# Patient Record
Sex: FEMALE | Race: OTHER | NOT HISPANIC OR LATINO | URBAN - METROPOLITAN AREA
[De-identification: names, ages, dates, MRNs, and addresses within clinical notes are randomized per-mention and may not be internally consistent; named-entity substitution may affect disease eponyms.]

---

## 2021-09-03 ENCOUNTER — INPATIENT (INPATIENT)
Facility: HOSPITAL | Age: 76
LOS: 5 days | Discharge: NURSING, REHAB OR LONG-TERM CARE | DRG: 552 | End: 2021-09-09
Attending: INTERNAL MEDICINE | Admitting: INTERNAL MEDICINE
Payer: MEDICARE

## 2021-09-03 VITALS
HEIGHT: 60 IN | OXYGEN SATURATION: 96 % | RESPIRATION RATE: 17 BRPM | DIASTOLIC BLOOD PRESSURE: 95 MMHG | HEART RATE: 94 BPM | TEMPERATURE: 98 F | WEIGHT: 139.99 LBS | SYSTOLIC BLOOD PRESSURE: 173 MMHG

## 2021-09-03 DIAGNOSIS — R33.9 RETENTION OF URINE, UNSPECIFIED: ICD-10-CM

## 2021-09-03 DIAGNOSIS — S32.9XXA FRACTURE OF UNSPECIFIED PARTS OF LUMBOSACRAL SPINE AND PELVIS, INITIAL ENCOUNTER FOR CLOSED FRACTURE: ICD-10-CM

## 2021-09-03 DIAGNOSIS — S32.10XA UNSPECIFIED FRACTURE OF SACRUM, INITIAL ENCOUNTER FOR CLOSED FRACTURE: ICD-10-CM

## 2021-09-03 DIAGNOSIS — I10 ESSENTIAL (PRIMARY) HYPERTENSION: ICD-10-CM

## 2021-09-03 DIAGNOSIS — E87.1 HYPO-OSMOLALITY AND HYPONATREMIA: ICD-10-CM

## 2021-09-03 DIAGNOSIS — Z29.9 ENCOUNTER FOR PROPHYLACTIC MEASURES, UNSPECIFIED: ICD-10-CM

## 2021-09-03 LAB
ANION GAP SERPL CALC-SCNC: 7 MMOL/L — SIGNIFICANT CHANGE UP (ref 5–17)
ANION GAP SERPL CALC-SCNC: 8 MMOL/L — SIGNIFICANT CHANGE UP (ref 5–17)
APPEARANCE UR: CLEAR — SIGNIFICANT CHANGE UP
BASOPHILS # BLD AUTO: 0 K/UL — SIGNIFICANT CHANGE UP (ref 0–0.2)
BASOPHILS NFR BLD AUTO: 0 % — SIGNIFICANT CHANGE UP (ref 0–2)
BILIRUB UR-MCNC: NEGATIVE — SIGNIFICANT CHANGE UP
BUN SERPL-MCNC: 5 MG/DL — LOW (ref 7–18)
BUN SERPL-MCNC: 7 MG/DL — SIGNIFICANT CHANGE UP (ref 7–18)
CALCIUM SERPL-MCNC: 8.1 MG/DL — LOW (ref 8.4–10.5)
CALCIUM SERPL-MCNC: 9.1 MG/DL — SIGNIFICANT CHANGE UP (ref 8.4–10.5)
CHLORIDE SERPL-SCNC: 86 MMOL/L — LOW (ref 96–108)
CHLORIDE SERPL-SCNC: 93 MMOL/L — LOW (ref 96–108)
CO2 SERPL-SCNC: 27 MMOL/L — SIGNIFICANT CHANGE UP (ref 22–31)
CO2 SERPL-SCNC: 28 MMOL/L — SIGNIFICANT CHANGE UP (ref 22–31)
COLOR SPEC: YELLOW — SIGNIFICANT CHANGE UP
CREAT SERPL-MCNC: 0.2 MG/DL — LOW (ref 0.5–1.3)
CREAT SERPL-MCNC: 0.31 MG/DL — LOW (ref 0.5–1.3)
DIFF PNL FLD: NEGATIVE — SIGNIFICANT CHANGE UP
EOSINOPHIL # BLD AUTO: 0 K/UL — SIGNIFICANT CHANGE UP (ref 0–0.5)
EOSINOPHIL NFR BLD AUTO: 0 % — SIGNIFICANT CHANGE UP (ref 0–6)
GLUCOSE SERPL-MCNC: 83 MG/DL — SIGNIFICANT CHANGE UP (ref 70–99)
GLUCOSE SERPL-MCNC: 98 MG/DL — SIGNIFICANT CHANGE UP (ref 70–99)
GLUCOSE UR QL: NEGATIVE — SIGNIFICANT CHANGE UP
HCT VFR BLD CALC: 33.8 % — LOW (ref 34.5–45)
HGB BLD-MCNC: 12.1 G/DL — SIGNIFICANT CHANGE UP (ref 11.5–15.5)
KETONES UR-MCNC: ABNORMAL
LEUKOCYTE ESTERASE UR-ACNC: ABNORMAL
LYMPHOCYTES # BLD AUTO: 0.56 K/UL — LOW (ref 1–3.3)
LYMPHOCYTES # BLD AUTO: 9 % — LOW (ref 13–44)
MCHC RBC-ENTMCNC: 30.6 PG — SIGNIFICANT CHANGE UP (ref 27–34)
MCHC RBC-ENTMCNC: 35.8 GM/DL — SIGNIFICANT CHANGE UP (ref 32–36)
MCV RBC AUTO: 85.4 FL — SIGNIFICANT CHANGE UP (ref 80–100)
MONOCYTES # BLD AUTO: 0.44 K/UL — SIGNIFICANT CHANGE UP (ref 0–0.9)
MONOCYTES NFR BLD AUTO: 7 % — SIGNIFICANT CHANGE UP (ref 2–14)
NEUTROPHILS # BLD AUTO: 3.89 K/UL — SIGNIFICANT CHANGE UP (ref 1.8–7.4)
NEUTROPHILS NFR BLD AUTO: 62 % — SIGNIFICANT CHANGE UP (ref 43–77)
NITRITE UR-MCNC: NEGATIVE — SIGNIFICANT CHANGE UP
PH UR: 8 — SIGNIFICANT CHANGE UP (ref 5–8)
PLATELET # BLD AUTO: 306 K/UL — SIGNIFICANT CHANGE UP (ref 150–400)
POTASSIUM SERPL-MCNC: 3.3 MMOL/L — LOW (ref 3.5–5.3)
POTASSIUM SERPL-MCNC: 3.4 MMOL/L — LOW (ref 3.5–5.3)
POTASSIUM SERPL-SCNC: 3.3 MMOL/L — LOW (ref 3.5–5.3)
POTASSIUM SERPL-SCNC: 3.4 MMOL/L — LOW (ref 3.5–5.3)
PROT UR-MCNC: 15
RBC # BLD: 3.96 M/UL — SIGNIFICANT CHANGE UP (ref 3.8–5.2)
RBC # FLD: 12 % — SIGNIFICANT CHANGE UP (ref 10.3–14.5)
SARS-COV-2 RNA SPEC QL NAA+PROBE: SIGNIFICANT CHANGE UP
SODIUM SERPL-SCNC: 121 MMOL/L — LOW (ref 135–145)
SODIUM SERPL-SCNC: 128 MMOL/L — LOW (ref 135–145)
SP GR SPEC: 1.01 — SIGNIFICANT CHANGE UP (ref 1.01–1.02)
UROBILINOGEN FLD QL: NEGATIVE — SIGNIFICANT CHANGE UP
WBC # BLD: 6.27 K/UL — SIGNIFICANT CHANGE UP (ref 3.8–10.5)
WBC # FLD AUTO: 6.27 K/UL — SIGNIFICANT CHANGE UP (ref 3.8–10.5)

## 2021-09-03 PROCEDURE — 99285 EMERGENCY DEPT VISIT HI MDM: CPT | Mod: CS

## 2021-09-03 PROCEDURE — 71045 X-RAY EXAM CHEST 1 VIEW: CPT | Mod: 26

## 2021-09-03 PROCEDURE — 76376 3D RENDER W/INTRP POSTPROCES: CPT | Mod: 26

## 2021-09-03 PROCEDURE — 72192 CT PELVIS W/O DYE: CPT | Mod: 26,MA

## 2021-09-03 PROCEDURE — 73700 CT LOWER EXTREMITY W/O DYE: CPT | Mod: 26,LT

## 2021-09-03 RX ORDER — ENOXAPARIN SODIUM 100 MG/ML
40 INJECTION SUBCUTANEOUS DAILY
Refills: 0 | Status: DISCONTINUED | OUTPATIENT
Start: 2021-09-03 | End: 2021-09-09

## 2021-09-03 RX ORDER — MORPHINE SULFATE 50 MG/1
1 CAPSULE, EXTENDED RELEASE ORAL EVERY 6 HOURS
Refills: 0 | Status: DISCONTINUED | OUTPATIENT
Start: 2021-09-03 | End: 2021-09-04

## 2021-09-03 RX ORDER — ACETAMINOPHEN 500 MG
650 TABLET ORAL EVERY 4 HOURS
Refills: 0 | Status: DISCONTINUED | OUTPATIENT
Start: 2021-09-03 | End: 2021-09-04

## 2021-09-03 RX ORDER — OXYCODONE AND ACETAMINOPHEN 5; 325 MG/1; MG/1
1 TABLET ORAL EVERY 4 HOURS
Refills: 0 | Status: DISCONTINUED | OUTPATIENT
Start: 2021-09-03 | End: 2021-09-04

## 2021-09-03 RX ORDER — HYDROMORPHONE HYDROCHLORIDE 2 MG/ML
1 INJECTION INTRAMUSCULAR; INTRAVENOUS; SUBCUTANEOUS
Refills: 0 | Status: DISCONTINUED | OUTPATIENT
Start: 2021-09-03 | End: 2021-09-03

## 2021-09-03 RX ORDER — MORPHINE SULFATE 50 MG/1
0.5 CAPSULE, EXTENDED RELEASE ORAL ONCE
Refills: 0 | Status: DISCONTINUED | OUTPATIENT
Start: 2021-09-03 | End: 2021-09-03

## 2021-09-03 RX ORDER — POTASSIUM CHLORIDE 20 MEQ
40 PACKET (EA) ORAL ONCE
Refills: 0 | Status: COMPLETED | OUTPATIENT
Start: 2021-09-03 | End: 2021-09-03

## 2021-09-03 RX ORDER — LIDOCAINE 4 G/100G
1 CREAM TOPICAL DAILY
Refills: 0 | Status: DISCONTINUED | OUTPATIENT
Start: 2021-09-03 | End: 2021-09-09

## 2021-09-03 RX ORDER — IBUPROFEN 200 MG
600 TABLET ORAL ONCE
Refills: 0 | Status: COMPLETED | OUTPATIENT
Start: 2021-09-03 | End: 2021-09-03

## 2021-09-03 RX ORDER — ACETAMINOPHEN 500 MG
1000 TABLET ORAL EVERY 8 HOURS
Refills: 0 | Status: DISCONTINUED | OUTPATIENT
Start: 2021-09-03 | End: 2021-09-03

## 2021-09-03 RX ORDER — SODIUM CHLORIDE 9 MG/ML
1000 INJECTION INTRAMUSCULAR; INTRAVENOUS; SUBCUTANEOUS
Refills: 0 | Status: DISCONTINUED | OUTPATIENT
Start: 2021-09-03 | End: 2021-09-03

## 2021-09-03 RX ORDER — SODIUM CHLORIDE 9 MG/ML
1000 INJECTION, SOLUTION INTRAVENOUS
Refills: 0 | Status: DISCONTINUED | OUTPATIENT
Start: 2021-09-03 | End: 2021-09-04

## 2021-09-03 RX ADMIN — SODIUM CHLORIDE 75 MILLILITER(S): 9 INJECTION INTRAMUSCULAR; INTRAVENOUS; SUBCUTANEOUS at 20:30

## 2021-09-03 RX ADMIN — Medication 600 MILLIGRAM(S): at 12:43

## 2021-09-03 RX ADMIN — MORPHINE SULFATE 1 MILLIGRAM(S): 50 CAPSULE, EXTENDED RELEASE ORAL at 01:45

## 2021-09-03 RX ADMIN — Medication 600 MILLIGRAM(S): at 13:00

## 2021-09-03 NOTE — ED PROVIDER NOTE - PHYSICAL EXAMINATION
Left hip area tenderness.  no bony deformities, no leg length discrepancy, femoral and pedal pulses intact, cap refill  < 2 secs. Left hip and lower back area tenderness.  no bony deformities, no leg length discrepancy, femoral and pedal pulses intact, cap refill  < 2 secs.  No saddle anesthesia, B/L knee, pedal and EHL flex and ext intact.

## 2021-09-03 NOTE — ED ADULT NURSE NOTE - NSIMPLEMENTINTERV_GEN_ALL_ED
Implemented All Fall with Harm Risk Interventions:  [place] to call system. Call bell, personal items and telephone within reach. Instruct patient to call for assistance. Room bathroom lighting operational. Non-slip footwear when patient is off stretcher. Physically safe environment: no spills, clutter or unnecessary equipment. Stretcher in lowest position, wheels locked, appropriate side rails in place. Provide visual cue, wrist band, yellow gown, etc. Monitor gait and stability. Monitor for mental status changes and reorient to person, place, and time. Review medications for side effects contributing to fall risk. Reinforce activity limits and safety measures with patient and family. Provide visual clues: red socks.

## 2021-09-03 NOTE — H&P ADULT - NSHPLABSRESULTS_GEN_ALL_CORE
CBC wnl  Na 121, Cl 86, K 3.4  UA: trace LE, 6-10 WBCs      CT Pelvis, CT 3D reconstruction:    OSSEOUS STRUCTURES: Mildly decreased bone mineralization. There are nondisplaced fractures of the bilateral sacral ala that traverse the midline at the S1 level. Acute bilateral L5 transverse process fractures. No additional acute fracture is seen. Chronic as for fracture Moderate to severe bilateral hip joint space narrowing is noted. There is spurring and productive change at the pubic symphysis. Chronic erosive change at the pubic symphysis. Spurring the sacroiliac joints. Moderate chronic compression deformities of the L4 and L5 vertebra.  SYNOVIUM/ JOINT FLUID: No hip joint effusion  TENDONS: Limited by CT technique. No full-thickness tendon tear or retraction.  MUSCLES: No intramuscular hematoma  NEUROVASCULAR STRUCTURES: Mild vascular calcifications are noted.  INTRAPELVIC SOFT TISSUES: Moderate distention of the urinary bladder. Diastases of the rectus abdominis muscles.  SUBCUTANEOUS SOFT TISSUES: No soft tissue swelling.    3-D reformatted imaging confirms these findings.    IMPRESSION:    Bilateral sacral alar fractures which cross midline at S1. Acute bilateral L5 transverse process fractures.  Chronic fracture deformities of the L4 and L5 vertebra.  Degenerative changes as above.  Moderate distention of the urinary bladder.

## 2021-09-03 NOTE — ED PROVIDER NOTE - CARE PLAN
1 Principal Discharge DX:	Sacral fracture  Secondary Diagnosis:	Lumbar transverse process fracture

## 2021-09-03 NOTE — H&P ADULT - PROBLEM SELECTOR PLAN 4
/95 in ED  on Losartan-HTCZ at home (not in pharmacy records) /95 in ED  on Losartan 50-HTCZ and lisinopril 40mg at home (not in pharmacy records)  If needed can add these anti HTNs meds

## 2021-09-03 NOTE — H&P ADULT - NSHPREVIEWOFSYSTEMS_GEN_ALL_CORE
Denied fever, chills, chest pain, shortness of breath, cough, abdominal pain, urinary incontinence, dysuria, burning urination, nausea, vomiting or diarrhea. Denied fever, chills, chest pain, shortness of breath, cough, abdominal pain, nausea, vomiting or diarrhea, urinary incontinence, dysuria, burning urination,

## 2021-09-03 NOTE — H&P ADULT - ATTENDING COMMENTS
Patient is a 76 year-old f, from home, ambulates with a walker at baseline with a PMH of HTN presents to the ER on 9/3/21 with c/o lower back pain since 7/28/21. As per patient's son, patient fell on her left side while walking on the street on 07/28/21. No head injury or loss of consciousness. Initially, her pain was tolerated and relieved by Tylenol. Her pain was getting worse 2 weeks ago and patient was prescribed percocet by her primary care provider on 8/20/21. Patient also had a X-ray outpatient. However, her pain still persists and she becomes immobilize and bed-bound.     # ACUTE FRACTURE B/L SACRAL ALAR FX W/ CROSS AT MIDLINE OF S1, B/L L5 TRANSVERSE PROCESS FX, CHRONIC FRACTURES OF L4 AND L5  - ORTHOPEDIC SX RECOMMENDATION FOR CONSERVATIVE MGMT  - PRN PAIN CONTROL; PAIN MGMT CONSULT  - F/U PT EVAL    # R/O LEFT FEMOR FRACTURE - F/U CT LEFT HIP/FEMUR    # ACUTE HYPONATREMIA - SUSPECT HYPOVOLEMIC HYPONATREMIA S/T POOR PO INTAKE + ? HCTZ [SO WILL BRING HOME ANTI-HTN TO CONFIRM] VS. R/O SIADH  - F/U URINE LYTES, U NA, U OSM, SERUM OSM  - TRIAL OF IVF  - GOAL NA < 8-10 CORRECTION IN 24 HOURS  - NEPHROLOGY CONSULT DR. FARRELL    # LEFT LOWER LOBE ? MASS VS. EFFUSION VS. CONSOLIDATION - NO ACUTE LEUKOCYTOSIS, COUGH, FEVER - F/U CT CHEST TO FURTHER CHARACTERIZE, F/U BCX    # HYPOKALEMIA - REPLETING WITH SUPPLEMENT    # HTN    # CASE D/W SON AT BEDSIDE    # GI AND DVT PPX

## 2021-09-03 NOTE — H&P ADULT - PROBLEM SELECTOR PLAN 2
-Na 121  -Cl 86  -IVF NS 100c/hr  ->f/u urine lytes  ->f/u BMP Q4  Nephrology Radha berrios -Na 121  -Cl 86  -IVF NS 75c/hr  ->f/u urine lytes  ->f/u BMP Q4  Nephrology Radha berrios -Na 121  -Cl 86  -IVF NS 75c/hr  ->f/u urine lytes  ->f/u BMP Q4 (correctonly to 127 to 129mEQ in 24 hours), adjust fluids as needed  Nephrology Siriki consulted

## 2021-09-03 NOTE — ED PROVIDER NOTE - PROGRESS NOTE DETAILS
Signed out from Dr. Gerber pending labs then Medicine admit  Labs significant for Na 121 but patient is at mental baseline, will proceed with Medicine admission.

## 2021-09-03 NOTE — H&P ADULT - NSHPPHYSICALEXAM_GEN_ALL_CORE
T(C): 36.5 (09-03-21 @ 19:30), Max: 36.8 (09-03-21 @ 09:45)  HR: 92 (09-03-21 @ 19:30) (92 - 94)  BP: 128/69 (09-03-21 @ 19:30) (128/69 - 173/95)  RR: 18 (09-03-21 @ 19:30) (17 - 18)  SpO2: 98% (09-03-21 @ 19:30) (96% - 99%)    GENERAL: Thin, elderly woman, no acute distress, appropriate, pleasant  EYES: sclera clear, no exudates  ENMT: oropharynx clear without erythema, no exudates, moist mucous membranes  NECK: supple, soft, no thyromegaly noted  LUNGS: good air entry bilaterally, clear to auscultation, symmetric breath sounds, no wheezing, rhonchi or rales appreciated  HEART: S1/S2, regular rate and rhythm, no murmurs noted, +1 lower extremity edema to midcalf  GASTROINTESTINAL: abdomen is soft, nontender, nondistended, normoactive bowel sounds, no palpable masses  INTEGUMENT: good skin turgor, no lesions noted  MUSCULOSKELETAL: TTP to left hip, lower back, movement restricted d/t pain  NEUROLOGIC: AAO x3, good muscle tone in 4 extremities, no focal neurological deficits  PSYCHIATRIC: mood is good, affect is congruent, linear and logical thought process  HEME/LYMPH: no palpable supraclavicular nodules, no obvious ecchymosis or petechiae

## 2021-09-03 NOTE — H&P ADULT - ASSESSMENT
76 YOF with PMH of HTN, sp with stable pelvic fracture admitted to medicine for PT/rehab palcement. Surgical intervention not warranted per ortho

## 2021-09-03 NOTE — ED PROVIDER NOTE - CLINICAL SUMMARY MEDICAL DECISION MAKING FREE TEXT BOX
CT reported Bilateral sacral alar fractures which cross midline at S1. Acute bilateral L5 transverse process fractures.  Chronic fracture deformities of the L4 and L5 vertebra. Degenerative changes as above.  Pt states unable to ambulate without walker due to pain.  3pm- Pt's son is not in ED, attempted to call pt. Ortho PA Mike endorsed and will evaluate pt. CT reported Bilateral sacral alar fractures which cross midline at S1. Acute bilateral L5 transverse process fractures.  Chronic fracture deformities of the L4 and L5 vertebra. Degenerative changes as above.  Pt states unable to ambulate without walker due to pain.  3pm- Pt's son is not in ED, attempted to call pt. Ortho RADHA Mckeon endorsed and will evaluate pt.  345p- I spoke to pt's son requesting mother be admitted for progressive apin despite walker use and unable to carry out DALs.  Dr. MELBA Kimbrough endorsed will admit for PT/rehab. Ortho PA also evaluated pt. Surgical intervention not warranted.

## 2021-09-03 NOTE — H&P ADULT - PROBLEM SELECTOR PLAN 1
s/p Fall 7/28  -Bilateral sacral alar fractures which cross midline at S1. Acute bilateral L5 transverse process fractures.  -Chronic fracture deformities of the L4 and L5 vertebra. Degenerative changes as above.  -Pt states unable to ambulate without walker due to pain.  Ortho: rec Conservative management  - Pain control  - Daily PT - WBAT to lower extremities with appropriate device   - Patient is orthopedically stable for discharge  - Patient may follow up with Dr. Beach after discharge     ->f/u CT L femur  ->0.5 morphine for pain s/p Fall 7/28  -Bilateral sacral alar fractures which cross midline at S1. Acute bilateral L5 transverse process fractures.  -Chronic fracture deformities of the L4 and L5 vertebra. Degenerative changes as above.  -Pt states unable to ambulate without walker due to pain.  Ortho: rec Conservative management  - Pain control  - Daily PT - WBAT to lower extremities with appropriate device   - Patient is orthopedically stable for discharge  - Patient may follow up with Dr. Beach after discharge   Pain consult ordered   ->f/u CT L femur  ->0.5 morphine for pain s/p Fall 7/28  -Bilateral sacral alar fractures which cross midline at S1. Acute bilateral L5 transverse process fractures.  -Chronic fracture deformities of the L4 and L5 vertebra. Degenerative changes as above.  -Pt states unable to ambulate without walker due to pain.  Ortho: rec Conservative management  - Pain control  - Daily PT - WBAT to lower extremities with appropriate device   - Patient is orthopedically stable for discharge  - Patient may follow up with Dr. Beach after discharge   Pain consult ordered   ->f/u CT L femur  ->s/p 0.5 morphine for pain in ED  - started on tylenol. percocet and dilaudid for pain control

## 2021-09-03 NOTE — CONSULT NOTE ADULT - ATTENDING COMMENTS
ORTHO ATTENDING  I saw and evaluated pt in ER and reviewed CT of pelvis which shows non-displaced fractures of sacrum and bilateral L5 transverse process fractures.  Chronic L4 and L5 compression fractures are present. Per son per H&P pt ambulates with walker and fell in street 7/28/21 and come to ER due to continued lower back and hip pain. Has tenderness across posterior pelvis and sacrum with little groin pain on hip rotation. Recommendation is for pain control and mobilization with PT ambulation WBAT. Pain should improve as fractures heal further.

## 2021-09-03 NOTE — H&P ADULT - PROBLEM SELECTOR PLAN 3
bates catheter  ->monitor I's/O's Pt was retaining urine  bates catheter inserted  ->monitor I's/O's

## 2021-09-03 NOTE — H&P ADULT - HISTORY OF PRESENT ILLNESS
Patient is a 76 year-old f, from home, ambulates with a walker at baseline with a PMH of HTN presents to the ER on 9/3/21 with c/o lower back pain since 7/28/21. As per patient's son, patient fell on her left side while walking on the street on 07/28/21. No head injury or loss of consciousness. Initially, her pain was tolerated and relieved by Tylenol. Her pain is getting worse 2 weeks ago and patient was prescribed analgesia and muscle relaxant by her primary care provider on 8/20/21. Patient also had a X-ray outpatient. However, her pain still persists and she becomes immobilize and bed-bound.  Patient is a 76 year-old f, from home, ambulates with a walker at baseline with a PMH of HTN presents to the ER on 9/3/21 with c/o lower back pain since 7/28/21. As per patient's son, patient fell on her left side while walking on the street on 07/28/21. No head injury or loss of consciousness. Initially, her pain was tolerated and relieved by Tylenol. Her pain was getting worse 2 weeks ago and patient was prescribed percocet by her primary care provider on 8/20/21. Patient also had a X-ray outpatient. However, her pain still persists and she becomes immobilize and bed-bound.

## 2021-09-03 NOTE — ED PROVIDER NOTE - OBJECTIVE STATEMENT
Son as Bria  requested by pt.  Pt fell when wheel fell off her walker on 7/28/21 and landed on left hip area.  No head trauma or LOC. Pt has persistent left hip are tenderness. Noncompliant with prescribed analgesia and muscle relaxant due to drowsiness.  Meds Lisinopril, Losartan

## 2021-09-04 DIAGNOSIS — M79.605 PAIN IN LEFT LEG: ICD-10-CM

## 2021-09-04 LAB
A1C WITH ESTIMATED AVERAGE GLUCOSE RESULT: 5.6 % — SIGNIFICANT CHANGE UP (ref 4–5.6)
ALBUMIN SERPL ELPH-MCNC: 3.1 G/DL — LOW (ref 3.5–5)
ALP SERPL-CCNC: 175 U/L — HIGH (ref 40–120)
ALT FLD-CCNC: 19 U/L DA — SIGNIFICANT CHANGE UP (ref 10–60)
ANION GAP SERPL CALC-SCNC: 4 MMOL/L — LOW (ref 5–17)
ANION GAP SERPL CALC-SCNC: 5 MMOL/L — SIGNIFICANT CHANGE UP (ref 5–17)
ANION GAP SERPL CALC-SCNC: 7 MMOL/L — SIGNIFICANT CHANGE UP (ref 5–17)
AST SERPL-CCNC: 19 U/L — SIGNIFICANT CHANGE UP (ref 10–40)
BILIRUB SERPL-MCNC: 0.3 MG/DL — SIGNIFICANT CHANGE UP (ref 0.2–1.2)
BUN SERPL-MCNC: 5 MG/DL — LOW (ref 7–18)
BUN SERPL-MCNC: 6 MG/DL — LOW (ref 7–18)
BUN SERPL-MCNC: 9 MG/DL — SIGNIFICANT CHANGE UP (ref 7–18)
CALCIUM SERPL-MCNC: 8 MG/DL — LOW (ref 8.4–10.5)
CALCIUM SERPL-MCNC: 8.3 MG/DL — LOW (ref 8.4–10.5)
CALCIUM SERPL-MCNC: 8.5 MG/DL — SIGNIFICANT CHANGE UP (ref 8.4–10.5)
CHLORIDE SERPL-SCNC: 94 MMOL/L — LOW (ref 96–108)
CHLORIDE SERPL-SCNC: 95 MMOL/L — LOW (ref 96–108)
CHLORIDE SERPL-SCNC: 95 MMOL/L — LOW (ref 96–108)
CHLORIDE UR-SCNC: 62 MMOL/L — SIGNIFICANT CHANGE UP
CHOLEST SERPL-MCNC: 164 MG/DL — SIGNIFICANT CHANGE UP
CO2 SERPL-SCNC: 28 MMOL/L — SIGNIFICANT CHANGE UP (ref 22–31)
CO2 SERPL-SCNC: 29 MMOL/L — SIGNIFICANT CHANGE UP (ref 22–31)
CO2 SERPL-SCNC: 29 MMOL/L — SIGNIFICANT CHANGE UP (ref 22–31)
COVID-19 SPIKE DOMAIN AB INTERP: POSITIVE
COVID-19 SPIKE DOMAIN ANTIBODY RESULT: >250 U/ML — HIGH
CREAT ?TM UR-MCNC: 152 MG/DL — SIGNIFICANT CHANGE UP
CREAT ?TM UR-MCNC: 97 MG/DL — SIGNIFICANT CHANGE UP
CREAT ?TM UR-MCNC: <13 MG/DL — SIGNIFICANT CHANGE UP
CREAT SERPL-MCNC: 0.23 MG/DL — LOW (ref 0.5–1.3)
CREAT SERPL-MCNC: 0.25 MG/DL — LOW (ref 0.5–1.3)
CREAT SERPL-MCNC: 0.3 MG/DL — LOW (ref 0.5–1.3)
ESTIMATED AVERAGE GLUCOSE: 114 MG/DL — SIGNIFICANT CHANGE UP (ref 68–114)
GLUCOSE SERPL-MCNC: 121 MG/DL — HIGH (ref 70–99)
GLUCOSE SERPL-MCNC: 122 MG/DL — HIGH (ref 70–99)
GLUCOSE SERPL-MCNC: 93 MG/DL — SIGNIFICANT CHANGE UP (ref 70–99)
HCT VFR BLD CALC: 31.4 % — LOW (ref 34.5–45)
HDLC SERPL-MCNC: 79 MG/DL — SIGNIFICANT CHANGE UP
HGB BLD-MCNC: 10.9 G/DL — LOW (ref 11.5–15.5)
LIPID PNL WITH DIRECT LDL SERPL: 78 MG/DL — SIGNIFICANT CHANGE UP
MAGNESIUM SERPL-MCNC: 1.7 MG/DL — SIGNIFICANT CHANGE UP (ref 1.6–2.6)
MCHC RBC-ENTMCNC: 30.3 PG — SIGNIFICANT CHANGE UP (ref 27–34)
MCHC RBC-ENTMCNC: 34.7 GM/DL — SIGNIFICANT CHANGE UP (ref 32–36)
MCV RBC AUTO: 87.2 FL — SIGNIFICANT CHANGE UP (ref 80–100)
NON HDL CHOLESTEROL: 85 MG/DL — SIGNIFICANT CHANGE UP
NRBC # BLD: 0 /100 WBCS — SIGNIFICANT CHANGE UP (ref 0–0)
OSMOLALITY SERPL: 262 MOSMOL/KG — LOW (ref 280–301)
OSMOLALITY UR: 183 MOS/KG — SIGNIFICANT CHANGE UP (ref 50–1200)
OSMOLALITY UR: 407 MOS/KG — SIGNIFICANT CHANGE UP (ref 50–1200)
OSMOLALITY UR: 540 MOS/KG — SIGNIFICANT CHANGE UP (ref 50–1200)
PHOSPHATE SERPL-MCNC: 3.2 MG/DL — SIGNIFICANT CHANGE UP (ref 2.5–4.5)
PLATELET # BLD AUTO: 283 K/UL — SIGNIFICANT CHANGE UP (ref 150–400)
POTASSIUM SERPL-MCNC: 3.6 MMOL/L — SIGNIFICANT CHANGE UP (ref 3.5–5.3)
POTASSIUM SERPL-MCNC: 3.9 MMOL/L — SIGNIFICANT CHANGE UP (ref 3.5–5.3)
POTASSIUM SERPL-MCNC: 4.3 MMOL/L — SIGNIFICANT CHANGE UP (ref 3.5–5.3)
POTASSIUM SERPL-SCNC: 3.6 MMOL/L — SIGNIFICANT CHANGE UP (ref 3.5–5.3)
POTASSIUM SERPL-SCNC: 3.9 MMOL/L — SIGNIFICANT CHANGE UP (ref 3.5–5.3)
POTASSIUM SERPL-SCNC: 4.3 MMOL/L — SIGNIFICANT CHANGE UP (ref 3.5–5.3)
POTASSIUM UR-SCNC: 52 MMOL/L — SIGNIFICANT CHANGE UP
POTASSIUM UR-SCNC: 81 MMOL/L — SIGNIFICANT CHANGE UP
PROT SERPL-MCNC: 6.4 G/DL — SIGNIFICANT CHANGE UP (ref 6–8.3)
RBC # BLD: 3.6 M/UL — LOW (ref 3.8–5.2)
RBC # FLD: 12.3 % — SIGNIFICANT CHANGE UP (ref 10.3–14.5)
SARS-COV-2 IGG+IGM SERPL QL IA: >250 U/ML — HIGH
SARS-COV-2 IGG+IGM SERPL QL IA: POSITIVE
SODIUM SERPL-SCNC: 128 MMOL/L — LOW (ref 135–145)
SODIUM SERPL-SCNC: 129 MMOL/L — LOW (ref 135–145)
SODIUM SERPL-SCNC: 129 MMOL/L — LOW (ref 135–145)
SODIUM UR-SCNC: 24 MMOL/L — SIGNIFICANT CHANGE UP
SODIUM UR-SCNC: 32 MMOL/L — SIGNIFICANT CHANGE UP
SODIUM UR-SCNC: 58 MMOL/L — SIGNIFICANT CHANGE UP
TRIGL SERPL-MCNC: 35 MG/DL — SIGNIFICANT CHANGE UP
TSH SERPL-MCNC: 1.41 UU/ML — SIGNIFICANT CHANGE UP (ref 0.34–4.82)
URATE SERPL-MCNC: 1.7 MG/DL — LOW (ref 2.5–7)
WBC # BLD: 5.38 K/UL — SIGNIFICANT CHANGE UP (ref 3.8–10.5)
WBC # FLD AUTO: 5.38 K/UL — SIGNIFICANT CHANGE UP (ref 3.8–10.5)

## 2021-09-04 PROCEDURE — 71250 CT THORAX DX C-: CPT | Mod: 26

## 2021-09-04 PROCEDURE — 99222 1ST HOSP IP/OBS MODERATE 55: CPT

## 2021-09-04 RX ORDER — LOSARTAN POTASSIUM 100 MG/1
50 TABLET, FILM COATED ORAL DAILY
Refills: 0 | Status: DISCONTINUED | OUTPATIENT
Start: 2021-09-04 | End: 2021-09-09

## 2021-09-04 RX ORDER — GABAPENTIN 400 MG/1
100 CAPSULE ORAL EVERY 8 HOURS
Refills: 0 | Status: DISCONTINUED | OUTPATIENT
Start: 2021-09-04 | End: 2021-09-04

## 2021-09-04 RX ORDER — SENNA PLUS 8.6 MG/1
2 TABLET ORAL AT BEDTIME
Refills: 0 | Status: DISCONTINUED | OUTPATIENT
Start: 2021-09-04 | End: 2021-09-09

## 2021-09-04 RX ORDER — OXYCODONE HYDROCHLORIDE 5 MG/1
5 TABLET ORAL EVERY 4 HOURS
Refills: 0 | Status: DISCONTINUED | OUTPATIENT
Start: 2021-09-04 | End: 2021-09-06

## 2021-09-04 RX ORDER — ACETAMINOPHEN 500 MG
1000 TABLET ORAL EVERY 8 HOURS
Refills: 0 | Status: COMPLETED | OUTPATIENT
Start: 2021-09-04 | End: 2021-09-07

## 2021-09-04 RX ORDER — LISINOPRIL 2.5 MG/1
40 TABLET ORAL DAILY
Refills: 0 | Status: DISCONTINUED | OUTPATIENT
Start: 2021-09-04 | End: 2021-09-04

## 2021-09-04 RX ORDER — SODIUM CHLORIDE 9 MG/ML
1000 INJECTION, SOLUTION INTRAVENOUS
Refills: 0 | Status: DISCONTINUED | OUTPATIENT
Start: 2021-09-04 | End: 2021-09-04

## 2021-09-04 RX ORDER — INFLUENZA VIRUS VACCINE 15; 15; 15; 15 UG/.5ML; UG/.5ML; UG/.5ML; UG/.5ML
0.5 SUSPENSION INTRAMUSCULAR ONCE
Refills: 0 | Status: DISCONTINUED | OUTPATIENT
Start: 2021-09-04 | End: 2021-09-09

## 2021-09-04 RX ORDER — SODIUM CHLORIDE 9 MG/ML
2 INJECTION INTRAMUSCULAR; INTRAVENOUS; SUBCUTANEOUS THREE TIMES A DAY
Refills: 0 | Status: DISCONTINUED | OUTPATIENT
Start: 2021-09-04 | End: 2021-09-08

## 2021-09-04 RX ORDER — GABAPENTIN 400 MG/1
100 CAPSULE ORAL EVERY 12 HOURS
Refills: 0 | Status: DISCONTINUED | OUTPATIENT
Start: 2021-09-04 | End: 2021-09-07

## 2021-09-04 RX ADMIN — OXYCODONE AND ACETAMINOPHEN 1 TABLET(S): 5; 325 TABLET ORAL at 00:29

## 2021-09-04 RX ADMIN — OXYCODONE AND ACETAMINOPHEN 1 TABLET(S): 5; 325 TABLET ORAL at 01:25

## 2021-09-04 RX ADMIN — OXYCODONE AND ACETAMINOPHEN 1 TABLET(S): 5; 325 TABLET ORAL at 09:00

## 2021-09-04 RX ADMIN — LIDOCAINE 1 PATCH: 4 CREAM TOPICAL at 23:40

## 2021-09-04 RX ADMIN — OXYCODONE AND ACETAMINOPHEN 1 TABLET(S): 5; 325 TABLET ORAL at 08:12

## 2021-09-04 RX ADMIN — ENOXAPARIN SODIUM 40 MILLIGRAM(S): 100 INJECTION SUBCUTANEOUS at 11:11

## 2021-09-04 RX ADMIN — MORPHINE SULFATE 1 MILLIGRAM(S): 50 CAPSULE, EXTENDED RELEASE ORAL at 01:26

## 2021-09-04 RX ADMIN — OXYCODONE HYDROCHLORIDE 5 MILLIGRAM(S): 5 TABLET ORAL at 13:40

## 2021-09-04 RX ADMIN — SODIUM CHLORIDE 50 MILLILITER(S): 9 INJECTION, SOLUTION INTRAVENOUS at 00:13

## 2021-09-04 RX ADMIN — Medication 40 MILLIEQUIVALENT(S): at 00:11

## 2021-09-04 RX ADMIN — SODIUM CHLORIDE 2 GRAM(S): 9 INJECTION INTRAMUSCULAR; INTRAVENOUS; SUBCUTANEOUS at 21:37

## 2021-09-04 RX ADMIN — Medication 1000 MILLIGRAM(S): at 21:35

## 2021-09-04 RX ADMIN — LIDOCAINE 1 PATCH: 4 CREAM TOPICAL at 11:11

## 2021-09-04 RX ADMIN — SENNA PLUS 2 TABLET(S): 8.6 TABLET ORAL at 21:35

## 2021-09-04 RX ADMIN — Medication 1000 MILLIGRAM(S): at 22:18

## 2021-09-04 RX ADMIN — GABAPENTIN 100 MILLIGRAM(S): 400 CAPSULE ORAL at 17:12

## 2021-09-04 RX ADMIN — LOSARTAN POTASSIUM 50 MILLIGRAM(S): 100 TABLET, FILM COATED ORAL at 17:12

## 2021-09-04 RX ADMIN — SODIUM CHLORIDE 60 MILLILITER(S): 9 INJECTION, SOLUTION INTRAVENOUS at 06:15

## 2021-09-04 RX ADMIN — OXYCODONE HYDROCHLORIDE 5 MILLIGRAM(S): 5 TABLET ORAL at 14:30

## 2021-09-04 RX ADMIN — LIDOCAINE 1 PATCH: 4 CREAM TOPICAL at 19:16

## 2021-09-04 NOTE — CONSULT NOTE ADULT - SUBJECTIVE AND OBJECTIVE BOX
Source of information: , Chart review, patient  Patient language: Bria  : [id number]    CC: Patient is a 76y old  Female who presents with a chief complaint of Bilateral L5 transverse process and sacral alar fracture (04 Sep 2021 10:35)      HPI:  Patient is a 76 year-old f, from home, ambulates with a walker at baseline with a PMH of HTN presents to the ER on 9/3/21 with c/o lower back pain since 21. As per patient's son, patient fell on her left side while walking on the street on 21. No head injury or loss of consciousness. Initially, her pain was tolerated and relieved by Tylenol. Her pain was getting worse 2 weeks ago and patient was prescribed percocet by her primary care provider on 21. Patient also had a X-ray outpatient. However, her pain still persists and she becomes immobilize and bed-bound.  (03 Sep 2021 19:24)    Pt s/p fall over 1 month ago.  + left hip pain.  Pt now admitted with worsening pain - pt states right leg pian worse than left.  Pain worsens on exertion.  Pt unable to ambulate due to pain.  No nausea or vomiting.  No chest pain or sob.  Imaging shows       PAIN SCORE: 3/10      SCALE USED: (1-10 VNRS)    PAST MEDICAL & SURGICAL HISTORY:  HTN (hypertension)        FAMILY HISTORY:        SOCIAL HISTORY:  [x ] Denies Smoking, Alcohol, or Drug Use    Allergies    penicillin (Rash)    Intolerances        MEDICATIONS:    MEDICATIONS  (STANDING):  enoxaparin Injectable 40 milliGRAM(s) SubCutaneous daily  influenza   Vaccine 0.5 milliLiter(s) IntraMuscular once  lidocaine   4% Patch 1 Patch Transdermal daily    MEDICATIONS  (PRN):      Vital Signs Last 24 Hrs  T(C): 36.4 (04 Sep 2021 05:40), Max: 36.7 (03 Sep 2021 23:14)  T(F): 97.5 (04 Sep 2021 05:40), Max: 98.1 (03 Sep 2021 23:14)  HR: 94 (04 Sep 2021 05:40) (82 - 100)  BP: 169/82 (04 Sep 2021 05:40) (128/69 - 189/94)  BP(mean): --  RR: 18 (04 Sep 2021 05:40) (18 - 18)  SpO2: 98% (04 Sep 2021 05:40) (97% - 100%)    LABS:                          10.9   5.38  )-----------( 283      ( 04 Sep 2021 04:21 )             31.4     -    129<L>  |  95<L>  |  6<L>  ----------------------------<  122<H>  3.9   |  29  |  0.30<L>    Ca    8.5      04 Sep 2021 10:29  Phos  3.2       Mg     1.7         TPro  6.4  /  Alb  3.1<L>  /  TBili  0.3  /  DBili  x   /  AST  19  /  ALT  19  /  AlkPhos  175<H>        LIVER FUNCTIONS - ( 04 Sep 2021 04:21 )  Alb: 3.1 g/dL / Pro: 6.4 g/dL / ALK PHOS: 175 U/L / ALT: 19 U/L DA / AST: 19 U/L / GGT: x           Urinalysis Basic - ( 03 Sep 2021 15:43 )    Color: Yellow / Appearance: Clear / S.010 / pH: x  Gluc: x / Ketone: Small  / Bili: Negative / Urobili: Negative   Blood: x / Protein: 15 / Nitrite: Negative   Leuk Esterase: Trace / RBC: 0-2 /HPF / WBC 6-10 /HPF   Sq Epi: x / Non Sq Epi: Few /HPF / Bacteria: Few /HPF      CAPILLARY BLOOD GLUCOSE          REVIEW OF SYSTEMS:  CONSTITUTIONAL: No fever or fatigue  RESPIRATORY: No cough, wheezing, chills or hemoptysis; No shortness of breath  CARDIOVASCULAR: No chest pain, palpitations, dizziness, or leg swelling  GASTROINTESTINAL: No abdominal or epigastric pain. No nausea, vomiting; No diarrhea or constipation.   GENITOURINARY: No dysuria, frequency, hematuria, retention or incontinence  MUSCULOSKELETAL: No joint pain or swelling; No muscle, back, or extremity pain, no upper or lower motor strength weakness, no saddle anesthesia, bowel/bladder incontinence, no falls   NEURO: No weakness, no numbness   PSYCHIATRIC: No depression, anxiety, mood swings, or difficulty sleeping    PHYSICAL EXAM:  GENERAL:  Alert & Oriented X3, NAD, Good concentration  CHEST/LUNG: Clear to auscultation bilaterally; No rales, rhonchi, wheezing, or rubs  HEART: Regular rate and rhythm; No murmurs, rubs, or gallops  ABDOMEN: Soft, Nontender, Nondistended; Bowel sounds present  : no incontinence, no flank pain  EXTREMITIES:  2+ Peripheral Pulses, No cyanosis, or edema  MUSCULOSKELETAL: Motor Strength 5/5 B/L upper and lower extremities; moves all extremities equally against gravity; ROM intact; negative SRL  NEUROLOGICAL: awake and alert and oriented   SKIN: No rashes or lesions    Radiology:      Drug Screen:  enoxaparin Injectable 40 milliGRAM(s) SubCutaneous daily      < from: CT Pelvis Bony Only No Cont (21 @ 13:58) >    EXAM:  CT 3D RECONSTRUCT [facility]                          EXAM:  CT PELVIS BONY ONLY                            PROCEDURE DATE:  2021          INTERPRETATION:  CT PELVIS BONY, CT 3D RECONSTRUCTION WO WORKSTATION dated 9/3/2021 1:58 PM    INDICATION: Left hip pain    COMPARISON: None available.    TECHNIQUE: CT imaging of the pelvis was performed. The data was reformatted in the axial, coronal, and sagittal planes. Additionally, 3-D reformatted imaging was created.    FINDINGS:    OSSEOUS STRUCTURES: Mildly decreased bone mineralization. There are nondisplaced fractures of the bilateral sacral ala that traverse the midline at the S1 level. Acute bilateral L5 transverse process fractures. No additional acute fracture is seen. Chronic as for fracture Moderate to severe bilateral hip joint space narrowing is noted. There is spurring and productive change at the pubic symphysis. Chronic erosive change at the pubic symphysis. Spurring the sacroiliac joints. Moderate chronic compression deformities of the L4 and L5 vertebra.  SYNOVIUM/ JOINT FLUID: No hip joint effusion  TENDONS: Limited by CT technique. No full-thickness tendon tear or retraction.  MUSCLES: No intramuscular hematoma  NEUROVASCULAR STRUCTURES: Mild vascular calcifications are noted.  INTRAPELVIC SOFT TISSUES: Moderate distention of the urinary bladder. Diastases of the rectus abdominis muscles.  SUBCUTANEOUS SOFT TISSUES: No soft tissue swelling.    3-D reformatted imaging confirms these findings.    IMPRESSION:    Bilateral sacral alar fractures which cross midline at S1. Acute bilateral L5 transverse process fractures.  Chronic fracture deformities of the L4 and L5 vertebra.  Degenerative changes as above.  Moderate distention of the urinary bladder. Correlate clinically.    < end of copied text >      ORT Score   Family Hx of substance abuse	Female	Male  Alcohol 	                                              1              3  Illegal drugs	                                      2              3  Rx drugs                                               4	      4    Personal Hx of substance abuse		  Alcohol 	                                               3	      3  Illegal drugs                                  	       4	      4  Rx drugs                                                5	      5  Age between 16- 45 years	               1             1  hx preadolescent sexual abuse	       3	      0  Psychological disease		  ADD, OCD, bipolar, schizophrenia	2	      2  Depression                                    	1	      1  Score totals 		0  		  a score of 3 or lower indicates low risk for opioid abuse		  a score of 4-7 indicates moderate risk for opioid abuse		  a score of 8 or higher indicates high risk for opioid abuse	    Risk factors associated with adverse outcomes related to opioid treatment  [ ]  Concurrent benzodiazepine use  [ ]  History/ Active substance use or alcohol use disorder  [ ] Psychiatric co-morbidity  [ ] Sleep apnea  [ ] COPD  [ ] BMI> 35  [ ] Liver dysfunction  [ ] Renal dysfunction  [ ] CHF  [ ] Smoker  [x ]  Age > 60 years      [x ]  [place]  Reviewed and Copied to Chart. See below.          
CLINTON LIU  [id number]    Orthopedic Consult:    Orthopedic Diagnosis: 1) Bilateral L5 transverse process fracture                                     2) Bilateral sacral alar fracture      CLINTON [id number]  HPI:  77 y/o F, from home, ambulates with a walker at baseline with a PMHx of HTN, who presents to the ER today c/o low back pain x 1-2 weeks. Patient is Bria speaking,  used ID#[id number]. Patient states that she had a fall recently however does not recall the date or the mechanism, and since the fall she has been having pain in the lower back. Patient states she was seen by her PMD who advised her to go to the ER for pain control and imaging. Patient states the pain is localized to lower back, moderate, non-radiating, partially alleviated with rest. Pt denies Chest pain, B/B changes, SOB, dyspnea, paresthesias, N/V/D, abdominal pain, syncope, or pain anywhere else.       Ambulation: [Walker]    PAST MEDICAL & SURGICAL HISTORY:  HTN (hypertension)        FAMILY HISTORY:      Social History:      Allergies    penicillin (Rash)    Intolerances        Home Medications:      Vital Signs Last 24 Hrs  T(C): 36.3 (03 Sep 2021 16:01), Max: 36.8 (03 Sep 2021 09:45)  T(F): 97.4 (03 Sep 2021 16:01), Max: 98.2 (03 Sep 2021 09:45)  HR: 92 (03 Sep 2021 16:01) (92 - 94)  BP: 167/85 (03 Sep 2021 16:01) (160/81 - 173/95)  BP(mean): --  RR: 18 (03 Sep 2021 16:01) (17 - 18)  SpO2: 97% (03 Sep 2021 16:01) (96% - 99%)  I&O's Summary      Physical Exam:  General: Alert and oriented, NAD, resting comfortably  Musculoskeletal:  Low back: Skin warm and pink. No erythema. No lesions. No step-offs. No midline TTP. Mild TTP noted over left paraspinal region. SILT throughout LE. Able to SLR b/l. Hip flexion 0-80 degrees with 4+/5 strength b/l   Lower extremities: Calves soft and NTTP b/l. SILT. NVI. (+)EHL/FHL/ADF/APF intact bilaterally    Labs:                        12.1   x     )-----------( 306      ( 03 Sep 2021 15:43 )             33.8     09-03    121<L>  |  86<L>  |  5<L>  ----------------------------<  98  3.4<L>   |  28  |  0.31<L>    Ca    9.1      03 Sep 2021 15:43          Radiology:  < from: CT Pelvis Bony Only No Cont (09.03.21 @ 13:58) >  EXAM:  CT 3D RECONSTRUCT [facility]                          EXAM:  CT PELVIS BONY ONLY                            PROCEDURE DATE:  09/03/2021          INTERPRETATION:  CT PELVIS BONY, CT 3D RECONSTRUCTION WO WORKSTATION dated 9/3/2021 1:58 PM    INDICATION: Left hip pain    COMPARISON: None available.    TECHNIQUE: CT imaging of the pelvis was performed. The data was reformatted in the axial, coronal, and sagittal planes. Additionally, 3-D reformatted imaging was created.    FINDINGS:    OSSEOUS STRUCTURES: Mildly decreased bone mineralization. There are nondisplaced fractures of the bilateral sacral ala that traverse the midline at the S1 level. Acute bilateral L5 transverse process fractures. No additional acute fracture is seen. Chronic as for fracture Moderate to severe bilateral hip joint space narrowing is noted. There is spurring and productive change at the pubic symphysis. Chronic erosive change at the pubic symphysis. Spurring the sacroiliac joints. Moderate chronic compression deformities of the L4 and L5 vertebra.  SYNOVIUM/ JOINT FLUID: No hip joint effusion  TENDONS: Limited by CT technique. No full-thickness tendon tear or retraction.  MUSCLES: No intramuscular hematoma  NEUROVASCULAR STRUCTURES: Mild vascular calcifications are noted.  INTRAPELVIC SOFT TISSUES: Moderate distention of the urinary bladder. Diastases of the rectus abdominis muscles.  SUBCUTANEOUS SOFT TISSUES: No soft tissue swelling.    3-D reformatted imaging confirms these findings.    IMPRESSION:    Bilateral sacral alar fractures which cross midline at S1. Acute bilateral L5 transverse process fractures.  Chronic fracture deformities of the L4 and L5 vertebra.  Degenerative changes as above.  Moderate distention of the urinary bladder. Correlate clinically.        Impression: Patient is a 76yFemale with 1) Bilateral L5 transverse process fracture                                                           2) Bilateral sacral alar fracture  Plan:  - Recommendation: [ ] Conservative management [  ] Surgical intervention  - Pain control  - Daily PT - WBAT to lower extremities with appropriate device   - Patient is orthopedically stable for discharge  - Patient may follow up with Dr. Beach after discharge   - Case d/w Dr. Beach   
NEPHROLOGY MEDICAL CARE, [facility] - Dr. Kirby Garcia/ Dr. Jesica Wyatt/ Dr. Dom Vasquez/ Dr. Hemalatha Robins    Patient was seen and examined at bedside.     Consultation requested by:  Mine Duff    Reason for Consult: Hyponatremia.    HPI:  Patient is a 76 year-old f, from home, ambulates with a walker at baseline with a PMH of HTN presents to the ER on 9/3/21 with c/o lower back pain since 21. As per patient's son, patient fell on her left side while walking on the street on 21. No head injury or loss of consciousness. Initially, her pain was tolerated and relieved by Tylenol. Renal consulted for hyponatremia. Patient was admitted with Na around 121 and given NS at 100cc/hr and Na improved to 129meq/l overnight but concern for overcorrection, the patient was placed on d5w at 60cc/hr and this morning Na around 129. Son at beside and spoke with son's relative who is a nephrologist pt was taking losartan and hctz since may. pt has not been eating much and drinking alot of water for past few days.     PMH:   HTN (hypertension)    PSH:   none.    FAMILY HISTORY:  n/c    Social History:  non-smoker/ non-alcoholic     Home Meds:  Home Medications:  lisinopril 40 mg oral tablet: 1 tab(s) orally once a day (03 Sep 2021 19:50)  losartan-hydrochlorothiazide 50mg-12.5mg oral tablet: 1 tab(s) orally once a day (03 Sep 2021 19:50)  Percocet 2.5/325 oral tablet: 1 tab(s) orally once a day, As Needed (03 Sep 2021 19:50)      Allergies:  Allergies    penicillin (Rash)    Intolerances        REVIEW OF SYSTEMS:  CONSTITUTIONAL: No fever; No weight loss; No fatigue  EYES: No eye pain; No visual disturbances; No discharge  ENMT:  No difficulty hearing; No tinnitus;  No vertigo; No sinus; No throat pain  NECK: No pain; No stiffness  BREASTS: No pain; No masses; No nipple discharge  RESPIRATORY: No cough; No wheezing; No chills; No hemoptysis; No shortness of breath  CARDIOVASCULAR: No chest pain; No palpitations; No dizziness; No leg swelling  GASTROINTESTINAL: No abdominal pain; No epigastric pain; No nausea; No vomiting; No hematemesis; No diarrhea; No constipation. No melena   GENITOURINARY: No dysuria No frequency; No hematuria; No incontinence  NEUROLOGICAL: No headaches; No memory loss; No loss of strength; No numbness; No tremors  SKIN: No itching; No burning; No rashes  ENDOCRINE: No heat or cold intolerance; No hair loss  MUSCULOSKELETAL: No joint pain or swelling; No muscle, back, or extremity pain  PSYCHIATRIC: No depression; No anxiety; No mood swings; No difficulty sleeping  HEME/LYMPH: No easy bruising; No bleeding gums  ALLERY AND IMMUNOLOGIC: No hives or eczema    Vital Signs Last 24 Hrs  T(C): 36.4 (04 Sep 2021 05:40), Max: 36.7 (03 Sep 2021 23:14)  T(F): 97.5 (04 Sep 2021 05:40), Max: 98.1 (03 Sep 2021 23:14)  HR: 94 (04 Sep 2021 05:40) (82 - 100)  BP: 169/82 (04 Sep 2021 05:40) (128/69 - 189/94)  BP(mean): --  RR: 18 (04 Sep 2021 05:40) (18 - 18)  SpO2: 98% (04 Sep 2021 05:40) (97% - 100%)     @ 07:01  -   @ 07:00  --------------------------------------------------------  IN: 300 mL / OUT: 1200 mL / NET: -900 mL          PHYSICAL EXAM:  General: No acute respiratory distress.  Eyes: conjunctiva and sclera clear  ENMT: Atraumatic, Normocephalic, supple, No JVD present. Moist mucous membranes  Respiratory: Bilateral clear to percussion; No rales, rhonchi, wheezing  Cardiovassular: S1S2+; no m/r/g  Gastrointestinal: Soft, Non-tender, Nondistended; Bowel sounds present, no hepatosplenomegaly.   Neuro:  Awake, Alert & Oriented X3, No focal deficits present.   Ext:  2+ Peripheral Pulses and pedal edema present, No Cyanosis  Skin: No visible rashes        LABS:                        10.9   5.38  )-----------( 283      ( 04 Sep 2021 04:21 )             31.4         129<L>  |  95<L>  |  6<L>  ----------------------------<  122<H>  3.9   |  29  |  0.30<L>    Ca    8.5      04 Sep 2021 10:29  Phos  3.2       Mg     1.7         TPro  6.4  /  Alb  3.1<L>  /  TBili  0.3  /  DBili  x   /  AST  19  /  ALT  19  /  AlkPhos  175<H>        Urinalysis Basic - ( 03 Sep 2021 15:43 )    Color: Yellow / Appearance: Clear / S.010 / pH: x  Gluc: x / Ketone: Small  / Bili: Negative / Urobili: Negative   Blood: x / Protein: 15 / Nitrite: Negative   Leuk Esterase: Trace / RBC: 0-2 /HPF / WBC 6-10 /HPF   Sq Epi: x / Non Sq Epi: Few /HPF / Bacteria: Few /HPF      Magnesium, Serum: 1.7 mg/dL ( @ 04:21)  Phosphorus Level, Serum: 3.2 mg/dL ( @ 04:21)    Urine studies  Osmolality, Random Urine: 407 mos/kg ( @ 11:32)  Potassium, Random Urine: 52 mmol/L ( @ 11:31)  Sodium, Random Urine: 24 mmol/L ( @ 11:31)  Creatinine, Random Urine: 152 mg/dL ( @ 11:31)  Sodium, Random Urine: 58 mmol/L ( @ 04:21)  Osmolality, Random Urine: 183 mos/kg ( @ 04:21)  Creatinine, Random Urine: <13 mg/dL ( @ 04:21)  Chloride, Random Urine: 62 mmol/L ( @ 04:21)      Medications:  MEDICATIONS  (STANDING):  acetaminophen   Tablet .. 1000 milliGRAM(s) Oral every 8 hours  enoxaparin Injectable 40 milliGRAM(s) SubCutaneous daily  gabapentin 100 milliGRAM(s) Oral every 12 hours  influenza   Vaccine 0.5 milliLiter(s) IntraMuscular once  lidocaine   4% Patch 1 Patch Transdermal daily  lisinopril 40 milliGRAM(s) Oral daily  senna 2 Tablet(s) Oral at bedtime    MEDICATIONS  (PRN):  oxyCODONE    IR 5 milliGRAM(s) Oral every 4 hours PRN Severe Pain (7 - 10)

## 2021-09-04 NOTE — CONSULT NOTE ADULT - ASSESSMENT
1. Hyponatremia with unknown duration most likely chronic. Pt is clinically euvolemic. Multifactorial due to HCTZ and excessive water intake and possible high ADH state.  -Na improved with fluids. d/c D5W for now since 129 is appropriate for 24hrs and rpt Na in the evening. Urine lytes noted.  Serum Uric acid is low;  TSH is okay; Cortisol in AM.   -Start Fluid restriction 800ml to 1L/day.   -Check Seurm Na qhrs. Monitor I/O's daily. Avoid overcorrection of NA (8-10meq/day)  2. HTN: -bp is elevated. okay to restart lisinopril 40mg daily.   -titrate bp meds to keep sbp >110 and < 130    Discussed with patient's son at bedside in detail regarding the renal plan and care  Discussed the assessment and plan with Primary Team/Nurse   1. Hyponatremia with unknown duration most likely chronic. Pt is clinically euvolemic. Multifactorial due to HCTZ and excessive water intake and possible high ADH state.  -Na improved with fluids. d/c D5W for now since 129 is appropriate for 24hrs and rpt Na in the evening. Urine lytes noted.  Serum Uric acid is low;  TSH is okay; Cortisol in AM.   -Start Fluid restriction 800ml to 1L/day.   -Check Seurm Na qhrs. Monitor I/O's daily. Avoid overcorrection of NA (8-10meq/day)  2. HTN: -bp is elevated. okay to restart lisinopril or losartan. hold hctz.  -titrate bp meds to keep sbp >110 and < 130    Discussed with patient's son at bedside in detail regarding the renal plan and care  Discussed the assessment and plan with Primary Team/Nurse

## 2021-09-04 NOTE — CONSULT NOTE ADULT - PROBLEM SELECTOR RECOMMENDATION 9
Pt with Bilateral sacral alar fractures which cross midline at S1. Acute bilateral L5 transverse process fractures.  Chronic fracture deformities of the L4 and L5 vertebra.   nonopioid recc  - acetaminophen 1 gram po q 8 hours for 3 days  - gabapentin 100mg po q 12 hours  - lidocaine patch daily  opioid recc  - no iv opioids  - oxycodone 5mg po q 4 hours prn severe pain  bowel regimen  - senna  - miralax daily  OOB/PT Pt with Bilateral sacral alar fractures which cross midline at S1. Acute bilateral L5 transverse process fractures.  Chronic fracture deformities of the L4 and L5 vertebra.   nonopioid recc  - acetaminophen 1 gram po q 8 hours for 3 days  - gabapentin 100mg po q 12 hours  - lidocaine patch daily  - no nsaids - pt with hyponatremia   opioid recc  - no iv opioids  - oxycodone 5mg po q 4 hours prn severe pain  bowel regimen  - senna  - miralax daily  OOB/PT

## 2021-09-04 NOTE — PATIENT PROFILE ADULT - NSTRANSFERBELONGINGSRESP_GEN_A_NUR
yes Topical Sulfur Applications Counseling: Topical Sulfur Counseling: Patient counseled that this medication may cause skin irritation or allergic reactions.  In the event of skin irritation, the patient was advised to reduce the amount of the drug applied or use it less frequently.   The patient verbalized understanding of the proper use and possible adverse effects of topical sulfur application.  All of the patient's questions and concerns were addressed.

## 2021-09-04 NOTE — CONSULT NOTE ADULT - ASSESSMENT
Calculator page.   This informational tool is a resource and is not meant for direct clinical application.   Patient Search   Multi-Patient Search   MME Calculator   Reports   Drug List   Designation   My MARTHA #  Data Detail Level: Printer-Friendly View Extended View  Confidential Drug Utilization Report  Search Terms: giovanni blancas, 1945Search Date: 09/04/2021 12:16:01 PM  The Drug Utilization Report below displays all of the controlled substance prescriptions, if any, that your patient has filled in the last twelve months. The information displayed on this report is compiled from pharmacy submissions to the Department, and accurately reflects the information as submitted by the pharmacies.    This report was requested by: Mirtha Song | Reference #: [id number]    There are no results for the search terms that you entered.

## 2021-09-04 NOTE — CHART NOTE - NSCHARTNOTEFT_GEN_A_CORE
EVENT:Pt with uncontrolled HTN . off her anti-HTN home regimen : Lisinopril 40mg , and Losartan-HCTZ .    HPI    76 year-old female, from home, ambulates with a walker at baseline with a PMH of HTN presents to the ER on 9/3/21 with c/o lower back pain since 7/28/21. As per patient's son, patient fell on her left side while walking on the street on 07/28/21. Pt found with  Bilateral L5 transverse process fracture and  Bilateral sacral alar fracture per CT pelvis. No surgical intervention . Continue Pain control. PT.  Seen by Ortho and Pain Management. Pt noted to be hyponatremic, with Na+ 121, overcorrected to 128 , now stable at 129 . Nephro following.   Now with uncontrolled HTN: 169-189/69-94.         SUBJECTIVE:Pt seen and examined. Pt awake, wanting to get out of bed, endorses pain to left hip. Pt reports no SOB, no chest discomfort. Son Milton at bedside who endorses pt takes Lisinopril 40mg daily and losartan -HCTZ . Son reports pt has poor intake at home, drinks only water, and sits in chair all day at home.     OBJECTIVE:  Vital Signs Last 24 Hrs  T(C): 36.4 (04 Sep 2021 05:40), Max: 36.7 (03 Sep 2021 23:14)  T(F): 97.5 (04 Sep 2021 05:40), Max: 98.1 (03 Sep 2021 23:14)  HR: 94 (04 Sep 2021 05:40) (82 - 100)  BP: 169/82 (04 Sep 2021 05:40) (128/69 - 189/94)  BP(mean): --  RR: 18 (04 Sep 2021 05:40) (18 - 18)  SpO2: 98% (04 Sep 2021 05:40) (97% - 100%)    MEDICATIONS  (STANDING):  acetaminophen   Tablet .. 1000 milliGRAM(s) Oral every 8 hours  enoxaparin Injectable 40 milliGRAM(s) SubCutaneous daily  gabapentin 100 milliGRAM(s) Oral every 12 hours  influenza   Vaccine 0.5 milliLiter(s) IntraMuscular once  lidocaine   4% Patch 1 Patch Transdermal daily  lisinopril 40 milliGRAM(s) Oral daily  senna 2 Tablet(s) Oral at bedtime    MEDICATIONS  (PRN):  oxyCODONE    IR 5 milliGRAM(s) Oral every 4 hours PRN Severe Pain (7 - 10)      PE: Lungs: CTA        CV: S1S2, RRR        Extremities : no edema BLE, PPP       Neuro: nonfocal     LABS:                        10.9   5.38  )-----------( 283      ( 04 Sep 2021 04:21 )             31.4     09-04    129<L>  |  95<L>  |  6<L>  ----------------------------<  122<H>  3.9   |  29  |  0.30<L>    Ca    8.5      04 Sep 2021 10:29  Phos  3.2     09-04  Mg     1.7     09-04    TPro  6.4  /  Alb  3.1<L>  /  TBili  0.3  /  DBili  x   /  AST  19  /  ALT  19  /  AlkPhos  175<H>  09-04        ASSESSMENT/Plan   1. Uncontrolled HTN       Resume Lisinopril 40mg       Hold HCTZ for now in setting of hyponatremia      Monitor BP and consider resuming losartan if BP persistently > 150/85      Advised son to bring pt's prescription bottles.     2. Hyponatremia       Na+ was overcorrected with NS.      Now stable at 129 (after D5W )      F/U repeat BMP at 5pm per Nephro       Hold HCTZ      Nephro Dr. Carrera following    3. Left hip pain       CT pelvis shows Bilateral sacral alar fractures which cross midline at S1. Acute bilateral L5 transverse process fractures.  Chronic fracture deformities of the L4 and L5 vertebra.  Degenerative changes . CT left femur shows no fracture.     Continue pain control    PT  No surgical intervention.  Seen by Ortho     4. Anorexia/Severe protein calorie malnutrition     Add Ensure 1 can 3 times daily     Nutrition consult      Case discussed with attending who agrees with plan of care.    Nerissa Soriano NP Medicine   [phone]

## 2021-09-04 NOTE — CHART NOTE - NSCHARTNOTEFT_GEN_A_CORE
EVENT:  76 year-old female, from home, ambulates with a walker at baseline with a PMH of HTN presents to the ER on 9/3/21 with c/o lower back pain since 7/28/21. As per patient's son, patient fell on her left side while walking on the street on 07/28/21.     Patient with Hyponatremia, Na is being monitored  Yesterday was 1211, last night 10pm-128, , 4am-120    SUBJECTIVE:  NAD    OBJECTIVE:  Vital Signs Last 24 Hrs  T(C): 36.4 (04 Sep 2021 05:40), Max: 36.8 (03 Sep 2021 09:45)  T(F): 97.5 (04 Sep 2021 05:40), Max: 98.2 (03 Sep 2021 09:45)  HR: 94 (04 Sep 2021 05:40) (82 - 100)  BP: 169/82 (04 Sep 2021 05:40) (128/69 - 189/94)  BP(mean): --  RR: 18 (04 Sep 2021 05:40) (17 - 18)  SpO2: 98% (04 Sep 2021 05:40) (96% - 100%)    LABS:                        10.9   5.38  )-----------( 283      ( 04 Sep 2021 04:21 )             31.4     09-04    129<L>  |  94<L>  |  5<L>  ----------------------------<  93  3.6   |  28  |  0.25<L>    Ca    8.0<L>      04 Sep 2021 04:21  Phos  3.2     09-04  Mg     1.7     09-04    T Pro  6.4  /  Alb  3.1<L>  /  T Bili  0.3  /  D Bili  x   /  AST  19  /  ALT  19  /  Alk Phos  175<H>  09-04      A/P:   Problem: Hyponatremia.     Plan:   -Na 129  -IVF changed to D5W for 4 hours   -f/u BMP Q4 (correction to 127 to 129mEQ in 24 hours), adjust fluids as needed  Nephrology Siriki consulted EVENT:  76 year-old female, from home, ambulates with a walker at baseline with a PMH of HTN presents to the ER on 9/3/21 with c/o lower back pain since 7/28/21. As per patient's son, patient fell on her left side while walking on the street on 07/28/21.     Patient with Hyponatremia, Na is being monitored  Yesterday was 121, last night 10pm-128, , 4am-120    SUBJECTIVE:  NAD    OBJECTIVE:  Vital Signs Last 24 Hrs  T(C): 36.4 (04 Sep 2021 05:40), Max: 36.8 (03 Sep 2021 09:45)  T(F): 97.5 (04 Sep 2021 05:40), Max: 98.2 (03 Sep 2021 09:45)  HR: 94 (04 Sep 2021 05:40) (82 - 100)  BP: 169/82 (04 Sep 2021 05:40) (128/69 - 189/94)  BP(mean): --  RR: 18 (04 Sep 2021 05:40) (17 - 18)  SpO2: 98% (04 Sep 2021 05:40) (96% - 100%)    LABS:                        10.9   5.38  )-----------( 283      ( 04 Sep 2021 04:21 )             31.4     09-04    129<L>  |  94<L>  |  5<L>  ----------------------------<  93  3.6   |  28  |  0.25<L>    Ca    8.0<L>      04 Sep 2021 04:21  Phos  3.2     09-04  Mg     1.7     09-04    T Pro  6.4  /  Alb  3.1<L>  /  T Bili  0.3  /  D Bili  x   /  AST  19  /  ALT  19  /  Alk Phos  175<H>  09-04      A/P:  Problem: Hyponatremia.     Plan:  -Na 129  -IVF changed to D5W for 4 hours   -f/u BMP Q4 (correction to 127 to 129mEQ in 24 hours), adjust fluids as needed  Nephrology Siriki consulted EVENT:  76 year-old female, from home, ambulates with a walker at baseline with a PMH of HTN presents to the ER on 9/3/21 with c/o lower back pain since 7/28/21. As per patient's son, patient fell on her left side while walking on the street on 07/28/21.     Patient with Hyponatremia, Na is being monitored  Yesterday was 121, last night 10pm-128, 4am-120    SUBJECTIVE:  NAD    OBJECTIVE:  Vital Signs Last 24 Hrs  T(C): 36.4 (04 Sep 2021 05:40), Max: 36.8 (03 Sep 2021 09:45)  T(F): 97.5 (04 Sep 2021 05:40), Max: 98.2 (03 Sep 2021 09:45)  HR: 94 (04 Sep 2021 05:40) (82 - 100)  BP: 169/82 (04 Sep 2021 05:40) (128/69 - 189/94)  BP(mean): --  RR: 18 (04 Sep 2021 05:40) (17 - 18)  SpO2: 98% (04 Sep 2021 05:40) (96% - 100%)    LABS:                        10.9   5.38  )-----------( 283      ( 04 Sep 2021 04:21 )             31.4     09-04    129<L>  |  94<L>  |  5<L>  ----------------------------<  93  3.6   |  28  |  0.25<L>    Ca    8.0<L>      04 Sep 2021 04:21  Phos  3.2     09-04  Mg     1.7     09-04    T Pro  6.4  /  Alb  3.1<L>  /  T Bili  0.3  /  D Bili  x   /  AST  19  /  ALT  19  /  Alk Phos  175<H>  09-04      A/P:  Problem: Hyponatremia.     Plan:  -Na 129  -IVF changed to D5W for 4 hours   -f/u BMP Q4 (correction to 127 to 129mEQ in 24 hours), adjust fluids as needed  Nephrology Siriki consulted

## 2021-09-04 NOTE — CONSULT NOTE ADULT - REASON FOR ADMISSION
Bilateral L5 transverse process and sacral alar fracture
Bilateral L5 transverse process and sacral alar fracture

## 2021-09-04 NOTE — PROGRESS NOTE ADULT - SUBJECTIVE AND OBJECTIVE BOX
Patient is a 76y old  Female who presents with a chief complaint of Bilateral L5 transverse process and sacral alar fracture (03 Sep 2021 19:24)    PATIENT IS SEEN AND EXAMINED IN MEDICAL FLOOR.  NGT [    ]    PASCUAL [   ]      GT [   ]    ALLERGIES:  penicillin (Rash)      Daily     Daily Weight in k.8 (03 Sep 2021 23:14)    VITALS:    Vital Signs Last 24 Hrs  T(C): 36.4 (04 Sep 2021 05:40), Max: 36.7 (03 Sep 2021 23:14)  T(F): 97.5 (04 Sep 2021 05:40), Max: 98.1 (03 Sep 2021 23:14)  HR: 94 (04 Sep 2021 05:40) (82 - 100)  BP: 169/82 (04 Sep 2021 05:40) (128/69 - 189/94)  BP(mean): --  RR: 18 (04 Sep 2021 05:40) (17 - 18)  SpO2: 98% (04 Sep 2021 05:40) (97% - 100%)    LABS:    CBC Full  -  ( 04 Sep 2021 04:21 )  WBC Count : 5.38 K/uL  RBC Count : 3.60 M/uL  Hemoglobin : 10.9 g/dL  Hematocrit : 31.4 %  Platelet Count - Automated : 283 K/uL  Mean Cell Volume : 87.2 fl  Mean Cell Hemoglobin : 30.3 pg  Mean Cell Hemoglobin Concentration : 34.7 gm/dL  Auto Neutrophil # : x  Auto Lymphocyte # : x  Auto Monocyte # : x  Auto Eosinophil # : x  Auto Basophil # : x  Auto Neutrophil % : x  Auto Lymphocyte % : x  Auto Monocyte % : x  Auto Eosinophil % : x  Auto Basophil % : x          129<L>  |  94<L>  |  5<L>  ----------------------------<  93  3.6   |  28  |  0.25<L>    Ca    8.0<L>      04 Sep 2021 04:21  Phos  3.2       Mg     1.7         TPro  6.4  /  Alb  3.1<L>  /  TBili  0.3  /  DBili  x   /  AST  19  /  ALT  19  /  AlkPhos  175<H>      CAPILLARY BLOOD GLUCOSE            LIVER FUNCTIONS - ( 04 Sep 2021 04:21 )  Alb: 3.1 g/dL / Pro: 6.4 g/dL / ALK PHOS: 175 U/L / ALT: 19 U/L DA / AST: 19 U/L / GGT: x           Creatinine Trend: 0.25<--, 0.20<--, 0.31<--  I&O's Summary    03 Sep 2021 07:01  -  04 Sep 2021 07:00  --------------------------------------------------------  IN: 300 mL / OUT: 1200 mL / NET: -900 mL                MEDICATIONS:    MEDICATIONS  (STANDING):  dextrose 5%. 1000 milliLiter(s) (60 mL/Hr) IV Continuous <Continuous>  enoxaparin Injectable 40 milliGRAM(s) SubCutaneous daily  influenza   Vaccine 0.5 milliLiter(s) IntraMuscular once  lidocaine   4% Patch 1 Patch Transdermal daily      MEDICATIONS  (PRN):  acetaminophen   Tablet .. 650 milliGRAM(s) Oral every 4 hours PRN Mild Pain (1 - 3)  morphine  - Injectable 1 milliGRAM(s) IV Push every 6 hours PRN Severe Pain (7 - 10)  oxycodone    5 mG/acetaminophen 325 mG 1 Tablet(s) Oral every 4 hours PRN Moderate Pain (4 - 6)      REVIEW OF SYSTEMS:                           ALL ROS DONE [ X   ]    CONSTITUTIONAL:  LETHARGIC [   ], FEVER [   ], UNRESPONSIVE [   ]  CVS:  CP  [   ], SOB, [   ], PALPITATIONS [   ], DIZZYNESS [   ]  RS: COUGH [   ], SPUTUM [   ]  GI: ABDOMINAL PAIN [   ], NAUSEA [   ], VOMITINGS [   ], DIARRHEA [   ], CONSTIPATION [   ]  :  DYSURIA [   ], NOCTURIA [   ], INCREASED FREQUENCY [   ], DRIBLING [   ],  SKELETAL: PAINFUL JOINTS [   ], SWOLLEN JOINTS [   ], NECK ACHE [   ], LOW BACK ACHE [   ],  SKIN : ULCERS [   ], RASH [   ], ITCHING [   ]  CNS: HEAD ACHE [   ], DOUBLE VISION [   ], BLURRED VISION [   ], AMS / CONFUSION [   ], SEIZURES [   ], WEAKNESS [   ],TINGLING / NUMBNESS [   ]    PHYSICAL EXAMINATION:  GENERAL APPEARANCE: NO DISTRESS  HEENT:  NO PALLOR, NO  JVD,  NO   NODES, NECK SUPPLE  CVS: S1 +, S2 +,   RS: AEEB,  OCCASIONAL  RALES +,   NO RONCHI  ABD: SOFT, NT, NO, BS +  EXT: NO PE  SKIN: WARM,   SKELETAL:  ROM ACCEPTABLE  CNS:  AAO X    ,   DEFICITS    RADIOLOGY :      ASSESSMENT :     Unspecified fracture of sacrum, initial encounter for closed fracture    No pertinent past medical history    HTN (hypertension)        PLAN:  HPI:  Patient is a 76 year-old f, from home, ambulates with a walker at baseline with a PMH of HTN presents to the ER on 9/3/21 with c/o lower back pain since 21. As per patient's son, patient fell on her left side while walking on the street on 21. No head injury or loss of consciousness. Initially, her pain was tolerated and relieved by Tylenol. Her pain was getting worse 2 weeks ago and patient was prescribed percocet by her primary care provider on 21. Patient also had a X-ray outpatient. However, her pain still persists and she becomes immobilize and bed-bound.  (03 Sep 2021 19:24)    # ACUTE FRACTURE B/L SACRAL ALAR FX W/ CROSS AT MIDLINE OF S1, B/L L5 TRANSVERSE PROCESS FX, CHRONIC FRACTURES OF L4 AND L5  - ORTHOPEDIC SX RECOMMENDATION FOR CONSERVATIVE MGMT  - PRN PAIN CONTROL; PAIN MGMT CONSULT  - F/U PT EVAL    # R/O LEFT FEMOR FRACTURE - F/U CT LEFT HIP/FEMUR    # ACUTE HYPONATREMIA - SUSPECT HYPOVOLEMIC HYPONATREMIA S/T POOR PO INTAKE + ? HCTZ [SO WILL BRING HOME ANTI-HTN TO CONFIRM] VS. R/O SIADH  - F/U URINE LYTES, U NA, U OSM, SERUM OSM  - TRIAL OF IVF  - GOAL NA < 6-8 CORRECTION IN 24 HOURS  - NEPHROLOGY CONSULT DR. FARRELL    # LEFT LOWER LOBE ? MASS VS. EFFUSION VS. CONSOLIDATION - NO ACUTE LEUKOCYTOSIS, COUGH, FEVER - F/U CT CHEST TO FURTHER CHARACTERIZE, F/U BCX    # HYPOKALEMIA - REPLETING WITH SUPPLEMENT    # HTN    # CASE D/W SON AT BEDSIDE    # GI AND DVT PPX .       Patient is a 76y old  Female who presents with a chief complaint of Bilateral L5 transverse process and sacral alar fracture (03 Sep 2021 19:24)    PATIENT IS SEEN AND EXAMINED IN MEDICAL FLOOR.    ALLERGIES:  penicillin (Rash)      Daily     Daily Weight in k.8 (03 Sep 2021 23:14)    VITALS:    Vital Signs Last 24 Hrs  T(C): 36.4 (04 Sep 2021 05:40), Max: 36.7 (03 Sep 2021 23:14)  T(F): 97.5 (04 Sep 2021 05:40), Max: 98.1 (03 Sep 2021 23:14)  HR: 94 (04 Sep 2021 05:40) (82 - 100)  BP: 169/82 (04 Sep 2021 05:40) (128/69 - 189/94)  BP(mean): --  RR: 18 (04 Sep 2021 05:40) (17 - 18)  SpO2: 98% (04 Sep 2021 05:40) (97% - 100%)    LABS:    CBC Full  -  ( 04 Sep 2021 04:21 )  WBC Count : 5.38 K/uL  RBC Count : 3.60 M/uL  Hemoglobin : 10.9 g/dL  Hematocrit : 31.4 %  Platelet Count - Automated : 283 K/uL  Mean Cell Volume : 87.2 fl  Mean Cell Hemoglobin : 30.3 pg  Mean Cell Hemoglobin Concentration : 34.7 gm/dL  Auto Neutrophil # : x  Auto Lymphocyte # : x  Auto Monocyte # : x  Auto Eosinophil # : x  Auto Basophil # : x  Auto Neutrophil % : x  Auto Lymphocyte % : x  Auto Monocyte % : x  Auto Eosinophil % : x  Auto Basophil % : x          129<L>  |  94<L>  |  5<L>  ----------------------------<  93  3.6   |  28  |  0.25<L>    Ca    8.0<L>      04 Sep 2021 04:21  Phos  3.2       Mg     1.7         TPro  6.4  /  Alb  3.1<L>  /  TBili  0.3  /  DBili  x   /  AST  19  /  ALT  19  /  AlkPhos  175<H>      CAPILLARY BLOOD GLUCOSE            LIVER FUNCTIONS - ( 04 Sep 2021 04:21 )  Alb: 3.1 g/dL / Pro: 6.4 g/dL / ALK PHOS: 175 U/L / ALT: 19 U/L DA / AST: 19 U/L / GGT: x           Creatinine Trend: 0.25<--, 0.20<--, 0.31<--  I&O's Summary    03 Sep 2021 07:01  -  04 Sep 2021 07:00  --------------------------------------------------------  IN: 300 mL / OUT: 1200 mL / NET: -900 mL                MEDICATIONS:    MEDICATIONS  (STANDING):  dextrose 5%. 1000 milliLiter(s) (60 mL/Hr) IV Continuous <Continuous>  enoxaparin Injectable 40 milliGRAM(s) SubCutaneous daily  influenza   Vaccine 0.5 milliLiter(s) IntraMuscular once  lidocaine   4% Patch 1 Patch Transdermal daily      MEDICATIONS  (PRN):  acetaminophen   Tablet .. 650 milliGRAM(s) Oral every 4 hours PRN Mild Pain (1 - 3)  morphine  - Injectable 1 milliGRAM(s) IV Push every 6 hours PRN Severe Pain (7 - 10)  oxycodone    5 mG/acetaminophen 325 mG 1 Tablet(s) Oral every 4 hours PRN Moderate Pain (4 - 6)      REVIEW OF SYSTEMS:                           ALL ROS DONE [ X   ]    CONSTITUTIONAL:  LETHARGIC [   ], FEVER [   ], UNRESPONSIVE [   ]  CVS:  CP  [   ], SOB, [   ], PALPITATIONS [   ], DIZZYNESS [   ]  RS: COUGH [   ], SPUTUM [   ]  GI: ABDOMINAL PAIN [   ], NAUSEA [   ], VOMITINGS [   ], DIARRHEA [   ], CONSTIPATION [   ]  :  DYSURIA [   ], NOCTURIA [   ], INCREASED FREQUENCY [   ], DRIBLING [   ],  SKELETAL: PAINFUL JOINTS [   ], SWOLLEN JOINTS [   ], NECK ACHE [   ], LOW BACK ACHE [   ],  SKIN : ULCERS [   ], RASH [   ], ITCHING [   ]  CNS: HEAD ACHE [   ], DOUBLE VISION [   ], BLURRED VISION [   ], AMS / CONFUSION [   ], SEIZURES [   ], WEAKNESS [   ],TINGLING / NUMBNESS [   ]    PHYSICAL EXAMINATION:  GENERAL APPEARANCE: NO DISTRESS  HEENT:  NO PALLOR, NO  JVD,  NO   NODES, NECK SUPPLE  CVS: S1 +, S2 +,   RS: AEEB,  OCCASIONAL  RALES +,   NO RONCHI  ABD: SOFT, NT, NO, BS +  EXT: PE +  SKIN: WARM,   SKELETAL:  ROM ACCEPTABLE  CNS:  AAO X 2-3, NO  DEFICITS    RADIOLOGY :    EXAM:  CT CHEST                            PROCEDURE DATE:  2021          INTERPRETATION:  CLINICAL INFORMATION: Evaluate for left lower lobe mass versus consolidation    COMPARISON: None.    CONTRAST/COMPLICATIONS:  IV Contrast: NONE  Oral Contrast: NONE  Complications: None reported at time of study completion    PROCEDURE:  CT of the Chest was performed.  Sagittal and coronal reformats were performed.    FINDINGS:  Motion limited exam.  LUNGS AND AIRWAYS: Moderate elevation of the lefthemidiaphragm. Patent central airways.  Small amount of scarring at the left lung apex.  PLEURA: No pleural effusion.  MEDIASTINUM AND LESLIE: No lymphadenopathy.  VESSELS: Atherosclerotic changes of the aorta.  HEART: Heart size is normal. No pericardial effusion. Coronary artery calcifications.  CHEST WALL AND LOWER NECK: Thyroid gland is enlarged and heterogeneous.  VISUALIZED UPPER ABDOMEN: Within normal limits.  BONES: Multilevel severe compression deformities of the thoracic and upper lumbar spine. Status post L1 vertebroplasty. Severe degenerative changes of the left shoulder.    IMPRESSION:  Moderate elevation of the left hemidiaphragm. No focal consolidation.    Enlarged, heterogeneous thyroid gland. Correlate with nonemergent thyroid ultrasound if clinically warranted.    Multilevel severe compression deformities of the thoracic and upper lumbar spine of indeterminate age.    ==========================================================================    EXAM:  CT FEMUR ONLY LT                            PROCEDURE DATE:  2021          INTERPRETATION:  CLINICAL INFORMATION: Status post fall.    TECHNIQUE: CT of the left femur was performed in bone and soft tissue windows with coronal and sagittal reformats. No intravenous contrast was administered. 3-D reformats were performed on a separate workstation.    COMPARISON: No similar prior studies are available for comparison.    FINDINGS:    Bone: No fracture or dislocation is demonstrated. Moderate osteoarthritis is seen in the left hip. Moderate to severe osteoarthritis of the left knee is noted.    Soft tissues: The visualized muscles and tendons are grossly intact.    IMPRESSION:    No fracture or dislocation of the left femur.    ===================================================================    EXAM:  CT 3D RECONSTRUCT [facility]                          EXAM:  CT PELVIS BONY ONLY                            PROCEDURE DATE:  2021          INTERPRETATION:  CT PELVIS BONY, CT 3D RECONSTRUCTION WO WORKSTATION dated 9/3/2021 1:58 PM    INDICATION: Left hip pain    COMPARISON: None available.    TECHNIQUE: CT imaging of the pelvis was performed. The data was reformatted in the axial, coronal, and sagittal planes. Additionally, 3-D reformatted imaging was created.    FINDINGS:    OSSEOUS STRUCTURES: Mildly decreased bone mineralization. There are nondisplaced fractures of the bilateral sacral ala that traverse the midline at the S1 level. Acute bilateral L5 transverse process fractures. No additional acute fracture is seen. Chronic as for fracture Moderate to severe bilateral hip joint space narrowing is noted. There is spurring and productive change at the pubic symphysis. Chronic erosive change at the pubic symphysis. Spurring the sacroiliac joints. Moderate chronic compression deformities of the L4 and L5 vertebra.  SYNOVIUM/ JOINT FLUID: No hip joint effusion  TENDONS: Limited by CT technique. No full-thickness tendon tear or retraction.  MUSCLES: No intramuscular hematoma  NEUROVASCULAR STRUCTURES: Mild vascular calcifications are noted.  INTRAPELVIC SOFT TISSUES: Moderate distention of the urinary bladder. Diastases of the rectus abdominis muscles.  SUBCUTANEOUS SOFT TISSUES: No soft tissue swelling.    3-D reformatted imaging confirms these findings.    IMPRESSION:    Bilateral sacral alar fractures which cross midline at S1. Acute bilateral L5 transverse process fractures.  Chronic fracture deformities of the L4 and L5 vertebra.  Degenerative changes as above.  Moderate distention of the urinary bladder. Correlate clinically.    ===============================================================      ASSESSMENT :     Unspecified fracture of sacrum, initial encounter for closed fracture    No pertinent past medical history    HTN (hypertension)        PLAN:  HPI:  Patient is a 76 year-old f, from home, ambulates with a walker at baseline with a PMH of HTN presents to the ER on 9/3/21 with c/o lower back pain since 21. As per patient's son, patient fell on her left side while walking on the street on 21. No head injury or loss of consciousness. Initially, her pain was tolerated and relieved by Tylenol. Her pain was getting worse 2 weeks ago and patient was prescribed percocet by her primary care provider on 21. Patient also had a X-ray outpatient. However, her pain still persists and she becomes immobilize and bed-bound.  (03 Sep 2021 19:24)    # ACUTE FRACTURE B/L SACRAL ALAR FX W/ CROSS AT MIDLINE OF S1, B/L L5 TRANSVERSE PROCESS FX, CHRONIC FRACTURES OF L4 AND L5, CHRONIC COMPRESSION DEFORMITIES OF THORACIC SPINE  - ORTHOPEDIC SX RECOMMENDATION FOR CONSERVATIVE MGMT  - PRN PAIN CONTROL; PAIN MGMT CONSULT  - F/U PT EVAL    # NO LEFT FEMOR FRACTURE NOTED ON CT LEFT HIP/FEMUR    # ACUTE HYPONATREMIA - SUSPECT HYPOVOLEMIC HYPONATREMIA S/T POOR PO INTAKE + ? HCTZ [SO WILL BRING HOME ANTI-HTN TO CONFIRM] VS. LESS LIKELY R/O SIADH  - NOTED URINE LYTES, U NA, U OSM, SERUM OSM  - TRIAL OF IVF - HOLD FOR NOW  - GOAL NA < 6-8 CORRECTION IN 24 HOURS  - NEPHROLOGY CONSULT DR. FARRELL    # RULED OUT LEFT LOWER LOBE ? MASS VS. EFFUSION VS. CONSOLIDATION  ON CT CHEST     # HYPOKALEMIA - REPLETING WITH SUPPLEMENT    # HTN    # CASE D/W SON AT BEDSIDE    # GI AND DVT PPX .

## 2021-09-04 NOTE — CHART NOTE - NSCHARTNOTEFT_GEN_A_CORE
Patient sodium on admission was 121, she was started NS 75cc/hr, repeat BMP showed serum Na 128( over corrected), patients NS was discontinued, he was started on D 5 @ 50cc, repeat BMP 4 hours later serum Na  noted to be 129, D5 increased to 60cc, repeat BMP at 10 am ordered. Goal BMP correction 6-8 meq in 24 hours. Goal Na 129 by 4 pm this afternoon..    F/u with nephro

## 2021-09-05 LAB
ALBUMIN SERPL ELPH-MCNC: 3 G/DL — LOW (ref 3.5–5)
ALP SERPL-CCNC: 192 U/L — HIGH (ref 40–120)
ALT FLD-CCNC: 17 U/L DA — SIGNIFICANT CHANGE UP (ref 10–60)
ANION GAP SERPL CALC-SCNC: 7 MMOL/L — SIGNIFICANT CHANGE UP (ref 5–17)
AST SERPL-CCNC: 14 U/L — SIGNIFICANT CHANGE UP (ref 10–40)
BILIRUB SERPL-MCNC: 0.2 MG/DL — SIGNIFICANT CHANGE UP (ref 0.2–1.2)
BUN SERPL-MCNC: 12 MG/DL — SIGNIFICANT CHANGE UP (ref 7–18)
CALCIUM SERPL-MCNC: 8.5 MG/DL — SIGNIFICANT CHANGE UP (ref 8.4–10.5)
CHLORIDE SERPL-SCNC: 96 MMOL/L — SIGNIFICANT CHANGE UP (ref 96–108)
CO2 SERPL-SCNC: 28 MMOL/L — SIGNIFICANT CHANGE UP (ref 22–31)
CREAT SERPL-MCNC: 0.25 MG/DL — LOW (ref 0.5–1.3)
GLUCOSE SERPL-MCNC: 93 MG/DL — SIGNIFICANT CHANGE UP (ref 70–99)
HCT VFR BLD CALC: 33.2 % — LOW (ref 34.5–45)
HGB BLD-MCNC: 11.3 G/DL — LOW (ref 11.5–15.5)
MAGNESIUM SERPL-MCNC: 1.9 MG/DL — SIGNIFICANT CHANGE UP (ref 1.6–2.6)
MCHC RBC-ENTMCNC: 29.9 PG — SIGNIFICANT CHANGE UP (ref 27–34)
MCHC RBC-ENTMCNC: 34 GM/DL — SIGNIFICANT CHANGE UP (ref 32–36)
MCV RBC AUTO: 87.8 FL — SIGNIFICANT CHANGE UP (ref 80–100)
NRBC # BLD: 0 /100 WBCS — SIGNIFICANT CHANGE UP (ref 0–0)
OSMOLALITY UR: 618 MOS/KG — SIGNIFICANT CHANGE UP (ref 50–1200)
PHOSPHATE SERPL-MCNC: 2.9 MG/DL — SIGNIFICANT CHANGE UP (ref 2.5–4.5)
PLATELET # BLD AUTO: 317 K/UL — SIGNIFICANT CHANGE UP (ref 150–400)
POTASSIUM SERPL-MCNC: 4.2 MMOL/L — SIGNIFICANT CHANGE UP (ref 3.5–5.3)
POTASSIUM SERPL-SCNC: 4.2 MMOL/L — SIGNIFICANT CHANGE UP (ref 3.5–5.3)
POTASSIUM UR-SCNC: 69 MMOL/L — SIGNIFICANT CHANGE UP
PROT SERPL-MCNC: 6.8 G/DL — SIGNIFICANT CHANGE UP (ref 6–8.3)
RBC # BLD: 3.78 M/UL — LOW (ref 3.8–5.2)
RBC # FLD: 12.7 % — SIGNIFICANT CHANGE UP (ref 10.3–14.5)
SODIUM SERPL-SCNC: 131 MMOL/L — LOW (ref 135–145)
SODIUM UR-SCNC: 53 MMOL/L — SIGNIFICANT CHANGE UP
URATE SERPL-MCNC: 1.4 MG/DL — LOW (ref 2.5–7)
URATE UR-MCNC: 60 MG/DL — SIGNIFICANT CHANGE UP
WBC # BLD: 4.93 K/UL — SIGNIFICANT CHANGE UP (ref 3.8–10.5)
WBC # FLD AUTO: 4.93 K/UL — SIGNIFICANT CHANGE UP (ref 3.8–10.5)

## 2021-09-05 RX ORDER — MAGNESIUM HYDROXIDE 400 MG/1
30 TABLET, CHEWABLE ORAL ONCE
Refills: 0 | Status: DISCONTINUED | OUTPATIENT
Start: 2021-09-05 | End: 2021-09-09

## 2021-09-05 RX ORDER — POLYETHYLENE GLYCOL 3350 17 G/17G
17 POWDER, FOR SOLUTION ORAL DAILY
Refills: 0 | Status: DISCONTINUED | OUTPATIENT
Start: 2021-09-05 | End: 2021-09-09

## 2021-09-05 RX ADMIN — Medication 1000 MILLIGRAM(S): at 13:18

## 2021-09-05 RX ADMIN — Medication 1 ENEMA: at 17:58

## 2021-09-05 RX ADMIN — LOSARTAN POTASSIUM 50 MILLIGRAM(S): 100 TABLET, FILM COATED ORAL at 06:19

## 2021-09-05 RX ADMIN — POLYETHYLENE GLYCOL 3350 17 GRAM(S): 17 POWDER, FOR SOLUTION ORAL at 11:10

## 2021-09-05 RX ADMIN — SENNA PLUS 2 TABLET(S): 8.6 TABLET ORAL at 21:41

## 2021-09-05 RX ADMIN — Medication 1000 MILLIGRAM(S): at 06:19

## 2021-09-05 RX ADMIN — Medication 1000 MILLIGRAM(S): at 21:41

## 2021-09-05 RX ADMIN — SODIUM CHLORIDE 2 GRAM(S): 9 INJECTION INTRAMUSCULAR; INTRAVENOUS; SUBCUTANEOUS at 13:18

## 2021-09-05 RX ADMIN — GABAPENTIN 100 MILLIGRAM(S): 400 CAPSULE ORAL at 06:19

## 2021-09-05 RX ADMIN — Medication 1000 MILLIGRAM(S): at 22:15

## 2021-09-05 RX ADMIN — Medication 1000 MILLIGRAM(S): at 13:50

## 2021-09-05 RX ADMIN — LIDOCAINE 1 PATCH: 4 CREAM TOPICAL at 19:26

## 2021-09-05 RX ADMIN — LIDOCAINE 1 PATCH: 4 CREAM TOPICAL at 11:11

## 2021-09-05 RX ADMIN — Medication 1000 MILLIGRAM(S): at 22:42

## 2021-09-05 RX ADMIN — SODIUM CHLORIDE 2 GRAM(S): 9 INJECTION INTRAMUSCULAR; INTRAVENOUS; SUBCUTANEOUS at 21:41

## 2021-09-05 RX ADMIN — SODIUM CHLORIDE 2 GRAM(S): 9 INJECTION INTRAMUSCULAR; INTRAVENOUS; SUBCUTANEOUS at 06:19

## 2021-09-05 RX ADMIN — Medication 1000 MILLIGRAM(S): at 06:58

## 2021-09-05 RX ADMIN — GABAPENTIN 100 MILLIGRAM(S): 400 CAPSULE ORAL at 17:56

## 2021-09-05 RX ADMIN — LIDOCAINE 1 PATCH: 4 CREAM TOPICAL at 23:00

## 2021-09-05 RX ADMIN — ENOXAPARIN SODIUM 40 MILLIGRAM(S): 100 INJECTION SUBCUTANEOUS at 11:10

## 2021-09-05 NOTE — PROGRESS NOTE ADULT - SUBJECTIVE AND OBJECTIVE BOX
Patient is a 76y old  Female who presents with a chief complaint of Bilateral L5 transverse process and sacral alar fracture (05 Sep 2021 10:53)    PATIENT IS SEEN AND EXAMINED IN MEDICAL FLOOR.  NGT [    ]    PASCUAL [   ]      GT [   ]    ALLERGIES:  penicillin (Rash)      Daily     Daily     VITALS:    Vital Signs Last 24 Hrs  T(C): 36.7 (05 Sep 2021 05:41), Max: 36.7 (05 Sep 2021 05:41)  T(F): 98 (05 Sep 2021 05:41), Max: 98 (05 Sep 2021 05:41)  HR: 107 (05 Sep 2021 05:41) (77 - 107)  BP: 157/74 (05 Sep 2021 05:41) (132/74 - 157/74)  BP(mean): --  RR: 16 (05 Sep 2021 05:41) (16 - 18)  SpO2: 97% (05 Sep 2021 05:41) (97% - 98%)    LABS:    CBC Full  -  ( 04 Sep 2021 04:21 )  WBC Count : 5.38 K/uL  RBC Count : 3.60 M/uL  Hemoglobin : 10.9 g/dL  Hematocrit : 31.4 %  Platelet Count - Automated : 283 K/uL  Mean Cell Volume : 87.2 fl  Mean Cell Hemoglobin : 30.3 pg  Mean Cell Hemoglobin Concentration : 34.7 gm/dL  Auto Neutrophil # : x  Auto Lymphocyte # : x  Auto Monocyte # : x  Auto Eosinophil # : x  Auto Basophil # : x  Auto Neutrophil % : x  Auto Lymphocyte % : x  Auto Monocyte % : x  Auto Eosinophil % : x  Auto Basophil % : x      09-04    128<L>  |  95<L>  |  9   ----------------------------<  121<H>  4.3   |  29  |  0.23<L>    Ca    8.3<L>      04 Sep 2021 16:35  Phos  3.2     09-04  Mg     1.7     09-04    TPro  6.4  /  Alb  3.1<L>  /  TBili  0.3  /  DBili  x   /  AST  19  /  ALT  19  /  AlkPhos  175<H>  09-04    CAPILLARY BLOOD GLUCOSE            LIVER FUNCTIONS - ( 04 Sep 2021 04:21 )  Alb: 3.1 g/dL / Pro: 6.4 g/dL / ALK PHOS: 175 U/L / ALT: 19 U/L DA / AST: 19 U/L / GGT: x           Creatinine Trend: 0.23<--, 0.30<--, 0.25<--, 0.20<--, 0.31<--  I&O's Summary    04 Sep 2021 07:01  -  05 Sep 2021 07:00  --------------------------------------------------------  IN: 0 mL / OUT: 1125 mL / NET: -1125 mL                MEDICATIONS:    MEDICATIONS  (STANDING):  acetaminophen   Tablet .. 1000 milliGRAM(s) Oral every 8 hours  enoxaparin Injectable 40 milliGRAM(s) SubCutaneous daily  gabapentin 100 milliGRAM(s) Oral every 12 hours  influenza   Vaccine 0.5 milliLiter(s) IntraMuscular once  lidocaine   4% Patch 1 Patch Transdermal daily  losartan 50 milliGRAM(s) Oral daily  polyethylene glycol 3350 17 Gram(s) Oral daily  senna 2 Tablet(s) Oral at bedtime  sodium chloride 2 Gram(s) Oral three times a day      MEDICATIONS  (PRN):  magnesium hydroxide Suspension 30 milliLiter(s) Oral once PRN Constipation  oxyCODONE    IR 5 milliGRAM(s) Oral every 4 hours PRN Severe Pain (7 - 10)      REVIEW OF SYSTEMS:                           ALL ROS DONE [ X   ]    CONSTITUTIONAL:  LETHARGIC [   ], FEVER [   ], UNRESPONSIVE [   ]  CVS:  CP  [   ], SOB, [   ], PALPITATIONS [   ], DIZZYNESS [   ]  RS: COUGH [   ], SPUTUM [   ]  GI: ABDOMINAL PAIN [   ], NAUSEA [   ], VOMITINGS [   ], DIARRHEA [   ], CONSTIPATION [   ]  :  DYSURIA [   ], NOCTURIA [   ], INCREASED FREQUENCY [   ], DRIBLING [   ],  SKELETAL: PAINFUL JOINTS [   ], SWOLLEN JOINTS [   ], NECK ACHE [   ], LOW BACK ACHE [   ],  SKIN : ULCERS [   ], RASH [   ], ITCHING [   ]  CNS: HEAD ACHE [   ], DOUBLE VISION [   ], BLURRED VISION [   ], AMS / CONFUSION [   ], SEIZURES [   ], WEAKNESS [   ],TINGLING / NUMBNESS [   ]    PHYSICAL EXAMINATION:  GENERAL APPEARANCE: NO DISTRESS  HEENT:  NO PALLOR, NO  JVD,  NO   NODES, NECK SUPPLE  CVS: S1 +, S2 +,   RS: AEEB,  OCCASIONAL  RALES +,   NO RONCHI  ABD: SOFT, NT, NO, BS +  EXT: PE +  SKIN: WARM,   SKELETAL:  ROM ACCEPTABLE  CNS:  AAO X 2-3, NO  DEFICITS    RADIOLOGY :    EXAM:  CT CHEST                            PROCEDURE DATE:  09/04/2021          INTERPRETATION:  CLINICAL INFORMATION: Evaluate for left lower lobe mass versus consolidation    COMPARISON: None.    CONTRAST/COMPLICATIONS:  IV Contrast: NONE  Oral Contrast: NONE  Complications: None reported at time of study completion    PROCEDURE:  CT of the Chest was performed.  Sagittal and coronal reformats were performed.    FINDINGS:  Motion limited exam.  LUNGS AND AIRWAYS: Moderate elevation of the lefthemidiaphragm. Patent central airways.  Small amount of scarring at the left lung apex.  PLEURA: No pleural effusion.  MEDIASTINUM AND LESLIE: No lymphadenopathy.  VESSELS: Atherosclerotic changes of the aorta.  HEART: Heart size is normal. No pericardial effusion. Coronary artery calcifications.  CHEST WALL AND LOWER NECK: Thyroid gland is enlarged and heterogeneous.  VISUALIZED UPPER ABDOMEN: Within normal limits.  BONES: Multilevel severe compression deformities of the thoracic and upper lumbar spine. Status post L1 vertebroplasty. Severe degenerative changes of the left shoulder.    IMPRESSION:  Moderate elevation of the left hemidiaphragm. No focal consolidation.    Enlarged, heterogeneous thyroid gland. Correlate with nonemergent thyroid ultrasound if clinically warranted.    Multilevel severe compression deformities of the thoracic and upper lumbar spine of indeterminate age.    ==========================================================================    EXAM:  CT FEMUR ONLY LT                            PROCEDURE DATE:  09/03/2021          INTERPRETATION:  CLINICAL INFORMATION: Status post fall.    TECHNIQUE: CT of the left femur was performed in bone and soft tissue windows with coronal and sagittal reformats. No intravenous contrast was administered. 3-D reformats were performed on a separate workstation.    COMPARISON: No similar prior studies are available for comparison.    FINDINGS:    Bone: No fracture or dislocation is demonstrated. Moderate osteoarthritis is seen in the left hip. Moderate to severe osteoarthritis of the left knee is noted.    Soft tissues: The visualized muscles and tendons are grossly intact.    IMPRESSION:    No fracture or dislocation of the left femur.    ===================================================================    EXAM:  CT 3D RECONSTRUCT [facility]                          EXAM:  CT PELVIS BONY ONLY                            PROCEDURE DATE:  09/03/2021          INTERPRETATION:  CT PELVIS BONY, CT 3D RECONSTRUCTION WO WORKSTATION dated 9/3/2021 1:58 PM    INDICATION: Left hip pain    COMPARISON: None available.    TECHNIQUE: CT imaging of the pelvis was performed. The data was reformatted in the axial, coronal, and sagittal planes. Additionally, 3-D reformatted imaging was created.    FINDINGS:    OSSEOUS STRUCTURES: Mildly decreased bone mineralization. There are nondisplaced fractures of the bilateral sacral ala that traverse the midline at the S1 level. Acute bilateral L5 transverse process fractures. No additional acute fracture is seen. Chronic as for fracture Moderate to severe bilateral hip joint space narrowing is noted. There is spurring and productive change at the pubic symphysis. Chronic erosive change at the pubic symphysis. Spurring the sacroiliac joints. Moderate chronic compression deformities of the L4 and L5 vertebra.  SYNOVIUM/ JOINT FLUID: No hip joint effusion  TENDONS: Limited by CT technique. No full-thickness tendon tear or retraction.  MUSCLES: No intramuscular hematoma  NEUROVASCULAR STRUCTURES: Mild vascular calcifications are noted.  INTRAPELVIC SOFT TISSUES: Moderate distention of the urinary bladder. Diastases of the rectus abdominis muscles.  SUBCUTANEOUS SOFT TISSUES: No soft tissue swelling.    3-D reformatted imaging confirms these findings.    IMPRESSION:    Bilateral sacral alar fractures which cross midline at S1. Acute bilateral L5 transverse process fractures.  Chronic fracture deformities of the L4 and L5 vertebra.  Degenerative changes as above.  Moderate distention of the urinary bladder. Correlate clinically.    ===============================================================      ASSESSMENT :     Unspecified fracture of sacrum, initial encounter for closed fracture    No pertinent past medical history    HTN (hypertension)        PLAN:  HPI:  Patient is a 76 year-old f, from home, ambulates with a walker at baseline with a PMH of HTN presents to the ER on 9/3/21 with c/o lower back pain since 7/28/21. As per patient's son, patient fell on her left side while walking on the street on 07/28/21. No head injury or loss of consciousness. Initially, her pain was tolerated and relieved by Tylenol. Her pain was getting worse 2 weeks ago and patient was prescribed percocet by her primary care provider on 8/20/21. Patient also had a X-ray outpatient. However, her pain still persists and she becomes immobilize and bed-bound.  (03 Sep 2021 19:24)    # ACUTE FRACTURE B/L SACRAL ALAR FX W/ CROSS AT MIDLINE OF S1, B/L L5 TRANSVERSE PROCESS FX, CHRONIC FRACTURES OF L4 AND L5, CHRONIC COMPRESSION DEFORMITIES OF THORACIC SPINE  - ORTHOPEDIC SX RECOMMENDATION FOR CONSERVATIVE MGMT  - PRN PAIN CONTROL; PAIN MGMT CONSULT  - F/U PT EVAL    # NO LEFT FEMOR FRACTURE NOTED ON CT LEFT HIP/FEMUR    # ACUTE HYPONATREMIA - SUSPECT HYPOVOLEMIC HYPONATREMIA S/T POOR PO INTAKE + ? HCTZ [SO WILL BRING HOME ANTI-HTN TO CONFIRM] VS. LESS LIKELY R/O SIADH  - NOTED URINE LYTES, U NA, U OSM, SERUM OSM  - TRIAL OF IVF - HOLD FOR NOW  - GOAL NA < 6-8 CORRECTION IN 24 HOURS  - NEPHROLOGY CONSULT DR. FARRELL    # RULED OUT LEFT LOWER LOBE ? MASS VS. EFFUSION VS. CONSOLIDATION  ON CT CHEST     # HYPOKALEMIA - REPLETING WITH SUPPLEMENT    # HTN    # CASE D/W SON AT BEDSIDE    # GI AND DVT PPX .   Patient is a 76y old  Female who presents with a chief complaint of Bilateral L5 transverse process and sacral alar fracture (05 Sep 2021 10:53)    PATIENT IS SEEN AND EXAMINED IN MEDICAL FLOOR.      ALLERGIES:  penicillin (Rash)      Daily     Daily     VITALS:    Vital Signs Last 24 Hrs  T(C): 36.7 (05 Sep 2021 05:41), Max: 36.7 (05 Sep 2021 05:41)  T(F): 98 (05 Sep 2021 05:41), Max: 98 (05 Sep 2021 05:41)  HR: 107 (05 Sep 2021 05:41) (77 - 107)  BP: 157/74 (05 Sep 2021 05:41) (132/74 - 157/74)  BP(mean): --  RR: 16 (05 Sep 2021 05:41) (16 - 18)  SpO2: 97% (05 Sep 2021 05:41) (97% - 98%)    LABS:    CBC Full  -  ( 04 Sep 2021 04:21 )  WBC Count : 5.38 K/uL  RBC Count : 3.60 M/uL  Hemoglobin : 10.9 g/dL  Hematocrit : 31.4 %  Platelet Count - Automated : 283 K/uL  Mean Cell Volume : 87.2 fl  Mean Cell Hemoglobin : 30.3 pg  Mean Cell Hemoglobin Concentration : 34.7 gm/dL  Auto Neutrophil # : x  Auto Lymphocyte # : x  Auto Monocyte # : x  Auto Eosinophil # : x  Auto Basophil # : x  Auto Neutrophil % : x  Auto Lymphocyte % : x  Auto Monocyte % : x  Auto Eosinophil % : x  Auto Basophil % : x      09-04    128<L>  |  95<L>  |  9   ----------------------------<  121<H>  4.3   |  29  |  0.23<L>    Ca    8.3<L>      04 Sep 2021 16:35  Phos  3.2     09-04  Mg     1.7     09-04    TPro  6.4  /  Alb  3.1<L>  /  TBili  0.3  /  DBili  x   /  AST  19  /  ALT  19  /  AlkPhos  175<H>  09-04    CAPILLARY BLOOD GLUCOSE            LIVER FUNCTIONS - ( 04 Sep 2021 04:21 )  Alb: 3.1 g/dL / Pro: 6.4 g/dL / ALK PHOS: 175 U/L / ALT: 19 U/L DA / AST: 19 U/L / GGT: x           Creatinine Trend: 0.23<--, 0.30<--, 0.25<--, 0.20<--, 0.31<--  I&O's Summary    04 Sep 2021 07:01  -  05 Sep 2021 07:00  --------------------------------------------------------  IN: 0 mL / OUT: 1125 mL / NET: -1125 mL                MEDICATIONS:    MEDICATIONS  (STANDING):  acetaminophen   Tablet .. 1000 milliGRAM(s) Oral every 8 hours  enoxaparin Injectable 40 milliGRAM(s) SubCutaneous daily  gabapentin 100 milliGRAM(s) Oral every 12 hours  influenza   Vaccine 0.5 milliLiter(s) IntraMuscular once  lidocaine   4% Patch 1 Patch Transdermal daily  losartan 50 milliGRAM(s) Oral daily  polyethylene glycol 3350 17 Gram(s) Oral daily  senna 2 Tablet(s) Oral at bedtime  sodium chloride 2 Gram(s) Oral three times a day      MEDICATIONS  (PRN):  magnesium hydroxide Suspension 30 milliLiter(s) Oral once PRN Constipation  oxyCODONE    IR 5 milliGRAM(s) Oral every 4 hours PRN Severe Pain (7 - 10)      REVIEW OF SYSTEMS:                           ALL ROS DONE [ X   ]    CONSTITUTIONAL:  LETHARGIC [   ], FEVER [   ], UNRESPONSIVE [   ]  CVS:  CP  [   ], SOB, [   ], PALPITATIONS [   ], DIZZYNESS [   ]  RS: COUGH [   ], SPUTUM [   ]  GI: ABDOMINAL PAIN [   ], NAUSEA [   ], VOMITINGS [   ], DIARRHEA [   ], CONSTIPATION [   ]  :  DYSURIA [   ], NOCTURIA [   ], INCREASED FREQUENCY [   ], DRIBLING [   ],  SKELETAL: PAINFUL JOINTS [   ], SWOLLEN JOINTS [   ], NECK ACHE [   ], LOW BACK ACHE [   ],  SKIN : ULCERS [   ], RASH [   ], ITCHING [   ]  CNS: HEAD ACHE [   ], DOUBLE VISION [   ], BLURRED VISION [   ], AMS / CONFUSION [   ], SEIZURES [   ], WEAKNESS [   ],TINGLING / NUMBNESS [   ]    PHYSICAL EXAMINATION:  GENERAL APPEARANCE: NO DISTRESS  HEENT:  NO PALLOR, NO  JVD,  NO   NODES, NECK SUPPLE  CVS: S1 +, S2 +,   RS: AEEB,  OCCASIONAL  RALES +,   NO RONCHI  ABD: SOFT, NT, NO, BS +  EXT: PE +  SKIN: WARM,   SKELETAL:  ROM ACCEPTABLE  CNS:  AAO X 2-3, NO  DEFICITS    RADIOLOGY :    EXAM:  CT CHEST                            PROCEDURE DATE:  09/04/2021          INTERPRETATION:  CLINICAL INFORMATION: Evaluate for left lower lobe mass versus consolidation    COMPARISON: None.    CONTRAST/COMPLICATIONS:  IV Contrast: NONE  Oral Contrast: NONE  Complications: None reported at time of study completion    PROCEDURE:  CT of the Chest was performed.  Sagittal and coronal reformats were performed.    FINDINGS:  Motion limited exam.  LUNGS AND AIRWAYS: Moderate elevation of the lefthemidiaphragm. Patent central airways.  Small amount of scarring at the left lung apex.  PLEURA: No pleural effusion.  MEDIASTINUM AND LESLIE: No lymphadenopathy.  VESSELS: Atherosclerotic changes of the aorta.  HEART: Heart size is normal. No pericardial effusion. Coronary artery calcifications.  CHEST WALL AND LOWER NECK: Thyroid gland is enlarged and heterogeneous.  VISUALIZED UPPER ABDOMEN: Within normal limits.  BONES: Multilevel severe compression deformities of the thoracic and upper lumbar spine. Status post L1 vertebroplasty. Severe degenerative changes of the left shoulder.    IMPRESSION:  Moderate elevation of the left hemidiaphragm. No focal consolidation.    Enlarged, heterogeneous thyroid gland. Correlate with nonemergent thyroid ultrasound if clinically warranted.    Multilevel severe compression deformities of the thoracic and upper lumbar spine of indeterminate age.    ==========================================================================    EXAM:  CT FEMUR ONLY LT                            PROCEDURE DATE:  09/03/2021          INTERPRETATION:  CLINICAL INFORMATION: Status post fall.    TECHNIQUE: CT of the left femur was performed in bone and soft tissue windows with coronal and sagittal reformats. No intravenous contrast was administered. 3-D reformats were performed on a separate workstation.    COMPARISON: No similar prior studies are available for comparison.    FINDINGS:    Bone: No fracture or dislocation is demonstrated. Moderate osteoarthritis is seen in the left hip. Moderate to severe osteoarthritis of the left knee is noted.    Soft tissues: The visualized muscles and tendons are grossly intact.    IMPRESSION:    No fracture or dislocation of the left femur.    ===================================================================    EXAM:  CT 3D RECONSTRUCT [facility]                          EXAM:  CT PELVIS BONY ONLY                            PROCEDURE DATE:  09/03/2021          INTERPRETATION:  CT PELVIS BONY, CT 3D RECONSTRUCTION WO WORKSTATION dated 9/3/2021 1:58 PM    INDICATION: Left hip pain    COMPARISON: None available.    TECHNIQUE: CT imaging of the pelvis was performed. The data was reformatted in the axial, coronal, and sagittal planes. Additionally, 3-D reformatted imaging was created.    FINDINGS:    OSSEOUS STRUCTURES: Mildly decreased bone mineralization. There are nondisplaced fractures of the bilateral sacral ala that traverse the midline at the S1 level. Acute bilateral L5 transverse process fractures. No additional acute fracture is seen. Chronic as for fracture Moderate to severe bilateral hip joint space narrowing is noted. There is spurring and productive change at the pubic symphysis. Chronic erosive change at the pubic symphysis. Spurring the sacroiliac joints. Moderate chronic compression deformities of the L4 and L5 vertebra.  SYNOVIUM/ JOINT FLUID: No hip joint effusion  TENDONS: Limited by CT technique. No full-thickness tendon tear or retraction.  MUSCLES: No intramuscular hematoma  NEUROVASCULAR STRUCTURES: Mild vascular calcifications are noted.  INTRAPELVIC SOFT TISSUES: Moderate distention of the urinary bladder. Diastases of the rectus abdominis muscles.  SUBCUTANEOUS SOFT TISSUES: No soft tissue swelling.    3-D reformatted imaging confirms these findings.    IMPRESSION:    Bilateral sacral alar fractures which cross midline at S1. Acute bilateral L5 transverse process fractures.  Chronic fracture deformities of the L4 and L5 vertebra.  Degenerative changes as above.  Moderate distention of the urinary bladder. Correlate clinically.    ===============================================================      ASSESSMENT :     Unspecified fracture of sacrum, initial encounter for closed fracture    No pertinent past medical history    HTN (hypertension)        PLAN:  HPI:  Patient is a 76 year-old f, from home, ambulates with a walker at baseline with a PMH of HTN presents to the ER on 9/3/21 with c/o lower back pain since 7/28/21. As per patient's son, patient fell on her left side while walking on the street on 07/28/21. No head injury or loss of consciousness. Initially, her pain was tolerated and relieved by Tylenol. Her pain was getting worse 2 weeks ago and patient was prescribed percocet by her primary care provider on 8/20/21. Patient also had a X-ray outpatient. However, her pain still persists and she becomes immobilize and bed-bound.  (03 Sep 2021 19:24)    # ACUTE FRACTURE B/L SACRAL ALAR FX W/ CROSS AT MIDLINE OF S1, B/L L5 TRANSVERSE PROCESS FX, CHRONIC FRACTURES OF L4 AND L5, CHRONIC COMPRESSION DEFORMITIES OF THORACIC SPINE  - ORTHOPEDIC SX RECOMMENDATION FOR CONSERVATIVE MGMT  - PRN PAIN CONTROL; PAIN MGMT CONSULT  - F/U PT EVAL     # NO LEFT FEMOR FRACTURE NOTED ON CT LEFT HIP/FEMUR    # ACUTE HYPONATREMIA - SUSPECT HYPOVOLEMIC HYPONATREMIA S/T POOR PO INTAKE + ? HCTZ [SO WILL BRING HOME ANTI-HTN TO CONFIRM] VS. LESS LIKELY R/O SIADH  - NOTED URINE LYTES, U NA, U OSM, SERUM OSM  - S/P IVF; NOW ON FLUID RESTRICTION  - GOAL NA < 6-8 CORRECTION IN 24 HOURS  - NEPHROLOGY CONSULT DR. FARRELL    # CONSTIPATION - MIRALAX    # RULED OUT LEFT LOWER LOBE ? MASS VS. EFFUSION VS. CONSOLIDATION  ON CT CHEST     # HYPOKALEMIA - REPLETING WITH SUPPLEMENT    # HTN    # CASE D/W SON EMILIANO LIU @ [phone] [9/5]    # GI AND DVT PPX .

## 2021-09-05 NOTE — DIETITIAN INITIAL EVALUATION ADULT. - PERTINENT LABORATORY DATA
09-05 Na131 mmol/L<L> Glu 93 mg/dL K+ 4.2 mmol/L Cr  0.25 mg/dL<L> BUN 12 mg/dL   09-05 Phos 2.9 mg/dL   09-05 Alb 3.0 g/dL<L>     09-04 Chol 164 mg/dL LDL --    HDL 79 mg/dL Trig 35 mg/dL    09-04-21 @ 10:17 HgbA1C 5.6

## 2021-09-05 NOTE — CHART NOTE - NSCHARTNOTEFT_GEN_A_CORE
Spoke to pt and Son at bedside, reviewed hospital course, current clinical status, treatment plan/options, all question answered.

## 2021-09-05 NOTE — DIETITIAN INITIAL EVALUATION ADULT. - PROBLEM SELECTOR PLAN 4
/95 in ED  on Losartan 50-HTCZ and lisinopril 40mg at home (not in pharmacy records)  If needed can add these anti HTNs meds

## 2021-09-05 NOTE — DIETITIAN INITIAL EVALUATION ADULT. - PROBLEM SELECTOR PLAN 2
-Na 121  -Cl 86  -IVF NS 75c/hr  ->f/u urine lytes  ->f/u BMP Q4 (correctonly to 127 to 129mEQ in 24 hours), adjust fluids as needed  Nephrology Siriki consulted

## 2021-09-05 NOTE — DIETITIAN INITIAL EVALUATION ADULT. - PERTINENT MEDS FT
MEDICATIONS  (STANDING):  acetaminophen   Tablet .. 1000 milliGRAM(s) Oral every 8 hours  enoxaparin Injectable 40 milliGRAM(s) SubCutaneous daily  gabapentin 100 milliGRAM(s) Oral every 12 hours  influenza   Vaccine 0.5 milliLiter(s) IntraMuscular once  lidocaine   4% Patch 1 Patch Transdermal daily  losartan 50 milliGRAM(s) Oral daily  polyethylene glycol 3350 17 Gram(s) Oral daily  senna 2 Tablet(s) Oral at bedtime  sodium chloride 2 Gram(s) Oral three times a day    MEDICATIONS  (PRN):  magnesium hydroxide Suspension 30 milliLiter(s) Oral once PRN Constipation  oxyCODONE    IR 5 milliGRAM(s) Oral every 4 hours PRN Severe Pain (7 - 10)

## 2021-09-05 NOTE — DIETITIAN NUTRITION RISK NOTIFICATION - TREATMENT: THE FOLLOWING DIET HAS BEEN RECOMMENDED
Diet, DASH/TLC:   Sodium & Cholesterol Restricted  Supplement Feeding Modality:  Oral  Ensure Enlive Cans or Servings Per Day:  1       Frequency:  Three Times a day (09-04-21 @ 13:20) [Active]

## 2021-09-05 NOTE — DIETITIAN INITIAL EVALUATION ADULT. - FACTORS AFF FOOD INTAKE
difficulty with food procurement/preparation/[employer]ehov[employer] witness/ethnic/cultural/personal food preferences/other (specify) hyponatremia, fracture of sacrum, weakness, HTN, left leg pain,/difficulty with food procurement/preparation/pain/persistent constipation/[place]/ethnic/cultural/personal food preferences/other (specify)

## 2021-09-05 NOTE — PHYSICAL THERAPY INITIAL EVALUATION ADULT - PERTINENT HX OF CURRENT PROBLEM, REHAB EVAL
Patient was brought to ED s/p fall at home. Imaging shows sacral fracture and b/l L5 transverse fracture. Patient cleared by ortho for PT

## 2021-09-05 NOTE — DIETITIAN INITIAL EVALUATION ADULT. - PROBLEM SELECTOR PLAN 1
s/p Fall 7/28  -Bilateral sacral alar fractures which cross midline at S1. Acute bilateral L5 transverse process fractures.  -Chronic fracture deformities of the L4 and L5 vertebra. Degenerative changes as above.  -Pt states unable to ambulate without walker due to pain.  Ortho: rec Conservative management  - Pain control  - Daily PT - WBAT to lower extremities with appropriate device   - Patient is orthopedically stable for discharge  - Patient may follow up with Dr. Beach after discharge   Pain consult ordered   ->f/u CT L femur  ->s/p 0.5 morphine for pain in ED  - started on tylenol. percocet and dilaudid for pain control

## 2021-09-05 NOTE — DIETITIAN INITIAL EVALUATION ADULT. - OTHER INFO
Patient from home lives alone. Visited pt. alert & "mildly agitated", son - Flex at bedside, reports pt. with poor po intake >1wk with severe "pain" & mostly drinking water" & unable to prepare her daily meals or "look after herself", denies nausea/vomiting or diarrhea however complaints of constipation PTA. Son stated pt. loss weight but unable to quantify amount? dosing wt. 117.1 Lbs on 09/05/21 noted, presently pt. consuming 10-30% of meals, receiving laxative, obtained food preferences from son, encourage po intake w/meal set up, Ortho team following, skin intact, no edema, awaiting placement.

## 2021-09-05 NOTE — PROGRESS NOTE ADULT - SUBJECTIVE AND OBJECTIVE BOX
NEPHROLOGY MEDICAL CARE, [facility] - Dr. Kirby Garcia/ Dr. Jesica Wyatt/ Dr. Dom Vasquez/ Dr. Hemalatha Robins    Patient was seen and examined at bedside.    CC: patient has constipation but doing okay.    Vital Signs Last 24 Hrs  T(C): 36.7 (05 Sep 2021 05:41), Max: 36.7 (05 Sep 2021 05:41)  T(F): 98 (05 Sep 2021 05:41), Max: 98 (05 Sep 2021 05:41)  HR: 107 (05 Sep 2021 05:41) (77 - 107)  BP: 157/74 (05 Sep 2021 05:41) (132/74 - 157/74)  BP(mean): --  RR: 16 (05 Sep 2021 05:41) (16 - 18)  SpO2: 97% (05 Sep 2021 05:41) (97% - 98%)    09-04 @ 07:01  -  09-05 @ 07:00  --------------------------------------------------------  IN: 0 mL / OUT: 1125 mL / NET: -1125 mL        PHYSICAL EXAM:  General: No acute respiratory distress.  Eyes: conjunctiva and sclera clear  ENMT: Atraumatic, Normocephalic, supple, No JVD present. Moist mucous membranes  Respiratory: Bilateral clear to percussion; No rales, rhonchi, wheezing  Cardiovascular S1S2+; no m/r/g  Gastrointestinal: Soft, Non-tender, Nondistended; Bowel sounds present, no hepatosplenomegaly.   Neuro:  Awake, Alert & Oriented X3, No focal deficits present.   Ext:  pedal edema present, No Cyanosis  Skin: No visible rashes    MEDICATIONS:  MEDICATIONS  (STANDING):  acetaminophen   Tablet .. 1000 milliGRAM(s) Oral every 8 hours  enoxaparin Injectable 40 milliGRAM(s) SubCutaneous daily  gabapentin 100 milliGRAM(s) Oral every 12 hours  influenza   Vaccine 0.5 milliLiter(s) IntraMuscular once  lidocaine   4% Patch 1 Patch Transdermal daily  losartan 50 milliGRAM(s) Oral daily  polyethylene glycol 3350 17 Gram(s) Oral daily  senna 2 Tablet(s) Oral at bedtime  sodium chloride 2 Gram(s) Oral three times a day    MEDICATIONS  (PRN):  magnesium hydroxide Suspension 30 milliLiter(s) Oral once PRN Constipation  oxyCODONE    IR 5 milliGRAM(s) Oral every 4 hours PRN Severe Pain (7 - 10)          LABS:                        10.9   5.38  )-----------( 283      ( 04 Sep 2021 04:21 )             31.4         128<L>  |  95<L>  |  9   ----------------------------<  121<H>  4.3   |  29  |  0.23<L>    Ca    8.3<L>      04 Sep 2021 16:35  Phos  3.2       Mg     1.7         TPro  6.4  /  Alb  3.1<L>  /  TBili  0.3  /  DBili  x   /  AST  19  /  ALT  19  /  AlkPhos  175<H>        Urinalysis Basic - ( 03 Sep 2021 15:43 )    Color: Yellow / Appearance: Clear / S.010 / pH: x  Gluc: x / Ketone: Small  / Bili: Negative / Urobili: Negative   Blood: x / Protein: 15 / Nitrite: Negative   Leuk Esterase: Trace / RBC: 0-2 /HPF / WBC 6-10 /HPF   Sq Epi: x / Non Sq Epi: Few /HPF / Bacteria: Few /HPF        Urine studies  Osmolality, Random Urine: 540 mos/kg ( @ 18:10)  Sodium, Random Urine: 32 mmol/L ( @ 18:10)  Potassium, Random Urine: 81 mmol/L ( @ 18:10)  Creatinine, Random Urine: 97 mg/dL ( @ 18:10)  Osmolality, Random Urine: 407 mos/kg ( @ 11:32)  Potassium, Random Urine: 52 mmol/L ( @ 11:31)  Sodium, Random Urine: 24 mmol/L ( @ 11:31)  Creatinine, Random Urine: 152 mg/dL ( @ 11:31)  Sodium, Random Urine: 58 mmol/L ( @ 04:21)  Osmolality, Random Urine: 183 mos/kg ( @ 04:21)  Creatinine, Random Urine: <13 mg/dL ( @ 04:21)  Chloride, Random Urine: 62 mmol/L ( @ :21)    PTH and Vit D:

## 2021-09-05 NOTE — PROGRESS NOTE ADULT - ASSESSMENT
1. Hyponatremia with unknown duration most likely chronic. Pt is clinically euvolemic. Multifactorial due to HCTZ and excessive water intake and possible high ADH state.  -Na improved with fluids. Na was 128 last night and add sodium tabs 2gm tid. rpt Urine lytes this morning; awaiting labs. urine lytes noted from admit. Serum Uric acid is low;  TSH is okay; Cortisol in AM.   -continue Fluid restriction 800ml to 1L/day.   -Check Seurm Na daily. Monitor I/O's daily. Avoid overcorrection of NA (8-10meq/day)  2. HTN: -bp is okay. on losartan 50mg daily. hold hctz.  -titrate bp meds to keep sbp >110 and < 130  3. Constipated: give laxatives.     Discussed the assessment and plan with Primary Team/Nurse

## 2021-09-05 NOTE — DIETITIAN INITIAL EVALUATION ADULT. - ORAL INTAKE PTA/DIET HISTORY
Per son, pt. follows lacto - vegetarian meal plan at home. Per son, pt. follows lacto/ovo - vegetarian meal plan at home.

## 2021-09-06 LAB
ALBUMIN SERPL ELPH-MCNC: 2.9 G/DL — LOW (ref 3.5–5)
ALP SERPL-CCNC: 193 U/L — HIGH (ref 40–120)
ALT FLD-CCNC: 17 U/L DA — SIGNIFICANT CHANGE UP (ref 10–60)
ANION GAP SERPL CALC-SCNC: 6 MMOL/L — SIGNIFICANT CHANGE UP (ref 5–17)
AST SERPL-CCNC: 11 U/L — SIGNIFICANT CHANGE UP (ref 10–40)
BILIRUB SERPL-MCNC: 0.3 MG/DL — SIGNIFICANT CHANGE UP (ref 0.2–1.2)
BUN SERPL-MCNC: 12 MG/DL — SIGNIFICANT CHANGE UP (ref 7–18)
CALCIUM SERPL-MCNC: 8.8 MG/DL — SIGNIFICANT CHANGE UP (ref 8.4–10.5)
CHLORIDE SERPL-SCNC: 97 MMOL/L — SIGNIFICANT CHANGE UP (ref 96–108)
CO2 SERPL-SCNC: 29 MMOL/L — SIGNIFICANT CHANGE UP (ref 22–31)
CREAT SERPL-MCNC: 0.37 MG/DL — LOW (ref 0.5–1.3)
GLUCOSE SERPL-MCNC: 131 MG/DL — HIGH (ref 70–99)
HCT VFR BLD CALC: 33 % — LOW (ref 34.5–45)
HGB BLD-MCNC: 11.3 G/DL — LOW (ref 11.5–15.5)
MAGNESIUM SERPL-MCNC: 1.8 MG/DL — SIGNIFICANT CHANGE UP (ref 1.6–2.6)
MCHC RBC-ENTMCNC: 30.4 PG — SIGNIFICANT CHANGE UP (ref 27–34)
MCHC RBC-ENTMCNC: 34.2 GM/DL — SIGNIFICANT CHANGE UP (ref 32–36)
MCV RBC AUTO: 88.7 FL — SIGNIFICANT CHANGE UP (ref 80–100)
NRBC # BLD: 0 /100 WBCS — SIGNIFICANT CHANGE UP (ref 0–0)
PHOSPHATE SERPL-MCNC: 2.6 MG/DL — SIGNIFICANT CHANGE UP (ref 2.5–4.5)
PLATELET # BLD AUTO: 336 K/UL — SIGNIFICANT CHANGE UP (ref 150–400)
POTASSIUM SERPL-MCNC: 4.3 MMOL/L — SIGNIFICANT CHANGE UP (ref 3.5–5.3)
POTASSIUM SERPL-SCNC: 4.3 MMOL/L — SIGNIFICANT CHANGE UP (ref 3.5–5.3)
PROT SERPL-MCNC: 6.5 G/DL — SIGNIFICANT CHANGE UP (ref 6–8.3)
RBC # BLD: 3.72 M/UL — LOW (ref 3.8–5.2)
RBC # FLD: 12.9 % — SIGNIFICANT CHANGE UP (ref 10.3–14.5)
SODIUM SERPL-SCNC: 132 MMOL/L — LOW (ref 135–145)
WBC # BLD: 5.6 K/UL — SIGNIFICANT CHANGE UP (ref 3.8–10.5)
WBC # FLD AUTO: 5.6 K/UL — SIGNIFICANT CHANGE UP (ref 3.8–10.5)

## 2021-09-06 RX ORDER — FUROSEMIDE 40 MG
20 TABLET ORAL DAILY
Refills: 0 | Status: DISCONTINUED | OUTPATIENT
Start: 2021-09-06 | End: 2021-09-07

## 2021-09-06 RX ORDER — HYDRALAZINE HCL 50 MG
5 TABLET ORAL ONCE
Refills: 0 | Status: COMPLETED | OUTPATIENT
Start: 2021-09-06 | End: 2021-09-06

## 2021-09-06 RX ORDER — OXYCODONE HYDROCHLORIDE 5 MG/1
5 TABLET ORAL EVERY 6 HOURS
Refills: 0 | Status: DISCONTINUED | OUTPATIENT
Start: 2021-09-06 | End: 2021-09-07

## 2021-09-06 RX ADMIN — Medication 1000 MILLIGRAM(S): at 06:07

## 2021-09-06 RX ADMIN — LOSARTAN POTASSIUM 50 MILLIGRAM(S): 100 TABLET, FILM COATED ORAL at 06:07

## 2021-09-06 RX ADMIN — POLYETHYLENE GLYCOL 3350 17 GRAM(S): 17 POWDER, FOR SOLUTION ORAL at 11:20

## 2021-09-06 RX ADMIN — GABAPENTIN 100 MILLIGRAM(S): 400 CAPSULE ORAL at 06:07

## 2021-09-06 RX ADMIN — OXYCODONE HYDROCHLORIDE 5 MILLIGRAM(S): 5 TABLET ORAL at 14:25

## 2021-09-06 RX ADMIN — Medication 1000 MILLIGRAM(S): at 14:31

## 2021-09-06 RX ADMIN — Medication 1000 MILLIGRAM(S): at 21:24

## 2021-09-06 RX ADMIN — SODIUM CHLORIDE 2 GRAM(S): 9 INJECTION INTRAMUSCULAR; INTRAVENOUS; SUBCUTANEOUS at 06:07

## 2021-09-06 RX ADMIN — Medication 1000 MILLIGRAM(S): at 06:47

## 2021-09-06 RX ADMIN — SODIUM CHLORIDE 2 GRAM(S): 9 INJECTION INTRAMUSCULAR; INTRAVENOUS; SUBCUTANEOUS at 21:25

## 2021-09-06 RX ADMIN — ENOXAPARIN SODIUM 40 MILLIGRAM(S): 100 INJECTION SUBCUTANEOUS at 11:20

## 2021-09-06 RX ADMIN — SENNA PLUS 2 TABLET(S): 8.6 TABLET ORAL at 21:24

## 2021-09-06 RX ADMIN — OXYCODONE HYDROCHLORIDE 5 MILLIGRAM(S): 5 TABLET ORAL at 17:50

## 2021-09-06 RX ADMIN — Medication 20 MILLIGRAM(S): at 12:21

## 2021-09-06 RX ADMIN — SODIUM CHLORIDE 2 GRAM(S): 9 INJECTION INTRAMUSCULAR; INTRAVENOUS; SUBCUTANEOUS at 14:31

## 2021-09-06 RX ADMIN — GABAPENTIN 100 MILLIGRAM(S): 400 CAPSULE ORAL at 17:50

## 2021-09-06 RX ADMIN — OXYCODONE HYDROCHLORIDE 5 MILLIGRAM(S): 5 TABLET ORAL at 12:20

## 2021-09-06 RX ADMIN — Medication 5 MILLIGRAM(S): at 16:17

## 2021-09-06 NOTE — PROGRESS NOTE ADULT - SUBJECTIVE AND OBJECTIVE BOX
NEPHROLOGY MEDICAL CARE, [facility] - Dr. Kirby Garcia/ Dr. Jesica Wyatt/ Dr. Dom Vasquez/ Dr. Hemalatha Robins    Patient was seen and examined at bedside.    CC: patient is okay and constipation resolved.    Vital Signs Last 24 Hrs  T(C): 36.4 (06 Sep 2021 05:30), Max: 36.4 (05 Sep 2021 14:47)  T(F): 97.6 (06 Sep 2021 05:30), Max: 97.6 (05 Sep 2021 14:47)  HR: 100 (06 Sep 2021 05:30) (89 - 108)  BP: 174/83 (06 Sep 2021 05:30) (145/71 - 174/83)  BP(mean): --  RR: 16 (06 Sep 2021 05:30) (16 - 17)  SpO2: 95% (06 Sep 2021 05:30) (95% - 99%)    09-05 @ 07:01 - 09-06 @ 07:00  --------------------------------------------------------  IN: 0 mL / OUT: 701 mL / NET: -701 mL    09-06 @ 07:01  -  09-06 @ 10:50  --------------------------------------------------------  IN: 0 mL / OUT: 400 mL / NET: -400 mL        PHYSICAL EXAM:  General: No acute respiratory distress.  Eyes: conjunctiva and sclera clear  ENMT: Atraumatic, Normocephalic, supple, No JVD present. Moist mucous membranes  Respiratory: Bilateral clear to percussion; No rales, rhonchi, wheezing  Cardiovascular S1S2+; no m/r/g  Gastrointestinal: Soft, Non-tender, Nondistended; Bowel sounds present  Neuro:  Awake, Alert & Oriented X3  Ext:  pedal edema present, No Cyanosis  Skin: No visible rashes      MEDICATIONS:  MEDICATIONS  (STANDING):  acetaminophen   Tablet .. 1000 milliGRAM(s) Oral every 8 hours  enoxaparin Injectable 40 milliGRAM(s) SubCutaneous daily  gabapentin 100 milliGRAM(s) Oral every 12 hours  influenza   Vaccine 0.5 milliLiter(s) IntraMuscular once  lidocaine   4% Patch 1 Patch Transdermal daily  losartan 50 milliGRAM(s) Oral daily  polyethylene glycol 3350 17 Gram(s) Oral daily  senna 2 Tablet(s) Oral at bedtime  sodium chloride 2 Gram(s) Oral three times a day    MEDICATIONS  (PRN):  magnesium hydroxide Suspension 30 milliLiter(s) Oral once PRN Constipation  oxyCODONE    IR 5 milliGRAM(s) Oral every 4 hours PRN Severe Pain (7 - 10)          LABS:                        11.3   5.60  )-----------( 336      ( 06 Sep 2021 08:44 )             33.0     09-06    132<L>  |  97  |  12  ----------------------------<  131<H>  4.3   |  29  |  0.37<L>    Ca    8.8      06 Sep 2021 08:44  Phos  2.6     09-06  Mg     1.8     09-06    TPro  6.5  /  Alb  2.9<L>  /  TBili  0.3  /  DBili  x   /  AST  11  /  ALT  17  /  AlkPhos  193<H>  09-06        Magnesium, Serum: 1.8 mg/dL (09-06 @ 08:44)  Phosphorus Level, Serum: 2.6 mg/dL (09-06 @ 08:44)  Magnesium, Serum: 1.9 mg/dL (09-05 @ 11:37)  Phosphorus Level, Serum: 2.9 mg/dL (09-05 @ 11:37)    Urine studies  Potassium, Random Urine: 69 mmol/L (09-05 @ 11:42)  Sodium, Random Urine: 53 mmol/L (09-05 @ 11:42)  Osmolality, Random Urine: 618 mos/kg (09-05 @ 11:42)  Osmolality, Random Urine: 540 mos/kg (09-04 @ 18:10)  Sodium, Random Urine: 32 mmol/L (09-04 @ 18:10)  Potassium, Random Urine: 81 mmol/L (09-04 @ 18:10)  Creatinine, Random Urine: 97 mg/dL (09-04 @ 18:10)  Osmolality, Random Urine: 407 mos/kg (09-04 @ 11:32)  Potassium, Random Urine: 52 mmol/L (09-04 @ 11:31)  Sodium, Random Urine: 24 mmol/L (09-04 @ 11:31)  Creatinine, Random Urine: 152 mg/dL (09-04 @ 11:31)  Sodium, Random Urine: 58 mmol/L (09-04 @ 04:21)  Osmolality, Random Urine: 183 mos/kg (09-04 @ 04:21)  Creatinine, Random Urine: <13 mg/dL (09-04 @ 04:21)  Chloride, Random Urine: 62 mmol/L (09-04 @ 04:21)    PTH and Vit D:

## 2021-09-06 NOTE — PROGRESS NOTE ADULT - ASSESSMENT
1. Hyponatremia with unknown duration most likely chronic. Pt is clinically euvolemic. Multifactorial due to HCTZ and excessive water intake and possible high ADH state.  -Na improved with fluids. Na is better 132 and sodium tabs 2gm tid. urine lytes noted. Serum Uric acid is low;  TSH is okay; Cortisol in AM.   -continue Fluid restriction 800ml to 1L/day.   -Check Seurm Na daily. Monitor I/O's daily. Avoid overcorrection of NA (8-10meq/day)  2. HTN: -bp is okay. on losartan 50mg daily. hold hctz.  -titrate bp meds to keep sbp >110 and < 130  3. Constipated: better and  laxatives prn.  4. Edema of legs: add lasix 20mg oral daily.     Discussed the assessment and plan with Primary Team/Nurse

## 2021-09-06 NOTE — PROGRESS NOTE ADULT - SUBJECTIVE AND OBJECTIVE BOX
Patient is a 76y old  Female who presents with a chief complaint of Bilateral L5 transverse process and sacral alar fracture (06 Sep 2021 10:50)    PATIENT IS SEEN AND EXAMINED IN MEDICAL FLOOR.  NGT [    ]    PASCUAL [   ]      GT [   ]    ALLERGIES:  penicillin (Rash)      Daily     Daily     VITALS:    Vital Signs Last 24 Hrs  T(C): 36.4 (06 Sep 2021 05:30), Max: 36.4 (05 Sep 2021 14:47)  T(F): 97.6 (06 Sep 2021 05:30), Max: 97.6 (05 Sep 2021 14:47)  HR: 99 (06 Sep 2021 11:31) (89 - 108)  BP: 187/84 (06 Sep 2021 11:31) (145/71 - 187/84)  BP(mean): --  RR: 16 (06 Sep 2021 05:30) (16 - 17)  SpO2: 97% (06 Sep 2021 11:31) (95% - 99%)    LABS:    CBC Full  -  ( 06 Sep 2021 08:44 )  WBC Count : 5.60 K/uL  RBC Count : 3.72 M/uL  Hemoglobin : 11.3 g/dL  Hematocrit : 33.0 %  Platelet Count - Automated : 336 K/uL  Mean Cell Volume : 88.7 fl  Mean Cell Hemoglobin : 30.4 pg  Mean Cell Hemoglobin Concentration : 34.2 gm/dL  Auto Neutrophil # : x  Auto Lymphocyte # : x  Auto Monocyte # : x  Auto Eosinophil # : x  Auto Basophil # : x  Auto Neutrophil % : x  Auto Lymphocyte % : x  Auto Monocyte % : x  Auto Eosinophil % : x  Auto Basophil % : x      09-06    132<L>  |  97  |  12  ----------------------------<  131<H>  4.3   |  29  |  0.37<L>    Ca    8.8      06 Sep 2021 08:44  Phos  2.6     09-06  Mg     1.8     09-06    TPro  6.5  /  Alb  2.9<L>  /  TBili  0.3  /  DBili  x   /  AST  11  /  ALT  17  /  AlkPhos  193<H>  09-06    CAPILLARY BLOOD GLUCOSE            LIVER FUNCTIONS - ( 06 Sep 2021 08:44 )  Alb: 2.9 g/dL / Pro: 6.5 g/dL / ALK PHOS: 193 U/L / ALT: 17 U/L DA / AST: 11 U/L / GGT: x           Creatinine Trend: 0.37<--, 0.25<--, 0.23<--, 0.30<--, 0.25<--, 0.20<--  I&O's Summary    05 Sep 2021 07:01  -  06 Sep 2021 07:00  --------------------------------------------------------  IN: 0 mL / OUT: 701 mL / NET: -701 mL    06 Sep 2021 07:01  -  06 Sep 2021 11:33  --------------------------------------------------------  IN: 0 mL / OUT: 400 mL / NET: -400 mL                MEDICATIONS:    MEDICATIONS  (STANDING):  acetaminophen   Tablet .. 1000 milliGRAM(s) Oral every 8 hours  enoxaparin Injectable 40 milliGRAM(s) SubCutaneous daily  furosemide    Tablet 20 milliGRAM(s) Oral daily  gabapentin 100 milliGRAM(s) Oral every 12 hours  influenza   Vaccine 0.5 milliLiter(s) IntraMuscular once  lidocaine   4% Patch 1 Patch Transdermal daily  losartan 50 milliGRAM(s) Oral daily  polyethylene glycol 3350 17 Gram(s) Oral daily  senna 2 Tablet(s) Oral at bedtime  sodium chloride 2 Gram(s) Oral three times a day      MEDICATIONS  (PRN):  magnesium hydroxide Suspension 30 milliLiter(s) Oral once PRN Constipation  oxyCODONE    IR 5 milliGRAM(s) Oral every 4 hours PRN Severe Pain (7 - 10)      REVIEW OF SYSTEMS:                           ALL ROS DONE [ X   ]    CONSTITUTIONAL:  LETHARGIC [   ], FEVER [   ], UNRESPONSIVE [   ]  CVS:  CP  [   ], SOB, [   ], PALPITATIONS [   ], DIZZYNESS [   ]  RS: COUGH [   ], SPUTUM [   ]  GI: ABDOMINAL PAIN [   ], NAUSEA [   ], VOMITINGS [   ], DIARRHEA [   ], CONSTIPATION [   ]  :  DYSURIA [   ], NOCTURIA [   ], INCREASED FREQUENCY [   ], DRIBLING [   ],  SKELETAL: PAINFUL JOINTS [   ], SWOLLEN JOINTS [   ], NECK ACHE [   ], LOW BACK ACHE [   ],  SKIN : ULCERS [   ], RASH [   ], ITCHING [   ]  CNS: HEAD ACHE [   ], DOUBLE VISION [   ], BLURRED VISION [   ], AMS / CONFUSION [   ], SEIZURES [   ], WEAKNESS [   ],TINGLING / NUMBNESS [   ]    PHYSICAL EXAMINATION:  GENERAL APPEARANCE: NO DISTRESS  HEENT:  NO PALLOR, NO  JVD,  NO   NODES, NECK SUPPLE  CVS: S1 +, S2 +,   RS: AEEB,  OCCASIONAL  RALES +,   NO RONCHI  ABD: SOFT, NT, NO, BS +  EXT: PE +  SKIN: WARM,   SKELETAL:  ROM ACCEPTABLE  CNS:  AAO X 2-3, NO  DEFICITS    RADIOLOGY :    EXAM:  CT CHEST                            PROCEDURE DATE:  09/04/2021          INTERPRETATION:  CLINICAL INFORMATION: Evaluate for left lower lobe mass versus consolidation    COMPARISON: None.    CONTRAST/COMPLICATIONS:  IV Contrast: NONE  Oral Contrast: NONE  Complications: None reported at time of study completion    PROCEDURE:  CT of the Chest was performed.  Sagittal and coronal reformats were performed.    FINDINGS:  Motion limited exam.  LUNGS AND AIRWAYS: Moderate elevation of the lefthemidiaphragm. Patent central airways.  Small amount of scarring at the left lung apex.  PLEURA: No pleural effusion.  MEDIASTINUM AND LESLIE: No lymphadenopathy.  VESSELS: Atherosclerotic changes of the aorta.  HEART: Heart size is normal. No pericardial effusion. Coronary artery calcifications.  CHEST WALL AND LOWER NECK: Thyroid gland is enlarged and heterogeneous.  VISUALIZED UPPER ABDOMEN: Within normal limits.  BONES: Multilevel severe compression deformities of the thoracic and upper lumbar spine. Status post L1 vertebroplasty. Severe degenerative changes of the left shoulder.    IMPRESSION:  Moderate elevation of the left hemidiaphragm. No focal consolidation.    Enlarged, heterogeneous thyroid gland. Correlate with nonemergent thyroid ultrasound if clinically warranted.    Multilevel severe compression deformities of the thoracic and upper lumbar spine of indeterminate age.    ==========================================================================    EXAM:  CT FEMUR ONLY LT                            PROCEDURE DATE:  09/03/2021          INTERPRETATION:  CLINICAL INFORMATION: Status post fall.    TECHNIQUE: CT of the left femur was performed in bone and soft tissue windows with coronal and sagittal reformats. No intravenous contrast was administered. 3-D reformats were performed on a separate workstation.    COMPARISON: No similar prior studies are available for comparison.    FINDINGS:    Bone: No fracture or dislocation is demonstrated. Moderate osteoarthritis is seen in the left hip. Moderate to severe osteoarthritis of the left knee is noted.    Soft tissues: The visualized muscles and tendons are grossly intact.    IMPRESSION:    No fracture or dislocation of the left femur.    ===================================================================    EXAM:  CT 3D RECONSTRUCT [facility]                          EXAM:  CT PELVIS BONY ONLY                            PROCEDURE DATE:  09/03/2021          INTERPRETATION:  CT PELVIS BONY, CT 3D RECONSTRUCTION WO WORKSTATION dated 9/3/2021 1:58 PM    INDICATION: Left hip pain    COMPARISON: None available.    TECHNIQUE: CT imaging of the pelvis was performed. The data was reformatted in the axial, coronal, and sagittal planes. Additionally, 3-D reformatted imaging was created.    FINDINGS:    OSSEOUS STRUCTURES: Mildly decreased bone mineralization. There are nondisplaced fractures of the bilateral sacral ala that traverse the midline at the S1 level. Acute bilateral L5 transverse process fractures. No additional acute fracture is seen. Chronic as for fracture Moderate to severe bilateral hip joint space narrowing is noted. There is spurring and productive change at the pubic symphysis. Chronic erosive change at the pubic symphysis. Spurring the sacroiliac joints. Moderate chronic compression deformities of the L4 and L5 vertebra.  SYNOVIUM/ JOINT FLUID: No hip joint effusion  TENDONS: Limited by CT technique. No full-thickness tendon tear or retraction.  MUSCLES: No intramuscular hematoma  NEUROVASCULAR STRUCTURES: Mild vascular calcifications are noted.  INTRAPELVIC SOFT TISSUES: Moderate distention of the urinary bladder. Diastases of the rectus abdominis muscles.  SUBCUTANEOUS SOFT TISSUES: No soft tissue swelling.    3-D reformatted imaging confirms these findings.    IMPRESSION:    Bilateral sacral alar fractures which cross midline at S1. Acute bilateral L5 transverse process fractures.  Chronic fracture deformities of the L4 and L5 vertebra.  Degenerative changes as above.  Moderate distention of the urinary bladder. Correlate clinically.    ===============================================================      ASSESSMENT :     Unspecified fracture of sacrum, initial encounter for closed fracture    No pertinent past medical history    HTN (hypertension)        PLAN:  HPI:  Patient is a 76 year-old f, from home, ambulates with a walker at baseline with a PMH of HTN presents to the ER on 9/3/21 with c/o lower back pain since 7/28/21. As per patient's son, patient fell on her left side while walking on the street on 07/28/21. No head injury or loss of consciousness. Initially, her pain was tolerated and relieved by Tylenol. Her pain was getting worse 2 weeks ago and patient was prescribed percocet by her primary care provider on 8/20/21. Patient also had a X-ray outpatient. However, her pain still persists and she becomes immobilize and bed-bound.  (03 Sep 2021 19:24)    # ACUTE FRACTURE B/L SACRAL ALAR FX W/ CROSS AT MIDLINE OF S1, B/L L5 TRANSVERSE PROCESS FX, CHRONIC FRACTURES OF L4 AND L5, CHRONIC COMPRESSION DEFORMITIES OF THORACIC SPINE  - ORTHOPEDIC SX RECOMMENDATION FOR CONSERVATIVE MGMT  - PRN PAIN CONTROL; PAIN MGMT CONSULT  - F/U PT EVAL     # NO LEFT FEMOR FRACTURE NOTED ON CT LEFT HIP/FEMUR    # ACUTE HYPONATREMIA - SUSPECT HYPOVOLEMIC HYPONATREMIA S/T POOR PO INTAKE + ? HCTZ [SO WILL BRING HOME ANTI-HTN TO CONFIRM] VS. LESS LIKELY R/O SIADH  - NOTED URINE LYTES, U NA, U OSM, SERUM OSM  - S/P IVF; NOW ON FLUID RESTRICTION  - GOAL NA < 6-8 CORRECTION IN 24 HOURS  - NEPHROLOGY CONSULT DR. FARRELL    # CONSTIPATION - MIRALAX    # RULED OUT LEFT LOWER LOBE ? MASS VS. EFFUSION VS. CONSOLIDATION  ON CT CHEST     # HYPOKALEMIA - REPLETING WITH SUPPLEMENT    # HTN    # CASE D/W SON EMILIANO LIU @ [phone] [9/5]    # GI AND DVT PPX .   Patient is a 76y old  Female who presents with a chief complaint of Bilateral L5 transverse process and sacral alar fracture (06 Sep 2021 10:50)    PATIENT IS SEEN AND EXAMINED IN MEDICAL FLOOR.  NGT [    ]    PASCUAL [   ]      GT [   ]    ALLERGIES:  penicillin (Rash)      Daily     Daily     VITALS:    Vital Signs Last 24 Hrs  T(C): 36.4 (06 Sep 2021 05:30), Max: 36.4 (05 Sep 2021 14:47)  T(F): 97.6 (06 Sep 2021 05:30), Max: 97.6 (05 Sep 2021 14:47)  HR: 99 (06 Sep 2021 11:31) (89 - 108)  BP: 187/84 (06 Sep 2021 11:31) (145/71 - 187/84)  BP(mean): --  RR: 16 (06 Sep 2021 05:30) (16 - 17)  SpO2: 97% (06 Sep 2021 11:31) (95% - 99%)    LABS:    CBC Full  -  ( 06 Sep 2021 08:44 )  WBC Count : 5.60 K/uL  RBC Count : 3.72 M/uL  Hemoglobin : 11.3 g/dL  Hematocrit : 33.0 %  Platelet Count - Automated : 336 K/uL  Mean Cell Volume : 88.7 fl  Mean Cell Hemoglobin : 30.4 pg  Mean Cell Hemoglobin Concentration : 34.2 gm/dL  Auto Neutrophil # : x  Auto Lymphocyte # : x  Auto Monocyte # : x  Auto Eosinophil # : x  Auto Basophil # : x  Auto Neutrophil % : x  Auto Lymphocyte % : x  Auto Monocyte % : x  Auto Eosinophil % : x  Auto Basophil % : x      09-06    132<L>  |  97  |  12  ----------------------------<  131<H>  4.3   |  29  |  0.37<L>    Ca    8.8      06 Sep 2021 08:44  Phos  2.6     09-06  Mg     1.8     09-06    TPro  6.5  /  Alb  2.9<L>  /  TBili  0.3  /  DBili  x   /  AST  11  /  ALT  17  /  AlkPhos  193<H>  09-06    CAPILLARY BLOOD GLUCOSE            LIVER FUNCTIONS - ( 06 Sep 2021 08:44 )  Alb: 2.9 g/dL / Pro: 6.5 g/dL / ALK PHOS: 193 U/L / ALT: 17 U/L DA / AST: 11 U/L / GGT: x           Creatinine Trend: 0.37<--, 0.25<--, 0.23<--, 0.30<--, 0.25<--, 0.20<--  I&O's Summary    05 Sep 2021 07:01  -  06 Sep 2021 07:00  --------------------------------------------------------  IN: 0 mL / OUT: 701 mL / NET: -701 mL    06 Sep 2021 07:01  -  06 Sep 2021 11:33  --------------------------------------------------------  IN: 0 mL / OUT: 400 mL / NET: -400 mL                MEDICATIONS:    MEDICATIONS  (STANDING):  acetaminophen   Tablet .. 1000 milliGRAM(s) Oral every 8 hours  enoxaparin Injectable 40 milliGRAM(s) SubCutaneous daily  furosemide    Tablet 20 milliGRAM(s) Oral daily  gabapentin 100 milliGRAM(s) Oral every 12 hours  influenza   Vaccine 0.5 milliLiter(s) IntraMuscular once  lidocaine   4% Patch 1 Patch Transdermal daily  losartan 50 milliGRAM(s) Oral daily  polyethylene glycol 3350 17 Gram(s) Oral daily  senna 2 Tablet(s) Oral at bedtime  sodium chloride 2 Gram(s) Oral three times a day      MEDICATIONS  (PRN):  magnesium hydroxide Suspension 30 milliLiter(s) Oral once PRN Constipation  oxyCODONE    IR 5 milliGRAM(s) Oral every 4 hours PRN Severe Pain (7 - 10)      REVIEW OF SYSTEMS:                           ALL ROS DONE [ X   ]    CONSTITUTIONAL:  LETHARGIC [   ], FEVER [   ], UNRESPONSIVE [   ]  CVS:  CP  [   ], SOB, [   ], PALPITATIONS [   ], DIZZYNESS [   ]  RS: COUGH [   ], SPUTUM [   ]  GI: ABDOMINAL PAIN [   ], NAUSEA [   ], VOMITINGS [   ], DIARRHEA [   ], CONSTIPATION [   ]  :  DYSURIA [   ], NOCTURIA [   ], INCREASED FREQUENCY [   ], DRIBLING [   ],  SKELETAL: PAINFUL JOINTS [   ], SWOLLEN JOINTS [   ], NECK ACHE [   ], LOW BACK ACHE [   ],  SKIN : ULCERS [   ], RASH [   ], ITCHING [   ]  CNS: HEAD ACHE [   ], DOUBLE VISION [   ], BLURRED VISION [   ], AMS / CONFUSION [   ], SEIZURES [   ], WEAKNESS [   ],TINGLING / NUMBNESS [   ]    PHYSICAL EXAMINATION:  GENERAL APPEARANCE: NO DISTRESS  HEENT:  NO PALLOR, NO  JVD,  NO   NODES, NECK SUPPLE  CVS: S1 +, S2 +,   RS: AEEB,  OCCASIONAL  RALES +,   NO RONCHI  ABD: SOFT, NT, NO, BS +  EXT: PE +  SKIN: WARM,   SKELETAL:  ROM ACCEPTABLE  CNS:  AAO X 2-3, NO  DEFICITS    RADIOLOGY :    EXAM:  CT CHEST                            PROCEDURE DATE:  09/04/2021          INTERPRETATION:  CLINICAL INFORMATION: Evaluate for left lower lobe mass versus consolidation    COMPARISON: None.    CONTRAST/COMPLICATIONS:  IV Contrast: NONE  Oral Contrast: NONE  Complications: None reported at time of study completion    PROCEDURE:  CT of the Chest was performed.  Sagittal and coronal reformats were performed.    FINDINGS:  Motion limited exam.  LUNGS AND AIRWAYS: Moderate elevation of the lefthemidiaphragm. Patent central airways.  Small amount of scarring at the left lung apex.  PLEURA: No pleural effusion.  MEDIASTINUM AND LESLIE: No lymphadenopathy.  VESSELS: Atherosclerotic changes of the aorta.  HEART: Heart size is normal. No pericardial effusion. Coronary artery calcifications.  CHEST WALL AND LOWER NECK: Thyroid gland is enlarged and heterogeneous.  VISUALIZED UPPER ABDOMEN: Within normal limits.  BONES: Multilevel severe compression deformities of the thoracic and upper lumbar spine. Status post L1 vertebroplasty. Severe degenerative changes of the left shoulder.    IMPRESSION:  Moderate elevation of the left hemidiaphragm. No focal consolidation.    Enlarged, heterogeneous thyroid gland. Correlate with nonemergent thyroid ultrasound if clinically warranted.    Multilevel severe compression deformities of the thoracic and upper lumbar spine of indeterminate age.    ==========================================================================    EXAM:  CT FEMUR ONLY LT                            PROCEDURE DATE:  09/03/2021          INTERPRETATION:  CLINICAL INFORMATION: Status post fall.    TECHNIQUE: CT of the left femur was performed in bone and soft tissue windows with coronal and sagittal reformats. No intravenous contrast was administered. 3-D reformats were performed on a separate workstation.    COMPARISON: No similar prior studies are available for comparison.    FINDINGS:    Bone: No fracture or dislocation is demonstrated. Moderate osteoarthritis is seen in the left hip. Moderate to severe osteoarthritis of the left knee is noted.    Soft tissues: The visualized muscles and tendons are grossly intact.    IMPRESSION:    No fracture or dislocation of the left femur.    ===================================================================    EXAM:  CT 3D RECONSTRUCT [facility]                          EXAM:  CT PELVIS BONY ONLY                            PROCEDURE DATE:  09/03/2021          INTERPRETATION:  CT PELVIS BONY, CT 3D RECONSTRUCTION WO WORKSTATION dated 9/3/2021 1:58 PM    INDICATION: Left hip pain    COMPARISON: None available.    TECHNIQUE: CT imaging of the pelvis was performed. The data was reformatted in the axial, coronal, and sagittal planes. Additionally, 3-D reformatted imaging was created.    FINDINGS:    OSSEOUS STRUCTURES: Mildly decreased bone mineralization. There are nondisplaced fractures of the bilateral sacral ala that traverse the midline at the S1 level. Acute bilateral L5 transverse process fractures. No additional acute fracture is seen. Chronic as for fracture Moderate to severe bilateral hip joint space narrowing is noted. There is spurring and productive change at the pubic symphysis. Chronic erosive change at the pubic symphysis. Spurring the sacroiliac joints. Moderate chronic compression deformities of the L4 and L5 vertebra.  SYNOVIUM/ JOINT FLUID: No hip joint effusion  TENDONS: Limited by CT technique. No full-thickness tendon tear or retraction.  MUSCLES: No intramuscular hematoma  NEUROVASCULAR STRUCTURES: Mild vascular calcifications are noted.  INTRAPELVIC SOFT TISSUES: Moderate distention of the urinary bladder. Diastases of the rectus abdominis muscles.  SUBCUTANEOUS SOFT TISSUES: No soft tissue swelling.    3-D reformatted imaging confirms these findings.    IMPRESSION:    Bilateral sacral alar fractures which cross midline at S1. Acute bilateral L5 transverse process fractures.  Chronic fracture deformities of the L4 and L5 vertebra.  Degenerative changes as above.  Moderate distention of the urinary bladder. Correlate clinically.    ===============================================================      ASSESSMENT :     Unspecified fracture of sacrum, initial encounter for closed fracture    No pertinent past medical history    HTN (hypertension)        PLAN:  HPI:  Patient is a 76 year-old f, from home, ambulates with a walker at baseline with a PMH of HTN presents to the ER on 9/3/21 with c/o lower back pain since 7/28/21. As per patient's son, patient fell on her left side while walking on the street on 07/28/21. No head injury or loss of consciousness. Initially, her pain was tolerated and relieved by Tylenol. Her pain was getting worse 2 weeks ago and patient was prescribed percocet by her primary care provider on 8/20/21. Patient also had a X-ray outpatient. However, her pain still persists and she becomes immobilize and bed-bound.  (03 Sep 2021 19:24)    # D/W SON EMILIANO [9/6] - HE [facility] AGREEABLE W/ [facility]    # ACUTE FRACTURE B/L SACRAL ALAR FX W/ CROSS AT MIDLINE OF S1, B/L L5 TRANSVERSE PROCESS FX, CHRONIC FRACTURES OF L4 AND L5, CHRONIC COMPRESSION DEFORMITIES OF THORACIC SPINE  - ORTHOPEDIC SX RECOMMENDATION FOR CONSERVATIVE MGMT  - PRN PAIN CONTROL; PAIN MGMT CONSULT  - F/U PT EVAL     # NO LEFT FEMOR FRACTURE NOTED ON CT LEFT HIP/FEMUR    # ACUTE HYPONATREMIA - SUSPECT HYPOVOLEMIC HYPONATREMIA S/T POOR PO INTAKE + ? HCTZ [SO WILL BRING HOME ANTI-HTN TO CONFIRM] VS. LESS LIKELY R/O SIADH  - NOTED URINE LYTES, U NA, U OSM, SERUM OSM  - S/P IVF; NOW ON FLUID RESTRICTION  - GOAL NA < 6-8 CORRECTION IN 24 HOURS  - NEPHROLOGY CONSULT DR. FARRELL    # CONSTIPATION - MIRALAX  - IMPROVED S/P TWE [9/5]    # RULED OUT LEFT LOWER LOBE ? MASS VS. EFFUSION VS. CONSOLIDATION  ON CT CHEST     # HYPOKALEMIA - REPLETING WITH SUPPLEMENT    # HTN  - LOSARTAN, ADDED LASIX PER NEPHROLOGY    # CASE D/W SON EMILIANO LIU @ [phone] [9/6]    # GI AND DVT PPX .

## 2021-09-06 NOTE — PROGRESS NOTE ADULT - SUBJECTIVE AND OBJECTIVE BOX
ORTHO F/U  I saw and evaluated pt in bed with head of bed elevated. Still with pain across posterior pelvis.  Started PT ambulation with walker 5 feet.    PE: Still with tenderness most across posterior pelvis and sacrum.        No groin pain on hip rotation.        Moving legs with gross sensation.    A/P: L5 bilateral transverse process fractures         Sacral alar fractures         Chronic L4 and L5 compression         Continue pain control and mobilization PT ambulation.         Will injury reportedly 7/28 fractures should be healing well at this point with pain improving

## 2021-09-07 DIAGNOSIS — Z02.9 ENCOUNTER FOR ADMINISTRATIVE EXAMINATIONS, UNSPECIFIED: ICD-10-CM

## 2021-09-07 LAB
ANION GAP SERPL CALC-SCNC: 5 MMOL/L — SIGNIFICANT CHANGE UP (ref 5–17)
BUN SERPL-MCNC: 16 MG/DL — SIGNIFICANT CHANGE UP (ref 7–18)
CALCIUM SERPL-MCNC: 8.6 MG/DL — SIGNIFICANT CHANGE UP (ref 8.4–10.5)
CHLORIDE SERPL-SCNC: 93 MMOL/L — LOW (ref 96–108)
CO2 SERPL-SCNC: 31 MMOL/L — SIGNIFICANT CHANGE UP (ref 22–31)
CREAT SERPL-MCNC: 0.36 MG/DL — LOW (ref 0.5–1.3)
D DIMER BLD IA.RAPID-MCNC: 417 NG/ML DDU — HIGH
GLUCOSE SERPL-MCNC: 96 MG/DL — SIGNIFICANT CHANGE UP (ref 70–99)
HCT VFR BLD CALC: 33.9 % — LOW (ref 34.5–45)
HGB BLD-MCNC: 11.6 G/DL — SIGNIFICANT CHANGE UP (ref 11.5–15.5)
MCHC RBC-ENTMCNC: 30.7 PG — SIGNIFICANT CHANGE UP (ref 27–34)
MCHC RBC-ENTMCNC: 34.2 GM/DL — SIGNIFICANT CHANGE UP (ref 32–36)
MCV RBC AUTO: 89.7 FL — SIGNIFICANT CHANGE UP (ref 80–100)
NRBC # BLD: 0 /100 WBCS — SIGNIFICANT CHANGE UP (ref 0–0)
OSMOLALITY UR: 485 MOS/KG — SIGNIFICANT CHANGE UP (ref 50–1200)
PLATELET # BLD AUTO: 323 K/UL — SIGNIFICANT CHANGE UP (ref 150–400)
POTASSIUM SERPL-MCNC: 4.2 MMOL/L — SIGNIFICANT CHANGE UP (ref 3.5–5.3)
POTASSIUM SERPL-SCNC: 4.2 MMOL/L — SIGNIFICANT CHANGE UP (ref 3.5–5.3)
POTASSIUM UR-SCNC: 67 MMOL/L — SIGNIFICANT CHANGE UP
RBC # BLD: 3.78 M/UL — LOW (ref 3.8–5.2)
RBC # FLD: 13 % — SIGNIFICANT CHANGE UP (ref 10.3–14.5)
SARS-COV-2 RNA SPEC QL NAA+PROBE: SIGNIFICANT CHANGE UP
SODIUM SERPL-SCNC: 129 MMOL/L — LOW (ref 135–145)
SODIUM UR-SCNC: 88 MMOL/L — SIGNIFICANT CHANGE UP
WBC # BLD: 4.58 K/UL — SIGNIFICANT CHANGE UP (ref 3.8–10.5)
WBC # FLD AUTO: 4.58 K/UL — SIGNIFICANT CHANGE UP (ref 3.8–10.5)

## 2021-09-07 PROCEDURE — 99232 SBSQ HOSP IP/OBS MODERATE 35: CPT

## 2021-09-07 RX ORDER — GABAPENTIN 400 MG/1
200 CAPSULE ORAL EVERY 12 HOURS
Refills: 0 | Status: DISCONTINUED | OUTPATIENT
Start: 2021-09-07 | End: 2021-09-09

## 2021-09-07 RX ORDER — LISINOPRIL 2.5 MG/1
40 TABLET ORAL DAILY
Refills: 0 | Status: DISCONTINUED | OUTPATIENT
Start: 2021-09-07 | End: 2021-09-07

## 2021-09-07 RX ORDER — TAMSULOSIN HYDROCHLORIDE 0.4 MG/1
0.4 CAPSULE ORAL AT BEDTIME
Refills: 0 | Status: DISCONTINUED | OUTPATIENT
Start: 2021-09-07 | End: 2021-09-09

## 2021-09-07 RX ORDER — OXYCODONE HYDROCHLORIDE 5 MG/1
5 TABLET ORAL EVERY 8 HOURS
Refills: 0 | Status: DISCONTINUED | OUTPATIENT
Start: 2021-09-07 | End: 2021-09-09

## 2021-09-07 RX ORDER — ACETAMINOPHEN 500 MG
1000 TABLET ORAL EVERY 8 HOURS
Refills: 0 | Status: DISCONTINUED | OUTPATIENT
Start: 2021-09-07 | End: 2021-09-09

## 2021-09-07 RX ORDER — METOPROLOL TARTRATE 50 MG
12.5 TABLET ORAL DAILY
Refills: 0 | Status: DISCONTINUED | OUTPATIENT
Start: 2021-09-07 | End: 2021-09-08

## 2021-09-07 RX ADMIN — OXYCODONE HYDROCHLORIDE 5 MILLIGRAM(S): 5 TABLET ORAL at 00:53

## 2021-09-07 RX ADMIN — Medication 1000 MILLIGRAM(S): at 06:20

## 2021-09-07 RX ADMIN — LIDOCAINE 1 PATCH: 4 CREAM TOPICAL at 20:30

## 2021-09-07 RX ADMIN — SENNA PLUS 2 TABLET(S): 8.6 TABLET ORAL at 21:58

## 2021-09-07 RX ADMIN — SODIUM CHLORIDE 2 GRAM(S): 9 INJECTION INTRAMUSCULAR; INTRAVENOUS; SUBCUTANEOUS at 05:31

## 2021-09-07 RX ADMIN — OXYCODONE HYDROCHLORIDE 5 MILLIGRAM(S): 5 TABLET ORAL at 14:00

## 2021-09-07 RX ADMIN — ENOXAPARIN SODIUM 40 MILLIGRAM(S): 100 INJECTION SUBCUTANEOUS at 12:21

## 2021-09-07 RX ADMIN — GABAPENTIN 100 MILLIGRAM(S): 400 CAPSULE ORAL at 05:31

## 2021-09-07 RX ADMIN — Medication 12.5 MILLIGRAM(S): at 17:23

## 2021-09-07 RX ADMIN — GABAPENTIN 200 MILLIGRAM(S): 400 CAPSULE ORAL at 17:23

## 2021-09-07 RX ADMIN — OXYCODONE HYDROCHLORIDE 5 MILLIGRAM(S): 5 TABLET ORAL at 06:20

## 2021-09-07 RX ADMIN — POLYETHYLENE GLYCOL 3350 17 GRAM(S): 17 POWDER, FOR SOLUTION ORAL at 12:21

## 2021-09-07 RX ADMIN — Medication 20 MILLIGRAM(S): at 05:31

## 2021-09-07 RX ADMIN — SODIUM CHLORIDE 2 GRAM(S): 9 INJECTION INTRAMUSCULAR; INTRAVENOUS; SUBCUTANEOUS at 21:58

## 2021-09-07 RX ADMIN — Medication 1000 MILLIGRAM(S): at 13:56

## 2021-09-07 RX ADMIN — LIDOCAINE 1 PATCH: 4 CREAM TOPICAL at 12:21

## 2021-09-07 RX ADMIN — Medication 1000 MILLIGRAM(S): at 14:00

## 2021-09-07 RX ADMIN — TAMSULOSIN HYDROCHLORIDE 0.4 MILLIGRAM(S): 0.4 CAPSULE ORAL at 21:58

## 2021-09-07 RX ADMIN — OXYCODONE HYDROCHLORIDE 5 MILLIGRAM(S): 5 TABLET ORAL at 01:40

## 2021-09-07 RX ADMIN — Medication 1000 MILLIGRAM(S): at 05:31

## 2021-09-07 RX ADMIN — OXYCODONE HYDROCHLORIDE 5 MILLIGRAM(S): 5 TABLET ORAL at 05:31

## 2021-09-07 RX ADMIN — SODIUM CHLORIDE 2 GRAM(S): 9 INJECTION INTRAMUSCULAR; INTRAVENOUS; SUBCUTANEOUS at 13:56

## 2021-09-07 RX ADMIN — LOSARTAN POTASSIUM 50 MILLIGRAM(S): 100 TABLET, FILM COATED ORAL at 05:31

## 2021-09-07 RX ADMIN — OXYCODONE HYDROCHLORIDE 5 MILLIGRAM(S): 5 TABLET ORAL at 13:56

## 2021-09-07 NOTE — PROGRESS NOTE ADULT - ASSESSMENT
76 YOF with PMH of HTN, sp with stable pelvic fracture admitted to medicine for PT/rehab placement. Surgical intervention not warranted per ortho

## 2021-09-07 NOTE — PROGRESS NOTE ADULT - SUBJECTIVE AND OBJECTIVE BOX
`Patient is a 76y old  Female who presents with a chief complaint of Bilateral L5 transverse process and sacral alar fracture (06 Sep 2021 21:47)    PATIENT IS SEEN AND EXAMINED IN MEDICAL FLOOR.  NGT [    ]    PASCUAL [   ]      GT [   ]    ALLERGIES:  penicillin (Rash)      Daily     Daily     VITALS:    Vital Signs Last 24 Hrs  T(C): 37 (07 Sep 2021 05:38), Max: 37 (07 Sep 2021 05:38)  T(F): 98.6 (07 Sep 2021 05:38), Max: 98.6 (07 Sep 2021 05:38)  HR: 99 (07 Sep 2021 05:38) (94 - 99)  BP: 152/84 (07 Sep 2021 05:38) (137/73 - 187/84)  BP(mean): --  RR: 19 (07 Sep 2021 05:38) (17 - 19)  SpO2: 96% (07 Sep 2021 05:38) (96% - 97%)    LABS:    CBC Full  -  ( 07 Sep 2021 08:21 )  WBC Count : 4.58 K/uL  RBC Count : 3.78 M/uL  Hemoglobin : 11.6 g/dL  Hematocrit : 33.9 %  Platelet Count - Automated : 323 K/uL  Mean Cell Volume : 89.7 fl  Mean Cell Hemoglobin : 30.7 pg  Mean Cell Hemoglobin Concentration : 34.2 gm/dL  Auto Neutrophil # : x  Auto Lymphocyte # : x  Auto Monocyte # : x  Auto Eosinophil # : x  Auto Basophil # : x  Auto Neutrophil % : x  Auto Lymphocyte % : x  Auto Monocyte % : x  Auto Eosinophil % : x  Auto Basophil % : x      09-07    129<L>  |  93<L>  |  16  ----------------------------<  96  4.2   |  31  |  0.36<L>    Ca    8.6      07 Sep 2021 08:21  Phos  2.6     09-06  Mg     1.8     09-06    TPro  6.5  /  Alb  2.9<L>  /  TBili  0.3  /  DBili  x   /  AST  11  /  ALT  17  /  AlkPhos  193<H>  09-06    CAPILLARY BLOOD GLUCOSE            LIVER FUNCTIONS - ( 06 Sep 2021 08:44 )  Alb: 2.9 g/dL / Pro: 6.5 g/dL / ALK PHOS: 193 U/L / ALT: 17 U/L DA / AST: 11 U/L / GGT: x           Creatinine Trend: 0.36<--, 0.37<--, 0.25<--, 0.23<--, 0.30<--, 0.25<--  I&O's Summary    06 Sep 2021 07:01  -  07 Sep 2021 07:00  --------------------------------------------------------  IN: 0 mL / OUT: 1400 mL / NET: -1400 mL                MEDICATIONS:    MEDICATIONS  (STANDING):  enoxaparin Injectable 40 milliGRAM(s) SubCutaneous daily  gabapentin 100 milliGRAM(s) Oral every 12 hours  influenza   Vaccine 0.5 milliLiter(s) IntraMuscular once  lidocaine   4% Patch 1 Patch Transdermal daily  losartan 50 milliGRAM(s) Oral daily  polyethylene glycol 3350 17 Gram(s) Oral daily  senna 2 Tablet(s) Oral at bedtime  sodium chloride 2 Gram(s) Oral three times a day  tamsulosin 0.4 milliGRAM(s) Oral at bedtime      MEDICATIONS  (PRN):  magnesium hydroxide Suspension 30 milliLiter(s) Oral once PRN Constipation      REVIEW OF SYSTEMS:                           ALL ROS DONE [ X   ]    CONSTITUTIONAL:  LETHARGIC [   ], FEVER [   ], UNRESPONSIVE [   ]  CVS:  CP  [   ], SOB, [   ], PALPITATIONS [   ], DIZZYNESS [   ]  RS: COUGH [   ], SPUTUM [   ]  GI: ABDOMINAL PAIN [   ], NAUSEA [   ], VOMITINGS [   ], DIARRHEA [   ], CONSTIPATION [   ]  :  DYSURIA [   ], NOCTURIA [   ], INCREASED FREQUENCY [   ], DRIBLING [   ],  SKELETAL: PAINFUL JOINTS [   ], SWOLLEN JOINTS [   ], NECK ACHE [   ], LOW BACK ACHE [   ],  SKIN : ULCERS [   ], RASH [   ], ITCHING [   ]  CNS: HEAD ACHE [   ], DOUBLE VISION [   ], BLURRED VISION [   ], AMS / CONFUSION [   ], SEIZURES [   ], WEAKNESS [   ],TINGLING / NUMBNESS [   ]    PHYSICAL EXAMINATION:  GENERAL APPEARANCE: NO DISTRESS  HEENT:  NO PALLOR, NO  JVD,  NO   NODES, NECK SUPPLE  CVS: S1 +, S2 +,   RS: AEEB,  OCCASIONAL  RALES +,   NO RONCHI  ABD: SOFT, NT, NO, BS +  EXT: PE +  SKIN: WARM,   SKELETAL:  ROM ACCEPTABLE  CNS:  AAO X 2-3, NO  DEFICITS    RADIOLOGY :    EXAM:  CT CHEST                            PROCEDURE DATE:  09/04/2021          INTERPRETATION:  CLINICAL INFORMATION: Evaluate for left lower lobe mass versus consolidation    COMPARISON: None.    CONTRAST/COMPLICATIONS:  IV Contrast: NONE  Oral Contrast: NONE  Complications: None reported at time of study completion    PROCEDURE:  CT of the Chest was performed.  Sagittal and coronal reformats were performed.    FINDINGS:  Motion limited exam.  LUNGS AND AIRWAYS: Moderate elevation of the lefthemidiaphragm. Patent central airways.  Small amount of scarring at the left lung apex.  PLEURA: No pleural effusion.  MEDIASTINUM AND LESLIE: No lymphadenopathy.  VESSELS: Atherosclerotic changes of the aorta.  HEART: Heart size is normal. No pericardial effusion. Coronary artery calcifications.  CHEST WALL AND LOWER NECK: Thyroid gland is enlarged and heterogeneous.  VISUALIZED UPPER ABDOMEN: Within normal limits.  BONES: Multilevel severe compression deformities of the thoracic and upper lumbar spine. Status post L1 vertebroplasty. Severe degenerative changes of the left shoulder.    IMPRESSION:  Moderate elevation of the left hemidiaphragm. No focal consolidation.    Enlarged, heterogeneous thyroid gland. Correlate with nonemergent thyroid ultrasound if clinically warranted.    Multilevel severe compression deformities of the thoracic and upper lumbar spine of indeterminate age.    ==========================================================================    EXAM:  CT FEMUR ONLY LT                            PROCEDURE DATE:  09/03/2021          INTERPRETATION:  CLINICAL INFORMATION: Status post fall.    TECHNIQUE: CT of the left femur was performed in bone and soft tissue windows with coronal and sagittal reformats. No intravenous contrast was administered. 3-D reformats were performed on a separate workstation.    COMPARISON: No similar prior studies are available for comparison.    FINDINGS:    Bone: No fracture or dislocation is demonstrated. Moderate osteoarthritis is seen in the left hip. Moderate to severe osteoarthritis of the left knee is noted.    Soft tissues: The visualized muscles and tendons are grossly intact.    IMPRESSION:    No fracture or dislocation of the left femur.    ===================================================================    EXAM:  CT 3D RECONSTRUCT [facility]                          EXAM:  CT PELVIS BONY ONLY                            PROCEDURE DATE:  09/03/2021          INTERPRETATION:  CT PELVIS BONY, CT 3D RECONSTRUCTION WO WORKSTATION dated 9/3/2021 1:58 PM    INDICATION: Left hip pain    COMPARISON: None available.    TECHNIQUE: CT imaging of the pelvis was performed. The data was reformatted in the axial, coronal, and sagittal planes. Additionally, 3-D reformatted imaging was created.    FINDINGS:    OSSEOUS STRUCTURES: Mildly decreased bone mineralization. There are nondisplaced fractures of the bilateral sacral ala that traverse the midline at the S1 level. Acute bilateral L5 transverse process fractures. No additional acute fracture is seen. Chronic as for fracture Moderate to severe bilateral hip joint space narrowing is noted. There is spurring and productive change at the pubic symphysis. Chronic erosive change at the pubic symphysis. Spurring the sacroiliac joints. Moderate chronic compression deformities of the L4 and L5 vertebra.  SYNOVIUM/ JOINT FLUID: No hip joint effusion  TENDONS: Limited by CT technique. No full-thickness tendon tear or retraction.  MUSCLES: No intramuscular hematoma  NEUROVASCULAR STRUCTURES: Mild vascular calcifications are noted.  INTRAPELVIC SOFT TISSUES: Moderate distention of the urinary bladder. Diastases of the rectus abdominis muscles.  SUBCUTANEOUS SOFT TISSUES: No soft tissue swelling.    3-D reformatted imaging confirms these findings.    IMPRESSION:    Bilateral sacral alar fractures which cross midline at S1. Acute bilateral L5 transverse process fractures.  Chronic fracture deformities of the L4 and L5 vertebra.  Degenerative changes as above.  Moderate distention of the urinary bladder. Correlate clinically.    ===============================================================      ASSESSMENT :     Unspecified fracture of sacrum, initial encounter for closed fracture    No pertinent past medical history    HTN (hypertension)        PLAN:  HPI:  Patient is a 76 year-old f, from home, ambulates with a walker at baseline with a PMH of HTN presents to the ER on 9/3/21 with c/o lower back pain since 7/28/21. As per patient's son, patient fell on her left side while walking on the street on 07/28/21. No head injury or loss of consciousness. Initially, her pain was tolerated and relieved by Tylenol. Her pain was getting worse 2 weeks ago and patient was prescribed percocet by her primary care provider on 8/20/21. Patient also had a X-ray outpatient. However, her pain still persists and she becomes immobilize and bed-bound.  (03 Sep 2021 19:24)    # D/W SON EMILIANO [9/6] - HE IS AGREEABLE W/ PT EVAL FOR CHET TO [facility] OR [facility]    # ACUTE FRACTURE B/L SACRAL ALAR FX W/ CROSS AT MIDLINE OF S1, B/L L5 TRANSVERSE PROCESS FX, CHRONIC FRACTURES OF L4 AND L5, CHRONIC COMPRESSION DEFORMITIES OF THORACIC SPINE  - ORTHOPEDIC SX RECOMMENDATION FOR CONSERVATIVE MGMT  - PRN PAIN CONTROL; PAIN MGMT CONSULT  - F/U PT EVAL     # NO LEFT FEMOR FRACTURE NOTED ON CT LEFT HIP/FEMUR    # ACUTE HYPONATREMIA - SUSPECT HYPOVOLEMIC HYPONATREMIA S/T POOR PO INTAKE + ? HCTZ [SO WILL BRING HOME ANTI-HTN TO CONFIRM] VS. LESS LIKELY R/O SIADH  - NOTED URINE LYTES, U NA, U OSM, SERUM OSM  - S/P IVF; NOW ON FLUID RESTRICTION  - GOAL NA < 6-8 CORRECTION IN 24 HOURS  - NEPHROLOGY CONSULT DR. FARRELL    # CONSTIPATION - MIRALAX  - IMPROVED S/P TWE [9/5]    # RULED OUT LEFT LOWER LOBE ? MASS VS. EFFUSION VS. CONSOLIDATION  ON CT CHEST     # HYPOKALEMIA - REPLETING WITH SUPPLEMENT    # HTN  - LOSARTAN, ADDED LASIX PER NEPHROLOGY    # CASE D/W SON EMILIANO BEKACARMEN @ [phone] [9/6]    # GI AND DVT PPX .   `Patient is a 76y old  Female who presents with a chief complaint of Bilateral L5 transverse process and sacral alar fracture (06 Sep 2021 21:47)    PATIENT IS SEEN AND EXAMINED IN MEDICAL FLOOR.      ALLERGIES:  penicillin (Rash)      VITALS:    Vital Signs Last 24 Hrs  T(C): 37 (07 Sep 2021 05:38), Max: 37 (07 Sep 2021 05:38)  T(F): 98.6 (07 Sep 2021 05:38), Max: 98.6 (07 Sep 2021 05:38)  HR: 99 (07 Sep 2021 05:38) (94 - 99)  BP: 152/84 (07 Sep 2021 05:38) (137/73 - 187/84)  BP(mean): --  RR: 19 (07 Sep 2021 05:38) (17 - 19)  SpO2: 96% (07 Sep 2021 05:38) (96% - 97%)    LABS:    CBC Full  -  ( 07 Sep 2021 08:21 )  WBC Count : 4.58 K/uL  RBC Count : 3.78 M/uL  Hemoglobin : 11.6 g/dL  Hematocrit : 33.9 %  Platelet Count - Automated : 323 K/uL  Mean Cell Volume : 89.7 fl  Mean Cell Hemoglobin : 30.7 pg  Mean Cell Hemoglobin Concentration : 34.2 gm/dL  Auto Neutrophil # : x  Auto Lymphocyte # : x  Auto Monocyte # : x  Auto Eosinophil # : x  Auto Basophil # : x  Auto Neutrophil % : x  Auto Lymphocyte % : x  Auto Monocyte % : x  Auto Eosinophil % : x  Auto Basophil % : x      09-07    129<L>  |  93<L>  |  16  ----------------------------<  96  4.2   |  31  |  0.36<L>    Ca    8.6      07 Sep 2021 08:21  Phos  2.6     09-06  Mg     1.8     09-06    TPro  6.5  /  Alb  2.9<L>  /  TBili  0.3  /  DBili  x   /  AST  11  /  ALT  17  /  AlkPhos  193<H>  09-06    CAPILLARY BLOOD GLUCOSE            LIVER FUNCTIONS - ( 06 Sep 2021 08:44 )  Alb: 2.9 g/dL / Pro: 6.5 g/dL / ALK PHOS: 193 U/L / ALT: 17 U/L DA / AST: 11 U/L / GGT: x           Creatinine Trend: 0.36<--, 0.37<--, 0.25<--, 0.23<--, 0.30<--, 0.25<--  I&O's Summary    06 Sep 2021 07:01  -  07 Sep 2021 07:00  --------------------------------------------------------  IN: 0 mL / OUT: 1400 mL / NET: -1400 mL                MEDICATIONS:    MEDICATIONS  (STANDING):  enoxaparin Injectable 40 milliGRAM(s) SubCutaneous daily  gabapentin 100 milliGRAM(s) Oral every 12 hours  influenza   Vaccine 0.5 milliLiter(s) IntraMuscular once  lidocaine   4% Patch 1 Patch Transdermal daily  losartan 50 milliGRAM(s) Oral daily  polyethylene glycol 3350 17 Gram(s) Oral daily  senna 2 Tablet(s) Oral at bedtime  sodium chloride 2 Gram(s) Oral three times a day  tamsulosin 0.4 milliGRAM(s) Oral at bedtime      MEDICATIONS  (PRN):  magnesium hydroxide Suspension 30 milliLiter(s) Oral once PRN Constipation      REVIEW OF SYSTEMS:                           ALL ROS DONE [ X   ]    CONSTITUTIONAL:  LETHARGIC [   ], FEVER [   ], UNRESPONSIVE [   ]  CVS:  CP  [   ], SOB, [   ], PALPITATIONS [   ], DIZZYNESS [   ]  RS: COUGH [   ], SPUTUM [   ]  GI: ABDOMINAL PAIN [   ], NAUSEA [   ], VOMITINGS [   ], DIARRHEA [   ], CONSTIPATION [   ]  :  DYSURIA [   ], NOCTURIA [   ], INCREASED FREQUENCY [   ], DRIBLING [   ],  SKELETAL: PAINFUL JOINTS [   ], SWOLLEN JOINTS [   ], NECK ACHE [   ], LOW BACK ACHE [   ],  SKIN : ULCERS [   ], RASH [   ], ITCHING [   ]  CNS: HEAD ACHE [   ], DOUBLE VISION [   ], BLURRED VISION [   ], AMS / CONFUSION [   ], SEIZURES [   ], WEAKNESS [   ],TINGLING / NUMBNESS [   ]    PHYSICAL EXAMINATION:  GENERAL APPEARANCE: NO DISTRESS  HEENT:  NO PALLOR, NO  JVD,  NO   NODES, NECK SUPPLE  CVS: S1 +, S2 +,   RS: AEEB,  OCCASIONAL  RALES +,   NO RONCHI  ABD: SOFT, NT, NO, BS +  EXT: PE +  SKIN: WARM,   SKELETAL:  ROM ACCEPTABLE  CNS:  AAO X 2-3, NO  DEFICITS    RADIOLOGY :    EXAM:  CT CHEST                            PROCEDURE DATE:  09/04/2021          INTERPRETATION:  CLINICAL INFORMATION: Evaluate for left lower lobe mass versus consolidation    COMPARISON: None.    CONTRAST/COMPLICATIONS:  IV Contrast: NONE  Oral Contrast: NONE  Complications: None reported at time of study completion    PROCEDURE:  CT of the Chest was performed.  Sagittal and coronal reformats were performed.    FINDINGS:  Motion limited exam.  LUNGS AND AIRWAYS: Moderate elevation of the lefthemidiaphragm. Patent central airways.  Small amount of scarring at the left lung apex.  PLEURA: No pleural effusion.  MEDIASTINUM AND LESLIE: No lymphadenopathy.  VESSELS: Atherosclerotic changes of the aorta.  HEART: Heart size is normal. No pericardial effusion. Coronary artery calcifications.  CHEST WALL AND LOWER NECK: Thyroid gland is enlarged and heterogeneous.  VISUALIZED UPPER ABDOMEN: Within normal limits.  BONES: Multilevel severe compression deformities of the thoracic and upper lumbar spine. Status post L1 vertebroplasty. Severe degenerative changes of the left shoulder.    IMPRESSION:  Moderate elevation of the left hemidiaphragm. No focal consolidation.    Enlarged, heterogeneous thyroid gland. Correlate with nonemergent thyroid ultrasound if clinically warranted.    Multilevel severe compression deformities of the thoracic and upper lumbar spine of indeterminate age.    ==========================================================================    EXAM:  CT FEMUR ONLY LT                            PROCEDURE DATE:  09/03/2021          INTERPRETATION:  CLINICAL INFORMATION: Status post fall.    TECHNIQUE: CT of the left femur was performed in bone and soft tissue windows with coronal and sagittal reformats. No intravenous contrast was administered. 3-D reformats were performed on a separate workstation.    COMPARISON: No similar prior studies are available for comparison.    FINDINGS:    Bone: No fracture or dislocation is demonstrated. Moderate osteoarthritis is seen in the left hip. Moderate to severe osteoarthritis of the left knee is noted.    Soft tissues: The visualized muscles and tendons are grossly intact.    IMPRESSION:    No fracture or dislocation of the left femur.    ===================================================================    EXAM:  CT 3D RECONSTRUCT [facility]                          EXAM:  CT PELVIS BONY ONLY                            PROCEDURE DATE:  09/03/2021          INTERPRETATION:  CT PELVIS BONY, CT 3D RECONSTRUCTION WO WORKSTATION dated 9/3/2021 1:58 PM    INDICATION: Left hip pain    COMPARISON: None available.    TECHNIQUE: CT imaging of the pelvis was performed. The data was reformatted in the axial, coronal, and sagittal planes. Additionally, 3-D reformatted imaging was created.    FINDINGS:    OSSEOUS STRUCTURES: Mildly decreased bone mineralization. There are nondisplaced fractures of the bilateral sacral ala that traverse the midline at the S1 level. Acute bilateral L5 transverse process fractures. No additional acute fracture is seen. Chronic as for fracture Moderate to severe bilateral hip joint space narrowing is noted. There is spurring and productive change at the pubic symphysis. Chronic erosive change at the pubic symphysis. Spurring the sacroiliac joints. Moderate chronic compression deformities of the L4 and L5 vertebra.  SYNOVIUM/ JOINT FLUID: No hip joint effusion  TENDONS: Limited by CT technique. No full-thickness tendon tear or retraction.  MUSCLES: No intramuscular hematoma  NEUROVASCULAR STRUCTURES: Mild vascular calcifications are noted.  INTRAPELVIC SOFT TISSUES: Moderate distention of the urinary bladder. Diastases of the rectus abdominis muscles.  SUBCUTANEOUS SOFT TISSUES: No soft tissue swelling.    3-D reformatted imaging confirms these findings.    IMPRESSION:    Bilateral sacral alar fractures which cross midline at S1. Acute bilateral L5 transverse process fractures.  Chronic fracture deformities of the L4 and L5 vertebra.  Degenerative changes as above.  Moderate distention of the urinary bladder. Correlate clinically.    ===============================================================      ASSESSMENT :     Unspecified fracture of sacrum, initial encounter for closed fracture    No pertinent past medical history    HTN (hypertension)        PLAN:  HPI:  Patient is a 76 year-old f, from home, ambulates with a walker at baseline with a PMH of HTN presents to the ER on 9/3/21 with c/o lower back pain since 7/28/21. As per patient's son, patient fell on her left side while walking on the street on 07/28/21. No head injury or loss of consciousness. Initially, her pain was tolerated and relieved by Tylenol. Her pain was getting worse 2 weeks ago and patient was prescribed percocet by her primary care provider on 8/20/21. Patient also had a X-ray outpatient. However, her pain still persists and she becomes immobilize and bed-bound.  (03 Sep 2021 19:24)    # D/W SON EMILIANO [9/7] - [facility]  TEAM COORDINATING    # ACUTE FRACTURE B/L SACRAL ALAR FX W/ CROSS AT MIDLINE OF S1, B/L L5 TRANSVERSE PROCESS FX, CHRONIC FRACTURES OF L4 AND L5, CHRONIC COMPRESSION DEFORMITIES OF THORACIC SPINE  - ORTHOPEDIC SX RECOMMENDATION FOR CONSERVATIVE MGMT  - PRN PAIN CONTROL; PAIN MGMT CONSULT  - S/P PT EVAL     # NO LEFT FEMOR FRACTURE NOTED ON CT LEFT HIP/FEMUR    # ACUTE HYPONATREMIA - SUSPECT HYPOVOLEMIC HYPONATREMIA S/T POOR PO INTAKE + ? HCTZ [SO WILL BRING HOME ANTI-HTN TO CONFIRM] VS. LESS LIKELY R/O SIADH  - NOTED URINE LYTES, U NA, U OSM, SERUM OSM  - S/P IVF; NOW ON FLUID RESTRICTION, SALT TABS  - GOAL NA < 6-8 CORRECTION IN 24 HOURS  - NEPHROLOGY CONSULT DR. FARRELL    - STARTED FLOMAX, PASCUAL TO BE D/C-ED IN [facility] IN 2-3 DAYS W/ TOV    # CONSTIPATION - MIRALAX  - IMPROVED S/P TWE [9/5]    # RULED OUT LEFT LOWER LOBE ? MASS VS. EFFUSION VS. CONSOLIDATION  ON CT CHEST     # HYPOKALEMIA - REPLETING WITH SUPPLEMENT    # HTN  - LOSARTAN, STARTED LOW DOSE BB    # CASE D/W SON EMILIANO LIU @ [phone] [9/6] AND AT BEDSIDE ON 9/7    # GI AND DVT PPX .

## 2021-09-07 NOTE — PROGRESS NOTE ADULT - ASSESSMENT
1. Hyponatremia with unknown duration most likely chronic. Pt is clinically euvolemic. Multifactorial due to HCTZ and excessive water intake and possible high ADH state.  -Na dropped to 129 and d/c lasix and continue sodium tabs 2gm tid. urine lytes noted. Serum Uric acid is low;  TSH is okay; Cortisol in AM.   -continue Fluid restriction 800ml to 1L/day.   -Check Seurm Na daily. Monitor I/O's daily. Avoid overcorrection of NA (8-10meq/day)  2. HTN: -bp is okay. on losartan 50mg daily. hold hctz.  -titrate bp meds to keep sbp >110 and < 130  3. Constipated: better and  laxatives prn.  4. Edema of legs:  better; hold diuretic due to worsening hypona.     Discussed the assessment and plan with Primary Team/Nurse

## 2021-09-07 NOTE — PROGRESS NOTE ADULT - ASSESSMENT
Calculator page.   This informational tool is a resource and is not meant for direct clinical application.   Patient Search   Multi-Patient Search   MME Calculator   Reports   Drug List   Designation   My MARTHA #  Data Detail Level: Printer-Friendly View Extended View  Confidential Drug Utilization Report  Search Terms: giovanni blancas, 1945Search Date: 09/04/2021 12:16:01 PM  The Drug Utilization Report below displays all of the controlled substance prescriptions, if any, that your patient has filled in the last twelve months. The information displayed on this report is compiled from pharmacy submissions to the Department, and accurately reflects the information as submitted by the pharmacies.    This report was requested by: Mirtha Song | Reference #: [id number]    There are no results for the search terms that you entered.

## 2021-09-07 NOTE — PROGRESS NOTE ADULT - SUBJECTIVE AND OBJECTIVE BOX
HPI:  76 YOF admitted with L5 & S1 fractures s/p fall.  Found to be hyponatremic.  Pain, ortho and nephrology consulted.    OVERNIGHT EVENTS:  No new overnight events.  Seen and examined at bedside.     REVIEW OF SYSTEMS:      CONSTITUTIONAL: No fever  EYES: no acute visual disturbances  NECK: No pain or stiffness  RESPIRATORY: No cough; No shortness of breath  CARDIOVASCULAR: No chest pain, no palpitations  GASTROINTESTINAL: No pain. No nausea, vomiting or diarrhea   NEUROLOGICAL: No headache or numbness, no tremors  MUSCULOSKELETAL: + pain  GENITOURINARY: no dysuria, no frequency, no hesitancy  PSYCHIATRY: no depression, no anxiety  ALL OTHER  ROS negative        Vital Signs Last 24 Hrs  T(C): 36.9 (07 Sep 2021 12:57), Max: 37 (07 Sep 2021 05:38)  T(F): 98.4 (07 Sep 2021 12:57), Max: 98.6 (07 Sep 2021 05:38)  HR: 96 (07 Sep 2021 12:57) (94 - 99)  BP: 161/81 (07 Sep 2021 12:57) (137/73 - 179/72)  BP(mean): --  RR: 20 (07 Sep 2021 12:57) (18 - 20)  SpO2: 97% (07 Sep 2021 12:57) (96% - 97%)    ________________________________________________  PHYSICAL EXAM:    GENERAL: NAD  HEENT: Normocephalic; conjunctivae and sclerae clear;  NECK : supple, no JVD  CHEST/LUNG: Clear to auscultation; Nonlabored  HEART: S1 S2  regular  ABDOMEN: Soft, Nontender, Nondistended; Bowel sounds present  EXTREMITIES: no cyanosis; no LE edema;   SKIN: warm and dry; No rashes or lesions  NERVOUS SYSTEM:  Alert; no new deficits    _________________________________________________  CURRENT MEDICATIONS:    MEDICATIONS  (STANDING):  acetaminophen   Tablet .. 1000 milliGRAM(s) Oral every 8 hours  enoxaparin Injectable 40 milliGRAM(s) SubCutaneous daily  gabapentin 200 milliGRAM(s) Oral every 12 hours  influenza   Vaccine 0.5 milliLiter(s) IntraMuscular once  lidocaine   4% Patch 1 Patch Transdermal daily  losartan 50 milliGRAM(s) Oral daily  oxyCODONE    IR 5 milliGRAM(s) Oral every 8 hours  polyethylene glycol 3350 17 Gram(s) Oral daily  senna 2 Tablet(s) Oral at bedtime  sodium chloride 2 Gram(s) Oral three times a day  tamsulosin 0.4 milliGRAM(s) Oral at bedtime    MEDICATIONS  (PRN):  magnesium hydroxide Suspension 30 milliLiter(s) Oral once PRN Constipation      __________________________________________________  LABS:                          11.6   4.58  )-----------( 323      ( 07 Sep 2021 08:21 )             33.9     09-07    129<L>  |  93<L>  |  16  ----------------------------<  96  4.2   |  31  |  0.36<L>    Ca    8.6      07 Sep 2021 08:21  Phos  2.6     09-06  Mg     1.8     09-06    TPro  6.5  /  Alb  2.9<L>  /  TBili  0.3  /  DBili  x   /  AST  11  /  ALT  17  /  AlkPhos  193<H>  09-06        CAPILLARY BLOOD GLUCOSE          __________________________________________________  RADIOLOGY & ADDITIONAL TESTS:    Imaging Personally Reviewed:  YES    < from: CT 3D Reconstruct w/o Workstation (09.03.21 @ 13:58) >  IMPRESSION:    Bilateral sacral alar fractures which cross midline at S1. Acute bilateral L5 transverse process fractures.  Chronic fracture deformities of the L4 and L5 vertebra.  Degenerative changes as above.  Moderate distention of the urinary bladder. Correlate clinically.    < end of copied text >    Consultant(s) Notes Reviewed:   YES     Plan of care was discussed with patient and /or primary care giver; all questions and concerns were addressed and care was aligned with patient's wishes.    Plan discussed with attending and consulting physicians.

## 2021-09-07 NOTE — PROGRESS NOTE ADULT - SUBJECTIVE AND OBJECTIVE BOX
NEPHROLOGY MEDICAL CARE, [facility] - Dr. Kirby Garcia/ Dr. Jesica Wyatt/ Dr. Dom Vasquez/ Dr. Hemalatha Robins    Patient was seen and examined at bedside.    CC: patient has difficult walking    Vital Signs Last 24 Hrs  T(C): 36.9 (07 Sep 2021 12:57), Max: 37 (07 Sep 2021 05:38)  T(F): 98.4 (07 Sep 2021 12:57), Max: 98.6 (07 Sep 2021 05:38)  HR: 96 (07 Sep 2021 12:57) (94 - 99)  BP: 161/81 (07 Sep 2021 12:57) (137/73 - 179/72)  BP(mean): --  RR: 20 (07 Sep 2021 12:57) (18 - 20)  SpO2: 97% (07 Sep 2021 12:57) (96% - 97%)    09-06 @ 07:01  -  09-07 @ 07:00  --------------------------------------------------------  IN: 0 mL / OUT: 1400 mL / NET: -1400 mL        PHYSICAL EXAM:  General: No acute respiratory distress.  Eyes: conjunctiva and sclera clear  ENMT: Atraumatic, Normocephalic, supple, No JVD present. Moist mucous membranes  Respiratory: Bilateral clear to percussion; No rales, rhonchi, wheezing  Cardiovascular S1S2+; no m/r/g  Gastrointestinal: Soft, Non-tender, Nondistended; Bowel sounds present  Neuro:  Awake, Alert & Oriented X3  Ext:  no pedal edema present, No Cyanosis  Skin: No visible rashes    MEDICATIONS:  MEDICATIONS  (STANDING):  acetaminophen   Tablet .. 1000 milliGRAM(s) Oral every 8 hours  enoxaparin Injectable 40 milliGRAM(s) SubCutaneous daily  gabapentin 200 milliGRAM(s) Oral every 12 hours  influenza   Vaccine 0.5 milliLiter(s) IntraMuscular once  lidocaine   4% Patch 1 Patch Transdermal daily  losartan 50 milliGRAM(s) Oral daily  oxyCODONE    IR 5 milliGRAM(s) Oral every 8 hours  polyethylene glycol 3350 17 Gram(s) Oral daily  senna 2 Tablet(s) Oral at bedtime  sodium chloride 2 Gram(s) Oral three times a day  tamsulosin 0.4 milliGRAM(s) Oral at bedtime    MEDICATIONS  (PRN):  magnesium hydroxide Suspension 30 milliLiter(s) Oral once PRN Constipation          LABS:                        11.6   4.58  )-----------( 323      ( 07 Sep 2021 08:21 )             33.9     09-07    129<L>  |  93<L>  |  16  ----------------------------<  96  4.2   |  31  |  0.36<L>    Ca    8.6      07 Sep 2021 08:21  Phos  2.6     09-06  Mg     1.8     09-06    TPro  6.5  /  Alb  2.9<L>  /  TBili  0.3  /  DBili  x   /  AST  11  /  ALT  17  /  AlkPhos  193<H>  09-06          Urine studies  Potassium, Random Urine: 67 mmol/L (09-07 @ 10:15)  Sodium, Random Urine: 88 mmol/L (09-07 @ 10:15)  Osmolality, Random Urine: 485 mos/kg (09-07 @ 10:15)  Potassium, Random Urine: 69 mmol/L (09-05 @ 11:42)  Sodium, Random Urine: 53 mmol/L (09-05 @ 11:42)  Osmolality, Random Urine: 618 mos/kg (09-05 @ 11:42)  Osmolality, Random Urine: 540 mos/kg (09-04 @ 18:10)  Sodium, Random Urine: 32 mmol/L (09-04 @ 18:10)  Potassium, Random Urine: 81 mmol/L (09-04 @ 18:10)  Creatinine, Random Urine: 97 mg/dL (09-04 @ 18:10)    PTH and Vit D:

## 2021-09-07 NOTE — PROGRESS NOTE ADULT - SUBJECTIVE AND OBJECTIVE BOX
Source of information: , Chart review  Patient language: English  : n/a    CC:  right leg pain  This is a 76yFemale patient who presents to the hospital for Patient is a 76y old  Female who presents with a chief complaint of Bilateral L5 transverse process and sacral alar fracture (07 Sep 2021 10:14)    Pt with Bilateral L5 transverse process and sacral alar fracture.  + chronic L4 and l5 compression fractures.  Pt states no pain at rest.  no nausea or vomiting.  + right leg pain on movement. Pt was not receiving btp meds so po opioids were given atc.      PAIN SCORE:   5/10      SCALE USED: (1-10 NRS)      PAST MEDICAL & SURGICAL HISTORY:  HTN (hypertension)        FAMILY HISTORY:        SOCIAL HISTORY:  [ x] Denies Smoking, Alcohol, or Drug Use    Allergies    penicillin (Rash)    Intolerances        MEDICATIONS:    MEDICATIONS  (STANDING):  acetaminophen   Tablet .. 1000 milliGRAM(s) Oral every 8 hours  enoxaparin Injectable 40 milliGRAM(s) SubCutaneous daily  gabapentin 200 milliGRAM(s) Oral every 12 hours  influenza   Vaccine 0.5 milliLiter(s) IntraMuscular once  lidocaine   4% Patch 1 Patch Transdermal daily  losartan 50 milliGRAM(s) Oral daily  oxyCODONE    IR 5 milliGRAM(s) Oral every 8 hours  polyethylene glycol 3350 17 Gram(s) Oral daily  senna 2 Tablet(s) Oral at bedtime  sodium chloride 2 Gram(s) Oral three times a day  tamsulosin 0.4 milliGRAM(s) Oral at bedtime    MEDICATIONS  (PRN):  magnesium hydroxide Suspension 30 milliLiter(s) Oral once PRN Constipation      Vital Signs Last 24 Hrs  T(C): 37 (07 Sep 2021 05:38), Max: 37 (07 Sep 2021 05:38)  T(F): 98.6 (07 Sep 2021 05:38), Max: 98.6 (07 Sep 2021 05:38)  HR: 99 (07 Sep 2021 05:38) (94 - 99)  BP: 152/84 (07 Sep 2021 05:38) (137/73 - 187/84)  BP(mean): --  RR: 19 (07 Sep 2021 05:38) (17 - 19)  SpO2: 96% (07 Sep 2021 05:38) (96% - 97%)    LABS:                          11.6   4.58  )-----------( 323      ( 07 Sep 2021 08:21 )             33.9     09-07    129<L>  |  93<L>  |  16  ----------------------------<  96  4.2   |  31  |  0.36<L>    Ca    8.6      07 Sep 2021 08:21  Phos  2.6     09-06  Mg     1.8     09-06    TPro  6.5  /  Alb  2.9<L>  /  TBili  0.3  /  DBili  x   /  AST  11  /  ALT  17  /  AlkPhos  193<H>  09-06      LIVER FUNCTIONS - ( 06 Sep 2021 08:44 )  Alb: 2.9 g/dL / Pro: 6.5 g/dL / ALK PHOS: 193 U/L / ALT: 17 U/L DA / AST: 11 U/L / GGT: x             CAPILLARY BLOOD GLUCOSE          Radiology:    Drug Screen:        REVIEW OF SYSTEMS:  CONSTITUTIONAL: No fever or fatigue  RESPIRATORY: No cough, wheezing, chills or hemoptysis; No shortness of breath  CARDIOVASCULAR: No chest pain, palpitations, dizziness, or leg swelling  GASTROINTESTINAL: No abdominal or epigastric pain. No nausea, vomiting; No diarrhea or constipation.   GENITOURINARY: No dysuria, frequency, hematuria, retention or incontinence  MUSCULOSKELETAL: + bilateral hip pain + right leg pain  NEURO: No headaches, No numbness/tingling b/l LE, No weakness  PSYCHIATRIC: No depression, anxiety, mood swings, or difficulty sleeping    PHYSICAL EXAM:  GENERAL:  Alert & Oriented X3, NAD, Good concentration  CHEST/LUNG: Clear to auscultation bilaterally; No rales, rhonchi, wheezing, or rubs  HEART: Regular rate and rhythm; No murmurs, rubs, or gallops  ABDOMEN: Soft, Nontender, Nondistended; Bowel sounds present  EXTREMITIES:  2+ Peripheral Pulses, No cyanosis, or edema  MUSCULOSKELETAL: + decreased rom + right hip tenderness on movement   SKIN: No rashes or lesions    Risk factors associated with adverse outcomes related to opioid treatment  [ ]  Concurrent benzodiazepine use  [ ]  History/ Active substance use or alcohol use disorder  [ ] Psychiatric co-morbidity  [ ] Sleep apnea  [ ] COPD  [ ] BMI> 35  [ ] Liver dysfunction  [ ] Renal dysfunction  [ ] CHF  [ ] Smoker  [ z]  Age > 60 years    [ x]  [place]  Reviewed and Copied to Chart. See below.    Plan of care and goal oriented pain management treatment options were discussed with patient and /or primary care giver; all questions and concerns were addressed and care was aligned with patient's wishes.    Educated patient on goal oriented pain management treatment options     09-07-21 @ 10:43

## 2021-09-08 LAB
ANION GAP SERPL CALC-SCNC: 5 MMOL/L — SIGNIFICANT CHANGE UP (ref 5–17)
BUN SERPL-MCNC: 18 MG/DL — SIGNIFICANT CHANGE UP (ref 7–18)
CALCIUM SERPL-MCNC: 8.4 MG/DL — SIGNIFICANT CHANGE UP (ref 8.4–10.5)
CHLORIDE SERPL-SCNC: 91 MMOL/L — LOW (ref 96–108)
CO2 SERPL-SCNC: 31 MMOL/L — SIGNIFICANT CHANGE UP (ref 22–31)
CREAT SERPL-MCNC: 0.25 MG/DL — LOW (ref 0.5–1.3)
GLUCOSE SERPL-MCNC: 102 MG/DL — HIGH (ref 70–99)
HCT VFR BLD CALC: 33.5 % — LOW (ref 34.5–45)
HGB BLD-MCNC: 11.5 G/DL — SIGNIFICANT CHANGE UP (ref 11.5–15.5)
MAGNESIUM SERPL-MCNC: 1.8 MG/DL — SIGNIFICANT CHANGE UP (ref 1.6–2.6)
MCHC RBC-ENTMCNC: 30.7 PG — SIGNIFICANT CHANGE UP (ref 27–34)
MCHC RBC-ENTMCNC: 34.3 GM/DL — SIGNIFICANT CHANGE UP (ref 32–36)
MCV RBC AUTO: 89.6 FL — SIGNIFICANT CHANGE UP (ref 80–100)
NRBC # BLD: 0 /100 WBCS — SIGNIFICANT CHANGE UP (ref 0–0)
OSMOLALITY UR: 728 MOS/KG — SIGNIFICANT CHANGE UP (ref 50–1200)
PHOSPHATE SERPL-MCNC: 3.8 MG/DL — SIGNIFICANT CHANGE UP (ref 2.5–4.5)
PLATELET # BLD AUTO: 330 K/UL — SIGNIFICANT CHANGE UP (ref 150–400)
POTASSIUM SERPL-MCNC: 4.2 MMOL/L — SIGNIFICANT CHANGE UP (ref 3.5–5.3)
POTASSIUM SERPL-SCNC: 4.2 MMOL/L — SIGNIFICANT CHANGE UP (ref 3.5–5.3)
POTASSIUM UR-SCNC: 105 MMOL/L — SIGNIFICANT CHANGE UP
RBC # BLD: 3.74 M/UL — LOW (ref 3.8–5.2)
RBC # FLD: 13.1 % — SIGNIFICANT CHANGE UP (ref 10.3–14.5)
SODIUM SERPL-SCNC: 127 MMOL/L — LOW (ref 135–145)
SODIUM UR-SCNC: 21 MMOL/L — SIGNIFICANT CHANGE UP
WBC # BLD: 6.69 K/UL — SIGNIFICANT CHANGE UP (ref 3.8–10.5)
WBC # FLD AUTO: 6.69 K/UL — SIGNIFICANT CHANGE UP (ref 3.8–10.5)

## 2021-09-08 PROCEDURE — 93970 EXTREMITY STUDY: CPT | Mod: 26

## 2021-09-08 PROCEDURE — 99231 SBSQ HOSP IP/OBS SF/LOW 25: CPT

## 2021-09-08 RX ORDER — SODIUM CHLORIDE 5 G/100ML
500 INJECTION, SOLUTION INTRAVENOUS
Refills: 0 | Status: DISCONTINUED | OUTPATIENT
Start: 2021-09-08 | End: 2021-09-08

## 2021-09-08 RX ORDER — METOPROLOL TARTRATE 50 MG
25 TABLET ORAL DAILY
Refills: 0 | Status: DISCONTINUED | OUTPATIENT
Start: 2021-09-08 | End: 2021-09-09

## 2021-09-08 RX ORDER — SODIUM CHLORIDE 9 MG/ML
1000 INJECTION INTRAMUSCULAR; INTRAVENOUS; SUBCUTANEOUS
Refills: 0 | Status: DISCONTINUED | OUTPATIENT
Start: 2021-09-08 | End: 2021-09-09

## 2021-09-08 RX ADMIN — OXYCODONE HYDROCHLORIDE 5 MILLIGRAM(S): 5 TABLET ORAL at 21:41

## 2021-09-08 RX ADMIN — OXYCODONE HYDROCHLORIDE 5 MILLIGRAM(S): 5 TABLET ORAL at 22:30

## 2021-09-08 RX ADMIN — GABAPENTIN 200 MILLIGRAM(S): 400 CAPSULE ORAL at 05:51

## 2021-09-08 RX ADMIN — Medication 25 MILLIGRAM(S): at 22:52

## 2021-09-08 RX ADMIN — LIDOCAINE 1 PATCH: 4 CREAM TOPICAL at 19:30

## 2021-09-08 RX ADMIN — SODIUM CHLORIDE 2 GRAM(S): 9 INJECTION INTRAMUSCULAR; INTRAVENOUS; SUBCUTANEOUS at 05:50

## 2021-09-08 RX ADMIN — LIDOCAINE 1 PATCH: 4 CREAM TOPICAL at 12:49

## 2021-09-08 RX ADMIN — ENOXAPARIN SODIUM 40 MILLIGRAM(S): 100 INJECTION SUBCUTANEOUS at 12:49

## 2021-09-08 RX ADMIN — LIDOCAINE 1 PATCH: 4 CREAM TOPICAL at 00:00

## 2021-09-08 RX ADMIN — Medication 1000 MILLIGRAM(S): at 21:41

## 2021-09-08 RX ADMIN — SODIUM CHLORIDE 50 MILLILITER(S): 5 INJECTION, SOLUTION INTRAVENOUS at 10:13

## 2021-09-08 RX ADMIN — SODIUM CHLORIDE 75 MILLILITER(S): 9 INJECTION INTRAMUSCULAR; INTRAVENOUS; SUBCUTANEOUS at 21:43

## 2021-09-08 RX ADMIN — TAMSULOSIN HYDROCHLORIDE 0.4 MILLIGRAM(S): 0.4 CAPSULE ORAL at 22:52

## 2021-09-08 RX ADMIN — SODIUM CHLORIDE 75 MILLILITER(S): 9 INJECTION INTRAMUSCULAR; INTRAVENOUS; SUBCUTANEOUS at 11:11

## 2021-09-08 RX ADMIN — LOSARTAN POTASSIUM 50 MILLIGRAM(S): 100 TABLET, FILM COATED ORAL at 05:50

## 2021-09-08 RX ADMIN — Medication 1000 MILLIGRAM(S): at 07:00

## 2021-09-08 RX ADMIN — Medication 12.5 MILLIGRAM(S): at 05:51

## 2021-09-08 RX ADMIN — Medication 1000 MILLIGRAM(S): at 05:55

## 2021-09-08 RX ADMIN — Medication 1000 MILLIGRAM(S): at 22:10

## 2021-09-08 NOTE — PROGRESS NOTE ADULT - NUTRITIONAL ASSESSMENT
This patient has been assessed with a concern for Malnutrition and has been determined to have a diagnosis/diagnoses of Moderate protein-calorie malnutrition.    This patient is being managed with:   Diet DASH/TLC-  Sodium & Cholesterol Restricted  Vegan {Accepts Vegetable Products Only}  Supplement Feeding Modality:  Oral  Ensure Enlive Cans or Servings Per Day:  1       Frequency:  Three Times a day  Entered: Sep  6 2021 11:32AM    
This patient has been assessed with a concern for Malnutrition and has been determined to have a diagnosis/diagnoses of Moderate protein-calorie malnutrition.    This patient is being managed with:   Diet DASH/TLC-  Sodium & Cholesterol Restricted  Vegan {Accepts Vegetable Products Only}  Supplement Feeding Modality:  Oral  Ensure Enlive Cans or Servings Per Day:  1       Frequency:  Three Times a day  Entered: Sep  6 2021 11:32AM    
This patient has been assessed with a concern for Malnutrition and has been determined to have a diagnosis/diagnoses of Moderate protein-calorie malnutrition.      
This patient has been assessed with a concern for Malnutrition and has been determined to have a diagnosis/diagnoses of Moderate protein-calorie malnutrition.    This patient is being managed with:   Diet DASH/TLC-  Sodium & Cholesterol Restricted  Vegan {Accepts Vegetable Products Only}  Supplement Feeding Modality:  Oral  Ensure Enlive Cans or Servings Per Day:  1       Frequency:  Three Times a day  Entered: Sep  6 2021 11:32AM    
This patient has been assessed with a concern for Malnutrition and has been determined to have a diagnosis/diagnoses of Moderate protein-calorie malnutrition.    This patient is being managed with:   Diet DASH/TLC-  Sodium & Cholesterol Restricted  Vegan {Accepts Vegetable Products Only}  Supplement Feeding Modality:  Oral  Ensure Enlive Cans or Servings Per Day:  1       Frequency:  Three Times a day  Entered: Sep  6 2021 11:32AM

## 2021-09-08 NOTE — PROGRESS NOTE ADULT - ASSESSMENT
Calculator page.   This informational tool is a resource and is not meant for direct clinical application.   Patient Search   Multi-Patient Search   MME Calculator   Reports   Drug List   Designation   My MARTHA #  Data Detail Level: Printer-Friendly View Extended View  Confidential Drug Utilization Report  Search Terms: giovanni [email or web address], 1945Search Date: 09/04/2021 12:16:01 PM  The Drug Utilization Report below displays all of the controlled substance prescriptions, if any, that your patient has filled in the last twelve months. The information displayed on this report is compiled from pharmacy submissions to the Department, and accurately reflects the information as submitted by the pharmacies.    This report was requested by: Mirtha Song | Reference #: [id number]    There are no results for the search terms that you entered.

## 2021-09-08 NOTE — PROGRESS NOTE ADULT - SUBJECTIVE AND OBJECTIVE BOX
`Patient is a 76y old  Female who presents with a chief complaint of Bilateral L5 transverse process and sacral alar fracture (07 Sep 2021 16:16)    PATIENT IS SEEN AND EXAMINED IN MEDICAL FLOOR.  BHUPENDRAT [    ]    SAMARA [   ]      GT [   ]    ALLERGIES:  penicillin (Rash)      Daily     Daily     VITALS:    Vital Signs Last 24 Hrs  T(C): 36.8 (08 Sep 2021 05:16), Max: 36.9 (07 Sep 2021 12:57)  T(F): 98.3 (08 Sep 2021 05:16), Max: 98.4 (07 Sep 2021 12:57)  HR: 110 (08 Sep 2021 05:16) (94 - 110)  BP: 155/80 (08 Sep 2021 05:16) (147/80 - 161/81)  BP(mean): --  RR: 15 (08 Sep 2021 05:16) (15 - 20)  SpO2: 96% (08 Sep 2021 05:16) (96% - 97%)    LABS:    CBC Full  -  ( 08 Sep 2021 07:45 )  WBC Count : 6.69 K/uL  RBC Count : 3.74 M/uL  Hemoglobin : 11.5 g/dL  Hematocrit : 33.5 %  Platelet Count - Automated : 330 K/uL  Mean Cell Volume : 89.6 fl  Mean Cell Hemoglobin : 30.7 pg  Mean Cell Hemoglobin Concentration : 34.3 gm/dL  Auto Neutrophil # : x  Auto Lymphocyte # : x  Auto Monocyte # : x  Auto Eosinophil # : x  Auto Basophil # : x  Auto Neutrophil % : x  Auto Lymphocyte % : x  Auto Monocyte % : x  Auto Eosinophil % : x  Auto Basophil % : x      09-08    127<L>  |  91<L>  |  18  ----------------------------<  102<H>  4.2   |  31  |  0.25<L>    Ca    8.4      08 Sep 2021 07:45  Phos  3.8     09-08  Mg     1.8     09-08      CAPILLARY BLOOD GLUCOSE              Creatinine Trend: 0.25<--, 0.36<--, 0.37<--, 0.25<--, 0.23<--, 0.30<--  I&O's Summary    07 Sep 2021 07:01  -  08 Sep 2021 07:00  --------------------------------------------------------  IN: 0 mL / OUT: 1050 mL / NET: -1050 mL                MEDICATIONS:    MEDICATIONS  (STANDING):  acetaminophen   Tablet .. 1000 milliGRAM(s) Oral every 8 hours  enoxaparin Injectable 40 milliGRAM(s) SubCutaneous daily  gabapentin 200 milliGRAM(s) Oral every 12 hours  influenza   Vaccine 0.5 milliLiter(s) IntraMuscular once  lidocaine   4% Patch 1 Patch Transdermal daily  losartan 50 milliGRAM(s) Oral daily  metoprolol succinate ER 12.5 milliGRAM(s) Oral daily  oxyCODONE    IR 5 milliGRAM(s) Oral every 8 hours  polyethylene glycol 3350 17 Gram(s) Oral daily  senna 2 Tablet(s) Oral at bedtime  sodium chloride 1.5%. 500 milliLiter(s) (50 mL/Hr) IV Continuous <Continuous>  tamsulosin 0.4 milliGRAM(s) Oral at bedtime      MEDICATIONS  (PRN):  magnesium hydroxide Suspension 30 milliLiter(s) Oral once PRN Constipation      REVIEW OF SYSTEMS:                           ALL ROS DONE [ X   ]    CONSTITUTIONAL:  LETHARGIC [   ], FEVER [   ], UNRESPONSIVE [   ]  CVS:  CP  [   ], SOB, [   ], PALPITATIONS [   ], DIZZYNESS [   ]  RS: COUGH [   ], SPUTUM [   ]  GI: ABDOMINAL PAIN [   ], NAUSEA [   ], VOMITINGS [   ], DIARRHEA [   ], CONSTIPATION [   ]  :  DYSURIA [   ], NOCTURIA [   ], INCREASED FREQUENCY [   ], DRIBLING [   ],  SKELETAL: PAINFUL JOINTS [   ], SWOLLEN JOINTS [   ], NECK ACHE [   ], LOW BACK ACHE [   ],  SKIN : ULCERS [   ], RASH [   ], ITCHING [   ]  CNS: HEAD ACHE [   ], DOUBLE VISION [   ], BLURRED VISION [   ], AMS / CONFUSION [   ], SEIZURES [   ], WEAKNESS [   ],TINGLING / NUMBNESS [   ]      PHYSICAL EXAMINATION:  GENERAL APPEARANCE: NO DISTRESS  HEENT:  NO PALLOR, NO  JVD,  NO   NODES, NECK SUPPLE  CVS: S1 +, S2 +,   RS: AEEB,  OCCASIONAL  RALES +,   NO RONCHI  ABD: SOFT, NT, NO, BS +  EXT: PE +  SKIN: WARM,   SKELETAL:  ROM ACCEPTABLE  CNS:  AAO X 2-3, NO  DEFICITS    RADIOLOGY :    EXAM:  CT CHEST                            PROCEDURE DATE:  09/04/2021          INTERPRETATION:  CLINICAL INFORMATION: Evaluate for left lower lobe mass versus consolidation    COMPARISON: None.    CONTRAST/COMPLICATIONS:  IV Contrast: NONE  Oral Contrast: NONE  Complications: None reported at time of study completion    PROCEDURE:  CT of the Chest was performed.  Sagittal and coronal reformats were performed.    FINDINGS:  Motion limited exam.  LUNGS AND AIRWAYS: Moderate elevation of the lefthemidiaphragm. Patent central airways.  Small amount of scarring at the left lung apex.  PLEURA: No pleural effusion.  MEDIASTINUM AND LESLIE: No lymphadenopathy.  VESSELS: Atherosclerotic changes of the aorta.  HEART: Heart size is normal. No pericardial effusion. Coronary artery calcifications.  CHEST WALL AND LOWER NECK: Thyroid gland is enlarged and heterogeneous.  VISUALIZED UPPER ABDOMEN: Within normal limits.  BONES: Multilevel severe compression deformities of the thoracic and upper lumbar spine. Status post L1 vertebroplasty. Severe degenerative changes of the left shoulder.    IMPRESSION:  Moderate elevation of the left hemidiaphragm. No focal consolidation.    Enlarged, heterogeneous thyroid gland. Correlate with nonemergent thyroid ultrasound if clinically warranted.    Multilevel severe compression deformities of the thoracic and upper lumbar spine of indeterminate age.    ==========================================================================    EXAM:  CT FEMUR ONLY LT                            PROCEDURE DATE:  09/03/2021          INTERPRETATION:  CLINICAL INFORMATION: Status post fall.    TECHNIQUE: CT of the left femur was performed in bone and soft tissue windows with coronal and sagittal reformats. No intravenous contrast was administered. 3-D reformats were performed on a separate workstation.    COMPARISON: No similar prior studies are available for comparison.    FINDINGS:    Bone: No fracture or dislocation is demonstrated. Moderate osteoarthritis is seen in the left hip. Moderate to severe osteoarthritis of the left knee is noted.    Soft tissues: The visualized muscles and tendons are grossly intact.    IMPRESSION:    No fracture or dislocation of the left femur.    ===================================================================    EXAM:  CT 3D RECONSTRUCT  [facility]                          EXAM:  CT PELVIS BONY ONLY                            PROCEDURE DATE:  09/03/2021          INTERPRETATION:  CT PELVIS BONY, CT 3D RECONSTRUCTION WO WORKSTATION dated 9/3/2021 1:58 PM    INDICATION: Left hip pain    COMPARISON: None available.    TECHNIQUE: CT imaging of the pelvis was performed. The data was reformatted in the axial, coronal, and sagittal planes. Additionally, 3-D reformatted imaging was created.    FINDINGS:    OSSEOUS STRUCTURES: Mildly decreased bone mineralization. There are nondisplaced fractures of the bilateral sacral ala that traverse the midline at the S1 level. Acute bilateral L5 transverse process fractures. No additional acute fracture is seen. Chronic as for fracture Moderate to severe bilateral hip joint space narrowing is noted. There is spurring and productive change at the pubic symphysis. Chronic erosive change at the pubic symphysis. Spurring the sacroiliac joints. Moderate chronic compression deformities of the L4 and L5 vertebra.  SYNOVIUM/ JOINT FLUID: No hip joint effusion  TENDONS: Limited by CT technique. No full-thickness tendon tear or retraction.  MUSCLES: No intramuscular hematoma  NEUROVASCULAR STRUCTURES: Mild vascular calcifications are noted.  INTRAPELVIC SOFT TISSUES: Moderate distention of the urinary bladder. Diastases of the rectus abdominis muscles.  SUBCUTANEOUS SOFT TISSUES: No soft tissue swelling.    3-D reformatted imaging confirms these findings.    IMPRESSION:    Bilateral sacral alar fractures which cross midline at S1. Acute bilateral L5 transverse process fractures.  Chronic fracture deformities of the L4 and L5 vertebra.  Degenerative changes as above.  Moderate distention of the urinary bladder. Correlate clinically.    ===============================================================      ASSESSMENT :     Unspecified fracture of sacrum, initial encounter for closed fracture    No pertinent past medical history    HTN (hypertension)        PLAN:  HPI:  Patient is a 76 year-old f, from home, ambulates with a walker at baseline with a PMH of HTN presents to the ER on 9/3/21 with c/o lower back pain since 7/28/21. As per patient's son, patient fell on her left side while walking on the street on 07/28/21. No head injury or loss of consciousness. Initially, her pain was tolerated and relieved by Tylenol. Her pain was getting worse 2 weeks ago and patient was prescribed percocet by her primary care provider on 8/20/21. Patient also had a X-ray outpatient. However, her pain still persists and she becomes immobilize and bed-bound.  (03 Sep 2021 19:24)    # D/W SON EMILIANO [9/7] - [facility]  [facility]AM COORDINATING    # ACUTE FRACTURE B/L SACRAL ALAR FX W/ CROSS AT MIDLINE OF S1, B/L L5 TRANSVERSE PROCESS FX, CHRONIC FRACTURES OF L4 AND L5, CHRONIC COMPRESSION DEFORMITIES OF THORACIC SPINE  - ORTHOPEDIC SX RECOMMENDATION FOR CONSERVATIVE MGMT  - PRN PAIN CONTROL; PAIN MGMT CONSULT  - S/P PT EVAL     # NO LEFT FEMOR FRACTURE NOTED ON CT LEFT HIP/FEMUR    # ACUTE HYPONATREMIA - SUSPECT HYPOVOLEMIC HYPONATREMIA S/T POOR PO INTAKE + ? HCTZ [SO WILL BRING HOME ANTI-HTN TO CONFIRM] VS. LESS LIKELY R/O SIADH  - NOTED URINE LYTES, U NA, U OSM, SERUM OSM  - S/P IVF; NOW ON FLUID RESTRICTION, SALT TABS  - GOAL NA < 6-8 CORRECTION IN 24 HOURS  - NEPHROLOGY CONSULT DR. FARRELL    - STARTED FLOMAX, PASCUAL TO BE D/C-ED IN NH IN 2-3 DAYS W/ TOV    # CONSTIPATION - MIRALAX  - IMPROVED S/P TWE [9/5]    # RULED OUT LEFT LOWER LOBE ? MASS VS. EFFUSION VS. CONSOLIDATION  ON CT CHEST     # HYPOKALEMIA - REPLETING WITH SUPPLEMENT    # HTN  - LOSARTAN, STARTED LOW DOSE BB    # CASE D/W SON EMILIANO LIU @ [phone] [9/6] AND AT BEDSIDE ON 9/7    # GI AND DVT PPX .   `Patient is a 76y old  Female who presents with a chief complaint of Bilateral L5 transverse process and sacral alar fracture (07 Sep 2021 16:16)    PATIENT IS SEEN AND EXAMINED IN MEDICAL FLOOR.    ALLERGIES:  penicillin (Rash)      Daily     Daily     VITALS:    Vital Signs Last 24 Hrs  T(C): 36.8 (08 Sep 2021 05:16), Max: 36.9 (07 Sep 2021 12:57)  T(F): 98.3 (08 Sep 2021 05:16), Max: 98.4 (07 Sep 2021 12:57)  HR: 110 (08 Sep 2021 05:16) (94 - 110)  BP: 155/80 (08 Sep 2021 05:16) (147/80 - 161/81)  BP(mean): --  RR: 15 (08 Sep 2021 05:16) (15 - 20)  SpO2: 96% (08 Sep 2021 05:16) (96% - 97%)    LABS:    CBC Full  -  ( 08 Sep 2021 07:45 )  WBC Count : 6.69 K/uL  RBC Count : 3.74 M/uL  Hemoglobin : 11.5 g/dL  Hematocrit : 33.5 %  Platelet Count - Automated : 330 K/uL  Mean Cell Volume : 89.6 fl  Mean Cell Hemoglobin : 30.7 pg  Mean Cell Hemoglobin Concentration : 34.3 gm/dL  Auto Neutrophil # : x  Auto Lymphocyte # : x  Auto Monocyte # : x  Auto Eosinophil # : x  Auto Basophil # : x  Auto Neutrophil % : x  Auto Lymphocyte % : x  Auto Monocyte % : x  Auto Eosinophil % : x  Auto Basophil % : x      09-08    127<L>  |  91<L>  |  18  ----------------------------<  102<H>  4.2   |  31  |  0.25<L>    Ca    8.4      08 Sep 2021 07:45  Phos  3.8     09-08  Mg     1.8     09-08      CAPILLARY BLOOD GLUCOSE              Creatinine Trend: 0.25<--, 0.36<--, 0.37<--, 0.25<--, 0.23<--, 0.30<--  I&O's Summary    07 Sep 2021 07:01  -  08 Sep 2021 07:00  --------------------------------------------------------  IN: 0 mL / OUT: 1050 mL / NET: -1050 mL                MEDICATIONS:    MEDICATIONS  (STANDING):  acetaminophen   Tablet .. 1000 milliGRAM(s) Oral every 8 hours  enoxaparin Injectable 40 milliGRAM(s) SubCutaneous daily  gabapentin 200 milliGRAM(s) Oral every 12 hours  influenza   Vaccine 0.5 milliLiter(s) IntraMuscular once  lidocaine   4% Patch 1 Patch Transdermal daily  losartan 50 milliGRAM(s) Oral daily  metoprolol succinate ER 12.5 milliGRAM(s) Oral daily  oxyCODONE    IR 5 milliGRAM(s) Oral every 8 hours  polyethylene glycol 3350 17 Gram(s) Oral daily  senna 2 Tablet(s) Oral at bedtime  sodium chloride 1.5%. 500 milliLiter(s) (50 mL/Hr) IV Continuous <Continuous>  tamsulosin 0.4 milliGRAM(s) Oral at bedtime      MEDICATIONS  (PRN):  magnesium hydroxide Suspension 30 milliLiter(s) Oral once PRN Constipation      REVIEW OF SYSTEMS:                           ALL ROS DONE [ X   ]    CONSTITUTIONAL:  LETHARGIC [   ], FEVER [   ], UNRESPONSIVE [   ]  CVS:  CP  [   ], SOB, [   ], PALPITATIONS [   ], DIZZYNESS [   ]  RS: COUGH [   ], SPUTUM [   ]  GI: ABDOMINAL PAIN [   ], NAUSEA [   ], VOMITINGS [   ], DIARRHEA [   ], CONSTIPATION [   ]  :  DYSURIA [   ], NOCTURIA [   ], INCREASED FREQUENCY [   ], DRIBLING [   ],  SKELETAL: PAINFUL JOINTS [   ], SWOLLEN JOINTS [   ], NECK ACHE [   ], LOW BACK ACHE [   ],  SKIN : ULCERS [   ], RASH [   ], ITCHING [   ]  CNS: HEAD ACHE [   ], DOUBLE VISION [   ], BLURRED VISION [   ], AMS / CONFUSION [   ], SEIZURES [   ], WEAKNESS [   ],TINGLING / NUMBNESS [   ]      PHYSICAL EXAMINATION:  GENERAL APPEARANCE: NO DISTRESS  HEENT:  NO PALLOR, NO  JVD,  NO   NODES, NECK SUPPLE  CVS: S1 +, S2 +,   RS: AEEB,  OCCASIONAL  RALES +,   NO RONCHI  ABD: SOFT, NT, NO, BS +  EXT: PE +  SKIN: WARM,   SKELETAL:  ROM ACCEPTABLE  CNS:  AAO X 2-3, NO  DEFICITS    RADIOLOGY :    EXAM:  CT CHEST                            PROCEDURE DATE:  09/04/2021          INTERPRETATION:  CLINICAL INFORMATION: Evaluate for left lower lobe mass versus consolidation    COMPARISON: None.    CONTRAST/COMPLICATIONS:  IV Contrast: NONE  Oral Contrast: NONE  Complications: None reported at time of study completion    PROCEDURE:  CT of the Chest was performed.  Sagittal and coronal reformats were performed.    FINDINGS:  Motion limited exam.  LUNGS AND AIRWAYS: Moderate elevation of the lefthemidiaphragm. Patent central airways.  Small amount of scarring at the left lung apex.  PLEURA: No pleural effusion.  MEDIASTINUM AND LESLIE: No lymphadenopathy.  VESSELS: Atherosclerotic changes of the aorta.  HEART: Heart size is normal. No pericardial effusion. Coronary artery calcifications.  CHEST WALL AND LOWER NECK: Thyroid gland is enlarged and heterogeneous.  VISUALIZED UPPER ABDOMEN: Within normal limits.  BONES: Multilevel severe compression deformities of the thoracic and upper lumbar spine. Status post L1 vertebroplasty. Severe degenerative changes of the left shoulder.    IMPRESSION:  Moderate elevation of the left hemidiaphragm. No focal consolidation.    Enlarged, heterogeneous thyroid gland. Correlate with nonemergent thyroid ultrasound if clinically warranted.    Multilevel severe compression deformities of the thoracic and upper lumbar spine of indeterminate age.    ==========================================================================    EXAM:  CT FEMUR ONLY LT                            PROCEDURE DATE:  09/03/2021          INTERPRETATION:  CLINICAL INFORMATION: Status post fall.    TECHNIQUE: CT of the left femur was performed in bone and soft tissue windows with coronal and sagittal reformats. No intravenous contrast was administered. 3-D reformats were performed on a separate workstation.    COMPARISON: No similar prior studies are available for comparison.    FINDINGS:    Bone: No fracture or dislocation is demonstrated. Moderate osteoarthritis is seen in the left hip. Moderate to severe osteoarthritis of the left knee is noted.    Soft tissues: The visualized muscles and tendons are grossly intact.    IMPRESSION:    No fracture or dislocation of the left femur.    ===================================================================    EXAM:  CT 3D RECONSTRUCT [facility]                          EXAM:  CT PELVIS BONY ONLY                            PROCEDURE DATE:  09/03/2021          INTERPRETATION:  CT PELVIS BONY, CT 3D RECONSTRUCTION WO WORKSTATION dated 9/3/2021 1:58 PM    INDICATION: Left hip pain    COMPARISON: None available.    TECHNIQUE: CT imaging of the pelvis was performed. The data was reformatted in the axial, coronal, and sagittal planes. Additionally, 3-D reformatted imaging was created.    FINDINGS:    OSSEOUS STRUCTURES: Mildly decreased bone mineralization. There are nondisplaced fractures of the bilateral sacral ala that traverse the midline at the S1 level. Acute bilateral L5 transverse process fractures. No additional acute fracture is seen. Chronic as for fracture Moderate to severe bilateral hip joint space narrowing is noted. There is spurring and productive change at the pubic symphysis. Chronic erosive change at the pubic symphysis. Spurring the sacroiliac joints. Moderate chronic compression deformities of the L4 and L5 vertebra.  SYNOVIUM/ JOINT FLUID: No hip joint effusion  TENDONS: Limited by CT technique. No full-thickness tendon tear or retraction.  MUSCLES: No intramuscular hematoma  NEUROVASCULAR STRUCTURES: Mild vascular calcifications are noted.  INTRAPELVIC SOFT TISSUES: Moderate distention of the urinary bladder. Diastases of the rectus abdominis muscles.  SUBCUTANEOUS SOFT TISSUES: No soft tissue swelling.    3-D reformatted imaging confirms these findings.    IMPRESSION:    Bilateral sacral alar fractures which cross midline at S1. Acute bilateral L5 transverse process fractures.  Chronic fracture deformities of the L4 and L5 vertebra.  Degenerative changes as above.  Moderate distention of the urinary bladder. Correlate clinically.    ===============================================================      ASSESSMENT :     Unspecified fracture of sacrum, initial encounter for closed fracture    No pertinent past medical history    HTN (hypertension)        PLAN:  HPI:  Patient is a 76 year-old f, from home, ambulates with a walker at baseline with a PMH of HTN presents to the ER on 9/3/21 with c/o lower back pain since 7/28/21. As per patient's son, patient fell on her left side while walking on the street on 07/28/21. No head injury or loss of consciousness. Initially, her pain was tolerated and relieved by Tylenol. Her pain was getting worse 2 weeks ago and patient was prescribed percocet by her primary care provider on 8/20/21. Patient also had a X-ray outpatient. However, her pain still persists and she becomes immobilize and bed-bound.  (03 Sep 2021 19:24)    # D/W SON EMILIANO [9/7] - [facility]  [facility]AM COORDINATING    # ACUTE FRACTURE B/L SACRAL ALAR FX W/ CROSS AT MIDLINE OF S1, B/L L5 TRANSVERSE PROCESS FX, CHRONIC FRACTURES OF L4 AND L5, CHRONIC COMPRESSION DEFORMITIES OF THORACIC SPINE  - ORTHOPEDIC SX RECOMMENDATION FOR CONSERVATIVE MGMT  - PRN PAIN CONTROL; PAIN MGMT CONSULT  - S/P PT EVAL     # NO LEFT FEMOR FRACTURE NOTED ON CT LEFT HIP/FEMUR    # ACUTE HYPONATREMIA - SUSPECT HYPOVOLEMIC HYPONATREMIA S/T POOR PO INTAKE + ? HCTZ [SO WILL BRING HOME ANTI-HTN TO CONFIRM] VS. LESS LIKELY R/O SIADH  - NOTED URINE LYTES, U NA, U OSM, SERUM OSM  - S/P IVF; NOW ON FLUID RESTRICTION, SALT TABS  - GOAL NA < 6-8 CORRECTION IN 24 HOURS  - NEPHROLOGY CONSULT DR. FARRELL    - HYPONATREMIA [9/8] - RECOMMENDED STARTING NS PER NEPHROLOGY, ON SALT TABS    - STARTED FLOMAX, PASCUAL TO BE D/C-ED IN NH IN 2-3 DAYS W/ TOV    # CONSTIPATION - MIRALAX  - IMPROVED S/P TWE [9/5]    # RULED OUT LEFT LOWER LOBE ? MASS VS. EFFUSION VS. CONSOLIDATION  ON CT CHEST     # HYPOKALEMIA - REPLETING WITH SUPPLEMENT    # HTN  - LOSARTAN, STARTED LOW DOSE BB    # CASE D/W SON EMILIANO LIU @ [phone] [9/6] AND AT BEDSIDE ON 9/8    # GI AND DVT PPX .

## 2021-09-08 NOTE — PROGRESS NOTE ADULT - SUBJECTIVE AND OBJECTIVE BOX
HPI:  76 YOF admitted with L5 & S1 fractures s/p fall.  Found to be hyponatremic.  Pain, ortho and nephrology consulted.    OVERNIGHT EVENTS:  No new overnight events.  Seen and examined at bedside.     REVIEW OF SYSTEMS:      CONSTITUTIONAL: No fever  EYES: no acute visual disturbances  NECK: No pain or stiffness  RESPIRATORY: No cough; No shortness of breath  CARDIOVASCULAR: No chest pain, no palpitations  GASTROINTESTINAL: No pain. No nausea, vomiting or diarrhea   NEUROLOGICAL: No headache or numbness, no tremors  MUSCULOSKELETAL: + pain  GENITOURINARY: no dysuria, no frequency, no hesitancy  PSYCHIATRY: no depression, no anxiety  ALL OTHER  ROS negative        Vital Signs Last 24 Hrs  T(C): 36.8 (08 Sep 2021 14:09), Max: 36.8 (07 Sep 2021 19:33)  T(F): 98.2 (08 Sep 2021 14:09), Max: 98.3 (08 Sep 2021 05:16)  HR: 105 (08 Sep 2021 14:09) (94 - 110)  BP: 169/89 (08 Sep 2021 14:09) (147/80 - 169/89)  BP(mean): --  RR: 18 (08 Sep 2021 14:09) (15 - 18)  SpO2: 97% (08 Sep 2021 14:09) (96% - 97%)    ________________________________________________  PHYSICAL EXAM:    GENERAL: NAD  HEENT: Normocephalic; conjunctivae and sclerae clear;  NECK : supple, no JVD  CHEST/LUNG: Clear to auscultation; Nonlabored  HEART: S1 S2  regular  ABDOMEN: Soft, Nontender, Nondistended; Bowel sounds present  EXTREMITIES: no cyanosis; no LE edema; no calf tenderness  SKIN: warm and dry; No rashes or lesions  NERVOUS SYSTEM:  Alert; no new deficits    _________________________________________________  CURRENT MEDICATIONS:    MEDICATIONS  (STANDING):  acetaminophen   Tablet .. 1000 milliGRAM(s) Oral every 8 hours  enoxaparin Injectable 40 milliGRAM(s) SubCutaneous daily  gabapentin 200 milliGRAM(s) Oral every 12 hours  influenza   Vaccine 0.5 milliLiter(s) IntraMuscular once  lidocaine   4% Patch 1 Patch Transdermal daily  losartan 50 milliGRAM(s) Oral daily  metoprolol succinate ER 12.5 milliGRAM(s) Oral daily  oxyCODONE    IR 5 milliGRAM(s) Oral every 8 hours  polyethylene glycol 3350 17 Gram(s) Oral daily  senna 2 Tablet(s) Oral at bedtime  sodium chloride 0.9%. 1000 milliLiter(s) (75 mL/Hr) IV Continuous <Continuous>  tamsulosin 0.4 milliGRAM(s) Oral at bedtime    MEDICATIONS  (PRN):  magnesium hydroxide Suspension 30 milliLiter(s) Oral once PRN Constipation      __________________________________________________  LABS:                          11.5   6.69  )-----------( 330      ( 08 Sep 2021 07:45 )             33.5     09-08    127<L>  |  91<L>  |  18  ----------------------------<  102<H>  4.2   |  31  |  0.25<L>    Ca    8.4      08 Sep 2021 07:45  Phos  3.8     09-08  Mg     1.8     09-08          CAPILLARY BLOOD GLUCOSE          __________________________________________________  RADIOLOGY & ADDITIONAL TESTS:    Imaging Personally Reviewed:  YES    < from: CT 3D Reconstruct w/o Workstation (09.03.21 @ 13:58) >  IMPRESSION:    Bilateral sacral alar fractures which cross midline at S1. Acute bilateral L5 transverse process fractures.  Chronic fracture deformities of the L4 and L5 vertebra.  Degenerative changes as above.  Moderate distention of the urinary bladder. Correlate clinically.    < end of copied text >    Consultant(s) Notes Reviewed:   YES     Plan of care was discussed with patient and /or primary care giver; all questions and concerns were addressed and care was aligned with patient's wishes.    Plan discussed with attending and consulting physicians.

## 2021-09-08 NOTE — PROGRESS NOTE ADULT - PROBLEM SELECTOR PLAN 2
Na 127  Urine os 728  Start NS  Continue 2g Na tabs TID  Follow up BMP in PM  Dr. Garcia, nephrology, following
Na 129  D/c lasix  Continue 2g Na tabs TID  Dr. Garcia, nephrology, following

## 2021-09-08 NOTE — PROGRESS NOTE ADULT - PROBLEM SELECTOR PLAN 4
Borderline controlled  Pt takes Losartan, HTCZ and Lisinopril at home  Continue home regimen Losartan with parameters  Diuretic held in the setting of hyponatremia  Consider increasing Losartan if SBP > 170  Monitor BP

## 2021-09-08 NOTE — PROGRESS NOTE ADULT - PROBLEM SELECTOR PLAN 1
Left leg pain.   ·  Recommendation: Pt with Bilateral sacral alar fractures which cross midline at S1. Acute bilateral L5 transverse process fractures.  Chronic fracture deformities of the L4 and L5 vertebra.   nonopioid recc   - acetaminophen 1 gram po q 8 hours for 3 days  - gabapentin 200mg po q 12 hours  - lidocaine patch daily  - no nsaids - pt with hyponatremia   opioid recc  - no iv opioids  - oxycodone 5mg po q 8 atc for 2 days as pt has not been taking any btp meds   bowel regimen - bm yesterday  - senna  - miralax daily  OOB/PT.
Left leg pain.   ·  Recommendation: Pt with Bilateral sacral alar fractures which cross midline at S1. Acute bilateral L5 transverse process fractures.  Chronic fracture deformities of the L4 and L5 vertebra.   nonopioid recc  - acetaminophen 1 gram po q 8 hours for 3 days  - gabapentin 200mg po q 12 hours  - lidocaine patch daily  - no nsaids - pt with hyponatremia   opioid recc  - no iv opioids  - oxycodone 5mg po q 8 atc for 2 days as pt has not been taking any  btp meds   bowel regimen - bm yesterday  - senna  - miralax daily  OOB/PT.
CT noted above  CT femur negative for fx  Ortho recommends conservative management  Continue pain control  Continue WBAT with appropriate device   PT recommends CHET  Ortho following  Pain NP following
CT noted above  CT femur negative for fx  Ortho recommends conservative management  Continue pain control  Continue WBAT with appropriate device   PT recommends CHET  Ortho following  Pain NP following

## 2021-09-08 NOTE — PROGRESS NOTE ADULT - SUBJECTIVE AND OBJECTIVE BOX
NEPHROLOGY MEDICAL CARE, [facility] - Dr. Kirby Garcia/ Dr. Jesica Wyatt/ Dr. Dom Vasquez/ Dr. Hemalatha Robins    Patient was seen and examined at bedside.    CC: patient is okay.     Vital Signs Last 24 Hrs  T(C): 36.8 (08 Sep 2021 05:16), Max: 36.8 (07 Sep 2021 19:33)  T(F): 98.3 (08 Sep 2021 05:16), Max: 98.3 (08 Sep 2021 05:16)  HR: 110 (08 Sep 2021 05:16) (94 - 110)  BP: 155/80 (08 Sep 2021 05:16) (147/80 - 155/80)  BP(mean): --  RR: 15 (08 Sep 2021 05:16) (15 - 17)  SpO2: 96% (08 Sep 2021 05:16) (96% - 97%)    09-07 @ 07:01  -  09-08 @ 07:00  --------------------------------------------------------  IN: 0 mL / OUT: 1050 mL / NET: -1050 mL        PHYSICAL EXAM:  General: No acute respiratory distress.  Eyes: conjunctiva and sclera clear  ENMT: Atraumatic, Normocephalic, supple, No JVD present. Moist mucous membranes  Respiratory: Bilateral clear to percussion; No rales, rhonchi, wheezing  Cardiovascular S1S2+; no m/r/g  Gastrointestinal: Soft, Non-tender, Nondistended; Bowel sounds present  Neuro:  Awake, Alert & Oriented X3  Ext:  no pedal edema present, No Cyanosis  Skin: No visible rashes    MEDICATIONS:  MEDICATIONS  (STANDING):  acetaminophen   Tablet .. 1000 milliGRAM(s) Oral every 8 hours  enoxaparin Injectable 40 milliGRAM(s) SubCutaneous daily  gabapentin 200 milliGRAM(s) Oral every 12 hours  influenza   Vaccine 0.5 milliLiter(s) IntraMuscular once  lidocaine   4% Patch 1 Patch Transdermal daily  losartan 50 milliGRAM(s) Oral daily  metoprolol succinate ER 12.5 milliGRAM(s) Oral daily  oxyCODONE    IR 5 milliGRAM(s) Oral every 8 hours  polyethylene glycol 3350 17 Gram(s) Oral daily  senna 2 Tablet(s) Oral at bedtime  sodium chloride 0.9%. 1000 milliLiter(s) (75 mL/Hr) IV Continuous <Continuous>  tamsulosin 0.4 milliGRAM(s) Oral at bedtime    MEDICATIONS  (PRN):  magnesium hydroxide Suspension 30 milliLiter(s) Oral once PRN Constipation          LABS:                        11.5   6.69  )-----------( 330      ( 08 Sep 2021 07:45 )             33.5     09-08    127<L>  |  91<L>  |  18  ----------------------------<  102<H>  4.2   |  31  |  0.25<L>    Ca    8.4      08 Sep 2021 07:45  Phos  3.8     09-08  Mg     1.8     09-08          Magnesium, Serum: 1.8 mg/dL (09-08 @ 07:45)  Phosphorus Level, Serum: 3.8 mg/dL (09-08 @ 07:45)    Urine studies  Osmolality, Random Urine: 728 mos/kg (09-08 @ 09:34)  Sodium, Random Urine: 21 mmol/L (09-08 @ 09:34)  Potassium, Random Urine: 105 mmol/L (09-08 @ 09:34)  Potassium, Random Urine: 67 mmol/L (09-07 @ 10:15)  Sodium, Random Urine: 88 mmol/L (09-07 @ 10:15)  Osmolality, Random Urine: 485 mos/kg (09-07 @ 10:15)    PTH and Vit D:

## 2021-09-08 NOTE — PROGRESS NOTE ADULT - PROBLEM SELECTOR PLAN 6
Pt medically stable for discharge  PT recommends CHET  CM following for discharge planning  Pending auth
Pt medically stable for discharge  PT recommends CHET  CM following for discharge planning  Pending auth

## 2021-09-08 NOTE — PROGRESS NOTE ADULT - ASSESSMENT
1. Hyponatremia with unknown duration most likely chronic. Pt is clinically euvolemic. Multifactorial due to HCTZ and excessive water intake and possible high ADH state.  -Na dropped to 127; rpt urine lytes show high uosm and low Romina, will try NS at 75cc/hr and rpt in 6hrs; if still low then lazarus consider hypertonic saline (1.5%).  -urine lytes noted. Serum Uric acid is low;  TSH is okay; Cortisol in AM.   -continue Fluid restriction 800ml to 1L/day.   -Check Seurm Na daily. Monitor I/O's daily. Avoid overcorrection of NA (8-10meq/day)  2. HTN: -bp is okay. on losartan 50mg daily. hold hctz.  -titrate bp meds to keep sbp >110 and < 130  3. Constipated: better and  laxatives prn.  4. Edema of legs:  better; off diuretic due to worsening hypona.     Discussed the assessment and plan with Primary Team/Nurse 1. Hyponatremia with unknown duration most likely chronic. Pt is clinically euvolemic. Multifactorial due to HCTZ and excessive water intake and possible high ADH state.  -Na dropped to 127; rpt urine lytes show high uosm and low Romina possible hypovelmia but also high ADH state, will try NS at 75cc/hr and rpt in 6hrs; if still low then lazarus consider hypertonic saline (1.5%).  -urine lytes noted. Serum Uric acid is low;  TSH is okay; Cortisol in AM.   -continue Fluid restriction 800ml to 1L/day.   -Check Seurm Na daily. Monitor I/O's daily. Avoid overcorrection of NA (8-10meq/day)  2. HTN: -bp is okay. on losartan 50mg daily. hold hctz.  -titrate bp meds to keep sbp >110 and < 130  3. Constipated: better and  laxatives prn.  4. Edema of legs:  better; off diuretic due to worsening hypona.     Discussed the assessment and plan with Primary Team/Nurse 1. Hyponatremia with unknown duration most likely chronic. Pt is clinically euvolemic. Multifactorial due to HCTZ and excessive water intake and possible high ADH state.  -Na dropped to 127; rpt urine lytes show high uosm and low Romina possible hypovelmia but also high ADH state, will try NS at 75cc/hr and rpt in 6hrs; if still low then lazarus consider hypertonic saline (1.5%).  -urine lytes noted. Serum Uric acid is low;  TSH is okay; Cortisol in AM.   -continue Fluid restriction 800ml to 1L/day.   -Check Seurm Na daily. Monitor I/O's daily. Avoid overcorrection of NA (8-10meq/day)  2. HTN: -bp is okay. on losartan 50mg daily. hold hctz.  -titrate bp meds to keep sbp >110 and < 130  3. Constipated: better and  laxatives prn.  4. Edema of legs:  better; off diuretic due to worsening hypona.     Discussed the assessment and plan with Primary Team/Nurse    addendum: pt refused rpt Na. continue fluids now and check bmp in am.

## 2021-09-09 ENCOUNTER — TRANSCRIPTION ENCOUNTER (OUTPATIENT)
Age: 76
End: 2021-09-09

## 2021-09-09 VITALS
HEART RATE: 101 BPM | DIASTOLIC BLOOD PRESSURE: 79 MMHG | TEMPERATURE: 98 F | RESPIRATION RATE: 16 BRPM | OXYGEN SATURATION: 97 % | SYSTOLIC BLOOD PRESSURE: 131 MMHG

## 2021-09-09 LAB
ANION GAP SERPL CALC-SCNC: 6 MMOL/L — SIGNIFICANT CHANGE UP (ref 5–17)
BUN SERPL-MCNC: 17 MG/DL — SIGNIFICANT CHANGE UP (ref 7–18)
CALCIUM SERPL-MCNC: 8.2 MG/DL — LOW (ref 8.4–10.5)
CHLORIDE SERPL-SCNC: 95 MMOL/L — LOW (ref 96–108)
CO2 SERPL-SCNC: 29 MMOL/L — SIGNIFICANT CHANGE UP (ref 22–31)
CREAT SERPL-MCNC: 0.25 MG/DL — LOW (ref 0.5–1.3)
GLUCOSE SERPL-MCNC: 149 MG/DL — HIGH (ref 70–99)
HCT VFR BLD CALC: 31 % — LOW (ref 34.5–45)
HGB BLD-MCNC: 10.4 G/DL — LOW (ref 11.5–15.5)
MAGNESIUM SERPL-MCNC: 1.9 MG/DL — SIGNIFICANT CHANGE UP (ref 1.6–2.6)
MCHC RBC-ENTMCNC: 30.2 PG — SIGNIFICANT CHANGE UP (ref 27–34)
MCHC RBC-ENTMCNC: 33.5 GM/DL — SIGNIFICANT CHANGE UP (ref 32–36)
MCV RBC AUTO: 90.1 FL — SIGNIFICANT CHANGE UP (ref 80–100)
NRBC # BLD: 0 /100 WBCS — SIGNIFICANT CHANGE UP (ref 0–0)
PHOSPHATE SERPL-MCNC: 3.1 MG/DL — SIGNIFICANT CHANGE UP (ref 2.5–4.5)
PLATELET # BLD AUTO: 294 K/UL — SIGNIFICANT CHANGE UP (ref 150–400)
POTASSIUM SERPL-MCNC: 4.2 MMOL/L — SIGNIFICANT CHANGE UP (ref 3.5–5.3)
POTASSIUM SERPL-SCNC: 4.2 MMOL/L — SIGNIFICANT CHANGE UP (ref 3.5–5.3)
RBC # BLD: 3.44 M/UL — LOW (ref 3.8–5.2)
RBC # FLD: 13 % — SIGNIFICANT CHANGE UP (ref 10.3–14.5)
SODIUM SERPL-SCNC: 130 MMOL/L — LOW (ref 135–145)
WBC # BLD: 5.13 K/UL — SIGNIFICANT CHANGE UP (ref 3.8–10.5)
WBC # FLD AUTO: 5.13 K/UL — SIGNIFICANT CHANGE UP (ref 3.8–10.5)

## 2021-09-09 PROCEDURE — 84560 ASSAY OF URINE/URIC ACID: CPT

## 2021-09-09 PROCEDURE — 84100 ASSAY OF PHOSPHORUS: CPT

## 2021-09-09 PROCEDURE — 93970 EXTREMITY STUDY: CPT

## 2021-09-09 PROCEDURE — 86769 SARS-COV-2 COVID-19 ANTIBODY: CPT

## 2021-09-09 PROCEDURE — 81001 URINALYSIS AUTO W/SCOPE: CPT

## 2021-09-09 PROCEDURE — 82436 ASSAY OF URINE CHLORIDE: CPT

## 2021-09-09 PROCEDURE — 87635 SARS-COV-2 COVID-19 AMP PRB: CPT

## 2021-09-09 PROCEDURE — 83935 ASSAY OF URINE OSMOLALITY: CPT

## 2021-09-09 PROCEDURE — 97110 THERAPEUTIC EXERCISES: CPT

## 2021-09-09 PROCEDURE — 85379 FIBRIN DEGRADATION QUANT: CPT

## 2021-09-09 PROCEDURE — 84133 ASSAY OF URINE POTASSIUM: CPT

## 2021-09-09 PROCEDURE — 80053 COMPREHEN METABOLIC PANEL: CPT

## 2021-09-09 PROCEDURE — 84443 ASSAY THYROID STIM HORMONE: CPT

## 2021-09-09 PROCEDURE — 97530 THERAPEUTIC ACTIVITIES: CPT

## 2021-09-09 PROCEDURE — 80061 LIPID PANEL: CPT

## 2021-09-09 PROCEDURE — 83036 HEMOGLOBIN GLYCOSYLATED A1C: CPT

## 2021-09-09 PROCEDURE — 36415 COLL VENOUS BLD VENIPUNCTURE: CPT

## 2021-09-09 PROCEDURE — 76376 3D RENDER W/INTRP POSTPROCES: CPT

## 2021-09-09 PROCEDURE — 84300 ASSAY OF URINE SODIUM: CPT

## 2021-09-09 PROCEDURE — 71250 CT THORAX DX C-: CPT

## 2021-09-09 PROCEDURE — 83930 ASSAY OF BLOOD OSMOLALITY: CPT

## 2021-09-09 PROCEDURE — 85027 COMPLETE CBC AUTOMATED: CPT

## 2021-09-09 PROCEDURE — 99284 EMERGENCY DEPT VISIT MOD MDM: CPT | Mod: 25

## 2021-09-09 PROCEDURE — 71045 X-RAY EXAM CHEST 1 VIEW: CPT

## 2021-09-09 PROCEDURE — 83735 ASSAY OF MAGNESIUM: CPT

## 2021-09-09 PROCEDURE — 85025 COMPLETE CBC W/AUTO DIFF WBC: CPT

## 2021-09-09 PROCEDURE — 73700 CT LOWER EXTREMITY W/O DYE: CPT

## 2021-09-09 PROCEDURE — 84550 ASSAY OF BLOOD/URIC ACID: CPT

## 2021-09-09 PROCEDURE — 93005 ELECTROCARDIOGRAM TRACING: CPT

## 2021-09-09 PROCEDURE — 82570 ASSAY OF URINE CREATININE: CPT

## 2021-09-09 PROCEDURE — 80048 BASIC METABOLIC PNL TOTAL CA: CPT

## 2021-09-09 PROCEDURE — 72192 CT PELVIS W/O DYE: CPT | Mod: MA

## 2021-09-09 RX ORDER — SODIUM CHLORIDE 9 MG/ML
2 INJECTION INTRAMUSCULAR; INTRAVENOUS; SUBCUTANEOUS THREE TIMES A DAY
Refills: 0 | Status: DISCONTINUED | OUTPATIENT
Start: 2021-09-09 | End: 2021-09-09

## 2021-09-09 RX ORDER — LOSARTAN POTASSIUM 100 MG/1
1 TABLET, FILM COATED ORAL
Qty: 0 | Refills: 0 | DISCHARGE
Start: 2021-09-09

## 2021-09-09 RX ORDER — TAMSULOSIN HYDROCHLORIDE 0.4 MG/1
1 CAPSULE ORAL
Qty: 0 | Refills: 0 | DISCHARGE
Start: 2021-09-09

## 2021-09-09 RX ORDER — LISINOPRIL 2.5 MG/1
1 TABLET ORAL
Qty: 0 | Refills: 0 | DISCHARGE

## 2021-09-09 RX ORDER — POLYETHYLENE GLYCOL 3350 17 G/17G
17 POWDER, FOR SOLUTION ORAL
Qty: 0 | Refills: 0 | DISCHARGE
Start: 2021-09-09

## 2021-09-09 RX ORDER — GABAPENTIN 400 MG/1
2 CAPSULE ORAL
Qty: 0 | Refills: 0 | DISCHARGE
Start: 2021-09-09

## 2021-09-09 RX ORDER — SENNA PLUS 8.6 MG/1
2 TABLET ORAL
Qty: 0 | Refills: 0 | DISCHARGE
Start: 2021-09-09

## 2021-09-09 RX ORDER — MAGNESIUM HYDROXIDE 400 MG/1
30 TABLET, CHEWABLE ORAL
Qty: 0 | Refills: 0 | DISCHARGE
Start: 2021-09-09

## 2021-09-09 RX ORDER — LIDOCAINE 4 G/100G
1 CREAM TOPICAL
Qty: 0 | Refills: 0 | DISCHARGE
Start: 2021-09-09

## 2021-09-09 RX ORDER — SODIUM CHLORIDE 9 MG/ML
2 INJECTION INTRAMUSCULAR; INTRAVENOUS; SUBCUTANEOUS
Qty: 0 | Refills: 0 | DISCHARGE
Start: 2021-09-09

## 2021-09-09 RX ORDER — LOSARTAN/HYDROCHLOROTHIAZIDE 100MG-25MG
1 TABLET ORAL
Qty: 0 | Refills: 0 | DISCHARGE

## 2021-09-09 RX ORDER — SODIUM CHLORIDE 5 G/100ML
500 INJECTION, SOLUTION INTRAVENOUS
Refills: 0 | Status: DISCONTINUED | OUTPATIENT
Start: 2021-09-09 | End: 2021-09-09

## 2021-09-09 RX ORDER — METOPROLOL TARTRATE 50 MG
1 TABLET ORAL
Qty: 0 | Refills: 0 | DISCHARGE
Start: 2021-09-09

## 2021-09-09 RX ORDER — ACETAMINOPHEN 500 MG
2 TABLET ORAL
Qty: 0 | Refills: 0 | DISCHARGE
Start: 2021-09-09

## 2021-09-09 RX ADMIN — ENOXAPARIN SODIUM 40 MILLIGRAM(S): 100 INJECTION SUBCUTANEOUS at 11:52

## 2021-09-09 RX ADMIN — Medication 1000 MILLIGRAM(S): at 13:39

## 2021-09-09 RX ADMIN — LOSARTAN POTASSIUM 50 MILLIGRAM(S): 100 TABLET, FILM COATED ORAL at 06:03

## 2021-09-09 RX ADMIN — Medication 1000 MILLIGRAM(S): at 07:00

## 2021-09-09 RX ADMIN — Medication 1000 MILLIGRAM(S): at 14:11

## 2021-09-09 RX ADMIN — Medication 1000 MILLIGRAM(S): at 06:03

## 2021-09-09 RX ADMIN — OXYCODONE HYDROCHLORIDE 5 MILLIGRAM(S): 5 TABLET ORAL at 07:00

## 2021-09-09 RX ADMIN — SODIUM CHLORIDE 40 MILLILITER(S): 5 INJECTION, SOLUTION INTRAVENOUS at 01:59

## 2021-09-09 RX ADMIN — OXYCODONE HYDROCHLORIDE 5 MILLIGRAM(S): 5 TABLET ORAL at 06:03

## 2021-09-09 RX ADMIN — GABAPENTIN 200 MILLIGRAM(S): 400 CAPSULE ORAL at 17:24

## 2021-09-09 RX ADMIN — LIDOCAINE 1 PATCH: 4 CREAM TOPICAL at 00:12

## 2021-09-09 RX ADMIN — Medication 25 MILLIGRAM(S): at 06:03

## 2021-09-09 RX ADMIN — SODIUM CHLORIDE 2 GRAM(S): 9 INJECTION INTRAMUSCULAR; INTRAVENOUS; SUBCUTANEOUS at 13:39

## 2021-09-09 RX ADMIN — LIDOCAINE 1 PATCH: 4 CREAM TOPICAL at 11:52

## 2021-09-09 RX ADMIN — GABAPENTIN 200 MILLIGRAM(S): 400 CAPSULE ORAL at 06:03

## 2021-09-09 NOTE — DISCHARGE NOTE PROVIDER - NSDCMRMEDTOKEN_GEN_ALL_CORE_FT
acetaminophen 500 mg oral tablet: 2 tab(s) orally every 8 hours  gabapentin 100 mg oral capsule: 2 cap(s) orally every 12 hours  lidocaine 4% topical film: Apply topically to affected area once a day  losartan 50 mg oral tablet: 1 tab(s) orally once a day  magnesium hydroxide 8% oral suspension: 30 milliliter(s) orally once, As needed, Constipation  metoprolol succinate 25 mg oral tablet, extended release: 1 tab(s) orally once a day  polyethylene glycol 3350 oral powder for reconstitution: 17 gram(s) orally once a day  senna oral tablet: 2 tab(s) orally once a day (at bedtime)  sodium chloride 1 g oral tablet: 2 tab(s) orally 3 times a day  tamsulosin 0.4 mg oral capsule: 1 cap(s) orally once a day (at bedtime)

## 2021-09-09 NOTE — DISCHARGE NOTE PROVIDER - DETAILS OF MALNUTRITION DIAGNOSIS/DIAGNOSES
This patient has been assessed with a concern for Malnutrition and was treated during this hospitalization for the following Nutrition diagnosis/diagnoses:     -  09/05/2021: Moderate protein-calorie malnutrition

## 2021-09-09 NOTE — DISCHARGE NOTE PROVIDER - HOSPITAL COURSE
76 YOF admitted with L5 & S1 fractures s/p fall.  Found to be hyponatremic.  Pain, ortho and nephrology consulted.  Patient reports reduced pain with pain NP regimen.  Ortho recommends conservative management.  Pt Na corrected with PO sodium and IVF.  Na 130 today, nephrology recommends d/c on PO sodium and patient follow up as outpatient.  Patient was found to be retaining urine, a bates was inserted and Flomax started.  TOV can be tried in the facility as early as 9/10.    Discussed with attending.    Patient medically stable for discharge.    For full hospital course, please see medical record.

## 2021-09-09 NOTE — PROGRESS NOTE ADULT - REASON FOR ADMISSION
Bilateral L5 transverse process and sacral alar fracture

## 2021-09-09 NOTE — CHART NOTE - NSCHARTNOTEFT_GEN_A_CORE
EVENT: Labs reviewed. Serum Na+ dropped from 129 to 127    HPI:  76 YOF admitted with L5 & S1 fractures s/p fall.  Found to be hyponatremic.  Pain, ortho and nephrology consulted.    Subjective: n/a    OBJECTIVE:  Vital Signs Last 24 Hrs  T(C): 36.5 (08 Sep 2021 21:33), Max: 36.8 (08 Sep 2021 05:16)  T(F): 97.7 (08 Sep 2021 21:33), Max: 98.3 (08 Sep 2021 05:16)  HR: 93 (08 Sep 2021 21:33) (93 - 110)  BP: 150/81 (08 Sep 2021 21:33) (150/81 - 169/89)  BP(mean): --  RR: 18 (08 Sep 2021 21:33) (15 - 18)  SpO2: 94% (08 Sep 2021 21:33) (94% - 97%)    FOCUSED PHYSICAL EXAM:  Neuro: awake, alert, oriented x 3. No neuro deficit  Cardiovascular: Pulses +2 B/L in lower and upper extremities, HR regular, BP stable, No edema.  Respiratory: Respirations regular, unlabored, breath sounds clear B/L.   GI: Abdomen soft, non-tender, positive bowel sounds.  : no bladder distention noted. No complaints at this time.  Skin: Dry, intact, no bruising, no diaphoresis.    LABS:                        11.5   6.69  )-----------( 330      ( 08 Sep 2021 07:45 )             33.5     09-08    127<L>  |  91<L>  |  18  ----------------------------<  102<H>  4.2   |  31  |  0.25<L>    Ca    8.4      08 Sep 2021 07:45  Phos  3.8     09-08  Mg     1.8     09-08        EKG:   IMAGING:    ASSESSMENT/PROBLEM: Hyponatremia of 127 most likely 2/2 to hypovolemia vs high ADH state      PLAN:   1. D/c NS at 75cc/hr  2. Order NS 1.5% as recommended by nephrologist, Dr Radha Orr  3. Follow up with serum sodium & Cortisol level in AM  4. Continue sodium tabs TID as ordered  5. Ordered fluid restriction to 1000cc/day  6. Strict I/O  7. Educate pt about fluid restriction  8. Cont present care/treatment  9. Reassess EVENT: Labs reviewed. Serum Na+ dropped from 129 to 127    HPI:  76 YOF admitted with L5 & S1 fractures s/p fall.  Found to be hyponatremic.  Pain, ortho and nephrology consulted.    Subjective: n/a    OBJECTIVE:  Vital Signs Last 24 Hrs  T(C): 36.5 (08 Sep 2021 21:33), Max: 36.8 (08 Sep 2021 05:16)  T(F): 97.7 (08 Sep 2021 21:33), Max: 98.3 (08 Sep 2021 05:16)  HR: 93 (08 Sep 2021 21:33) (93 - 110)  BP: 150/81 (08 Sep 2021 21:33) (150/81 - 169/89)  BP(mean): --  RR: 18 (08 Sep 2021 21:33) (15 - 18)  SpO2: 94% (08 Sep 2021 21:33) (94% - 97%)    FOCUSED PHYSICAL EXAM:  Neuro: awake, alert, oriented x 3. No neuro deficit  Cardiovascular: Pulses +2 B/L in lower and upper extremities, HR regular, BP stable, No edema.  Respiratory: Respirations regular, unlabored, breath sounds clear B/L.   GI: Abdomen soft, non-tender, positive bowel sounds.  : no bladder distention noted. No complaints at this time.  Skin: Dry, intact, no bruising, no diaphoresis.    LABS:                        11.5   6.69  )-----------( 330      ( 08 Sep 2021 07:45 )             33.5     09-08    127<L>  |  91<L>  |  18  ----------------------------<  102<H>  4.2   |  31  |  0.25<L>    Ca    8.4      08 Sep 2021 07:45  Phos  3.8     09-08  Mg     1.8     09-08        EKG:   IMAGING:    ASSESSMENT/PROBLEM: Hyponatremia of 127 most likely 2/2 to hypovolemia vs high ADH state      PLAN:   1. D/c NS at 75cc/hr  2. Order NS 1.5% as recommended by nephrologist, Dr Radha Orr  3. Follow up with serum sodium & Cortisol level in AM  4. Ordered fluid restriction to 1000cc/day  5. Strict I/O  6. Educate pt about fluid restriction  7. Cont present care/treatment  8. Reassess

## 2021-09-09 NOTE — DISCHARGE NOTE PROVIDER - NSDCCPCAREPLAN_GEN_ALL_CORE_FT
PRINCIPAL DISCHARGE DIAGNOSIS  Diagnosis: Sacral fracture  Assessment and Plan of Treatment: You came to the hospital after a fall.  Your work up revealed a sacral fracture.  Ortho recommends physical therapy and pain management to treat your fracture.      SECONDARY DISCHARGE DIAGNOSES  Diagnosis: Lumbar transverse process fracture  Assessment and Plan of Treatment: You were also found to have a lumbar fracture.  Ortho recommends the same treatment for this fracture.    Diagnosis: Hyponatremia  Assessment and Plan of Treatment: Your sodium was low.  You were given IV fluids and started on salt tablets.  Please continue to take the salt tablets and follow up with your PCP for lab work to ensure your sodium remains within an acceptable range.    Diagnosis: Urinary retention  Assessment and Plan of Treatment: A bates catheter was inserted for urinary retention.  Your facility can attempt a trial of void as early as 9/10.    Diagnosis: Hypertension  Assessment and Plan of Treatment: Continue to take your medication as prescribed.  Follow up with your primary doctor regularly to ensure your medication is therapeutic.

## 2021-09-09 NOTE — PROGRESS NOTE ADULT - ASSESSMENT
1. Hyponatremia with unknown duration most likely chronic. Pt is clinically euvolemic. Multifactorial due to HCTZ and excessive water intake and possible high ADH state.  -Na improved with fluids; Na 130; place on salt tabs 2gm tid at the rehab. rpt urine lytes show high uosm and low Romina possible hypovelmia but also high ADH state,   -urine lytes noted. Serum Uric acid is low;  TSH is okay; Cortisol in AM.   -continue Fluid restriction 800ml to 1L/day.   -Check Seurm Na daily. Monitor I/O's daily. Avoid overcorrection of NA (8-10meq/day)  2. HTN: -bp is okay. on losartan 50mg daily. avoid diuretics possible contributing to hypona.  -titrate bp meds to keep sbp >110 and < 130  3. Constipated: better and  laxatives prn.  4. Edema of legs:  better; off diuretic due to worsening hypona.   5. Mild Wheezing: give dounebs.    Discussed the assessment and plan with Primary Team/Nurse

## 2021-09-09 NOTE — DISCHARGE NOTE PROVIDER - CARE PROVIDER_API CALL
Carolina Zurita  [facility]  [address] [place] [address]  Phone: [phone]  Fax: [phone]  Follow Up Time: 1-3 days

## 2021-09-09 NOTE — PROGRESS NOTE ADULT - PROVIDER SPECIALTY LIST ADULT
Internal Medicine
Orthopedics
Internal Medicine
Nephrology
Pain Medicine
Pain Medicine
Nephrology
Internal Medicine
Internal Medicine

## 2021-09-09 NOTE — DISCHARGE NOTE NURSING/CASE MANAGEMENT/SOCIAL WORK - PATIENT PORTAL LINK FT
You can access the FollowMyHealth Patient Portal offered by [facility] by registering at the following website: [email or web address]. By joining [employer]’s FollowMyHealth portal, you will also be able to view your health information using other applications (apps) compatible with our system.

## 2021-09-09 NOTE — DISCHARGE NOTE NURSING/CASE MANAGEMENT/SOCIAL WORK - NSDCPEFALRISK_GEN_ALL_CORE
For information on Fall & injury Prevention, visit [email or web address]

## 2021-09-09 NOTE — PROGRESS NOTE ADULT - SUBJECTIVE AND OBJECTIVE BOX
NEPHROLOGY MEDICAL CARE, [facility] - Dr. Kirby Garcia/ Dr. Jesica Wyatt/ Dr. Dom Vasquez/ Dr. Hemalatha Robins    Patient was seen and examined at bedside.    CC: patient is okay; possible wheezing    Vital Signs Last 24 Hrs  T(C): 36.7 (09 Sep 2021 05:39), Max: 36.8 (08 Sep 2021 14:09)  T(F): 98.1 (09 Sep 2021 05:39), Max: 98.2 (08 Sep 2021 14:09)  HR: 89 (09 Sep 2021 05:39) (89 - 105)  BP: 134/72 (09 Sep 2021 05:39) (134/72 - 169/89)  BP(mean): --  RR: 15 (09 Sep 2021 05:39) (15 - 18)  SpO2: 97% (09 Sep 2021 05:39) (94% - 97%)    09-08 @ 07:01  -  09-09 @ 07:00  --------------------------------------------------------  IN: 0 mL / OUT: 350 mL / NET: -350 mL        PHYSICAL EXAM:  General: No acute respiratory distress.  Eyes: conjunctiva and sclera clear  ENMT: Atraumatic, Normocephalic, supple, No JVD present. Moist mucous membranes  Respiratory: Bilateral clear to percussion; No rales, rhonchi; mild wheezing  Cardiovascular S1S2+; no m/r/g  Gastrointestinal: Soft, Non-tender, Nondistended; Bowel sounds present  Neuro:  Awake, Alert & Oriented X3  Ext:  no pedal edema present, No Cyanosis  Skin: No visible rashes    MEDICATIONS:  MEDICATIONS  (STANDING):  acetaminophen   Tablet .. 1000 milliGRAM(s) Oral every 8 hours  enoxaparin Injectable 40 milliGRAM(s) SubCutaneous daily  gabapentin 200 milliGRAM(s) Oral every 12 hours  influenza   Vaccine 0.5 milliLiter(s) IntraMuscular once  lidocaine   4% Patch 1 Patch Transdermal daily  losartan 50 milliGRAM(s) Oral daily  metoprolol succinate ER 25 milliGRAM(s) Oral daily  polyethylene glycol 3350 17 Gram(s) Oral daily  senna 2 Tablet(s) Oral at bedtime  sodium chloride 2 Gram(s) Oral three times a day  sodium chloride 1.5%. 500 milliLiter(s) (40 mL/Hr) IV Continuous <Continuous>  tamsulosin 0.4 milliGRAM(s) Oral at bedtime    MEDICATIONS  (PRN):  magnesium hydroxide Suspension 30 milliLiter(s) Oral once PRN Constipation          LABS:                        10.4   5.13  )-----------( 294      ( 09 Sep 2021 11:30 )             31.0     09-09    130<L>  |  95<L>  |  17  ----------------------------<  149<H>  4.2   |  29  |  0.25<L>    Ca    8.2<L>      09 Sep 2021 11:30  Phos  3.1     09-09  Mg     1.9     09-09          Magnesium, Serum: 1.9 mg/dL (09-09 @ 11:30)  Phosphorus Level, Serum: 3.1 mg/dL (09-09 @ 11:30)    Urine studies  Osmolality, Random Urine: 728 mos/kg (09-08 @ 09:34)  Sodium, Random Urine: 21 mmol/L (09-08 @ 09:34)  Potassium, Random Urine: 105 mmol/L (09-08 @ 09:34)  Potassium, Random Urine: 67 mmol/L (09-07 @ 10:15)  Sodium, Random Urine: 88 mmol/L (09-07 @ 10:15)  Osmolality, Random Urine: 485 mos/kg (09-07 @ 10:15)    PTH and Vit D:

## 2021-09-28 PROBLEM — I10 ESSENTIAL (PRIMARY) HYPERTENSION: Chronic | Status: ACTIVE | Noted: 2021-09-03

## 2021-12-09 ENCOUNTER — NON-APPOINTMENT (OUTPATIENT)
Age: 76
End: 2021-12-09

## 2021-12-09 ENCOUNTER — APPOINTMENT (OUTPATIENT)
Dept: INTERNAL MEDICINE | Facility: CLINIC | Age: 76
End: 2021-12-09
Payer: MEDICARE

## 2021-12-09 VITALS
HEART RATE: 90 BPM | RESPIRATION RATE: 16 BRPM | SYSTOLIC BLOOD PRESSURE: 172 MMHG | DIASTOLIC BLOOD PRESSURE: 83 MMHG | BODY MASS INDEX: 37.69 KG/M2 | OXYGEN SATURATION: 93 % | TEMPERATURE: 98.6 F | HEIGHT: 60 IN | WEIGHT: 192 LBS

## 2021-12-09 DIAGNOSIS — S32.009A UNSPECIFIED FRACTURE OF UNSPECIFIED LUMBAR VERTEBRA, INITIAL ENCOUNTER FOR CLOSED FRACTURE: ICD-10-CM

## 2021-12-09 DIAGNOSIS — M19.019 PRIMARY OSTEOARTHRITIS, UNSPECIFIED SHOULDER: ICD-10-CM

## 2021-12-09 DIAGNOSIS — Z63.4 DISAPPEARANCE AND DEATH OF FAMILY MEMBER: ICD-10-CM

## 2021-12-09 PROCEDURE — G0009: CPT

## 2021-12-09 PROCEDURE — 93000 ELECTROCARDIOGRAM COMPLETE: CPT

## 2021-12-09 PROCEDURE — 90732 PPSV23 VACC 2 YRS+ SUBQ/IM: CPT

## 2021-12-09 PROCEDURE — G0438: CPT

## 2021-12-09 SDOH — SOCIAL STABILITY - SOCIAL INSECURITY: DISSAPEARANCE AND DEATH OF FAMILY MEMBER: Z63.4

## 2021-12-09 NOTE — HEALTH RISK ASSESSMENT
[Good] : ~his/her~  mood as  good [No] : In the past 12 months have you used drugs other than those required for medical reasons? No [Any fall with injury in past year] : Patient reported fall with injury in the past year [0] : 2) Feeling down, depressed, or hopeless: Not at all (0) [HIV Test offered] : HIV Test offered [None] : None [Feels Safe at Home] : Feels safe at home [Independent] : using telephone [Some assistance needed] : managing finances [Full assistance needed] : doing laundry [FreeTextEntry1] : Check up\par  [] : No [de-identified] : None [BSQ1Ussye] : 0 [Change in mental status noted] : No change in mental status noted [BoneDensityComments] : As ordered for today.

## 2021-12-09 NOTE — ASSESSMENT
[FreeTextEntry1] : 76 year old female found to have stable Hypertension, Arthritis, Allergic Rhinosinusitis,with the current prescription regimen as recommended, diet and life style modifications, as counseled. Prior results reviewed, interpreted and discussed with the patient during today's examination, as appropriate. Follow up, treatment plan and tests, as ordered.\par \par EKG showed NSR at the rate of 90 BPM, LAD, non-sp ST-T changes noted.\par \par Supplemental history was obtained from son, who is accompanying the patient for today's examination.\par

## 2021-12-09 NOTE — HISTORY OF PRESENT ILLNESS
[de-identified] : 76 year old female patient with history of stable Hypertension, Arthritis, Allergic Rhinosinusitis, history as stated, presented for an annual preventative examination.\par Patient denies any associated symptoms of shortness of breath, chest pain, abdominal pain at this time.\par

## 2021-12-27 ENCOUNTER — NON-APPOINTMENT (OUTPATIENT)
Age: 76
End: 2021-12-27

## 2022-01-01 NOTE — ED PROVIDER NOTE - CARDIAC, MLM
Normal rate, regular rhythm.  Heart sounds S1, S2.  No murmurs, rubs or gallops.
I examined baby at the bedside and reviewed with mother: medical history as above, maternal medications included prenatal vitamins, as well as any other listed above in the HPI, normal sonograms.  Full term, well appearing  male, continue routine  care and anticipatory guidance     Kandi Vinson MD  Pediatric Hospitalist

## 2022-01-08 LAB
25(OH)D3 SERPL-MCNC: 51.9 NG/ML
ALBUMIN SERPL ELPH-MCNC: 4.2 G/DL
ALP BLD-CCNC: 85 U/L
ALT SERPL-CCNC: 8 U/L
ANION GAP SERPL CALC-SCNC: 13 MMOL/L
AST SERPL-CCNC: 17 U/L
BASOPHILS # BLD AUTO: 0.04 K/UL
BASOPHILS NFR BLD AUTO: 0.9 %
BILIRUB SERPL-MCNC: 0.3 MG/DL
BUN SERPL-MCNC: 6 MG/DL
CALCIUM SERPL-MCNC: 9.2 MG/DL
CHLORIDE SERPL-SCNC: 93 MMOL/L
CHOLEST SERPL-MCNC: 207 MG/DL
CO2 SERPL-SCNC: 27 MMOL/L
CREAT SERPL-MCNC: 0.42 MG/DL
EOSINOPHIL # BLD AUTO: 0.14 K/UL
EOSINOPHIL NFR BLD AUTO: 3.1 %
ERYTHROCYTE [SEDIMENTATION RATE] IN BLOOD BY WESTERGREN METHOD: 46 MM/HR
ESTIMATED AVERAGE GLUCOSE: 117 MG/DL
FOLATE SERPL-MCNC: >20 NG/ML
GGT SERPL-CCNC: 21 U/L
GLUCOSE SERPL-MCNC: 75 MG/DL
HBA1C MFR BLD HPLC: 5.7 %
HCT VFR BLD CALC: 38.9 %
HDLC SERPL-MCNC: 70 MG/DL
HGB BLD-MCNC: 12.7 G/DL
HIV1+2 AB SPEC QL IA.RAPID: NONREACTIVE
IMM GRANULOCYTES NFR BLD AUTO: 0.2 %
IRON SATN MFR SERPL: 20 %
IRON SERPL-MCNC: 75 UG/DL
LDLC SERPL CALC-MCNC: 124 MG/DL
LYMPHOCYTES # BLD AUTO: 1.78 K/UL
LYMPHOCYTES NFR BLD AUTO: 39.5 %
MAN DIFF?: NORMAL
MCHC RBC-ENTMCNC: 29.8 PG
MCHC RBC-ENTMCNC: 32.6 GM/DL
MCV RBC AUTO: 91.3 FL
MONOCYTES # BLD AUTO: 0.4 K/UL
MONOCYTES NFR BLD AUTO: 8.9 %
NEUTROPHILS # BLD AUTO: 2.14 K/UL
NEUTROPHILS NFR BLD AUTO: 47.4 %
NONHDLC SERPL-MCNC: 137 MG/DL
PLATELET # BLD AUTO: 296 K/UL
POTASSIUM SERPL-SCNC: 3.9 MMOL/L
PROT SERPL-MCNC: 7.2 G/DL
RBC # BLD: 4.26 M/UL
RBC # FLD: 13.3 %
SODIUM SERPL-SCNC: 133 MMOL/L
T3 SERPL-MCNC: 101 NG/DL
T4 FREE SERPL-MCNC: 1 NG/DL
TIBC SERPL-MCNC: 375 UG/DL
TRIGL SERPL-MCNC: 67 MG/DL
TSH SERPL-ACNC: 1.71 UIU/ML
UIBC SERPL-MCNC: 300 UG/DL
VIT B12 SERPL-MCNC: 609 PG/ML
WBC # FLD AUTO: 4.51 K/UL

## 2022-03-10 ENCOUNTER — APPOINTMENT (OUTPATIENT)
Dept: INTERNAL MEDICINE | Facility: CLINIC | Age: 77
End: 2022-03-10
Payer: MEDICARE

## 2022-03-10 VITALS
TEMPERATURE: 98.2 F | HEIGHT: 60 IN | DIASTOLIC BLOOD PRESSURE: 79 MMHG | OXYGEN SATURATION: 96 % | WEIGHT: 115 LBS | HEART RATE: 77 BPM | RESPIRATION RATE: 16 BRPM | BODY MASS INDEX: 22.58 KG/M2 | SYSTOLIC BLOOD PRESSURE: 122 MMHG

## 2022-03-10 DIAGNOSIS — J30.9 ALLERGIC RHINITIS, UNSPECIFIED: ICD-10-CM

## 2022-03-10 DIAGNOSIS — H61.20 IMPACTED CERUMEN, UNSPECIFIED EAR: ICD-10-CM

## 2022-03-10 DIAGNOSIS — M19.90 UNSPECIFIED OSTEOARTHRITIS, UNSPECIFIED SITE: ICD-10-CM

## 2022-03-10 PROCEDURE — 99214 OFFICE O/P EST MOD 30 MIN: CPT

## 2022-03-10 RX ORDER — HYDRALAZINE HYDROCHLORIDE 25 MG/1
25 TABLET ORAL
Qty: 30 | Refills: 2 | Status: DISCONTINUED | COMMUNITY
End: 2022-03-10

## 2022-03-10 NOTE — ASSESSMENT
[FreeTextEntry1] : 76 year old female found to have stable Hypertension, Arthritis, Allergic Rhinosinusitis, Elevated Hemoglobin A1c, Neuralgia, with the current prescription regimen as recommended, diet and life style modifications, as counseled. Prior results reviewed, interpreted and discussed with the patient during today's examination, as appropriate. Follow up, treatment plan and tests, as ordered.\par \par Supplemental history was obtained from son, who is accompanying the patient for today's examination.\par \par Total time spent : 30 minutes\par Including:\par Preparation prior to visit - Reviewing prior record, results of tests and Consultation Reports as applicable\par Conducting an appropriate H & P during today's encounter\par Appropriate orders for tests, medications and procedures, as applicable\par Counseling patient \par Note completion\par

## 2022-03-10 NOTE — HISTORY OF PRESENT ILLNESS
[de-identified] : 76 year old  female patient with history of stable Hypertension, Arthritis, Allergic Rhinosinusitis, Elevated Hemoglobin A1c, history as stated, presented for follow up examination. Patient is compliant with all medications. Denies shortness of breath, chest pain or abdominal pains at this time. ROS as stated.\par

## 2022-04-27 NOTE — PROGRESS NOTE ADULT - SUBJECTIVE AND OBJECTIVE BOX
Source of information: , Chart review  Patient language: Bria  : [id number]    CC:  right leg pain    This is a 76yFemale patient who presents to the hospital for Patient is a 76y old  Female who presents with a chief complaint of Bilateral L5 transverse process and sacral alar fracture (08 Sep 2021 14:44)  .  Pt lying in bed, nad. At rest - pain is controlled.  pt was oob with Pt yesterday.  Walked a few steps.  Meds were adjusted - awaiting pt today.  no nausea or vomiting.      PAIN SCORE:   4/10      SCALE USED: (1-10 NRS)      PAST MEDICAL & SURGICAL HISTORY:  HTN (hypertension)        FAMILY HISTORY:        SOCIAL HISTORY:  [ x] Denies Smoking, Alcohol, or Drug Use    Allergies    penicillin (Rash)    Intolerances        MEDICATIONS:    MEDICATIONS  (STANDING):  acetaminophen   Tablet .. 1000 milliGRAM(s) Oral every 8 hours  enoxaparin Injectable 40 milliGRAM(s) SubCutaneous daily  gabapentin 200 milliGRAM(s) Oral every 12 hours  influenza   Vaccine 0.5 milliLiter(s) IntraMuscular once  lidocaine   4% Patch 1 Patch Transdermal daily  losartan 50 milliGRAM(s) Oral daily  metoprolol succinate ER 25 milliGRAM(s) Oral daily  oxyCODONE    IR 5 milliGRAM(s) Oral every 8 hours  polyethylene glycol 3350 17 Gram(s) Oral daily  senna 2 Tablet(s) Oral at bedtime  sodium chloride 0.9%. 1000 milliLiter(s) (75 mL/Hr) IV Continuous <Continuous>  tamsulosin 0.4 milliGRAM(s) Oral at bedtime    MEDICATIONS  (PRN):  magnesium hydroxide Suspension 30 milliLiter(s) Oral once PRN Constipation      Vital Signs Last 24 Hrs  T(C): 36.8 (08 Sep 2021 14:09), Max: 36.8 (08 Sep 2021 05:16)  T(F): 98.2 (08 Sep 2021 14:09), Max: 98.3 (08 Sep 2021 05:16)  HR: 105 (08 Sep 2021 14:09) (105 - 110)  BP: 169/89 (08 Sep 2021 14:09) (155/80 - 169/89)  BP(mean): --  RR: 18 (08 Sep 2021 14:09) (15 - 18)  SpO2: 97% (08 Sep 2021 14:09) (96% - 97%)    LABS:                          11.5   6.69  )-----------( 330      ( 08 Sep 2021 07:45 )             33.5     09-08    127<L>  |  91<L>  |  18  ----------------------------<  102<H>  4.2   |  31  |  0.25<L>    Ca    8.4      08 Sep 2021 07:45  Phos  3.8     09-08  Mg     1.8     09-08            CAPILLARY BLOOD GLUCOSE          Radiology:    Drug Screen:        REVIEW OF SYSTEMS:  CONSTITUTIONAL: No fever or fatigue  RESPIRATORY: No cough, wheezing, chills or hemoptysis; No shortness of breath  CARDIOVASCULAR: No chest pain, palpitations, dizziness, or leg swelling  GASTROINTESTINAL: No abdominal or epigastric pain. No nausea, vomiting; No diarrhea or constipation.   GENITOURINARY: + bates  MUSCULOSKELETAL: + left leg pain  NEURO: No headaches, No numbness/tingling b/l LE, No weakness  PSYCHIATRIC: No depression, anxiety, mood swings, or difficulty sleeping    PHYSICAL EXAM:  GENERAL:  Alert & Oriented X3, NAD, Good concentration  CHEST/LUNG: Clear to auscultation bilaterally; No rales, rhonchi, wheezing, or rubs  HEART: Regular rate and rhythm; No murmurs, rubs, or gallops  ABDOMEN: Soft, Nontender, Nondistended; Bowel sounds present  EXTREMITIES:  2+ Peripheral Pulses, No cyanosis, or edema  MUSCULOSKELETAL: + decreased rom+ left leg pain on exertion   SKIN: No rashes or lesions    Risk factors associated with adverse outcomes related to opioid treatment  [ ]  Concurrent benzodiazepine use  [ ]  History/ Active substance use or alcohol use disorder  [ ] Psychiatric co-morbidity  [ ] Sleep apnea  [ ] COPD  [ ] BMI> 35  [ ] Liver dysfunction  [ ] Renal dysfunction  [ ] CHF  [ ] Smoker  [ x]  Age > 60 years    [ x]  [place]  Reviewed and Copied to Chart. See below.    Plan of care and goal oriented pain management treatment options were discussed with patient and /or primary care giver; all questions and concerns were addressed and care was aligned with patient's wishes.    Educated patient on goal oriented pain management treatment options     09-08-21 @ 20:32 Right Revision Mastoidectomy Cartilage Graft

## 2022-05-11 ENCOUNTER — APPOINTMENT (OUTPATIENT)
Dept: INTERNAL MEDICINE | Facility: CLINIC | Age: 77
End: 2022-05-11
Payer: MEDICARE

## 2022-05-11 VITALS
OXYGEN SATURATION: 97 % | HEART RATE: 87 BPM | BODY MASS INDEX: 23.95 KG/M2 | DIASTOLIC BLOOD PRESSURE: 81 MMHG | WEIGHT: 122 LBS | TEMPERATURE: 98.4 F | SYSTOLIC BLOOD PRESSURE: 167 MMHG | HEIGHT: 60 IN | RESPIRATION RATE: 16 BRPM

## 2022-05-11 DIAGNOSIS — R29.6 REPEATED FALLS: ICD-10-CM

## 2022-05-11 PROCEDURE — 99214 OFFICE O/P EST MOD 30 MIN: CPT

## 2022-05-13 ENCOUNTER — NON-APPOINTMENT (OUTPATIENT)
Age: 77
End: 2022-05-13

## 2022-05-13 LAB
25(OH)D3 SERPL-MCNC: 50.2 NG/ML
ALBUMIN SERPL ELPH-MCNC: 4.6 G/DL
ALP BLD-CCNC: 76 U/L
ALT SERPL-CCNC: 11 U/L
ANION GAP SERPL CALC-SCNC: 12 MMOL/L
AST SERPL-CCNC: 20 U/L
BASOPHILS # BLD AUTO: 0.04 K/UL
BASOPHILS NFR BLD AUTO: 0.7 %
BILIRUB SERPL-MCNC: 0.2 MG/DL
BUN SERPL-MCNC: 13 MG/DL
CALCIUM SERPL-MCNC: 9.6 MG/DL
CHLORIDE SERPL-SCNC: 99 MMOL/L
CO2 SERPL-SCNC: 28 MMOL/L
CREAT SERPL-MCNC: 0.41 MG/DL
EGFR: 101 ML/MIN/1.73M2
EOSINOPHIL # BLD AUTO: 0.07 K/UL
EOSINOPHIL NFR BLD AUTO: 1.3 %
ESTIMATED AVERAGE GLUCOSE: 108 MG/DL
GLUCOSE SERPL-MCNC: 110 MG/DL
HBA1C MFR BLD HPLC: 5.4 %
HCT VFR BLD CALC: 39.9 %
HGB BLD-MCNC: 12.8 G/DL
IMM GRANULOCYTES NFR BLD AUTO: 0.4 %
LYMPHOCYTES # BLD AUTO: 2.1 K/UL
LYMPHOCYTES NFR BLD AUTO: 38 %
MAN DIFF?: NORMAL
MCHC RBC-ENTMCNC: 31.2 PG
MCHC RBC-ENTMCNC: 32.1 GM/DL
MCV RBC AUTO: 97.3 FL
MONOCYTES # BLD AUTO: 0.5 K/UL
MONOCYTES NFR BLD AUTO: 9 %
NEUTROPHILS # BLD AUTO: 2.8 K/UL
NEUTROPHILS NFR BLD AUTO: 50.6 %
PLATELET # BLD AUTO: 276 K/UL
POTASSIUM SERPL-SCNC: 4.8 MMOL/L
PROT SERPL-MCNC: 7.5 G/DL
RBC # BLD: 4.1 M/UL
RBC # FLD: 13.7 %
SODIUM SERPL-SCNC: 140 MMOL/L
TSH SERPL-ACNC: 2.54 UIU/ML
WBC # FLD AUTO: 5.53 K/UL

## 2022-06-19 NOTE — PROGRESS NOTE ADULT - PROBLEM SELECTOR PLAN 4
The results of your imaging studies are preliminary at this time. Final reports of your imaging are still pending, and they may include new findings read by the radiologist that are important for you to know. Please check online at [facility] or call the Emergency Department at [phone] to obtain the final results of your imaging within the next 24 hours.     Borderline controlled  Pt takes Losartan, HTCZ and Lisinopril at home  Continue home regimen Losartan with parameters  Start home Lisinopril with parameters  Monitor BP Borderline controlled  Pt takes Losartan, HTCZ and Lisinopril at home  Continue home regimen Losartan with parameters  Consider increasing Losartan if SBP > 170  Monitor BP

## 2022-07-12 DIAGNOSIS — M79.2 NEURALGIA AND NEURITIS, UNSPECIFIED: ICD-10-CM

## 2022-07-25 NOTE — PHYSICAL EXAM
[No Acute Distress] : no acute distress [Well Nourished] : well nourished [Normal Sclera/Conjunctiva] : normal sclera/conjunctiva [PERRL] : pupils equal round and reactive to light [No Lymphadenopathy] : no lymphadenopathy [Thyroid Normal, No Nodules] : the thyroid was normal and there were no nodules present [No Respiratory Distress] : no respiratory distress  [Clear to Auscultation] : lungs were clear to auscultation bilaterally [Normal] : normal rate, regular rhythm, normal S1 and S2 and no murmur heard [No Carotid Bruits] : no carotid bruits [Pedal Pulses Present] : the pedal pulses are present [No Edema] : there was no peripheral edema [Normal Appearance] : normal in appearance [No Masses] : no palpable masses [No Nipple Discharge] : no nipple discharge [No Axillary Lymphadenopathy] : no axillary lymphadenopathy [Soft] : abdomen soft [Normal Supraclavicular Nodes] : no supraclavicular lymphadenopathy [Normal Axillary Nodes] : no axillary lymphadenopathy [No CVA Tenderness] : no CVA  tenderness [No Joint Swelling] : no joint swelling [No Rash] : no rash [No Focal Deficits] : no focal deficits [Normal Insight/Judgement] : insight and judgment were intact [Kyphosis] : no kyphosis [Scoliosis] : no scoliosis [de-identified] : varicose veins below knee [de-identified] : pt refused to lie down

## 2022-07-25 NOTE — ASSESSMENT
[FreeTextEntry1] : 75 yo with neuropathy LE's,Gabapentin dose optimized as per neurology instructions.\par side effects explained.\par h/o traumatic L5,S1 Fx's,compression Fx's.\par h/o hyponatremia,HCTZ induced.\par incidental finding of enlarged thyroid.\par pt and her son refused to increase the dose of HTN meds even though her BP was elevated today,they claim BP is low normal at home.\par also M11Q forms filled out as pt's son claims pt had frequent falls due to unstable gait,refused neurology eval as they have their own neurologist.\par labs,will f/u.\par

## 2022-07-25 NOTE — HISTORY OF PRESENT ILLNESS
[FreeTextEntry1] : tingling,numbness feet [de-identified] : 75 yo accompanied by pt's son.was admitted to [facility] 09/21 for hyponatremia,etiology?poss.HCTZ,\par S.sodium was corrected with Na+ supplement,fluid restriction.\par also pt was found to have b/l L5 process Fx and sacral Fx after a fall.\par also was found of compression Fx's thoracic,lumbar spine.\par also CT chest showed elevated L.hemidiaphragm,lungs unremarkable.\par enlarged thyroid.\par venous Doppler was neg for DVT\par she was Rxed Gabapentin 100 mg bid but her neurologist suggested 600 mg/d for b/l LE pain.\par pt insists on Rx this dose.\par PMH:HTN,mild,pre DM,OA's.\par ECG in 09/21:RSR,LVH

## 2022-07-25 NOTE — COUNSELING
[Fall prevention counseling provided] : Fall prevention counseling provided [None] : None [Good understanding] : Patient has a good understanding of lifestyle changes and steps needed to achieve self management goal [FreeTextEntry2] : low salt [de-identified] : walk with walker

## 2022-08-03 ENCOUNTER — NON-APPOINTMENT (OUTPATIENT)
Age: 77
End: 2022-08-03

## 2022-08-03 ENCOUNTER — APPOINTMENT (OUTPATIENT)
Dept: INTERNAL MEDICINE | Facility: CLINIC | Age: 77
End: 2022-08-03

## 2022-08-03 VITALS
BODY MASS INDEX: 23.56 KG/M2 | HEART RATE: 81 BPM | SYSTOLIC BLOOD PRESSURE: 164 MMHG | RESPIRATION RATE: 16 BRPM | HEIGHT: 60 IN | WEIGHT: 120 LBS | OXYGEN SATURATION: 93 % | TEMPERATURE: 97.5 F | DIASTOLIC BLOOD PRESSURE: 83 MMHG

## 2022-08-03 DIAGNOSIS — R26.81 UNSTEADINESS ON FEET: ICD-10-CM

## 2022-08-03 DIAGNOSIS — R26.2 DIFFICULTY IN WALKING, NOT ELSEWHERE CLASSIFIED: ICD-10-CM

## 2022-08-03 PROCEDURE — 99213 OFFICE O/P EST LOW 20 MIN: CPT

## 2022-08-03 NOTE — COUNSELING
[Fall prevention counseling provided] : Fall prevention counseling provided [FreeTextEntry2] : low salt [de-identified] : healthy eating,ambulate short distances with walker [None] : None

## 2022-08-03 NOTE — HISTORY OF PRESENT ILLNESS
[Family Member] : family member [FreeTextEntry1] : LE pain,inability to ambulate [de-identified] : 76 yo accompanied by pt's son.was admitted to [facility] 09/21 for hyponatremia,etiology?poss.HCTZ,\par S.sodium was corrected with Na+ supplement,fluid restriction.\par also pt was found to have b/l L5 process Fx and sacral Fx after a fall.\par also was found of compression Fx's thoracic,lumbar spine.\par also CT chest showed elevated L.hemidiaphragm,lungs unremarkable.\par enlarged thyroid.\par venous Doppler was neg for DVT\par she was Rxed Gabapentin 100 mg bid but her neurologist suggested 600 mg/d for b/l LE pain.\par pt insists on Rx this dose.\par PMH:HTN,mild,pre DM,OA's.\par ECG in 09/21:RSR,LVH

## 2022-08-03 NOTE — ASSESSMENT
[FreeTextEntry1] : 76 yo \par h/o HTN,always systolic BP elevation,will increase BB dose\par pre DM,\par osteoporotic compression Fx's,spine,traumatic Fx's L5-S1\par with neuropathy LE's,Gabapentin \par h/o hyponatremia,HCTZ induced,corrected..\par incidental finding of enlarged thyroid.us thyroid p\par disability forms filled out\par labs,will f/u.\par

## 2022-08-03 NOTE — PHYSICAL EXAM
[Supple] : supple [Kyphosis] : kyphosis [Scoliosis] : scoliosis [de-identified] : ambulates with walker [de-identified] : ngozi.hearing loss [de-identified] : thyroid not felt [de-identified] : varicose veins below knee [de-identified] : pt refused to lie down

## 2022-08-23 ENCOUNTER — APPOINTMENT (OUTPATIENT)
Dept: INTERNAL MEDICINE | Facility: CLINIC | Age: 77
End: 2022-08-23

## 2022-10-18 ENCOUNTER — NON-APPOINTMENT (OUTPATIENT)
Age: 77
End: 2022-10-18

## 2022-12-02 DIAGNOSIS — B34.9 VIRAL INFECTION, UNSPECIFIED: ICD-10-CM

## 2022-12-20 ENCOUNTER — TRANSCRIPTION ENCOUNTER (OUTPATIENT)
Age: 77
End: 2022-12-20

## 2022-12-20 ENCOUNTER — APPOINTMENT (OUTPATIENT)
Dept: INTERNAL MEDICINE | Facility: CLINIC | Age: 77
End: 2022-12-20

## 2022-12-20 VITALS
SYSTOLIC BLOOD PRESSURE: 201 MMHG | OXYGEN SATURATION: 95 % | TEMPERATURE: 97.8 F | HEIGHT: 57 IN | WEIGHT: 126 LBS | BODY MASS INDEX: 27.18 KG/M2 | RESPIRATION RATE: 16 BRPM | HEART RATE: 55 BPM | DIASTOLIC BLOOD PRESSURE: 95 MMHG

## 2022-12-20 VITALS — DIASTOLIC BLOOD PRESSURE: 100 MMHG | SYSTOLIC BLOOD PRESSURE: 200 MMHG

## 2022-12-20 DIAGNOSIS — Z23 ENCOUNTER FOR IMMUNIZATION: ICD-10-CM

## 2022-12-20 DIAGNOSIS — M54.50 LOW BACK PAIN, UNSPECIFIED: ICD-10-CM

## 2022-12-20 DIAGNOSIS — Z00.00 ENCOUNTER FOR GENERAL ADULT MEDICAL EXAMINATION W/OUT ABNORMAL FINDINGS: ICD-10-CM

## 2022-12-20 PROCEDURE — 99213 OFFICE O/P EST LOW 20 MIN: CPT

## 2022-12-20 RX ORDER — FOSINOPRIL SODIUM 40 MG/1
40 TABLET ORAL
Qty: 90 | Refills: 3 | Status: DISCONTINUED | COMMUNITY
Start: 1900-01-01 | End: 2022-12-20

## 2022-12-20 NOTE — COUNSELING
[Fall prevention counseling provided] : Fall prevention counseling provided [Adequate lighting] : Adequate lighting [No throw rugs] : No throw rugs [Use proper foot wear] : Use proper foot wear [Use recommended devices] : Use recommended devices [None] : None [Good understanding] : Patient has a good understanding of lifestyle changes and steps needed to achieve self management goal [FreeTextEntry2] : low salt [de-identified] : healthy eating,walking

## 2022-12-20 NOTE — PHYSICAL EXAM
[No Acute Distress] : no acute distress [Well Nourished] : well nourished [Normal Sclera/Conjunctiva] : normal sclera/conjunctiva [PERRL] : pupils equal round and reactive to light [No Lymphadenopathy] : no lymphadenopathy [Supple] : supple [No Respiratory Distress] : no respiratory distress  [No Carotid Bruits] : no carotid bruits [Pedal Pulses Present] : the pedal pulses are present [No Edema] : there was no peripheral edema [Normal Appearance] : normal in appearance [No Masses] : no palpable masses [No Nipple Discharge] : no nipple discharge [No Axillary Lymphadenopathy] : no axillary lymphadenopathy [Soft] : abdomen soft [Normal Supraclavicular Nodes] : no supraclavicular lymphadenopathy [Normal Axillary Nodes] : no axillary lymphadenopathy [No CVA Tenderness] : no CVA  tenderness [Kyphosis] : kyphosis [Scoliosis] : scoliosis [No Joint Swelling] : no joint swelling [No Rash] : no rash [No Focal Deficits] : no focal deficits [Normal Insight/Judgement] : insight and judgment were intact [de-identified] : ambulates with walker,fragile,kiphosis [de-identified] : thyroid not felt [de-identified] : normal BS on the R.diminished BS on the L. [de-identified] : soft syst.murmur 1/6 over precordium [de-identified] : min.varicose veins below knee [de-identified] : pt refused to lie down [de-identified] : walks with walker

## 2022-12-20 NOTE — ASSESSMENT
[FreeTextEntry1] : 76 yo \par h/o HTN,always systolic BP elevation,will continue BB, Fosinopril,will add Amlodipine 5 mg\par pre DM,\par osteoporotic compression Fx's,spine,traumatic Fx's L5-S1\par  neuropathy LE's,Gabapentin \par low back pain,cyclobenzaprine 10 mg\par h/o hyponatremia,HCTZ induced,corrected..\par goiter,heterogeneous gland,euthyroid pt.\par labs,will f/u.\par

## 2022-12-20 NOTE — HEALTH RISK ASSESSMENT
[Never] : Never [No] : In the past 12 months have you used drugs other than those required for medical reasons? No [No falls in past year] : Patient reported no falls in the past year [0] : 2) Feeling down, depressed, or hopeless: Not at all (0) [Patient/Caregiver unclear of wishes] : , patient/caregiver unclear of wishes [[id number]] : 0

## 2022-12-20 NOTE — HISTORY OF PRESENT ILLNESS
[Family Member] : family member [FreeTextEntry1] : pain,LE's [de-identified] : 76 yo accompanied by pt's son.\par c/o b/l LE pain at rest and on walking with walker\par multiple compression Fx's b/l L5 process Fx and sacral, thoracic,lumbar spine.\par also CT chest showed elevated L.hemidiaphragm,lungs unremarkable.\par enlarged thyroid.us heterogenous gland,TFT's normal.\par venous Doppler was neg for DVT\par h/o hyponatremia,2/2 HCTZ,\par she is taking  Gabapentin 100 mg x6/d 600 mg/d for pain low back,legs.\par pt insists on Rx this dose.\par PMH:HTN,denies HA's,amaurosis,dizziness,syncope.\par OA's.\par denies HA's,amaurosis,dizziness,syncope,c.p.,sob,palpitations,claudication\par ECG in 09/21:RSR,LVH

## 2022-12-23 ENCOUNTER — NON-APPOINTMENT (OUTPATIENT)
Age: 77
End: 2022-12-23

## 2022-12-23 LAB
25(OH)D3 SERPL-MCNC: 56.8 NG/ML
ALBUMIN SERPL ELPH-MCNC: 4.6 G/DL
ALP BLD-CCNC: 107 U/L
ALT SERPL-CCNC: 9 U/L
ANION GAP SERPL CALC-SCNC: 10 MMOL/L
APPEARANCE: CLEAR
AST SERPL-CCNC: 17 U/L
BASOPHILS # BLD AUTO: 0.05 K/UL
BASOPHILS NFR BLD AUTO: 0.9 %
BILIRUB SERPL-MCNC: 0.2 MG/DL
BILIRUBIN URINE: NEGATIVE
BLOOD URINE: NEGATIVE
BUN SERPL-MCNC: 10 MG/DL
CALCIUM SERPL-MCNC: 9.9 MG/DL
CHLORIDE SERPL-SCNC: 94 MMOL/L
CO2 SERPL-SCNC: 30 MMOL/L
COLOR: NORMAL
CREAT SERPL-MCNC: 0.4 MG/DL
EGFR: 101 ML/MIN/1.73M2
EOSINOPHIL # BLD AUTO: 0.05 K/UL
EOSINOPHIL NFR BLD AUTO: 0.9 %
ESTIMATED AVERAGE GLUCOSE: 114 MG/DL
GLUCOSE QUALITATIVE U: NEGATIVE
GLUCOSE SERPL-MCNC: 102 MG/DL
HBA1C MFR BLD HPLC: 5.6 %
HCT VFR BLD CALC: 40.1 %
HGB BLD-MCNC: 12.3 G/DL
IMM GRANULOCYTES NFR BLD AUTO: 0.2 %
KETONES URINE: NEGATIVE
LEUKOCYTE ESTERASE URINE: NEGATIVE
LYMPHOCYTES # BLD AUTO: 1.72 K/UL
LYMPHOCYTES NFR BLD AUTO: 31.3 %
MAN DIFF?: NORMAL
MCHC RBC-ENTMCNC: 29.8 PG
MCHC RBC-ENTMCNC: 30.7 GM/DL
MCV RBC AUTO: 97.1 FL
MONOCYTES # BLD AUTO: 0.5 K/UL
MONOCYTES NFR BLD AUTO: 9.1 %
NEUTROPHILS # BLD AUTO: 3.17 K/UL
NEUTROPHILS NFR BLD AUTO: 57.6 %
NITRITE URINE: NEGATIVE
PH URINE: 7
PLATELET # BLD AUTO: 297 K/UL
POTASSIUM SERPL-SCNC: 4.7 MMOL/L
PROT SERPL-MCNC: 7.7 G/DL
PROTEIN URINE: NEGATIVE
RBC # BLD: 4.13 M/UL
RBC # FLD: 13.8 %
SODIUM SERPL-SCNC: 134 MMOL/L
SPECIFIC GRAVITY URINE: 1.01
THYROGLOB AB SERPL-ACNC: <20 IU/ML
THYROPEROXIDASE AB SERPL IA-ACNC: 3399 IU/ML
TSH SERPL-ACNC: 1.26 UIU/ML
UROBILINOGEN URINE: NORMAL
WBC # FLD AUTO: 5.5 K/UL

## 2023-01-17 DIAGNOSIS — E87.1 HYPO-OSMOLALITY AND HYPONATREMIA: ICD-10-CM

## 2023-01-18 ENCOUNTER — LABORATORY RESULT (OUTPATIENT)
Age: 78
End: 2023-01-18

## 2023-01-19 ENCOUNTER — LABORATORY RESULT (OUTPATIENT)
Age: 78
End: 2023-01-19

## 2023-01-20 ENCOUNTER — NON-APPOINTMENT (OUTPATIENT)
Age: 78
End: 2023-01-20

## 2023-02-28 ENCOUNTER — NON-APPOINTMENT (OUTPATIENT)
Age: 78
End: 2023-02-28

## 2023-04-04 ENCOUNTER — APPOINTMENT (OUTPATIENT)
Dept: INTERNAL MEDICINE | Facility: CLINIC | Age: 78
End: 2023-04-04

## 2023-05-16 ENCOUNTER — APPOINTMENT (OUTPATIENT)
Dept: INTERNAL MEDICINE | Facility: CLINIC | Age: 78
End: 2023-05-16
Payer: MEDICARE

## 2023-05-16 VITALS — DIASTOLIC BLOOD PRESSURE: 88 MMHG | SYSTOLIC BLOOD PRESSURE: 160 MMHG

## 2023-05-16 VITALS
RESPIRATION RATE: 16 BRPM | BODY MASS INDEX: 28.26 KG/M2 | WEIGHT: 131 LBS | TEMPERATURE: 97.3 F | HEIGHT: 57 IN | HEART RATE: 96 BPM | OXYGEN SATURATION: 94 % | DIASTOLIC BLOOD PRESSURE: 89 MMHG | SYSTOLIC BLOOD PRESSURE: 177 MMHG

## 2023-05-16 DIAGNOSIS — T50.905A ADVERSE EFFECT OF UNSPECIFIED DRUGS, MEDICAMENTS AND BIOLOGICAL SUBSTANCES, INITIAL ENCOUNTER: ICD-10-CM

## 2023-05-16 DIAGNOSIS — M79.2 NEURALGIA AND NEURITIS, UNSPECIFIED: ICD-10-CM

## 2023-05-16 DIAGNOSIS — H04.123 DRY EYE SYNDROME OF BILATERAL LACRIMAL GLANDS: ICD-10-CM

## 2023-05-16 DIAGNOSIS — I10 ESSENTIAL (PRIMARY) HYPERTENSION: ICD-10-CM

## 2023-05-16 DIAGNOSIS — E04.9 NONTOXIC GOITER, UNSPECIFIED: ICD-10-CM

## 2023-05-16 PROCEDURE — 99214 OFFICE O/P EST MOD 30 MIN: CPT

## 2023-05-16 RX ORDER — AZITHROMYCIN 250 MG/1
250 TABLET, FILM COATED ORAL
Qty: 6 | Refills: 0 | Status: DISCONTINUED | COMMUNITY
Start: 2022-12-02 | End: 2023-05-16

## 2023-05-16 RX ORDER — LEVOCETIRIZINE DIHYDROCHLORIDE 5 MG/1
5 TABLET ORAL
Qty: 90 | Refills: 3 | Status: DISCONTINUED | COMMUNITY
End: 2023-05-16

## 2023-05-16 RX ORDER — AMLODIPINE BESYLATE 2.5 MG/1
2.5 TABLET ORAL DAILY
Qty: 90 | Refills: 3 | Status: ACTIVE | COMMUNITY
Start: 2022-12-20 | End: 1900-01-01

## 2023-05-16 RX ORDER — GABAPENTIN 100 MG/1
100 CAPSULE ORAL
Qty: 90 | Refills: 5 | Status: ACTIVE | COMMUNITY
Start: 2022-03-10 | End: 1900-01-01

## 2023-05-16 RX ORDER — VALSARTAN 160 MG/1
160 TABLET, COATED ORAL DAILY
Qty: 90 | Refills: 3 | Status: ACTIVE | COMMUNITY
Start: 2023-05-16 | End: 1900-01-01

## 2023-05-16 RX ORDER — IPRATROPIUM BROMIDE 21 UG/1
0.03 SPRAY NASAL 3 TIMES DAILY
Qty: 1 | Refills: 5 | Status: DISCONTINUED | COMMUNITY
Start: 2022-08-04 | End: 2023-05-16

## 2023-05-16 RX ORDER — FOSINOPRIL SODIUM 40 MG/1
40 TABLET ORAL
Qty: 90 | Refills: 3 | Status: DISCONTINUED | COMMUNITY
Start: 2022-12-20 | End: 2023-05-16

## 2023-05-16 RX ORDER — CARBOXYMETHYLCELLULOSE SODIUM 5 MG/ML
0.5 SOLUTION/ DROPS OPHTHALMIC
Qty: 2 | Refills: 5 | Status: ACTIVE | COMMUNITY
Start: 2023-05-16 | End: 1900-01-01

## 2023-05-16 NOTE — PHYSICAL EXAM
[No Acute Distress] : no acute distress [Normal Sclera/Conjunctiva] : normal sclera/conjunctiva [PERRL] : pupils equal round and reactive to light [No Lymphadenopathy] : no lymphadenopathy [Supple] : supple [No Respiratory Distress] : no respiratory distress  [No Carotid Bruits] : no carotid bruits [Pedal Pulses Present] : the pedal pulses are present [No Edema] : there was no peripheral edema [Normal Appearance] : normal in appearance [No Masses] : no palpable masses [No Nipple Discharge] : no nipple discharge [No Axillary Lymphadenopathy] : no axillary lymphadenopathy [Soft] : abdomen soft [No CVA Tenderness] : no CVA  tenderness [Kyphosis] : kyphosis [Scoliosis] : scoliosis [No Joint Swelling] : no joint swelling [No Rash] : no rash [No Focal Deficits] : no focal deficits [Normal Insight/Judgement] : insight and judgment were intact [de-identified] : ambulates with walker,overwt [de-identified] : thyroid enlarged,40 mg,moving with deglutition [de-identified] : normal BS on the R.diminished BS on the L. [de-identified] : soft syst.murmur 1/6 over precordium [de-identified] : min.varicose veins below knee [de-identified] : pt refused to lie down [de-identified] : walks with walker

## 2023-05-16 NOTE — HISTORY OF PRESENT ILLNESS
[FreeTextEntry1] : ankle edema\par anxiety [de-identified] : 78 yo accompanied by pt's son.\par c/o b/l ankle swelling.\par pt's son admits pt is always anxious,intolerant to other people,as part of her character\par multiple compression Fx's b/l L5 process Fx and sacral, thoracic,lumbar spine.Rxed with high Gabapentin dose by outside pain mng\par also CT chest showed elevated L.hemidiaphragm,lungs unremarkable.\par enlarged thyroid.us heterogenous gland,TFT's normal.\par venous Doppler was neg for DVT\par h/o hyponatremia,2/2 HCTZ,\par she is taking  Gabapentin 100 mg x6/d 600 mg/d for pain low back,legs.\par pt insists on Rx this dose.\par PMH:HTN,denies HA's,amaurosis,dizziness,syncope.\par OA's.\par denies HA's,amaurosis,dizziness,syncope,c.p.,sob,palpitations,claudication\par ECG in 09/21:RSR,LVH

## 2023-05-16 NOTE — ASSESSMENT
[FreeTextEntry1] : 76 yo \par h/o HTN,always systolic BP elevation,will make the following changes to Rx:\par d/c Fosinopril,start Valsartan 160 mg,make  Amlodipine 2,5 mg,make Metoprolol  mg\par pre DM,\par osteoporotic compression Fx's,spine,traumatic Fx's L5-S1\par  neuropathy LE's,Gabapentin \par low back pain,cyclobenzaprine 10 mg\par h/o hyponatremia,HCTZ induced,corrected..\par goiter,heterogeneous gland,euthyroid pt.\par labs,will f/u.\par

## 2023-05-16 NOTE — COUNSELING
[Fall prevention counseling provided] : Fall prevention counseling provided [None] : None [Good understanding] : Patient has a good understanding of lifestyle changes and steps needed to achieve self management goal [FreeTextEntry2] : low salt [de-identified] : healthy eating,walking with walker

## 2023-05-31 ENCOUNTER — LABORATORY RESULT (OUTPATIENT)
Age: 78
End: 2023-05-31

## 2023-06-01 ENCOUNTER — LABORATORY RESULT (OUTPATIENT)
Age: 78
End: 2023-06-01

## 2023-07-27 RX ORDER — METOPROLOL SUCCINATE 50 MG/1
50 TABLET, EXTENDED RELEASE ORAL
Qty: 90 | Refills: 3 | Status: ACTIVE | COMMUNITY
Start: 1900-01-01 | End: 1900-01-01

## 2023-08-04 RX ORDER — CYCLOBENZAPRINE HYDROCHLORIDE 10 MG/1
10 TABLET, FILM COATED ORAL
Qty: 30 | Refills: 5 | Status: ACTIVE | COMMUNITY
Start: 2022-12-20

## 2024-07-23 ENCOUNTER — RESULT REVIEW (OUTPATIENT)
Age: 79
End: 2024-07-23

## 2024-07-24 ENCOUNTER — TRANSCRIPTION ENCOUNTER (OUTPATIENT)
Age: 79
End: 2024-07-24

## 2024-09-14 ENCOUNTER — INPATIENT (INPATIENT)
Facility: HOSPITAL | Age: 79
LOS: 10 days | Discharge: NURSING, REHAB OR LONG-TERM CARE | DRG: 189 | End: 2024-09-25
Attending: INTERNAL MEDICINE | Admitting: INTERNAL MEDICINE
Payer: COMMERCIAL

## 2024-09-14 VITALS
HEART RATE: 73 BPM | DIASTOLIC BLOOD PRESSURE: 95 MMHG | HEIGHT: 60 IN | SYSTOLIC BLOOD PRESSURE: 182 MMHG | WEIGHT: 130.95 LBS | OXYGEN SATURATION: 100 % | TEMPERATURE: 98 F | RESPIRATION RATE: 28 BRPM

## 2024-09-14 LAB
ALBUMIN SERPL ELPH-MCNC: 3.6 G/DL — SIGNIFICANT CHANGE UP (ref 3.5–5)
ALP SERPL-CCNC: 64 U/L — SIGNIFICANT CHANGE UP (ref 40–120)
ALT FLD-CCNC: 15 U/L DA — SIGNIFICANT CHANGE UP (ref 10–60)
ANION GAP SERPL CALC-SCNC: 4 MMOL/L — LOW (ref 5–17)
APTT BLD: 31 SEC — SIGNIFICANT CHANGE UP (ref 24.5–35.6)
AST SERPL-CCNC: 18 U/L — SIGNIFICANT CHANGE UP (ref 10–40)
BASE EXCESS BLDA CALC-SCNC: 21.3 MMOL/L — HIGH (ref -2–3)
BASOPHILS # BLD AUTO: 0.03 K/UL — SIGNIFICANT CHANGE UP (ref 0–0.2)
BASOPHILS NFR BLD AUTO: 0.6 % — SIGNIFICANT CHANGE UP (ref 0–2)
BILIRUB SERPL-MCNC: 0.4 MG/DL — SIGNIFICANT CHANGE UP (ref 0.2–1.2)
BLOOD GAS COMMENTS ARTERIAL: SIGNIFICANT CHANGE UP
BUN SERPL-MCNC: 9 MG/DL — SIGNIFICANT CHANGE UP (ref 7–18)
CALCIUM SERPL-MCNC: 8.4 MG/DL — SIGNIFICANT CHANGE UP (ref 8.4–10.5)
CHLORIDE SERPL-SCNC: 75 MMOL/L — LOW (ref 96–108)
CO2 SERPL-SCNC: 42 MMOL/L — HIGH (ref 22–31)
CREAT SERPL-MCNC: 0.4 MG/DL — LOW (ref 0.5–1.3)
EGFR: 100 ML/MIN/1.73M2 — SIGNIFICANT CHANGE UP
EOSINOPHIL # BLD AUTO: 0.01 K/UL — SIGNIFICANT CHANGE UP (ref 0–0.5)
EOSINOPHIL NFR BLD AUTO: 0.2 % — SIGNIFICANT CHANGE UP (ref 0–6)
GLUCOSE SERPL-MCNC: 141 MG/DL — HIGH (ref 70–99)
HCO3 BLDA-SCNC: 55 MMOL/L — CRITICAL HIGH (ref 21–28)
HCT VFR BLD CALC: 38.4 % — SIGNIFICANT CHANGE UP (ref 34.5–45)
HGB BLD-MCNC: 12.8 G/DL — SIGNIFICANT CHANGE UP (ref 11.5–15.5)
HOROWITZ INDEX BLDA+IHG-RTO: 40 — SIGNIFICANT CHANGE UP
IMM GRANULOCYTES NFR BLD AUTO: 0.2 % — SIGNIFICANT CHANGE UP (ref 0–0.9)
INR BLD: 1.01 RATIO — SIGNIFICANT CHANGE UP (ref 0.85–1.18)
LYMPHOCYTES # BLD AUTO: 1.09 K/UL — SIGNIFICANT CHANGE UP (ref 1–3.3)
LYMPHOCYTES # BLD AUTO: 21 % — SIGNIFICANT CHANGE UP (ref 13–44)
MCHC RBC-ENTMCNC: 29.7 PG — SIGNIFICANT CHANGE UP (ref 27–34)
MCHC RBC-ENTMCNC: 33.3 GM/DL — SIGNIFICANT CHANGE UP (ref 32–36)
MCV RBC AUTO: 89.1 FL — SIGNIFICANT CHANGE UP (ref 80–100)
MONOCYTES # BLD AUTO: 0.42 K/UL — SIGNIFICANT CHANGE UP (ref 0–0.9)
MONOCYTES NFR BLD AUTO: 8.1 % — SIGNIFICANT CHANGE UP (ref 2–14)
NEUTROPHILS # BLD AUTO: 3.64 K/UL — SIGNIFICANT CHANGE UP (ref 1.8–7.4)
NEUTROPHILS NFR BLD AUTO: 69.9 % — SIGNIFICANT CHANGE UP (ref 43–77)
NRBC # BLD: 0 /100 WBCS — SIGNIFICANT CHANGE UP (ref 0–0)
PCO2 BLDA: 119 MMHG — CRITICAL HIGH (ref 32–35)
PH BLDA: 7.27 — LOW (ref 7.35–7.45)
PLATELET # BLD AUTO: 212 K/UL — SIGNIFICANT CHANGE UP (ref 150–400)
PO2 BLDA: 190 MMHG — HIGH (ref 83–108)
POTASSIUM SERPL-MCNC: 3.4 MMOL/L — LOW (ref 3.5–5.3)
POTASSIUM SERPL-SCNC: 3.4 MMOL/L — LOW (ref 3.5–5.3)
PROT SERPL-MCNC: 7.7 G/DL — SIGNIFICANT CHANGE UP (ref 6–8.3)
PROTHROM AB SERPL-ACNC: 11.5 SEC — SIGNIFICANT CHANGE UP (ref 9.5–13)
RBC # BLD: 4.31 M/UL — SIGNIFICANT CHANGE UP (ref 3.8–5.2)
RBC # FLD: 12.1 % — SIGNIFICANT CHANGE UP (ref 10.3–14.5)
SAO2 % BLDA: 100 % — SIGNIFICANT CHANGE UP
SODIUM SERPL-SCNC: 121 MMOL/L — LOW (ref 135–145)
TROPONIN I, HIGH SENSITIVITY RESULT: 8.8 NG/L — SIGNIFICANT CHANGE UP
WBC # BLD: 5.2 K/UL — SIGNIFICANT CHANGE UP (ref 3.8–10.5)
WBC # FLD AUTO: 5.2 K/UL — SIGNIFICANT CHANGE UP (ref 3.8–10.5)

## 2024-09-14 PROCEDURE — 71275 CT ANGIOGRAPHY CHEST: CPT | Mod: 26,MC

## 2024-09-14 PROCEDURE — 99291 CRITICAL CARE FIRST HOUR: CPT

## 2024-09-14 PROCEDURE — 70450 CT HEAD/BRAIN W/O DYE: CPT | Mod: 26,MC

## 2024-09-14 NOTE — ED ADULT NURSE NOTE - NSFALLRISKINTERV_ED_ALL_ED
Assistance OOB with selected safe patient handling equipment if applicable/Assistance with ambulation/Communicate fall risk and risk factors to all staff, patient, and family/Monitor gait and stability/Monitor for mental status changes and reorient to person, place, and time, as needed/Move patient closer to nursing station/within visual sight of ED staff/Provide visual cue: yellow wristband, yellow gown, etc/Reinforce activity limits and safety measures with patient and family/Toileting schedule using arm’s reach rule for commode and bathroom/Use of alarms - bed, stretcher, chair and/or video monitoring/Call bell, personal items and telephone in reach/Instruct patient to call for assistance before getting out of bed/chair/stretcher/Non-slip footwear applied when patient is off stretcher/[place] to call system/Physically safe environment - no spills, clutter or unnecessary equipment/Purposeful Proactive Rounding/Room/bathroom lighting operational, light cord in reach

## 2024-09-14 NOTE — ED PROVIDER NOTE - CLINICAL SUMMARY MEDICAL DECISION MAKING FREE TEXT BOX
Patient arrived with EMS severely lethargic, hypoxic on room air. Placed on NRB and improved to 95%. EMS stated family noticed weakness and facial droop 3 days ago. Today patient increasingly lethargic so family called 911. Overall limited history from family, patient grossly stable but unclear on presentation reason for hypoxia.    Patient groaning and moving extremities but unable to provide history. Imaging negative for infection or embolism. Patient noted to be retaining CO2 with compensation metabolically. While in ED improved to be spontaneously opening eyes and following some commands.     ICU contacted for intensive care and patient admitted after discussion with ICU team.

## 2024-09-14 NOTE — ED PROVIDER NOTE - OBJECTIVE STATEMENT
79-year-old female with unknown past medical history brought in by EMS for weakness facial droop and shortness of breath.  Patient at the bedside is nonverbal and appears demented.  Attempted translation both with video and in person patient only responds to name.  Appears edematous and dyspneic.  Left-sided facial droop noted.  On arrival due to duration of time stroke code not activated.  Patient arrived alone without detailed history provided by family.  EMR review shows patient was admitted this past summer for hyponatremia.      GENERALIZED APPEARANCE:  No obvious signs of acute distress.  Non-verbal due to AMS.   SKIN:  Warm, dry, normal color for race, no rashes, no cyanosis.  HEENMT: Atraumatic. PERRLA. EOMI. No JVD. No lymphadenopathy.  RESPIRATORY:  Clear to auscultation B/L, adequate air entry bilaterally, symmetrical chest expansion. No obvious respiratory distress.   CARDIOVASCULAR:  Regular rate, no obvious murmur.  GI:  Soft, non-tender, non-distended.  No rebound, no guarding.  EXTREMITIES:  No deformity, calf tenderness. Pulses intact x4. 2+ Pitting edema.  NEURO: AAOx0. No focal deficits. Spontaneously awake but unable to respond.. 79-year-old female with unknown past medical history brought in by EMS for weakness facial droop and shortness of breath.  Patient at the bedside is nonverbal and appears demented.  Attempted translation both with video and in person patient only responds to name.  Appears edematous and dyspneic.  Left-sided facial droop noted.  On arrival due to duration of time stroke code not activated.  Patient arrived alone without detailed history provided by family.  EMR review shows patient was admitted this past summer for hyponatremia.     GENERALIZED APPEARANCE:  Appears altered, Letharic, Frail  SKIN:  Warm, dry, normal color for race, no rashes, no cyanosis.  HEENMT: Atraumatic. +Dry mucous membranes. +Poor dentition.  RESPIRATORY:  Diffusely diminished lung sounds.  CARDIOVASCULAR:  Regular rate, no obvious murmur.  GI:  Soft, non-tender, non-distended.  No rebound, no guarding.  EXTREMITIES:  No deformity, calf tenderness. Pulses intact x4.   2+ Pitting edema bilaterally.  NEURO: AAOx0. No focal deficits. Spontaneously moving and awake but lethargic. 79-year-old female with unknown past medical history brought in by EMS for weakness facial droop and shortness of breath.  Patient at the bedside is nonverbal and appears demented.  Attempted translation both with video and in person patient only responds to name.  Appears edematous and dyspneic.  Left-sided facial droop noted.  On arrival due to duration of time stroke code not activated.  Patient arrived alone without detailed history provided by family.  EMR review shows patient was admitted this past summer for hyponatremia.     GENERALIZED APPEARANCE:  Appears altered, Lethargic, Frail.  SKIN:  Warm, dry, normal color for race, no rashes, no cyanosis.  HEENMT: Atraumatic. +Dry mucous membranes. +Poor dentition.  RESPIRATORY:  Diffusely diminished lung sounds.  CARDIOVASCULAR:  Regular rate, no obvious murmur.  GI:  Soft, non-tender, non-distended.  No rebound, no guarding.  EXTREMITIES:  No deformity, calf tenderness. Pulses intact x4.   2+ Pitting edema bilaterally.  NEURO: AAOx0. No focal deficits. Spontaneously moving and awake but lethargic.

## 2024-09-14 NOTE — ED ADULT TRIAGE NOTE - NS ED NURSE DIRECT TO ROOM YN
Yes Detail Level: Detailed Quality 265: Biopsy Follow-Up: Biopsy results reviewed, communicated, tracked, and documented

## 2024-09-14 NOTE — ED ADULT TRIAGE NOTE - CHIEF COMPLAINT QUOTE
L facial droop (leaning more on her L side) x 3 days, + shortness of breath and BLE swelling, uses O2 2L NC at home

## 2024-09-14 NOTE — ED ADULT NURSE NOTE - RESPIRATORY ASSESSMENT
2020 Prior Authorization Request  Who's requesting PA: Pharmacy  Provider: Emeterio Ramirez MD  Pharmacy Name, Location & Phone # : [id number] - [place], [place] - [address] [place] [address] [phone]  Medication Name: Duloxetine HCI 60 MG DR Capsules  Quantity: 90  Refills: 0  Days Supply: 3  Medication Instructions: Take 1 capsule (60 mg total) by mouth 3 (three) times a day  Insurance Plan: Medicaid [place]  Insurance Member ID & GRP # : MemberID# [id number], GRPID#, not given  CoverMyMeds Key: [id number]  Informed patient that prior authorizations can take up to 10 business days for response: YES  Okay to leave a detailed message: YES    Route to: [facility] ([id number])    
oral
- - -

## 2024-09-14 NOTE — ED ADULT NURSE NOTE - OBJECTIVE STATEMENT
Pt BIBA with c/o of facial droop noticed by family 3 days ago. Pt mentation unable to be assessed. Pt disoriented.

## 2024-09-14 NOTE — ED PROVIDER NOTE - CARE PLAN
Principal Discharge DX:	Acute respiratory failure with hypoxia  Secondary Diagnosis:	Hyponatremia   1

## 2024-09-14 NOTE — ED PROVIDER NOTE - CRITICAL CARE ATTENDING CONTRIBUTION TO CARE
Upon my evaluation, this patient had a high probability of imminent or life-threatening deterioration due to hypoxia and acute respiratory failure which required my direct attention, intervention, and personal management.    I have personally provided 60 minutes of critical care time exclusive of time spent on separately billable procedures. Time includes review of laboratory data, radiology results, discussion with consultants, and monitoring for potential decompensation. Interventions were performed as documented above.

## 2024-09-14 NOTE — ED PROVIDER NOTE - PROGRESS NOTE DETAILS
Patient has normal BNP on labs.  ABG shows retention of CO2 with compensation pH 7.27, bicarb 54.  Patient has been receiving supplemental oxygen while in the ED, fluids withheld due to potential history of CHF.  Patient remained stable and is currently more awake and responsive than her presentation initially.  Sodium noted to be 121, ICU contacted for evaluation due to complex clinical presentation and close care required for hyponatremia. Multiple calls made to update the patient's emergency contact her son no answer. Patient's now spontaneously awake and more responsive to commands.  Patient has been on BiPAP for under an hour but is improving significantly.  ICU contacted and pending disposition from ICU. Patient has normal BNP on labs, presentation unlikely CHF.  ABG shows retention of CO2 with compensation pH 7.27, bicarb 54.  Patient has been receiving supplemental oxygen while in the ED, fluids withheld due to potential history of CHF.  Patient remained stable and is currently more awake and responsive than her presentation initially.  Sodium noted to be 121, ICU contacted for evaluation due to complex clinical presentation and close care required for hyponatremia.

## 2024-09-15 DIAGNOSIS — J96.01 ACUTE RESPIRATORY FAILURE WITH HYPOXIA: ICD-10-CM

## 2024-09-15 PROBLEM — M54.16 RADICULOPATHY, LUMBAR REGION: Chronic | Status: ACTIVE | Noted: 2024-07-22

## 2024-09-15 PROBLEM — E87.1 HYPO-OSMOLALITY AND HYPONATREMIA: Chronic | Status: ACTIVE | Noted: 2024-07-22

## 2024-09-15 PROBLEM — M19.90 UNSPECIFIED OSTEOARTHRITIS, UNSPECIFIED SITE: Chronic | Status: ACTIVE | Noted: 2024-07-22

## 2024-09-15 LAB
ALBUMIN SERPL ELPH-MCNC: 3.1 G/DL — LOW (ref 3.5–5)
ALBUMIN SERPL ELPH-MCNC: 3.1 G/DL — LOW (ref 3.5–5)
ALP SERPL-CCNC: 55 U/L — SIGNIFICANT CHANGE UP (ref 40–120)
ALP SERPL-CCNC: 56 U/L — SIGNIFICANT CHANGE UP (ref 40–120)
ALT FLD-CCNC: 15 U/L DA — SIGNIFICANT CHANGE UP (ref 10–60)
ALT FLD-CCNC: 17 U/L DA — SIGNIFICANT CHANGE UP (ref 10–60)
ANION GAP SERPL CALC-SCNC: 4 MMOL/L — LOW (ref 5–17)
ANION GAP SERPL CALC-SCNC: 5 MMOL/L — SIGNIFICANT CHANGE UP (ref 5–17)
ANION GAP SERPL CALC-SCNC: 6 MMOL/L — SIGNIFICANT CHANGE UP (ref 5–17)
ANION GAP SERPL CALC-SCNC: 7 MMOL/L — SIGNIFICANT CHANGE UP (ref 5–17)
APPEARANCE UR: CLEAR — SIGNIFICANT CHANGE UP
AST SERPL-CCNC: 16 U/L — SIGNIFICANT CHANGE UP (ref 10–40)
AST SERPL-CCNC: 30 U/L — SIGNIFICANT CHANGE UP (ref 10–40)
BACTERIA # UR AUTO: ABNORMAL /HPF
BASE EXCESS BLDA CALC-SCNC: 13.8 MMOL/L — HIGH (ref -2–3)
BASE EXCESS BLDA CALC-SCNC: 20.4 MMOL/L — HIGH (ref -2–3)
BASOPHILS # BLD AUTO: 0.01 K/UL — SIGNIFICANT CHANGE UP (ref 0–0.2)
BASOPHILS # BLD AUTO: 0.02 K/UL — SIGNIFICANT CHANGE UP (ref 0–0.2)
BASOPHILS NFR BLD AUTO: 0.2 % — SIGNIFICANT CHANGE UP (ref 0–2)
BASOPHILS NFR BLD AUTO: 0.4 % — SIGNIFICANT CHANGE UP (ref 0–2)
BILIRUB SERPL-MCNC: 0.3 MG/DL — SIGNIFICANT CHANGE UP (ref 0.2–1.2)
BILIRUB SERPL-MCNC: 0.4 MG/DL — SIGNIFICANT CHANGE UP (ref 0.2–1.2)
BILIRUB UR-MCNC: NEGATIVE — SIGNIFICANT CHANGE UP
BLOOD GAS COMMENTS ARTERIAL: SIGNIFICANT CHANGE UP
BLOOD GAS COMMENTS ARTERIAL: SIGNIFICANT CHANGE UP
BUN SERPL-MCNC: 10 MG/DL — SIGNIFICANT CHANGE UP (ref 7–18)
BUN SERPL-MCNC: 10 MG/DL — SIGNIFICANT CHANGE UP (ref 7–18)
BUN SERPL-MCNC: 7 MG/DL — SIGNIFICANT CHANGE UP (ref 7–18)
BUN SERPL-MCNC: 8 MG/DL — SIGNIFICANT CHANGE UP (ref 7–18)
BUN SERPL-MCNC: 8 MG/DL — SIGNIFICANT CHANGE UP (ref 7–18)
BUN SERPL-MCNC: 9 MG/DL — SIGNIFICANT CHANGE UP (ref 7–18)
CALCIUM SERPL-MCNC: 7.9 MG/DL — LOW (ref 8.4–10.5)
CALCIUM SERPL-MCNC: 7.9 MG/DL — LOW (ref 8.4–10.5)
CALCIUM SERPL-MCNC: 8 MG/DL — LOW (ref 8.4–10.5)
CALCIUM SERPL-MCNC: 8.2 MG/DL — LOW (ref 8.4–10.5)
CHLORIDE SERPL-SCNC: 74 MMOL/L — LOW (ref 96–108)
CHLORIDE SERPL-SCNC: 76 MMOL/L — LOW (ref 96–108)
CHLORIDE SERPL-SCNC: 78 MMOL/L — LOW (ref 96–108)
CHLORIDE SERPL-SCNC: 79 MMOL/L — LOW (ref 96–108)
CHLORIDE SERPL-SCNC: 80 MMOL/L — LOW (ref 96–108)
CHLORIDE SERPL-SCNC: 83 MMOL/L — LOW (ref 96–108)
CO2 SERPL-SCNC: 38 MMOL/L — HIGH (ref 22–31)
CO2 SERPL-SCNC: 39 MMOL/L — HIGH (ref 22–31)
CO2 SERPL-SCNC: 40 MMOL/L — HIGH (ref 22–31)
CO2 SERPL-SCNC: 41 MMOL/L — HIGH (ref 22–31)
COLOR SPEC: YELLOW — SIGNIFICANT CHANGE UP
CREAT ?TM UR-MCNC: 56 MG/DL — SIGNIFICANT CHANGE UP
CREAT SERPL-MCNC: 0.3 MG/DL — LOW (ref 0.5–1.3)
CREAT SERPL-MCNC: 0.3 MG/DL — LOW (ref 0.5–1.3)
CREAT SERPL-MCNC: 0.31 MG/DL — LOW (ref 0.5–1.3)
CREAT SERPL-MCNC: 0.32 MG/DL — LOW (ref 0.5–1.3)
CREAT SERPL-MCNC: 0.34 MG/DL — LOW (ref 0.5–1.3)
CREAT SERPL-MCNC: 0.4 MG/DL — LOW (ref 0.5–1.3)
DIFF PNL FLD: ABNORMAL
EGFR: 100 ML/MIN/1.73M2 — SIGNIFICANT CHANGE UP
EGFR: 104 ML/MIN/1.73M2 — SIGNIFICANT CHANGE UP
EGFR: 106 ML/MIN/1.73M2 — SIGNIFICANT CHANGE UP
EGFR: 106 ML/MIN/1.73M2 — SIGNIFICANT CHANGE UP
EGFR: 107 ML/MIN/1.73M2 — SIGNIFICANT CHANGE UP
EGFR: 107 ML/MIN/1.73M2 — SIGNIFICANT CHANGE UP
EOSINOPHIL # BLD AUTO: 0.01 K/UL — SIGNIFICANT CHANGE UP (ref 0–0.5)
EOSINOPHIL # BLD AUTO: 0.14 K/UL — SIGNIFICANT CHANGE UP (ref 0–0.5)
EOSINOPHIL NFR BLD AUTO: 0.2 % — SIGNIFICANT CHANGE UP (ref 0–6)
EOSINOPHIL NFR BLD AUTO: 2.5 % — SIGNIFICANT CHANGE UP (ref 0–6)
EPI CELLS # UR: PRESENT
GLUCOSE BLDC GLUCOMTR-MCNC: 125 MG/DL — HIGH (ref 70–99)
GLUCOSE BLDC GLUCOMTR-MCNC: 126 MG/DL — HIGH (ref 70–99)
GLUCOSE SERPL-MCNC: 100 MG/DL — HIGH (ref 70–99)
GLUCOSE SERPL-MCNC: 103 MG/DL — HIGH (ref 70–99)
GLUCOSE SERPL-MCNC: 104 MG/DL — HIGH (ref 70–99)
GLUCOSE SERPL-MCNC: 112 MG/DL — HIGH (ref 70–99)
GLUCOSE SERPL-MCNC: 113 MG/DL — HIGH (ref 70–99)
GLUCOSE SERPL-MCNC: 98 MG/DL — SIGNIFICANT CHANGE UP (ref 70–99)
GLUCOSE UR QL: NEGATIVE MG/DL — SIGNIFICANT CHANGE UP
HCO3 BLDA-SCNC: 43 MMOL/L — HIGH (ref 21–28)
HCO3 BLDA-SCNC: 51 MMOL/L — CRITICAL HIGH (ref 21–28)
HCT VFR BLD CALC: 32.8 % — LOW (ref 34.5–45)
HCT VFR BLD CALC: 35.8 % — SIGNIFICANT CHANGE UP (ref 34.5–45)
HGB BLD-MCNC: 11 G/DL — LOW (ref 11.5–15.5)
HGB BLD-MCNC: 11.9 G/DL — SIGNIFICANT CHANGE UP (ref 11.5–15.5)
HOROWITZ INDEX BLDA+IHG-RTO: 50 — SIGNIFICANT CHANGE UP
HOROWITZ INDEX BLDA+IHG-RTO: 80 — SIGNIFICANT CHANGE UP
IMM GRANULOCYTES NFR BLD AUTO: 0.3 % — SIGNIFICANT CHANGE UP (ref 0–0.9)
IMM GRANULOCYTES NFR BLD AUTO: 0.4 % — SIGNIFICANT CHANGE UP (ref 0–0.9)
KETONES UR-MCNC: NEGATIVE MG/DL — SIGNIFICANT CHANGE UP
LACTATE SERPL-SCNC: 0.9 MMOL/L — SIGNIFICANT CHANGE UP (ref 0.7–2)
LEGIONELLA AG UR QL: NEGATIVE — SIGNIFICANT CHANGE UP
LEUKOCYTE ESTERASE UR-ACNC: ABNORMAL
LYMPHOCYTES # BLD AUTO: 0.83 K/UL — LOW (ref 1–3.3)
LYMPHOCYTES # BLD AUTO: 0.98 K/UL — LOW (ref 1–3.3)
LYMPHOCYTES # BLD AUTO: 14.7 % — SIGNIFICANT CHANGE UP (ref 13–44)
LYMPHOCYTES # BLD AUTO: 16.5 % — SIGNIFICANT CHANGE UP (ref 13–44)
MAGNESIUM SERPL-MCNC: 1.7 MG/DL — SIGNIFICANT CHANGE UP (ref 1.6–2.6)
MAGNESIUM SERPL-MCNC: 1.8 MG/DL — SIGNIFICANT CHANGE UP (ref 1.6–2.6)
MCHC RBC-ENTMCNC: 29.5 PG — SIGNIFICANT CHANGE UP (ref 27–34)
MCHC RBC-ENTMCNC: 29.6 PG — SIGNIFICANT CHANGE UP (ref 27–34)
MCHC RBC-ENTMCNC: 33.2 GM/DL — SIGNIFICANT CHANGE UP (ref 32–36)
MCHC RBC-ENTMCNC: 33.5 GM/DL — SIGNIFICANT CHANGE UP (ref 32–36)
MCV RBC AUTO: 88.2 FL — SIGNIFICANT CHANGE UP (ref 80–100)
MCV RBC AUTO: 88.8 FL — SIGNIFICANT CHANGE UP (ref 80–100)
MONOCYTES # BLD AUTO: 0.31 K/UL — SIGNIFICANT CHANGE UP (ref 0–0.9)
MONOCYTES # BLD AUTO: 0.55 K/UL — SIGNIFICANT CHANGE UP (ref 0–0.9)
MONOCYTES NFR BLD AUTO: 5.5 % — SIGNIFICANT CHANGE UP (ref 2–14)
MONOCYTES NFR BLD AUTO: 9.3 % — SIGNIFICANT CHANGE UP (ref 2–14)
MRSA PCR RESULT.: SIGNIFICANT CHANGE UP
NEUTROPHILS # BLD AUTO: 4.31 K/UL — SIGNIFICANT CHANGE UP (ref 1.8–7.4)
NEUTROPHILS # BLD AUTO: 4.36 K/UL — SIGNIFICANT CHANGE UP (ref 1.8–7.4)
NEUTROPHILS NFR BLD AUTO: 73.5 % — SIGNIFICANT CHANGE UP (ref 43–77)
NEUTROPHILS NFR BLD AUTO: 76.5 % — SIGNIFICANT CHANGE UP (ref 43–77)
NITRITE UR-MCNC: NEGATIVE — SIGNIFICANT CHANGE UP
NRBC # BLD: 0 /100 WBCS — SIGNIFICANT CHANGE UP (ref 0–0)
NRBC # BLD: 0 /100 WBCS — SIGNIFICANT CHANGE UP (ref 0–0)
OSMOLALITY SERPL: 255 MOSMOL/KG — LOW (ref 280–301)
OSMOLALITY SERPL: 259 MOSMOL/KG — LOW (ref 280–301)
OSMOLALITY UR: 406 MOSM/KG — SIGNIFICANT CHANGE UP (ref 50–1200)
OSMOLALITY UR: 418 MOSM/KG — SIGNIFICANT CHANGE UP (ref 50–1200)
PCO2 BLDA: 76 MMHG — CRITICAL HIGH (ref 32–35)
PCO2 BLDA: 93 MMHG — CRITICAL HIGH (ref 32–35)
PH BLDA: 7.35 — SIGNIFICANT CHANGE UP (ref 7.35–7.45)
PH BLDA: 7.36 — SIGNIFICANT CHANGE UP (ref 7.35–7.45)
PH UR: 5 — SIGNIFICANT CHANGE UP (ref 5–8)
PHOSPHATE SERPL-MCNC: 3.1 MG/DL — SIGNIFICANT CHANGE UP (ref 2.5–4.5)
PHOSPHATE SERPL-MCNC: 4.4 MG/DL — SIGNIFICANT CHANGE UP (ref 2.5–4.5)
PLATELET # BLD AUTO: 185 K/UL — SIGNIFICANT CHANGE UP (ref 150–400)
PLATELET # BLD AUTO: 193 K/UL — SIGNIFICANT CHANGE UP (ref 150–400)
PO2 BLDA: 124 MMHG — HIGH (ref 83–108)
PO2 BLDA: 175 MMHG — HIGH (ref 83–108)
POTASSIUM SERPL-MCNC: 3.4 MMOL/L — LOW (ref 3.5–5.3)
POTASSIUM SERPL-MCNC: 3.5 MMOL/L — SIGNIFICANT CHANGE UP (ref 3.5–5.3)
POTASSIUM SERPL-MCNC: 3.6 MMOL/L — SIGNIFICANT CHANGE UP (ref 3.5–5.3)
POTASSIUM SERPL-MCNC: 3.8 MMOL/L — SIGNIFICANT CHANGE UP (ref 3.5–5.3)
POTASSIUM SERPL-MCNC: 4.2 MMOL/L — SIGNIFICANT CHANGE UP (ref 3.5–5.3)
POTASSIUM SERPL-MCNC: 4.3 MMOL/L — SIGNIFICANT CHANGE UP (ref 3.5–5.3)
POTASSIUM SERPL-SCNC: 3.4 MMOL/L — LOW (ref 3.5–5.3)
POTASSIUM SERPL-SCNC: 3.5 MMOL/L — SIGNIFICANT CHANGE UP (ref 3.5–5.3)
POTASSIUM SERPL-SCNC: 3.6 MMOL/L — SIGNIFICANT CHANGE UP (ref 3.5–5.3)
POTASSIUM SERPL-SCNC: 3.8 MMOL/L — SIGNIFICANT CHANGE UP (ref 3.5–5.3)
POTASSIUM SERPL-SCNC: 4.2 MMOL/L — SIGNIFICANT CHANGE UP (ref 3.5–5.3)
POTASSIUM SERPL-SCNC: 4.3 MMOL/L — SIGNIFICANT CHANGE UP (ref 3.5–5.3)
PROT SERPL-MCNC: 6.6 G/DL — SIGNIFICANT CHANGE UP (ref 6–8.3)
PROT SERPL-MCNC: 7.1 G/DL — SIGNIFICANT CHANGE UP (ref 6–8.3)
PROT UR-MCNC: NEGATIVE MG/DL — SIGNIFICANT CHANGE UP
RBC # BLD: 3.72 M/UL — LOW (ref 3.8–5.2)
RBC # BLD: 4.03 M/UL — SIGNIFICANT CHANGE UP (ref 3.8–5.2)
RBC # FLD: 12 % — SIGNIFICANT CHANGE UP (ref 10.3–14.5)
RBC # FLD: 12.1 % — SIGNIFICANT CHANGE UP (ref 10.3–14.5)
RBC CASTS # UR COMP ASSIST: 3 /HPF — SIGNIFICANT CHANGE UP (ref 0–4)
S AUREUS DNA NOSE QL NAA+PROBE: SIGNIFICANT CHANGE UP
S PNEUM AG UR QL: NEGATIVE — SIGNIFICANT CHANGE UP
SAO2 % BLDA: 100 % — SIGNIFICANT CHANGE UP
SAO2 % BLDA: 100 % — SIGNIFICANT CHANGE UP
SARS-COV-2 RNA SPEC QL NAA+PROBE: SIGNIFICANT CHANGE UP
SODIUM SERPL-SCNC: 120 MMOL/L — CRITICAL LOW (ref 135–145)
SODIUM SERPL-SCNC: 121 MMOL/L — LOW (ref 135–145)
SODIUM SERPL-SCNC: 124 MMOL/L — LOW (ref 135–145)
SODIUM SERPL-SCNC: 124 MMOL/L — LOW (ref 135–145)
SODIUM SERPL-SCNC: 125 MMOL/L — LOW (ref 135–145)
SODIUM SERPL-SCNC: 126 MMOL/L — LOW (ref 135–145)
SODIUM UR-SCNC: 29 MMOL/L — SIGNIFICANT CHANGE UP
SODIUM UR-SCNC: 6 MMOL/L — SIGNIFICANT CHANGE UP
SP GR SPEC: 1.02 — SIGNIFICANT CHANGE UP (ref 1–1.03)
TROPONIN I, HIGH SENSITIVITY RESULT: 9.3 NG/L — SIGNIFICANT CHANGE UP
TSH SERPL-MCNC: 1.43 UU/ML — SIGNIFICANT CHANGE UP (ref 0.34–4.82)
UROBILINOGEN FLD QL: 0.2 MG/DL — SIGNIFICANT CHANGE UP (ref 0.2–1)
WBC # BLD: 5.63 K/UL — SIGNIFICANT CHANGE UP (ref 3.8–10.5)
WBC # BLD: 5.93 K/UL — SIGNIFICANT CHANGE UP (ref 3.8–10.5)
WBC # FLD AUTO: 5.63 K/UL — SIGNIFICANT CHANGE UP (ref 3.8–10.5)
WBC # FLD AUTO: 5.93 K/UL — SIGNIFICANT CHANGE UP (ref 3.8–10.5)
WBC UR QL: 4 /HPF — SIGNIFICANT CHANGE UP (ref 0–5)

## 2024-09-15 PROCEDURE — 99291 CRITICAL CARE FIRST HOUR: CPT | Mod: GC

## 2024-09-15 PROCEDURE — 73030 X-RAY EXAM OF SHOULDER: CPT | Mod: 26,LT

## 2024-09-15 PROCEDURE — 93970 EXTREMITY STUDY: CPT | Mod: 26

## 2024-09-15 RX ORDER — SODIUM CHLORIDE 0.9 % (FLUSH) 0.9 %
500 SYRINGE (ML) INJECTION ONCE
Refills: 0 | Status: COMPLETED | OUTPATIENT
Start: 2024-09-15 | End: 2024-09-15

## 2024-09-15 RX ORDER — PANTOPRAZOLE SODIUM 40 MG/1
40 TABLET, DELAYED RELEASE ORAL DAILY
Refills: 0 | Status: DISCONTINUED | OUTPATIENT
Start: 2024-09-15 | End: 2024-09-16

## 2024-09-15 RX ORDER — AZITHROMYCIN 250 MG/1
500 TABLET, FILM COATED ORAL EVERY 24 HOURS
Refills: 0 | Status: DISCONTINUED | OUTPATIENT
Start: 2024-09-15 | End: 2024-09-16

## 2024-09-15 RX ORDER — ENOXAPARIN SODIUM 150 MG/ML
40 INJECTION SUBCUTANEOUS EVERY 24 HOURS
Refills: 0 | Status: DISCONTINUED | OUTPATIENT
Start: 2024-09-15 | End: 2024-09-25

## 2024-09-15 RX ORDER — CEFTRIAXONE SODIUM 1 G
1000 VIAL (EA) INJECTION EVERY 24 HOURS
Refills: 0 | Status: COMPLETED | OUTPATIENT
Start: 2024-09-15 | End: 2024-09-19

## 2024-09-15 RX ORDER — SODIUM CHLORIDE 0.9 % (FLUSH) 0.9 %
1000 SYRINGE (ML) INJECTION
Refills: 0 | Status: DISCONTINUED | OUTPATIENT
Start: 2024-09-15 | End: 2024-09-15

## 2024-09-15 RX ORDER — CHLORHEXIDINE GLUCONATE ORAL RINSE 1.2 MG/ML
1 SOLUTION DENTAL
Refills: 0 | Status: DISCONTINUED | OUTPATIENT
Start: 2024-09-15 | End: 2024-09-16

## 2024-09-15 RX ADMIN — Medication 50 MILLILITER(S): at 12:29

## 2024-09-15 RX ADMIN — PANTOPRAZOLE SODIUM 40 MILLIGRAM(S): 40 TABLET, DELAYED RELEASE ORAL at 12:29

## 2024-09-15 RX ADMIN — Medication 100 MILLIGRAM(S): at 05:31

## 2024-09-15 RX ADMIN — Medication 40 MILLIEQUIVALENT(S): at 16:42

## 2024-09-15 RX ADMIN — Medication 50 MILLILITER(S): at 05:31

## 2024-09-15 RX ADMIN — ENOXAPARIN SODIUM 40 MILLIGRAM(S): 150 INJECTION SUBCUTANEOUS at 05:31

## 2024-09-15 RX ADMIN — AZITHROMYCIN 255 MILLIGRAM(S): 250 TABLET, FILM COATED ORAL at 04:24

## 2024-09-15 RX ADMIN — Medication 1000 MILLILITER(S): at 04:24

## 2024-09-15 RX ADMIN — CHLORHEXIDINE GLUCONATE ORAL RINSE 1 APPLICATION(S): 1.2 SOLUTION DENTAL at 05:00

## 2024-09-15 NOTE — H&P ADULT - NSHPPHYSICALEXAM_GEN_ALL_CORE
General - NAD, sitting up in bed, well groomed  Eyes - PERRLA, EOM intact  ENT - Nonicteric sclerae, PERRLA, EOMI. Oropharynx clear. Moist mucous membranes. Conjunctivae appear well perfused.   Neck - No noticeable or palpable swelling, redness or rash around throat or on face  Lymph Nodes - No lymphadenopathy  Cardiovascular - RRR no m/r/g, no JVD, no carotid bruits  Lungs - Clear to auscultation, no use of accessory muscles, no crackles or wheezes.  Skin - No rashes, skin warm and dry, no erythematous areas  Abdomen - Normal bowel sounds, abdomen soft and nontender  Rectal – Rectal exam not performed since no symptoms indicated blood loss.  Extremities - No edema, cyanosis or clubbing  Musculoskeletal - 5/5 strength, normal range of motion, no swollen or erythematous joints.  Neuro– Alert and oriented x 3, CN 2-12 grossly intact. General - NAD, obese, confused, elderly, AAOx1, wakes to verbal stimuli  Eyes - PERRLA, EOM intact  ENT - Nonicteric sclerae, PERRLA, EOMI. Oropharynx clear. Moist mucous membranes. Conjunctivae appear well perfused.   Neck - No noticeable or palpable swelling, redness or rash around throat or on face  Lymph Nodes - No lymphadenopathy  Cardiovascular - RRR no m/r/g, no JVD, no carotid bruits  Lungs - Clear to auscultation, no use of accessory muscles, no crackles or wheezes.  Skin - No rashes, skin warm and dry, no erythematous areas  Abdomen - Normal bowel sounds, abdomen soft and nontender  Extremities - (+) 2+ pitting edema b/l LEs. No cyanosis or clubbing  Musculoskeletal - Unable to assess strength due to mental status, normal range of motion, no swollen or erythematous joints.  Neuro– Alert and oriented x 3, CN 2-12 grossly intact. No facial asymmetry, no UE drift b/l, rest of neuro exam limited due to mental status

## 2024-09-15 NOTE — PROGRESS NOTE ADULT - ATTENDING COMMENTS
80 yo with hx of diaphragmatic dysfunction and chronic lower extremity edema, hyponatremia on hctz in the past, HTN, now p/w SOB, AMS,  found to be hypoxic and hypercapnic in ED with pCO2 of ~120 and hyponatremic to 121 in ED and MICU consulted for evaluation  Pt placed on NIV with clinical improvement. History obtained from patient's son and daughter in law at bedside. States earlier this year was admitted to [facility] for similar complaints and was discharged home with bipap support. Though patient has been non compliant with bipap. Suspect hypoventilation is multifactorial given hyponatremia, use of muscle relaxers and chronic diaphragmatic dysfunction.     Assessment:  1. Acute on chronic hypercapnic and hypoxic respiratory failure  2. Left lung atelectasis with possible super imposed pneumonia   3. Left elevated hemidiaphragm   4. Acute on chronic hyponatremia - likely depletional due to poor oral intake  5. Acute encephalopathy   6. Hypertension     Plan:  Breaks off NIV this morning, monitor respiratory and neurologic status   Hold cyclobenzapine and gabapentin for now  Will need evaluation for diaphragmatic dysfunction   Cont. Nocturnal bipap support, encourage compliance with nocturnal bipap  Empiric antibiotics for possible pneumonia, follow up culture results  Serum sodium is slowly correcting with IV hydration  Avoid rapid correction of sodium > 6-8 meq in 24 hours   Serial BMP  Start oral diet  Monitor urine output  DVT prophylaxis  Cont. ICU care for now  Discussed with son and daughter-in-law

## 2024-09-15 NOTE — CHART NOTE - NSCHARTNOTEFT_GEN_A_CORE
Spoke with patient's son Flex at bedside today. Updated family member about overnight status, course, current and next steps in the care management and expectations in the next 24 hours. Family member acknowledged to understand all the information provided.

## 2024-09-15 NOTE — PROGRESS NOTE ADULT - SUBJECTIVE AND OBJECTIVE BOX
Patient is a 79y old  Female who presents with a chief complaint of hypoxia, hypercapnia, AMS (15 Sep 2024 00:51)      OVERNIGHT EVENTS:   No overnight events   Afebrile, hemodynamically stable     SUBJECTIVE/INTERVAL HPI: Patient seen and examined at bedside. Denies headache, vision changes, chest pain, shortness of breath, abdominal pain, nausea, vomiting, diarrhea, constipation, dysuria, swelling, and rash.      PRESSORS: [ ] YES [ ] NO  WHICH:    ICU Vital Signs Last 24 Hrs  T(C): 35.8 (15 Sep 2024 04:05), Max: 36.7 (15 Sep 2024 03:18)  T(F): 96.5 (15 Sep 2024 04:05), Max: 98.1 (15 Sep 2024 03:18)  HR: 80 (15 Sep 2024 06:19) (66 - 90)  BP: 127/60 (15 Sep 2024 06:19) (124/76 - 182/95)  BP(mean): 81 (15 Sep 2024 06:19) (81 - 151)  ABP: --  ABP(mean): --  RR: 15 (15 Sep 2024 06:19) (13 - 28)  SpO2: 98% (15 Sep 2024 06:19) (96% - 100%)    O2 Parameters below as of 15 Sep 2024 06:19  Patient On (Oxygen Delivery Method): BiPAP/CPAP    O2 Concentration (%): 40      I&O's Summary    14 Sep 2024 07:01  -  15 Sep 2024 07:00  --------------------------------------------------------  IN: 300 mL / OUT: 100 mL / NET: 200 mL          PHYSICAL EXAM:  GENERAL: No acute distress   HEAD:  Atraumatic, Normocephalic  EYES: EOMI, PERRLA, conjunctiva and sclera clear  ENMT: No tonsillar erythema, exudates, or enlargement; Moist mucous membranes  NECK: Supple, No JVD, Normal thyroid  HEART: Regular rate and rhythm; No murmurs, rubs, or gallops  RESPIRATORY: CTA B/L, No W/R/R  ABDOMEN: Soft, Nontender, Nondistended; Bowel sounds present  NEUROLOGY: A&Ox3, nonfocal, moving all extremities  EXTREMITIES:  2+ Peripheral Pulses, No clubbing, cyanosis, or edema  SKIN: warm, dry, normal color, no rash or abnormal lesions    LABS:                        11.0   5.93  )-----------( 193      ( 15 Sep 2024 04:41 )             32.8     09-15    121<L>  |  76<L>  |  10  ----------------------------<  104<H>  3.5   |  40<H>  |  0.32<L>    Ca    8.2<L>      15 Sep 2024 04:41  Phos  3.1     09-15  Mg     1.7     09-15    TPro  6.6  /  Alb  3.1<L>  /  TBili  0.3  /  DBili  x   /  AST  16  /  ALT  15  /  AlkPhos  55  09-15    PT/INR - ( 14 Sep 2024 20:14 )   PT: 11.5 sec;   INR: 1.01 ratio         PTT - ( 14 Sep 2024 20:14 )  PTT:31.0 sec  Urinalysis Basic - ( 15 Sep 2024 04:41 )    Color: x / Appearance: x / SG: x / pH: x  Gluc: 104 mg/dL / Ketone: x  / Bili: x / Urobili: x   Blood: x / Protein: x / Nitrite: x   Leuk Esterase: x / RBC: x / WBC x   Sq Epi: x / Non Sq Epi: x / Bacteria: x      CAPILLARY BLOOD GLUCOSE      POCT Blood Glucose.: 126 mg/dL (15 Sep 2024 06:24)  POCT Blood Glucose.: 125 mg/dL (15 Sep 2024 02:10)  POCT Blood Glucose.: 161 mg/dL (14 Sep 2024 19:34)    ABG - ( 15 Sep 2024 05:56 )  pH, Arterial: 7.36  pH, Blood: x     /  pCO2: 76    /  pO2: 124   / HCO3: 43    / Base Excess: 13.8  /  SaO2: 100                 Consultant(s) Notes Reviewed:  [x ] YES  [ ] NO    MEDICATIONS  (STANDING):  azithromycin  IVPB 500 milliGRAM(s) IV Intermittent every 24 hours  cefTRIAXone   IVPB 1000 milliGRAM(s) IV Intermittent every 24 hours  chlorhexidine 2% Cloths 1 Application(s) Topical <User Schedule>  enoxaparin Injectable 40 milliGRAM(s) SubCutaneous every 24 hours  pantoprazole  Injectable 40 milliGRAM(s) IV Push daily  sodium chloride 0.9%. 1000 milliLiter(s) (50 mL/Hr) IV Continuous <Continuous>    MEDICATIONS  (PRN):      Care Discussed with Consultants/Other Providers [ x] YES  [ ] NO    RADIOLOGY & ADDITIONAL TESTS: Patient is a 79y old  Female who presents with a chief complaint of hypoxia, hypercapnia, AMS (15 Sep 2024 00:51)      OVERNIGHT EVENTS:   No overnight events     SUBJECTIVE/INTERVAL HPI: Patient seen and examined at bedside. Patient son stated the patient takes gabapentin 300mg dose total in 1 day, crestor and carvedilol. Per son, patient went to [facility] 1 month ago for similar admission with hypercapnia and they know about the left hemidiaphragm, and was admitted for PNA. Patient was referred for a pulmonology appt for a sleep study that was scheduled for next week. Patient wears bipap at night but she pulls it off.     PRESSORS: [ ] YES [ ] NO  WHICH:    ICU Vital Signs Last 24 Hrs  T(C): 35.8 (15 Sep 2024 04:05), Max: 36.7 (15 Sep 2024 03:18)  T(F): 96.5 (15 Sep 2024 04:05), Max: 98.1 (15 Sep 2024 03:18)  HR: 80 (15 Sep 2024 06:19) (66 - 90)  BP: 127/60 (15 Sep 2024 06:19) (124/76 - 182/95)  BP(mean): 81 (15 Sep 2024 06:19) (81 - 151)  ABP: --  ABP(mean): --  RR: 15 (15 Sep 2024 06:19) (13 - 28)  SpO2: 98% (15 Sep 2024 06:19) (96% - 100%)    O2 Parameters below as of 15 Sep 2024 06:19  Patient On (Oxygen Delivery Method): BiPAP/CPAP    O2 Concentration (%): 40      I&O's Summary    14 Sep 2024 07:01  -  15 Sep 2024 07:00  --------------------------------------------------------  IN: 300 mL / OUT: 100 mL / NET: 200 mL          PHYSICAL EXAM:  GENERAL: No acute distress   HEAD:  Atraumatic, Normocephalic  EYES: EOMI, PERRLA, conjunctiva and sclera clear  ENMT: No tonsillar erythema, exudates, or enlargement; Moist mucous membranes  NECK: Supple, No JVD, Normal thyroid  HEART: Regular rate and rhythm; No murmurs, rubs, or gallops  RESPIRATORY: CTA B/L, No W/R/R  ABDOMEN: Soft, Nontender, Nondistended; Bowel sounds present  NEUROLOGY: A&Ox3, nonfocal, moving all extremities  EXTREMITIES:  2+ Peripheral Pulses, No clubbing, cyanosis, or edema  SKIN: warm, dry, normal color, no rash or abnormal lesions    LABS:                        11.0   5.93  )-----------( 193      ( 15 Sep 2024 04:41 )             32.8     09-15    121<L>  |  76<L>  |  10  ----------------------------<  104<H>  3.5   |  40<H>  |  0.32<L>    Ca    8.2<L>      15 Sep 2024 04:41  Phos  3.1     09-15  Mg     1.7     09-15    TPro  6.6  /  Alb  3.1<L>  /  TBili  0.3  /  DBili  x   /  AST  16  /  ALT  15  /  AlkPhos  55  09-15    PT/INR - ( 14 Sep 2024 20:14 )   PT: 11.5 sec;   INR: 1.01 ratio         PTT - ( 14 Sep 2024 20:14 )  PTT:31.0 sec  Urinalysis Basic - ( 15 Sep 2024 04:41 )    Color: x / Appearance: x / SG: x / pH: x  Gluc: 104 mg/dL / Ketone: x  / Bili: x / Urobili: x   Blood: x / Protein: x / Nitrite: x   Leuk Esterase: x / RBC: x / WBC x   Sq Epi: x / Non Sq Epi: x / Bacteria: x      CAPILLARY BLOOD GLUCOSE      POCT Blood Glucose.: 126 mg/dL (15 Sep 2024 06:24)  POCT Blood Glucose.: 125 mg/dL (15 Sep 2024 02:10)  POCT Blood Glucose.: 161 mg/dL (14 Sep 2024 19:34)    ABG - ( 15 Sep 2024 05:56 )  pH, Arterial: 7.36  pH, Blood: x     /  pCO2: 76    /  pO2: 124   / HCO3: 43    / Base Excess: 13.8  /  SaO2: 100                 Consultant(s) Notes Reviewed:  [x ] YES  [ ] NO    MEDICATIONS  (STANDING):  azithromycin  IVPB 500 milliGRAM(s) IV Intermittent every 24 hours  cefTRIAXone   IVPB 1000 milliGRAM(s) IV Intermittent every 24 hours  chlorhexidine 2% Cloths 1 Application(s) Topical <User Schedule>  enoxaparin Injectable 40 milliGRAM(s) SubCutaneous every 24 hours  pantoprazole  Injectable 40 milliGRAM(s) IV Push daily  sodium chloride 0.9%. 1000 milliLiter(s) (50 mL/Hr) IV Continuous <Continuous>    MEDICATIONS  (PRN):      Care Discussed with Consultants/Other Providers [ x] YES  [ ] NO    RADIOLOGY & ADDITIONAL TESTS: Patient is a 79y old  Female who presents with a chief complaint of hypoxia, hypercapnia, AMS (15 Sep 2024 00:51)      OVERNIGHT EVENTS:   No overnight events     SUBJECTIVE/INTERVAL HPI: Patient seen and examined at bedside. Patient son stated the patient takes gabapentin 300mg dose total in 1 day, crestor and carvedilol. Per son, patient went to [facility] 1 month ago for similar admission with hypercapniaragm, and was admitted for PNA. Patient was referred for a pulmonology appt for a sleep study that was scheduled for next week. Patient wears bipap at night but she pulls it off.   States she has too many pillows and wants to style her hair.     PRESSORS: [ ] YES [ ] NO  WHICH:    ICU Vital Signs Last 24 Hrs  T(C): 35.8 (15 Sep 2024 04:05), Max: 36.7 (15 Sep 2024 03:18)  T(F): 96.5 (15 Sep 2024 04:05), Max: 98.1 (15 Sep 2024 03:18)  HR: 80 (15 Sep 2024 06:19) (66 - 90)  BP: 127/60 (15 Sep 2024 06:19) (124/76 - 182/95)  BP(mean): 81 (15 Sep 2024 06:19) (81 - 151)  ABP: --  ABP(mean): --  RR: 15 (15 Sep 2024 06:19) (13 - 28)  SpO2: 98% (15 Sep 2024 06:19) (96% - 100%)    O2 Parameters below as of 15 Sep 2024 06:19  Patient On (Oxygen Delivery Method): BiPAP/CPAP    O2 Concentration (%): 40      I&O's Summary    14 Sep 2024 07:01  -  15 Sep 2024 07:00  --------------------------------------------------------  IN: 300 mL / OUT: 100 mL / NET: 200 mL          PHYSICAL EXAM:  GENERAL: NAD, obese, elderly  HEAD:  Atraumatic, Normocephalic, no facial droop  EYES: EOMI, conjunctiva and sclera clear  ENMT:  Moist mucous membranes  NECK: Supple, No JVD  HEART: Regular rate and rhythm; No murmurs, rubs, or gallops  RESPIRATORY: CTA B/L  ABDOMEN: Soft, Nontender, Nondistended; Bowel sounds present  NEUROLOGY: A&Ox1-2, moving all extremities  EXTREMITIES:  2+ Peripheral Pulses, 2+ b/l LE edema  SKIN: warm, dry, normal color, no rash or abnormal lesions    LABS:                        11.0   5.93  )-----------( 193      ( 15 Sep 2024 04:41 )             32.8     09-15    121<L>  |  76<L>  |  10  ----------------------------<  104<H>  3.5   |  40<H>  |  0.32<L>    Ca    8.2<L>      15 Sep 2024 04:41  Phos  3.1     09-15  Mg     1.7     09-15    TPro  6.6  /  Alb  3.1<L>  /  TBili  0.3  /  DBili  x   /  AST  16  /  ALT  15  /  AlkPhos  55  09-15    PT/INR - ( 14 Sep 2024 20:14 )   PT: 11.5 sec;   INR: 1.01 ratio         PTT - ( 14 Sep 2024 20:14 )  PTT:31.0 sec  Urinalysis Basic - ( 15 Sep 2024 04:41 )    Color: x / Appearance: x / SG: x / pH: x  Gluc: 104 mg/dL / Ketone: x  / Bili: x / Urobili: x   Blood: x / Protein: x / Nitrite: x   Leuk Esterase: x / RBC: x / WBC x   Sq Epi: x / Non Sq Epi: x / Bacteria: x      CAPILLARY BLOOD GLUCOSE      POCT Blood Glucose.: 126 mg/dL (15 Sep 2024 06:24)  POCT Blood Glucose.: 125 mg/dL (15 Sep 2024 02:10)  POCT Blood Glucose.: 161 mg/dL (14 Sep 2024 19:34)    ABG - ( 15 Sep 2024 05:56 )  pH, Arterial: 7.36  pH, Blood: x     /  pCO2: 76    /  pO2: 124   / HCO3: 43    / Base Excess: 13.8  /  SaO2: 100                 Consultant(s) Notes Reviewed:  [x ] YES  [ ] NO    MEDICATIONS  (STANDING):  azithromycin  IVPB 500 milliGRAM(s) IV Intermittent every 24 hours  cefTRIAXone   IVPB 1000 milliGRAM(s) IV Intermittent every 24 hours  chlorhexidine 2% Cloths 1 Application(s) Topical <User Schedule>  enoxaparin Injectable 40 milliGRAM(s) SubCutaneous every 24 hours  pantoprazole  Injectable 40 milliGRAM(s) IV Push daily  sodium chloride 0.9%. 1000 milliLiter(s) (50 mL/Hr) IV Continuous <Continuous>    MEDICATIONS  (PRN):      Care Discussed with Consultants/Other Providers [ x] YES  [ ] NO    RADIOLOGY & ADDITIONAL TESTS:    < from: CT Angio Chest PE Protocol w/ IV Cont (09.14.24 @ 22:55) >  No pulmonary embolism.    Areas of airspace consolidation, increased in the left lung and new in   the right upper lobe, could be atelectasis, pneumonia or combination of   both. Recommend clinical correlation and follow-up chest CT in one month,   particularly to reevaluate the medial left upper lobe airspace opacity.      < end of copied text >      < from: CT Head No Cont (09.14.24 @ 20:02) >  No acute intracranial  hemorrhage, mass effect or midline shift.    Nonspecific moderate subcortical white matter hypodensities may relate to   small vessel ischemic disease.    < end of copied text >   Patient is a 79y old  Female who presents with a chief complaint of hypoxia, hypercapnia, AMS (15 Sep 2024 00:51)      OVERNIGHT EVENTS:   No overnight events     SUBJECTIVE/INTERVAL HPI: Patient seen and examined at bedside. Patient son stated the patient takes gabapentin 300mg dose total in 1 day, crestor and carvedilol. Per son, patient went to [facility] 1 month ago for similar admission with hypercapniaragm, and was admitted for PNA. Patient was referred for a pulmonology appt for a sleep study that was scheduled for next week. Patient was discharged with bipap to wear at night but she is noncompliant (she pulls it off).  States she has too many pillows and wants to style her hair.     PRESSORS: [ ] YES [ ] NO  WHICH:    ICU Vital Signs Last 24 Hrs  T(C): 35.8 (15 Sep 2024 04:05), Max: 36.7 (15 Sep 2024 03:18)  T(F): 96.5 (15 Sep 2024 04:05), Max: 98.1 (15 Sep 2024 03:18)  HR: 80 (15 Sep 2024 06:19) (66 - 90)  BP: 127/60 (15 Sep 2024 06:19) (124/76 - 182/95)  BP(mean): 81 (15 Sep 2024 06:19) (81 - 151)  ABP: --  ABP(mean): --  RR: 15 (15 Sep 2024 06:19) (13 - 28)  SpO2: 98% (15 Sep 2024 06:19) (96% - 100%)    O2 Parameters below as of 15 Sep 2024 06:19  Patient On (Oxygen Delivery Method): BiPAP/CPAP    O2 Concentration (%): 40      I&O's Summary    14 Sep 2024 07:01  -  15 Sep 2024 07:00  --------------------------------------------------------  IN: 300 mL / OUT: 100 mL / NET: 200 mL          PHYSICAL EXAM:  GENERAL: NAD, obese, elderly  HEAD:  Atraumatic, Normocephalic, no facial droop  EYES: EOMI, conjunctiva and sclera clear  ENMT:  Moist mucous membranes  NECK: Supple, No JVD  HEART: Regular rate and rhythm; No murmurs, rubs, or gallops  RESPIRATORY: CTA B/L  ABDOMEN: Soft, Nontender, Nondistended; Bowel sounds present  NEUROLOGY: A&Ox1-2, moving all extremities  EXTREMITIES:  2+ Peripheral Pulses, 2+ b/l LE edema  SKIN: warm, dry, normal color, no rash or abnormal lesions    LABS:                        11.0   5.93  )-----------( 193      ( 15 Sep 2024 04:41 )             32.8     09-15    121<L>  |  76<L>  |  10  ----------------------------<  104<H>  3.5   |  40<H>  |  0.32<L>    Ca    8.2<L>      15 Sep 2024 04:41  Phos  3.1     09-15  Mg     1.7     09-15    TPro  6.6  /  Alb  3.1<L>  /  TBili  0.3  /  DBili  x   /  AST  16  /  ALT  15  /  AlkPhos  55  09-15    PT/INR - ( 14 Sep 2024 20:14 )   PT: 11.5 sec;   INR: 1.01 ratio         PTT - ( 14 Sep 2024 20:14 )  PTT:31.0 sec  Urinalysis Basic - ( 15 Sep 2024 04:41 )    Color: x / Appearance: x / SG: x / pH: x  Gluc: 104 mg/dL / Ketone: x  / Bili: x / Urobili: x   Blood: x / Protein: x / Nitrite: x   Leuk Esterase: x / RBC: x / WBC x   Sq Epi: x / Non Sq Epi: x / Bacteria: x      CAPILLARY BLOOD GLUCOSE      POCT Blood Glucose.: 126 mg/dL (15 Sep 2024 06:24)  POCT Blood Glucose.: 125 mg/dL (15 Sep 2024 02:10)  POCT Blood Glucose.: 161 mg/dL (14 Sep 2024 19:34)    ABG - ( 15 Sep 2024 05:56 )  pH, Arterial: 7.36  pH, Blood: x     /  pCO2: 76    /  pO2: 124   / HCO3: 43    / Base Excess: 13.8  /  SaO2: 100                 Consultant(s) Notes Reviewed:  [x ] YES  [ ] NO    MEDICATIONS  (STANDING):  azithromycin  IVPB 500 milliGRAM(s) IV Intermittent every 24 hours  cefTRIAXone   IVPB 1000 milliGRAM(s) IV Intermittent every 24 hours  chlorhexidine 2% Cloths 1 Application(s) Topical <User Schedule>  enoxaparin Injectable 40 milliGRAM(s) SubCutaneous every 24 hours  pantoprazole  Injectable 40 milliGRAM(s) IV Push daily  sodium chloride 0.9%. 1000 milliLiter(s) (50 mL/Hr) IV Continuous <Continuous>    MEDICATIONS  (PRN):      Care Discussed with Consultants/Other Providers [ x] YES  [ ] NO    RADIOLOGY & ADDITIONAL TESTS:    < from: CT Angio Chest PE Protocol w/ IV Cont (09.14.24 @ 22:55) >  No pulmonary embolism.    Areas of airspace consolidation, increased in the left lung and new in   the right upper lobe, could be atelectasis, pneumonia or combination of   both. Recommend clinical correlation and follow-up chest CT in one month,   particularly to reevaluate the medial left upper lobe airspace opacity.      < end of copied text >      < from: CT Head No Cont (09.14.24 @ 20:02) >  No acute intracranial  hemorrhage, mass effect or midline shift.    Nonspecific moderate subcortical white matter hypodensities may relate to   small vessel ischemic disease.    < end of copied text >

## 2024-09-15 NOTE — PATIENT PROFILE ADULT - FUNCTIONAL ASSESSMENT - BASIC MOBILITY 6.
3-calculated by average/Not able to assess (calculate score using [facility] averaging method)

## 2024-09-15 NOTE — PATIENT PROFILE ADULT - FALL HARM RISK - HARM RISK INTERVENTIONS
Assistance with ambulation/Assistance OOB with selected safe patient handling equipment/Communicate Risk of Fall with Harm to all staff/Discuss with provider need for PT consult/Monitor for mental status changes/Monitor gait and stability/Move patient closer to nurses' station/Provide patient with walking aids - walker, cane, crutches/Reinforce activity limits and safety measures with patient and family/Reorient to person, place and time as needed/Tailored Fall Risk Interventions/Toileting schedule using arm’s reach rule for commode and bathroom/Use of alarms - bed, chair and/or voice tab/Visual Cue: Yellow wristband and red socks/Bed in lowest position, wheels locked, appropriate side rails in place/Call bell, personal items and telephone in reach/Instruct patient to call for assistance before getting out of bed or chair/Non-slip footwear when patient is out of bed/[place] to call system/Physically safe environment - no spills, clutter or unnecessary equipment/Purposeful Proactive Rounding/Room/bathroom lighting operational, light cord in reach

## 2024-09-15 NOTE — PROGRESS NOTE ADULT - ASSESSMENT
79F PMH hypertension, arthritis, Neuralgia, osteoporotic compression fractures L5-S1, hyponatremia (HCTZ induced, corrected), goiter, who presented to the ED for left sided facial droop and SOB. Admitted to ICU for AHHRF 2/2 PNA and hyponatremia    #AHHRF   #PNA  #hyponatremia  #acute encephalopathy    =================== Neuro============================  #acute encephalopathy  presenting with AMS, left facial droop as per Ed  no facial droop upon ICU evaluation, but confused and was lethargic, AAOx1  likely due to hypercapnia vs infectious etiology vs hyponatremia  CT head: No acute intracranial  hemorrhage, mass effect or midline shift. Nonspecific moderate subcortical white matter hypodensities may relate to small vessel ischemic disease.  will start with rocephin and azithro for PNA  f/u blood cultures and urine cultures  trend Na       ================= Cardiovascular==========================  #HTN  on coreg and valsartan at home  /76 on admission  will continue to hold home meds for now  monitor BP    #b/l LE edema   2+ pitting edema b/l LEs  proBNP WNL  TTE: 7/2024 EF 58%, G1DD  f/u b/l LE dopplers     ================- Pulm=================================  #AHHRF likely due to PNA  presenting with SOB   unclear etiology of hypercapnia (hyponatremia > AMS > hypoventilation > hypercapnia?)  ABG 7.27/119/190/55 > BIPAP >  7.35/93/175/51  started on BIPAP 16/6/50%  CTA chest: Areas of airspace consolidation, increased in the left lung and new in the right upper lobe  aspiration precautions  NPO while on BIPAP  will start rocephin and azithro   f/u BCx, UCx, sputum cultures, strep, mycoplasma, and legionella       ==================ID===================================  #PNA  presenting with SOB   unclear etiology of hypercapnia (hyponatremia > AMS > hypoventilation > hypercapnia?)  ABG 7.27/119/190/55 > BIPAP >  7.35/93/175/51  started on BIPAP 16/6/50%  CTA chest: Areas of airspace consolidation, increased in the left lung and new in the right upper lobe  aspiration precautions  NPO while on BIPAP  will start rocephin and azithro   f/u BCx, UCx, sputum cultures, strep, mycoplasma, and legionella     ================= Nephro================================  #hyponatremia  previously admitted for hyponatremia in July 2024  was due to HCTZ use during that time, unclear if patient continuing to use HCTZ after last admission  Na 121  continue to trend  will give Na if there is no improvement  f/u urine lytes    =================GI====================================  no active issues  NPO while on BIPAP    ================ Heme==================================  no active issues    =================Endocrine===============================  no active issues   FS q6 while NPO    ================= Skin/Catheters============================  Peripheral IV lines   Menjivar catheter   Patient consented for A line and CVC     =================Prophylaxis =============================  lovenox DVT prophylaxis   protonix GI prophylaxis     ==================GOC==================================  FULL CODE   Disposition ICU     79F PMH hypertension, arthritis, Neuralgia, osteoporotic compression fractures L5-S1, hyponatremia (HCTZ induced, corrected), goiter, who presented to the ED for left sided facial droop and SOB. Admitted to ICU for acute hypoxic and hypercapnic respiratory failure 2/2 PNA, diaphragm dysfunction and hyponatremia      =================== Neuro============================  #acute encephalopathy, resolving   presenting with AMS, left facial droop as per ED  no facial droop but confused and was lethargic upon ICU evaluation --> current is AAOx1-2 and is less confused  likely due to acute on chronic hypercapnia vs infectious etiology vs hyponatremia, also has hx of muscle relaxant use  CT head: No acute intracranial  hemorrhage, mass effect or midline shift. Nonspecific moderate subcortical white matter hypodensities may relate to small vessel ischemic disease.  hold muscle relaxant use  c/w  rocephin and azithro for PNA  f/u blood cultures and urine cultures  trend Na       ================= Cardiovascular==========================  #HTN  on coreg and ?valsartan at home  /76 on admission  will continue to hold home meds for now  monitor BP    #b/l LE edema   2+ pitting edema b/l LEs  proBNP WNL  TTE: 7/2024 EF 58%, G1DD  f/u b/l LE dopplers     ================- Pulm=================================  #AHHRF  likely acute on chronic hypercapnia, PNA  suspect hypoventilation can be 2/2 left elevated hemidiaphragm as seen on CT angio chest, hyponatremia which can cause AMS and hx of muscle relaxant use  presenting with SOB   ABG 7.27/119/190/55 at admission > s/p BIPAP >  7.36/76/124/43  CTA chest: Areas of airspace consolidation, increased in the left lung and new in the right upper lobe  aspiration precautions  on 3L NC, monitor respiratory function   c/w  rocephin and azithro   f/u BCx, UCx, sputum cultures, strep, mycoplasma, and legionella   can consider BIPAP for night time  evaluate left hemidiaphragm       ==================ID===================================  #PNA  presenting with SOB   on 3L NC, monitor respiratory function   CTA chest: Areas of airspace consolidation, increased in the left lung and new in the right upper lobe  aspiration precautions  strep: neg  c/w  rocephin and azithro   f/u BCx, UCx, sputum cultures, strep, mycoplasma, and legionella     ================= Nephro================================  #hyponatremia  previously admitted for hyponatremia in July 2024  was due to HCTZ use during that time, per son, patient is not taking HCTZ after last admission  can be due to poor PO intake  Na 121 --> s/p 1500mL NS --> Na 124  c/w 50mL/hr NS  continue to trend Na, goal Na repletion is127 in the next 12 hours (6-8 mmol/L for 24 hrs)  f/u urine lytes, UOsmo    =================GI====================================  no active issues  advanced diet to PO    ================ Heme==================================  no active issues    =================Endocrine===============================  no active issues     ================= Skin/Catheters============================  Peripheral IV lines   Menjivar catheter   Patient consented for A line and CVC     =================Prophylaxis =============================  lovenox DVT prophylaxis   protonix GI prophylaxis     ==================GOC==================================  FULL CODE   Disposition ICU     79F PMH hypertension, arthritis, Neuralgia, osteoporotic compression fractures L5-S1, hyponatremia (HCTZ induced, corrected), goiter, who presented to the ED for left sided facial droop and SOB. Admitted to ICU for acute hypoxic and hypercapnic respiratory failure 2/2 PNA, diaphragm dysfunction and hyponatremia      =================== Neuro============================  #acute encephalopathy, improving  presenting with AMS, left facial droop as per ED  no facial droop but confused and was lethargic upon ICU evaluation --> current is AAOx1-2 and is less confused  likely due to acute on chronic hypercapnia vs infectious etiology vs hyponatremia, also has hx of muscle relaxant use  CT head: No acute intracranial  hemorrhage, mass effect or midline shift. Nonspecific moderate subcortical white matter hypodensities may relate to small vessel ischemic disease.  hold muscle relaxant use  c/w  rocephin and azithro for PNA  f/u blood cultures and urine cultures  trend Na       ================= Cardiovascular==========================  #HTN  on coreg and ?valsartan at home  /76 on admission  will continue to hold home meds for now  monitor BP    #b/l LE edema   2+ pitting edema b/l LEs  proBNP WNL  TTE: 7/2024 EF 58%, G1DD  f/u b/l LE dopplers     ================- Pulm=================================  #AHHRF  likely acute on chronic hypercapnia, PNA  suspect hypoventilation can be 2/2 left elevated hemidiaphragm as seen on CT angio chest, hyponatremia which can cause AMS and hx of muscle relaxant use  presenting with SOB   ABG 7.27/119/190/55 at admission > s/p BIPAP >  7.36/76/124/43  CTA chest: Areas of airspace consolidation, increased in the left lung and new in the right upper lobe  aspiration precautions  s/p BIPAP, on 3L NC, monitor respiratory function   c/w  rocephin and azithro   f/u BCx, UCx, sputum cultures, strep, mycoplasma, and legionella   can consider BIPAP for night time  evaluate left elevated hemidiaphragm       ==================ID===================================  #PNA  presenting with SOB   on 3L NC, monitor respiratory function   CTA chest: Areas of airspace consolidation, increased in the left lung and new in the right upper lobe  aspiration precautions  strep: neg  c/w  rocephin and azithro   f/u BCx, UCx, sputum cultures, strep, mycoplasma, and legionella     ================= Nephro================================  #hyponatremia  previously admitted for hyponatremia in July 2024  was due to HCTZ use during that time, per son, patient is not taking HCTZ after last admission  can be due to poor PO intake  Na 121 --> s/p 500mL NS --> Na 124  c/w 50mL/hr NS  continue to trend Na, goal Na repletion is127 in the next 12 hours (6-8 mmol/L for 24 hrs)  f/u urine lytes, UOsmo    =================GI====================================  no active issues  advanced diet to PO    ================ Heme==================================  no active issues    =================Endocrine===============================  no active issues     ================= Skin/Catheters============================  Peripheral IV lines   Menjivar catheter   Patient consented for A line and CVC     =================Prophylaxis =============================  lovenox DVT prophylaxis   protonix GI prophylaxis     ==================GOC==================================  FULL CODE   Disposition ICU     79F PMH hypertension, arthritis, Neuralgia, osteoporotic compression fractures L5-S1, hyponatremia (HCTZ induced, corrected), goiter, who presented to the ED for left sided facial droop and SOB. Admitted to ICU for acute hypoxic and hypercapnic respiratory failure 2/2 PNA, diaphragm dysfunction and hyponatremia      =================== Neuro============================  #acute encephalopathy, improving  presenting with AMS, left facial droop as per ED  no facial droop but confused and was lethargic upon ICU evaluation --> current is AAOx1-2 and is less confused  likely due to acute on chronic hypercapnia vs infectious etiology vs hyponatremia, also has hx of muscle relaxant use  CT head: No acute intracranial  hemorrhage, mass effect or midline shift. Nonspecific moderate subcortical white matter hypodensities may relate to small vessel ischemic disease.  hold muscle relaxant use  c/w  rocephin and azithro for PNA  f/u blood cultures and urine cultures  trend Na       ================= Cardiovascular==========================  #HTN  on coreg and ?valsartan at home  /76 on admission  will continue to hold home meds for now  monitor BP    #b/l LE edema   2+ pitting edema b/l LEs  proBNP WNL  TTE: 7/2024 EF 58%, G1DD  f/u b/l LE dopplers     ================- Pulm=================================  #AHHRF  likely acute on chronic hypercapnia, PNA  suspect hypoventilation can be 2/2 left elevated hemidiaphragm as seen on CT angio chest, hyponatremia which can cause AMS and hx of muscle relaxant use  non compliant with nightly BIPAP per patient's son  presenting with SOB   ABG 7.27/119/190/55 at admission > s/p BIPAP >  7.36/76/124/43  CTA chest: Areas of airspace consolidation, increased in the left lung and new in the right upper lobe  aspiration precautions  s/p BIPAP, on 3L NC, monitor respiratory function   c/w  rocephin and azithro   f/u BCx, UCx, sputum cultures, strep, mycoplasma, and legionella   can consider BIPAP for night time  evaluate left elevated hemidiaphragm       ==================ID===================================  #PNA  presenting with SOB   on 3L NC, monitor respiratory function   CTA chest: Areas of airspace consolidation, increased in the left lung and new in the right upper lobe  aspiration precautions  strep: neg  c/w  rocephin and azithro   f/u BCx, UCx, sputum cultures, strep, mycoplasma, and legionella     ================= Nephro================================  #hyponatremia  previously admitted for hyponatremia in July 2024  was due to HCTZ use during that time, per son, patient is not taking HCTZ after last admission  can be due to poor PO intake  Na 121 --> s/p 500mL NS --> Na 124  c/w 50mL/hr NS  continue to trend Na, goal Na repletion is127 in the next 12 hours (6-8 mmol/L for 24 hrs)  f/u urine lytes, UOsmo    =================GI====================================  no active issues  advanced diet to PO    ================ Heme==================================  no active issues    =================Endocrine===============================  no active issues     ================= Skin/Catheters============================  Peripheral IV lines   Menjivar catheter   Patient consented for A line and CVC     =================Prophylaxis =============================  lovenox DVT prophylaxis   protonix GI prophylaxis     ==================GOC==================================  FULL CODE   Disposition ICU     79F PMH hypertension, arthritis, Neuralgia, osteoporotic compression fractures L5-S1, hyponatremia (HCTZ induced, corrected), goiter, who presented to the ED for left sided facial droop and SOB. Admitted to ICU for acute hypoxic and hypercapnic respiratory failure 2/2 PNA, diaphragm dysfunction and hyponatremia      =================== Neuro============================  #acute encephalopathy, improving  presenting with AMS, left facial droop as per ED  no facial droop but confused and was lethargic upon ICU evaluation --> current is AAOx1-2 and is less confused  likely due to acute on chronic hypercapnia vs infectious etiology vs hyponatremia, also has hx of muscle relaxant use  CT head: No acute intracranial  hemorrhage, mass effect or midline shift. Nonspecific moderate subcortical white matter hypodensities may relate to small vessel ischemic disease.  hold muscle relaxant use  c/w  rocephin and azithro for PNA  f/u blood cultures and urine cultures  trend Na       ================= Cardiovascular==========================  #HTN  on coreg and ?valsartan at home  /76 on admission  will continue to hold home meds for now  monitor BP    #b/l LE edema   hx of edema of b/l LE, LE duplex b/l neg in 7/2024, can be due to venous insuffiencey (chronic)  2+ pitting edema b/l LEs  proBNP WNL  TTE: 7/2024 EF 58%, G1DD  f/u b/l LE dopplers     ================- Pulm=================================  #AHHRF  likely acute on chronic hypercapnia, PNA  suspect hypoventilation can be 2/2 left elevated hemidiaphragm as seen on CT angio chest, hyponatremia which can cause AMS and hx of muscle relaxant use  non compliant with nightly BIPAP per patient's son  presenting with SOB   ABG 7.27/119/190/55 at admission > s/p BIPAP >  7.36/76/124/43  CTA chest: Areas of airspace consolidation, increased in the left lung and new in the right upper lobe  aspiration precautions  s/p BIPAP, on 3L NC, monitor respiratory function   c/w  rocephin and azithro   f/u BCx, UCx, sputum cultures, strep, mycoplasma, and legionella   can consider BIPAP for night time  evaluate left elevated hemidiaphragm       ==================ID===================================  #PNA  presenting with SOB   on 3L NC, monitor respiratory function   CTA chest: Areas of airspace consolidation, increased in the left lung and new in the right upper lobe  aspiration precautions  strep: neg  c/w  rocephin and azithro   f/u BCx, UCx, sputum cultures, strep, mycoplasma, and legionella     ================= Nephro================================  #hyponatremia  previously admitted for hyponatremia in July 2024  was due to HCTZ use during that time, per son, patient is not taking HCTZ after last admission  can be due to poor PO intake  Na 121 --> s/p 500mL NS --> Na 124  c/w 50mL/hr NS  continue to trend Na, goal Na repletion is127 in the next 12 hours (6-8 mmol/L for 24 hrs)  f/u urine lytes, UOsmo    =================GI====================================  no active issues  advanced diet to PO    ================ Heme==================================  no active issues    =================Endocrine===============================  no active issues     ================= Skin/Catheters============================  Peripheral IV lines   Menjivar catheter   Patient consented for A line and CVC     =================Prophylaxis =============================  lovenox DVT prophylaxis   protonix GI prophylaxis     ==================GOC==================================  FULL CODE   Disposition ICU     79F PMH hypertension, arthritis, Neuralgia, osteoporotic compression fractures L5-S1, hyponatremia (HCTZ induced, corrected), goiter, who presented to the ED for left sided facial droop and SOB. Admitted to ICU for acute hypoxic and hypercapnic respiratory failure 2/2 PNA, diaphragm dysfunction and hyponatremia      =================== Neuro============================  #acute encephalopathy, improving  presenting with AMS, left facial droop as per ED  no facial droop but confused and was lethargic upon ICU evaluation --> current is AAOx1-2 and is less confused  likely due to acute on chronic hypercapnia vs infectious etiology vs hyponatremia, also has hx of muscle relaxant use  CT head: No acute intracranial  hemorrhage, mass effect or midline shift. Nonspecific moderate subcortical white matter hypodensities may relate to small vessel ischemic disease.  hold muscle relaxant use  c/w  rocephin and azithro for PNA  f/u blood cultures and urine cultures  trend Na       ================= Cardiovascular==========================  #HTN  on coreg and ?valsartan at home  /76 on admission  will continue to hold home meds for now  monitor BP    #b/l LE edema   hx of edema of b/l LE, LE duplex b/l neg in 7/2024, can be due to venous insuffiencey (chronic)  2+ pitting edema b/l LEs  proBNP WNL  TTE: 7/2024 EF 58%, G1DD  f/u b/l LE dopplers     ================- Pulm=================================  #AHHRF  likely acute on chronic hypercapnia, PNA  suspect hypoventilation can be 2/2 left elevated hemidiaphragm as seen on CT angio chest, hyponatremia which can cause AMS and hx of muscle relaxant use  non compliant with nightly BIPAP per patient's son  presenting with SOB   ABG 7.27/119/190/55 at admission > s/p BIPAP >  7.36/76/124/43  CTA chest: Areas of airspace consolidation, increased in the left lung and new in the right upper lobe  aspiration precautions  s/p BIPAP, on 3L NC, monitor respiratory function   c/w  rocephin and azithro   f/u BCx, UCx, sputum cultures, strep, mycoplasma, and legionella   can consider BIPAP for night time  evaluate left elevated hemidiaphragm       ==================ID===================================  #PNA  presenting with SOB   on 3L NC, monitor respiratory function   CTA chest: Areas of airspace consolidation, increased in the left lung and new in the right upper lobe  aspiration precautions  strep: neg  c/w  rocephin and azithro   f/u BCx, UCx, sputum cultures, strep, mycoplasma, and legionella     ================= Nephro================================  #hyponatremia  previously admitted for hyponatremia in July 2024  was due to HCTZ use during that time, per son, patient is not taking HCTZ after last admission  can be due to poor PO intake  Na 121 --> s/p 500mL NS --> Na 124  c/w 50mL/hr NS  continue to trend Na, goal Na repletion is127 by 8PM (6-8 mmol/L for 24 hrs)  f/u urine lytes, UOsmo    =================GI====================================  no active issues  advanced diet to PO    ================ Heme==================================  no active issues    =================Endocrine===============================  no active issues     =================MSK===============================  hx of chronic left shoulder pain  f/u L shoulder xray    ================= Skin/Catheters============================  Peripheral IV lines   Menjivar catheter   Patient consented for A line and CVC     =================Prophylaxis =============================  lovenox DVT prophylaxis   protonix GI prophylaxis     ==================GOC==================================  FULL CODE   Disposition ICU

## 2024-09-15 NOTE — H&P ADULT - ATTENDING COMMENTS
80 yo with hx of hyponatremia on hctz in the past, HTN, now p/w SOB, AMS, ?facial droop, found to be hypoxic in ED and hypercapneic to pCO2 of ~120 and hyponatremic to 121 in ED and MICU consulted for evaluation  Pt placed on NIV with clinical improvement    acute hypercapneic/hypoxemic respiratory failure   PNA  hyponatremia  encephalopathy    c/w NIV and repeat blood gases periodically  c/w IV CAP coverage for L lobar pneumonia  c/w saline and serial BMPs for goal Na correction 4-8 mEq/24 hours; q4 neuro checks; pt nonfocal on exam    dispo - MICU

## 2024-09-15 NOTE — H&P ADULT - HISTORY OF PRESENT ILLNESS
hypertension, arthritis, Neuralgia, osteoporotic compression fractures L5-S1, hyponatremia (HCTZ induced, corrected), goiter, who presented to the ED for left sided facial droop and SOB. Pt seen at bedside, on NIV , AAOX1, and did not have a facial droop, more awake and alert compared to when she came to the ED as per ED attending. As per ED attending, he tried calling the son, who stated that she was having SOB for the past 3 days. ED was unable to obtain any more history as the son needed to hang up. When called again by ICU, the phone number went to voicemail. Pt was able to state her name and follow commands, still appears confused compared to baseline (as per last admission), however only speaks donald and is hard of hearing.  79F PMH hypertension, arthritis, Neuralgia, osteoporotic compression fractures L5-S1, hyponatremia (HCTZ induced, corrected), goiter, who presented to the ED for left sided facial droop and SOB. Pt seen at bedside, on NIV , AAOX1, and did not have a facial droop, more awake and alert compared to when she came to the ED as per ED attending. As per ED attending, he tried calling the son, who stated that she was having SOB for the past 3 days. ED was unable to obtain any more history as the son needed to hang up. When called again by ICU, the phone number went to voicemail. Pt was able to state her name and follow commands, still appears confused compared to baseline (as per last admission), however only speaks donald and is hard of hearing.

## 2024-09-15 NOTE — H&P ADULT - ASSESSMENT
79F PMH hypertension, arthritis, Neuralgia, osteoporotic compression fractures L5-S1, hyponatremia (HCTZ induced, corrected), goiter, who presented to the ED for left sided facial droop and SOB    #AHHRF   #PNA  #hyponatremia  #acute encephalopathy    =================== Neuro============================  #acute encephalopathy  presenting with AMS, left facial droop as per Ed  no facial droop upon ICU evaluation, but confused and was lethargic, AAOx1  likely due to hypercapnia vs infectious etiology vs       ================= Cardiovascular==========================        ================- Pulm=================================      ==================ID===================================      ================= Nephro================================      =================GI====================================      ================ Heme==================================      =================Endocrine===============================      ================= Skin/Catheters============================  Peripheral IV lines   Menjivar catheter   Patient consented for A line and CVC     =================Prophylaxis =============================  DVT prophylaxis   GI prophylaxis     ==================GOC==================================  FULL CODE   Disposition      79F PMH hypertension, arthritis, Neuralgia, osteoporotic compression fractures L5-S1, hyponatremia (HCTZ induced, corrected), goiter, who presented to the ED for left sided facial droop and SOB    #AHHRF   #PNA  #hyponatremia  #acute encephalopathy    =================== Neuro============================  #acute encephalopathy  presenting with AMS, left facial droop as per Ed  no facial droop upon ICU evaluation, but confused and was lethargic, AAOx1  likely due to hypercapnia vs infectious etiology vs hyponatremia  CT head: No acute intracranial  hemorrhage, mass effect or midline shift. Nonspecific moderate subcortical white matter hypodensities may relate to small vessel ischemic disease.  will start with rocephin and azithro for PNA  f/u blood cultures and urine cultures  trend Na       ================= Cardiovascular==========================  #HTN  on coreg and valsartan at home  /76 on admission  will continue to hold home meds for now  monitor BP    #b/l LE edema   2+ pitting edema b/l LEs  proBNP WNL  f/u TTE  f/u b/l LE dopplers     ================- Pulm=================================  #AHHRF likely due to PNA  presenting with SOB   unclear etiology of hypercapnia (hyponatremia > AMS > hypoventilation > hypercapnia?)  ABG 7.27/119/190/55 > BIPAP >  7.35/93/175/51  started on BIPAP 16/6/50%  CTA chest: Areas of airspace consolidation, increased in the left lung and new in the right upper lobe  aspiration precautions  NPO while on BIPAP  will start rocephin and azithro   f/u BCx, UCx, sputum cultures, strep, mycoplasma, and legionella       ==================ID===================================  #PNA  presenting with SOB   unclear etiology of hypercapnia (hyponatremia > AMS > hypoventilation > hypercapnia?)  ABG 7.27/119/190/55 > BIPAP >  7.35/93/175/51  started on BIPAP 16/6/50%  CTA chest: Areas of airspace consolidation, increased in the left lung and new in the right upper lobe  aspiration precautions  NPO while on BIPAP  will start rocephin and azithro   f/u BCx, UCx, sputum cultures, strep, mycoplasma, and legionella     ================= Nephro================================  #hyponatremia  previously admitted for hyponatremia in July 2024  was due to HCTZ use during that time, unclear if patient continuing to use HCTZ after last admission  Na 121  continue to trend  will give Na if there is no improvement  f/u urine lytes    =================GI====================================  no active issues  NPO while on BIPAP    ================ Heme==================================  no active issues    =================Endocrine===============================  no active issues   FS q6 while NPO    ================= Skin/Catheters============================  Peripheral IV lines   Menjivar catheter   Patient consented for A line and CVC     =================Prophylaxis =============================  lovenox DVT prophylaxis   protonix GI prophylaxis     ==================GOC==================================  FULL CODE   Disposition ICU     79F PMH hypertension, arthritis, Neuralgia, osteoporotic compression fractures L5-S1, hyponatremia (HCTZ induced, corrected), goiter, who presented to the ED for left sided facial droop and SOB. Admitted to ICU for AHHRF 2/2 PNA and hyponatremia    #AHHRF   #PNA  #hyponatremia  #acute encephalopathy    =================== Neuro============================  #acute encephalopathy  presenting with AMS, left facial droop as per Ed  no facial droop upon ICU evaluation, but confused and was lethargic, AAOx1  likely due to hypercapnia vs infectious etiology vs hyponatremia  CT head: No acute intracranial  hemorrhage, mass effect or midline shift. Nonspecific moderate subcortical white matter hypodensities may relate to small vessel ischemic disease.  will start with rocephin and azithro for PNA  f/u blood cultures and urine cultures  trend Na       ================= Cardiovascular==========================  #HTN  on coreg and valsartan at home  /76 on admission  will continue to hold home meds for now  monitor BP    #b/l LE edema   2+ pitting edema b/l LEs  proBNP WNL  f/u TTE  f/u b/l LE dopplers     ================- Pulm=================================  #AHHRF likely due to PNA  presenting with SOB   unclear etiology of hypercapnia (hyponatremia > AMS > hypoventilation > hypercapnia?)  ABG 7.27/119/190/55 > BIPAP >  7.35/93/175/51  started on BIPAP 16/6/50%  CTA chest: Areas of airspace consolidation, increased in the left lung and new in the right upper lobe  aspiration precautions  NPO while on BIPAP  will start rocephin and azithro   f/u BCx, UCx, sputum cultures, strep, mycoplasma, and legionella       ==================ID===================================  #PNA  presenting with SOB   unclear etiology of hypercapnia (hyponatremia > AMS > hypoventilation > hypercapnia?)  ABG 7.27/119/190/55 > BIPAP >  7.35/93/175/51  started on BIPAP 16/6/50%  CTA chest: Areas of airspace consolidation, increased in the left lung and new in the right upper lobe  aspiration precautions  NPO while on BIPAP  will start rocephin and azithro   f/u BCx, UCx, sputum cultures, strep, mycoplasma, and legionella     ================= Nephro================================  #hyponatremia  previously admitted for hyponatremia in July 2024  was due to HCTZ use during that time, unclear if patient continuing to use HCTZ after last admission  Na 121  continue to trend  will give Na if there is no improvement  f/u urine lytes    =================GI====================================  no active issues  NPO while on BIPAP    ================ Heme==================================  no active issues    =================Endocrine===============================  no active issues   FS q6 while NPO    ================= Skin/Catheters============================  Peripheral IV lines   Menjivar catheter   Patient consented for A line and CVC     =================Prophylaxis =============================  lovenox DVT prophylaxis   protonix GI prophylaxis     ==================GOC==================================  FULL CODE   Disposition ICU     79F PMH hypertension, arthritis, Neuralgia, osteoporotic compression fractures L5-S1, hyponatremia (HCTZ induced, corrected), goiter, who presented to the ED for left sided facial droop and SOB. Admitted to ICU for AHHRF 2/2 PNA and hyponatremia    #AHHRF   #PNA  #hyponatremia  #acute encephalopathy    =================== Neuro============================  #acute encephalopathy  presenting with AMS, left facial droop as per Ed  no facial droop upon ICU evaluation, but confused and was lethargic, AAOx1  likely due to hypercapnia vs infectious etiology vs hyponatremia  CT head: No acute intracranial  hemorrhage, mass effect or midline shift. Nonspecific moderate subcortical white matter hypodensities may relate to small vessel ischemic disease.  will start with rocephin and azithro for PNA  f/u blood cultures and urine cultures  trend Na       ================= Cardiovascular==========================  #HTN  on coreg and valsartan at home  /76 on admission  will continue to hold home meds for now  monitor BP    #b/l LE edema   2+ pitting edema b/l LEs  proBNP WNL  TTE: 7/2024 EF 58%, G1DD  f/u b/l LE dopplers     ================- Pulm=================================  #AHHRF likely due to PNA  presenting with SOB   unclear etiology of hypercapnia (hyponatremia > AMS > hypoventilation > hypercapnia?)  ABG 7.27/119/190/55 > BIPAP >  7.35/93/175/51  started on BIPAP 16/6/50%  CTA chest: Areas of airspace consolidation, increased in the left lung and new in the right upper lobe  aspiration precautions  NPO while on BIPAP  will start rocephin and azithro   f/u BCx, UCx, sputum cultures, strep, mycoplasma, and legionella       ==================ID===================================  #PNA  presenting with SOB   unclear etiology of hypercapnia (hyponatremia > AMS > hypoventilation > hypercapnia?)  ABG 7.27/119/190/55 > BIPAP >  7.35/93/175/51  started on BIPAP 16/6/50%  CTA chest: Areas of airspace consolidation, increased in the left lung and new in the right upper lobe  aspiration precautions  NPO while on BIPAP  will start rocephin and azithro   f/u BCx, UCx, sputum cultures, strep, mycoplasma, and legionella     ================= Nephro================================  #hyponatremia  previously admitted for hyponatremia in July 2024  was due to HCTZ use during that time, unclear if patient continuing to use HCTZ after last admission  Na 121  continue to trend  will give Na if there is no improvement  f/u urine lytes    =================GI====================================  no active issues  NPO while on BIPAP    ================ Heme==================================  no active issues    =================Endocrine===============================  no active issues   FS q6 while NPO    ================= Skin/Catheters============================  Peripheral IV lines   Menjivar catheter   Patient consented for A line and CVC     =================Prophylaxis =============================  lovenox DVT prophylaxis   protonix GI prophylaxis     ==================GOC==================================  FULL CODE   Disposition ICU

## 2024-09-16 LAB
ALBUMIN SERPL ELPH-MCNC: 2.9 G/DL — LOW (ref 3.5–5)
ALBUMIN SERPL ELPH-MCNC: 3.6 G/DL — SIGNIFICANT CHANGE UP (ref 3.5–5)
ALP SERPL-CCNC: 52 U/L — SIGNIFICANT CHANGE UP (ref 40–120)
ALP SERPL-CCNC: 76 U/L — SIGNIFICANT CHANGE UP (ref 40–120)
ALT FLD-CCNC: 13 U/L DA — SIGNIFICANT CHANGE UP (ref 10–60)
ALT FLD-CCNC: 22 U/L DA — SIGNIFICANT CHANGE UP (ref 10–60)
ANION GAP SERPL CALC-SCNC: 4 MMOL/L — LOW (ref 5–17)
ANION GAP SERPL CALC-SCNC: 5 MMOL/L — SIGNIFICANT CHANGE UP (ref 5–17)
ANION GAP SERPL CALC-SCNC: 6 MMOL/L — SIGNIFICANT CHANGE UP (ref 5–17)
AST SERPL-CCNC: 15 U/L — SIGNIFICANT CHANGE UP (ref 10–40)
AST SERPL-CCNC: 28 U/L — SIGNIFICANT CHANGE UP (ref 10–40)
BASE EXCESS BLDA CALC-SCNC: 12.9 MMOL/L — HIGH (ref -2–3)
BASOPHILS # BLD AUTO: 0.02 K/UL — SIGNIFICANT CHANGE UP (ref 0–0.2)
BASOPHILS NFR BLD AUTO: 0.3 % — SIGNIFICANT CHANGE UP (ref 0–2)
BILIRUB SERPL-MCNC: 0.4 MG/DL — SIGNIFICANT CHANGE UP (ref 0.2–1.2)
BILIRUB SERPL-MCNC: 0.5 MG/DL — SIGNIFICANT CHANGE UP (ref 0.2–1.2)
BLOOD GAS COMMENTS ARTERIAL: SIGNIFICANT CHANGE UP
BUN SERPL-MCNC: 5 MG/DL — LOW (ref 7–18)
BUN SERPL-MCNC: 6 MG/DL — LOW (ref 7–18)
BUN SERPL-MCNC: 7 MG/DL — SIGNIFICANT CHANGE UP (ref 7–18)
CALCIUM SERPL-MCNC: 8 MG/DL — LOW (ref 8.4–10.5)
CALCIUM SERPL-MCNC: 8.1 MG/DL — LOW (ref 8.4–10.5)
CALCIUM SERPL-MCNC: 8.2 MG/DL — LOW (ref 8.4–10.5)
CHLORIDE SERPL-SCNC: 80 MMOL/L — LOW (ref 96–108)
CHLORIDE SERPL-SCNC: 83 MMOL/L — LOW (ref 96–108)
CHLORIDE SERPL-SCNC: 84 MMOL/L — LOW (ref 96–108)
CO2 SERPL-SCNC: 36 MMOL/L — HIGH (ref 22–31)
CO2 SERPL-SCNC: 40 MMOL/L — HIGH (ref 22–31)
CO2 SERPL-SCNC: 40 MMOL/L — HIGH (ref 22–31)
CREAT SERPL-MCNC: 0.24 MG/DL — LOW (ref 0.5–1.3)
CREAT SERPL-MCNC: 0.27 MG/DL — LOW (ref 0.5–1.3)
CREAT SERPL-MCNC: 0.46 MG/DL — LOW (ref 0.5–1.3)
CULTURE RESULTS: NO GROWTH — SIGNIFICANT CHANGE UP
EGFR: 110 ML/MIN/1.73M2 — SIGNIFICANT CHANGE UP
EGFR: 113 ML/MIN/1.73M2 — SIGNIFICANT CHANGE UP
EGFR: 97 ML/MIN/1.73M2 — SIGNIFICANT CHANGE UP
EOSINOPHIL # BLD AUTO: 0.14 K/UL — SIGNIFICANT CHANGE UP (ref 0–0.5)
EOSINOPHIL NFR BLD AUTO: 2.2 % — SIGNIFICANT CHANGE UP (ref 0–6)
GLUCOSE BLDC GLUCOMTR-MCNC: 235 MG/DL — HIGH (ref 70–99)
GLUCOSE SERPL-MCNC: 129 MG/DL — HIGH (ref 70–99)
GLUCOSE SERPL-MCNC: 232 MG/DL — HIGH (ref 70–99)
GLUCOSE SERPL-MCNC: 97 MG/DL — SIGNIFICANT CHANGE UP (ref 70–99)
HCO3 BLDA-SCNC: 42 MMOL/L — HIGH (ref 21–28)
HCT VFR BLD CALC: 33.7 % — LOW (ref 34.5–45)
HCT VFR BLD CALC: 42.6 % — SIGNIFICANT CHANGE UP (ref 34.5–45)
HGB BLD-MCNC: 11.3 G/DL — LOW (ref 11.5–15.5)
HGB BLD-MCNC: 14 G/DL — SIGNIFICANT CHANGE UP (ref 11.5–15.5)
HOROWITZ INDEX BLDA+IHG-RTO: 40 — SIGNIFICANT CHANGE UP
IMM GRANULOCYTES NFR BLD AUTO: 0.3 % — SIGNIFICANT CHANGE UP (ref 0–0.9)
LACTATE SERPL-SCNC: 2.3 MMOL/L — HIGH (ref 0.7–2)
LYMPHOCYTES # BLD AUTO: 1.22 K/UL — SIGNIFICANT CHANGE UP (ref 1–3.3)
LYMPHOCYTES # BLD AUTO: 18.9 % — SIGNIFICANT CHANGE UP (ref 13–44)
MAGNESIUM SERPL-MCNC: 1.7 MG/DL — SIGNIFICANT CHANGE UP (ref 1.6–2.6)
MAGNESIUM SERPL-MCNC: 1.8 MG/DL — SIGNIFICANT CHANGE UP (ref 1.6–2.6)
MAGNESIUM SERPL-MCNC: 2 MG/DL — SIGNIFICANT CHANGE UP (ref 1.6–2.6)
MCHC RBC-ENTMCNC: 29.7 PG — SIGNIFICANT CHANGE UP (ref 27–34)
MCHC RBC-ENTMCNC: 29.7 PG — SIGNIFICANT CHANGE UP (ref 27–34)
MCHC RBC-ENTMCNC: 32.9 GM/DL — SIGNIFICANT CHANGE UP (ref 32–36)
MCHC RBC-ENTMCNC: 33.5 GM/DL — SIGNIFICANT CHANGE UP (ref 32–36)
MCV RBC AUTO: 88.7 FL — SIGNIFICANT CHANGE UP (ref 80–100)
MCV RBC AUTO: 90.3 FL — SIGNIFICANT CHANGE UP (ref 80–100)
MONOCYTES # BLD AUTO: 0.67 K/UL — SIGNIFICANT CHANGE UP (ref 0–0.9)
MONOCYTES NFR BLD AUTO: 10.4 % — SIGNIFICANT CHANGE UP (ref 2–14)
NEUTROPHILS # BLD AUTO: 4.37 K/UL — SIGNIFICANT CHANGE UP (ref 1.8–7.4)
NEUTROPHILS NFR BLD AUTO: 67.9 % — SIGNIFICANT CHANGE UP (ref 43–77)
NRBC # BLD: 0 /100 WBCS — SIGNIFICANT CHANGE UP (ref 0–0)
NRBC # BLD: 0 /100 WBCS — SIGNIFICANT CHANGE UP (ref 0–0)
OSMOLALITY SERPL: 257 MOSMOL/KG — LOW (ref 280–301)
PCO2 BLDA: 72 MMHG — CRITICAL HIGH (ref 32–35)
PH BLDA: 7.37 — SIGNIFICANT CHANGE UP (ref 7.35–7.45)
PHOSPHATE SERPL-MCNC: 1.7 MG/DL — LOW (ref 2.5–4.5)
PHOSPHATE SERPL-MCNC: 2 MG/DL — LOW (ref 2.5–4.5)
PHOSPHATE SERPL-MCNC: 5.7 MG/DL — HIGH (ref 2.5–4.5)
PLATELET # BLD AUTO: 189 K/UL — SIGNIFICANT CHANGE UP (ref 150–400)
PLATELET # BLD AUTO: 273 K/UL — SIGNIFICANT CHANGE UP (ref 150–400)
PO2 BLDA: 54 MMHG — LOW (ref 83–108)
POTASSIUM SERPL-MCNC: 3.7 MMOL/L — SIGNIFICANT CHANGE UP (ref 3.5–5.3)
POTASSIUM SERPL-MCNC: 4 MMOL/L — SIGNIFICANT CHANGE UP (ref 3.5–5.3)
POTASSIUM SERPL-MCNC: 4.3 MMOL/L — SIGNIFICANT CHANGE UP (ref 3.5–5.3)
POTASSIUM SERPL-SCNC: 3.7 MMOL/L — SIGNIFICANT CHANGE UP (ref 3.5–5.3)
POTASSIUM SERPL-SCNC: 4 MMOL/L — SIGNIFICANT CHANGE UP (ref 3.5–5.3)
POTASSIUM SERPL-SCNC: 4.3 MMOL/L — SIGNIFICANT CHANGE UP (ref 3.5–5.3)
PROT SERPL-MCNC: 6.1 G/DL — SIGNIFICANT CHANGE UP (ref 6–8.3)
PROT SERPL-MCNC: 7.9 G/DL — SIGNIFICANT CHANGE UP (ref 6–8.3)
RBC # BLD: 3.8 M/UL — SIGNIFICANT CHANGE UP (ref 3.8–5.2)
RBC # BLD: 4.72 M/UL — SIGNIFICANT CHANGE UP (ref 3.8–5.2)
RBC # FLD: 12.2 % — SIGNIFICANT CHANGE UP (ref 10.3–14.5)
RBC # FLD: 12.2 % — SIGNIFICANT CHANGE UP (ref 10.3–14.5)
SAO2 % BLDA: 90 % — SIGNIFICANT CHANGE UP
SODIUM SERPL-SCNC: 124 MMOL/L — LOW (ref 135–145)
SODIUM SERPL-SCNC: 126 MMOL/L — LOW (ref 135–145)
SODIUM SERPL-SCNC: 128 MMOL/L — LOW (ref 135–145)
SPECIMEN SOURCE: SIGNIFICANT CHANGE UP
TROPONIN I, HIGH SENSITIVITY RESULT: 64.6 NG/L — HIGH
WBC # BLD: 10.75 K/UL — HIGH (ref 3.8–10.5)
WBC # BLD: 6.44 K/UL — SIGNIFICANT CHANGE UP (ref 3.8–10.5)
WBC # FLD AUTO: 10.75 K/UL — HIGH (ref 3.8–10.5)
WBC # FLD AUTO: 6.44 K/UL — SIGNIFICANT CHANGE UP (ref 3.8–10.5)

## 2024-09-16 PROCEDURE — 93010 ELECTROCARDIOGRAM REPORT: CPT

## 2024-09-16 RX ORDER — POTASSIUM PHOSPHATE, MONOBASIC POTASSIUM PHOSPHATE, DIBASIC 224; 236 MG/ML; MG/ML
30 INJECTION, SOLUTION, CONCENTRATE INTRAVENOUS ONCE
Refills: 0 | Status: COMPLETED | OUTPATIENT
Start: 2024-09-16 | End: 2024-09-16

## 2024-09-16 RX ORDER — POTASSIUM PHOSPHATE, MONOBASIC POTASSIUM PHOSPHATE, DIBASIC 224; 236 MG/ML; MG/ML
30 INJECTION, SOLUTION, CONCENTRATE INTRAVENOUS ONCE
Refills: 0 | Status: DISCONTINUED | OUTPATIENT
Start: 2024-09-16 | End: 2024-09-16

## 2024-09-16 RX ORDER — SOD PHOS DI, MONO/K PHOS MONO 250 MG
1 TABLET ORAL ONCE
Refills: 0 | Status: COMPLETED | OUTPATIENT
Start: 2024-09-16 | End: 2024-09-16

## 2024-09-16 RX ORDER — PANTOPRAZOLE SODIUM 40 MG/1
40 TABLET, DELAYED RELEASE ORAL
Refills: 0 | Status: DISCONTINUED | OUTPATIENT
Start: 2024-09-16 | End: 2024-09-25

## 2024-09-16 RX ORDER — SODIUM CHLORIDE 0.65 %
1 AEROSOL, SPRAY (ML) NASAL EVERY 4 HOURS
Refills: 0 | Status: DISCONTINUED | OUTPATIENT
Start: 2024-09-16 | End: 2024-09-25

## 2024-09-16 RX ORDER — METOPROLOL TARTRATE 50 MG
5 TABLET ORAL ONCE
Refills: 0 | Status: DISCONTINUED | OUTPATIENT
Start: 2024-09-16 | End: 2024-09-16

## 2024-09-16 RX ORDER — SODIUM CHLORIDE 0.9 % (FLUSH) 0.9 %
1000 SYRINGE (ML) INJECTION
Refills: 0 | Status: DISCONTINUED | OUTPATIENT
Start: 2024-09-16 | End: 2024-09-18

## 2024-09-16 RX ADMIN — CHLORHEXIDINE GLUCONATE ORAL RINSE 1 APPLICATION(S): 1.2 SOLUTION DENTAL at 06:00

## 2024-09-16 RX ADMIN — Medication 1 PACKET(S): at 16:39

## 2024-09-16 RX ADMIN — Medication 100 MILLIGRAM(S): at 04:17

## 2024-09-16 RX ADMIN — POTASSIUM PHOSPHATE, MONOBASIC POTASSIUM PHOSPHATE, DIBASIC 83.33 MILLIMOLE(S): 224; 236 INJECTION, SOLUTION, CONCENTRATE INTRAVENOUS at 16:39

## 2024-09-16 RX ADMIN — Medication 1 PACKET(S): at 04:31

## 2024-09-16 RX ADMIN — AZITHROMYCIN 255 MILLIGRAM(S): 250 TABLET, FILM COATED ORAL at 04:15

## 2024-09-16 RX ADMIN — PANTOPRAZOLE SODIUM 40 MILLIGRAM(S): 40 TABLET, DELAYED RELEASE ORAL at 11:46

## 2024-09-16 RX ADMIN — ENOXAPARIN SODIUM 40 MILLIGRAM(S): 150 INJECTION SUBCUTANEOUS at 04:17

## 2024-09-16 NOTE — PHYSICAL THERAPY INITIAL EVALUATION ADULT - PERTINENT HX OF CURRENT PROBLEM, REHAB EVAL
admitted for hypoxia, hypercapnia, AMS; left facial droop  hyponatremia; family reports 3 episodes of fall at home in the past 3 months  (-) DVT noted on ultrasound done on 9/15  PMHx: hypertension, arthritis, Neuralgia, osteoporotic compression fractures L5-S1, hyponatremia (HCTZ induced, corrected), goiter

## 2024-09-16 NOTE — CHART NOTE - NSCHARTNOTEFT_GEN_A_CORE
80 yo with hx of diaphragmatic dysfunction and chronic lower extremity edema, hyponatremia on hctz in the past, HTN, now p/w SOB, AMS,  found to be hypoxic and hypercapnic in ED with pCO2 of ~120 and hyponatremic to 121 in ED and MICU consulted for evaluation  Pt placed on NIV with clinical improvement. History obtained from patient's son and daughter in law at bedside. States earlier this year was admitted to [facility] for similar complaints and was discharged home with bipap support. Though patient has been non compliant with bipap. Suspect hypoventilation is multifactorial given hyponatremia, use of muscle relaxers and chronic diaphragmatic dysfunction. 80 yo with hx of diaphragmatic dysfunction and chronic lower extremity edema, hyponatremia on hctz in the past, HTN, now p/w SOB, AMS,  found to be hypoxic and hypercapnic in ED with pCO2 of ~120 and hyponatremic to 121 in ED and MICU consulted for evaluation  Pt placed on NIV with clinical improvement. History obtained from patient's son and daughter in law at bedside. States earlier this year was admitted to [facility] for similar complaints and was discharged home with bipap support. Though patient has been non compliant with bipap. Suspect hypoventilation is multifactorial given hyponatremia, use of muscle relaxers and chronic diaphragmatic dysfunction.     In the ICU patient was placed on BiPAP and her Mentation improved, used NIV overnight  We Held cyclobenzapine and gabapentin for now. Patient will need evaluation for diaphragmatic dysfunction   Cont. Nocturnal bipap support, encourage compliance with nocturnal bipap She was started on empiric antibiotics for possible pneumonia, pending culture results. Serum sodium is slowly correcting with IV hydration  Avoid rapid correction of sodium > 6-8 meq in 24 hours  PT evaluation slated Home PT    Things to follow:  [ ] Ulytes UOsm for SIADH workup  [ ] Consider salt tabs if fluid restriction does not improve hyponatremia  [ ] bipap qHS  [ ] f/u BCx, UCx  [ ] f/u PNA w/u  [ ] f/u L shoulder xray read  f/u pt  Outpt diaphragm robb  Tov  [ ] D/c TERRY Menjivar at 6 pmx    Signed out to Dr. Rojo and NP___________ 80 yo with hx of diaphragmatic dysfunction and chronic lower extremity edema, hyponatremia on hctz in the past, HTN, now p/w SOB, AMS,  found to be hypoxic and hypercapnic in ED with pCO2 of ~120 and hyponatremic to 121 in ED and MICU consulted for evaluation  Pt placed on NIV with clinical improvement. History obtained from patient's son and daughter in law at bedside. States earlier this year was admitted to [facility] for similar complaints and was discharged home with bipap support. Though patient has been non compliant with bipap. Suspect hypoventilation is multifactorial given hyponatremia, use of muscle relaxers and chronic diaphragmatic dysfunction.     In the ICU patient was placed on BiPAP and her Mentation improved, used NIV overnight  We Held cyclobenzapine and gabapentin for now. Patient will need evaluation for diaphragmatic dysfunction   Cont. Nocturnal bipap support, encourage compliance with nocturnal bipap She was started on empiric antibiotics for possible pneumonia, pending culture results. Serum sodium is slowly correcting with IV hydration  Avoided rapid correction of sodium > 6-8 meq in 24 hours     Things to follow:  [ ] Ulytes UOsm for SIADH workup  [ ] Consider salt tabs if fluid restriction does not improve hyponatremia  [ ] bipap qHS  [ ] f/u BCx, UCx  [ ] f/u PNA w/u  [ ] f/u L shoulder xray read  [ ] f/u pt  Outpt diaphragm robb  [ ] D/c TERRY Menjivar at 6 pmx    Signed out to Dr. Rojo and NP___________ 78 yo with hx of diaphragmatic dysfunction and chronic lower extremity edema, hyponatremia on hctz in the past, HTN, now p/w SOB, AMS,  found to be hypoxic and hypercapnic in ED with pCO2 of ~120 and hyponatremic to 121 in ED and MICU consulted for evaluation  Pt placed on NIV with clinical improvement. History obtained from patient's son and daughter in law at bedside. States earlier this year was admitted to [facility] for similar complaints and was discharged home with bipap support. Though patient has been non compliant with bipap. Suspect hypoventilation is multifactorial given hyponatremia, use of muscle relaxers and chronic diaphragmatic dysfunction.  CT Chest: Areas of airspace consolidation, increased in the left lung and new in the right upper lobe. Continue with CTX for PNA.     In the ICU patient was placed on BiPAP and her Mentation improved, used NIV overnight  We held cyclobenzapine and gabapentin for now. Patient will need evaluation for diaphragmatic dysfunction.  Cont. Nocturnal bipap support, encourage compliance with nocturnal bipap She was started on empiric antibiotics for possible pneumonia, pending culture results. Serum sodium is slowly correcting with IV hydration  Avoided rapid correction of sodium > 6-8 meq in 24 hours     Things to follow:  [ ] Ulytes UOsm for SIADH workup  [ ] Consider salt tabs if fluid restriction does not improve hyponatremia  [ ] bipap qHS  [ ] f/u BCx, UCx  [ ] f/u PNA w/u  [ ] f/u L shoulder xray read  [ ] f/u PT eval  Outpt elevated L diaphragm w/u  [ ] D/c TERRY Menjivar at 6 pm  [ ] f/u SW consult for short to long term care placement    Signed out to Dr. Rojo and NP Loreta

## 2024-09-16 NOTE — PROGRESS NOTE ADULT - SUBJECTIVE AND OBJECTIVE BOX
INTERVAL HPI/OVERNIGHT EVENTS: No acute overnight events. pt appears comfortable on NC. Does not endorse any complaints.     PRESSORS: [ ] YES [x ] NO  WHICH:    MEDICATIONS:     Antimicrobial:  cefTRIAXone   IVPB 1000 milliGRAM(s) IV Intermittent every 24 hours    Cardiovascular:    Pulmonary:    Hematalogic:  enoxaparin Injectable 40 milliGRAM(s) SubCutaneous every 24 hours    Other:  pantoprazole    Tablet 40 milliGRAM(s) Oral before breakfast  potassium phosphate / sodium phosphate Powder (PHOS-NaK) 1 Packet(s) Oral once      Drug Dosing Weight  Height (cm): 152.4 (15 Sep 2024 04:05)  Weight (kg): 62.1 (15 Sep 2024 04:05)  BMI (kg/m2): 26.7 (15 Sep 2024 04:05)  BSA (m2): 1.59 (15 Sep 2024 04:05)    INTUBATED: [ ] YES [x ] NO   DATE:     CENTRAL LINE: [ ] YES [x ] NO  LOCATION:       PASCUAL: [ ] YES [ x] NO   : Removed, TOV 9/16 at 6 pm     A-LINE:  [ ] YES [x ] NO  LOCATION:       ICU Vital Signs Last 24 Hrs  T(C): 36.2 (16 Sep 2024 12:00), Max: 37 (15 Sep 2024 20:00)  T(F): 97.1 (16 Sep 2024 12:00), Max: 98.6 (15 Sep 2024 20:00)  HR: 63 (16 Sep 2024 14:00) (62 - 91)  BP: 143/66 (16 Sep 2024 14:00) (108/77 - 180/143)  BP(mean): 88 (16 Sep 2024 14:00) (73 - 155)  ABP: --  ABP(mean): --  RR: 18 (16 Sep 2024 14:00) (15 - 36)  SpO2: 100% (16 Sep 2024 14:00) (95% - 100%)    O2 Parameters below as of 16 Sep 2024 14:00  Patient On (Oxygen Delivery Method): nasal cannula  O2 Flow (L/min): 2          ABG - ( 15 Sep 2024 05:56 )  pH, Arterial: 7.36  pH, Blood: x     /  pCO2: 76    /  pO2: 124   / HCO3: 43    / Base Excess: 13.8  /  SaO2: 100                   09-15 @ 07:01  -  09-16 @ 07:00  --------------------------------------------------------  IN: 600 mL / OUT: 690 mL / NET: -90 mL            REVIEW OF SYSTEMS:    CONSTITUTIONAL: No fever, chills, weight loss, or fatigue  RESPIRATORY: No cough, wheezing, or hemoptysis; No shortness of breath  CARDIOVASCULAR: No chest pain, palpitations, dizziness, or leg swelling  GASTROINTESTINAL: No abdominal pain. No nausea, vomiting, or hematemesis; No diarrhea or constipation. No melena or hematochezia.  GENITOURINARY: No dysuria, hematuria, or urinary frequency  NEUROLOGICAL: No headaches, memory loss, loss of strength, numbness, or tremors  MSK: No muscle or joint pain  SKIN: No itching, burning, rashes, or lesions      PHYSICAL EXAM:  GENERAL: NAD, obese, elderly  HEAD:  Atraumatic, Normocephalic, no facial droop  EYES: EOMI, conjunctiva and sclera clear  ENMT:  Moist mucous membranes  NECK: Supple, No JVD  HEART: Regular rate and rhythm; No murmurs, rubs, or gallops  RESPIRATORY: CTA B/L  ABDOMEN: Soft, Nontender, Nondistended; Bowel sounds present  NEUROLOGY: A&Ox1-2, moving all extremities  EXTREMITIES:  2+ Peripheral Pulses, 2+ b/l LE edema  SKIN: warm, dry, normal color, no rash or abnormal lesions      LABS:  CBC Full  -  ( 16 Sep 2024 03:30 )  WBC Count : 6.44 K/uL  RBC Count : 3.80 M/uL  Hemoglobin : 11.3 g/dL  Hematocrit : 33.7 %  Platelet Count - Automated : 189 K/uL  Mean Cell Volume : 88.7 fl  Mean Cell Hemoglobin : 29.7 pg  Mean Cell Hemoglobin Concentration : 33.5 gm/dL  Auto Neutrophil # : 4.37 K/uL  Auto Lymphocyte # : 1.22 K/uL  Auto Monocyte # : 0.67 K/uL  Auto Eosinophil # : 0.14 K/uL  Auto Basophil # : 0.02 K/uL  Auto Neutrophil % : 67.9 %  Auto Lymphocyte % : 18.9 %  Auto Monocyte % : 10.4 %  Auto Eosinophil % : 2.2 %  Auto Basophil % : 0.3 %    09-16    124<L>  |  80<L>  |  6<L>  ----------------------------<  129<H>  3.7   |  40<H>  |  0.27<L>    Ca    8.1<L>      16 Sep 2024 13:36  Phos  1.7     09-16  Mg     1.7     09-16    TPro  6.1  /  Alb  2.9<L>  /  TBili  0.4  /  DBili  x   /  AST  15  /  ALT  13  /  AlkPhos  52  09-16    PT/INR - ( 14 Sep 2024 20:14 )   PT: 11.5 sec;   INR: 1.01 ratio         PTT - ( 14 Sep 2024 20:14 )  PTT:31.0 sec  Urinalysis Basic - ( 16 Sep 2024 13:36 )    Color: x / Appearance: x / SG: x / pH: x  Gluc: 129 mg/dL / Ketone: x  / Bili: x / Urobili: x   Blood: x / Protein: x / Nitrite: x   Leuk Esterase: x / RBC: x / WBC x   Sq Epi: x / Non Sq Epi: x / Bacteria: x      Culture Results:   No growth at 24 hours (09-15 @ 02:08)  Culture Results:   No growth at 24 hours (09-15 @ 02:08)  Culture Results:   No growth (09-15 @ 02:08)      RADIOLOGY & ADDITIONAL STUDIES REVIEWED:  ***    [ ]GOALS OF CARE DISCUSSION WITH PATIENT/FAMILY/PROXY:

## 2024-09-16 NOTE — SWALLOW BEDSIDE ASSESSMENT ADULT - COMMENTS
Oral residue cleared with liquid wash. Pt is AA+Ox1, Bria VRI#[id number] Estefani assisted with exam. HOB elevated to 60°. At end of exam, Dr. Boykin assisted with Bria translation.

## 2024-09-16 NOTE — RAPID RESPONSE TEAM SUMMARY - NSADDTLFINDINGSRRT_GEN_ALL_CORE
Per RN at RRT patient hypoxic on 4L NC to 75% and placed on NRB mask.  Upon arrival patients BP in 170s and HR in 120s-130s.  patients saturating low to mid 90s on NRB mask.  Duonebs ordered.  Patient ordered for nocternal bipap for chronic CO2 retention and has a history of similar episodes of hypoxic with change in mental status which is imporved with bipap.  EKG obtained showing Sinus tachycardia with RBBB.  Patient placed on BiPAP.

## 2024-09-16 NOTE — PROGRESS NOTE ADULT - ASSESSMENT
79F PMH hypertension, arthritis, Neuralgia, osteoporotic compression fractures L5-S1, hyponatremia (HCTZ induced, corrected), goiter, who presented to the ED for left sided facial droop and SOB. Admitted to ICU for acute hypoxic and hypercapnic respiratory failure 2/2 PNA, diaphragm dysfunction and hyponatremia      =================== Neuro============================  #acute encephalopathy, improving  presenting with AMS, left facial droop as per ED  no facial droop but confused and was lethargic upon ICU evaluation --> current is AAOx1-2 and is less confused  likely due to acute on chronic hypercapnia vs infectious etiology vs hyponatremia, also has hx of muscle relaxant use  CT head: No acute intracranial  hemorrhage, mass effect or midline shift. Nonspecific moderate subcortical white matter hypodensities may relate to small vessel ischemic disease.  hold muscle relaxant use  c/w  rocephin and azithro for PNA  f/u blood cultures and urine cultures  trend Na       ================= Cardiovascular==========================  #HTN  on coreg and ?valsartan at home  /76 on admission  will continue to hold home meds for now  monitor BP    #b/l LE edema   hx of edema of b/l LE, LE duplex b/l neg in 7/2024, can be due to venous insuffiencey (chronic)  2+ pitting edema b/l LEs  proBNP WNL  TTE: 7/2024 EF 58%, G1DD  f/u b/l LE dopplers: no remarkable findings, neg for DVT    ================- Pulm=================================  #AHHRF  likely acute on chronic hypercapnia, PNA  suspect hypoventilation can be 2/2 left elevated hemidiaphragm as seen on CT angio chest, hyponatremia which can cause AMS and hx of muscle relaxant use  non compliant with nightly BIPAP per patient's son  presenting with SOB   ABG 7.27/119/190/55 at admission > s/p BIPAP >  7.36/76/124/43  CTA chest: Areas of airspace consolidation, increased in the left lung and new in the right upper lobe  aspiration precautions  s/p BIPAP, on 3L NC, monitor respiratory function   c/w  Rocephin for PNA  f/u BCx, UCx, sputum cultures, strep, mycoplasma, and legionella   continue BIPAP for night time (discharge with this recommendation)  evaluate left elevated hemidiaphragm outpt      ==================ID===================================  #PNA  presenting with SOB   on 3L NC, monitor respiratory function   CTA chest: Areas of airspace consolidation, increased in the left lung and new in the right upper lobe  aspiration precautions  strep: neg  c/w  rocephin for 5 day course  f/u BCx, UCx, sputum cultures, strep, mycoplasma, and legionella     ================= Nephro================================  #hyponatremia  previously admitted for hyponatremia in July 2024  was due to HCTZ use during that time, per son, patient is not taking HCTZ after last admission  can be due to poor PO intake  Na 121 --> s/p 500mL NS --> Na 124  c/w 50mL/hr NS  continue to trend Na, goal Na repletion is127 by 8PM (6-8 mmol/L for 24 hrs)  f/u urine lytes, UOsmo    =================GI====================================  no active issues  advanced diet to PO    ================ Heme==================================  no active issues    =================Endocrine===============================  no active issues     =================MSK===============================  hx of chronic left shoulder pain  f/u L shoulder xray    ================= Skin/Catheters============================  Peripheral IV lines   Menjivar catheter   Patient consented for A line and CVC     =================Prophylaxis =============================  lovenox DVT prophylaxis   protonix GI prophylaxis     ==================GOC==================================  FULL CODE   Disposition ICU

## 2024-09-16 NOTE — PHYSICAL THERAPY INITIAL EVALUATION ADULT - GENERAL OBSERVATIONS, REHAB EVAL
awake, alert, NAD; currently on O2@1L/min via NC; +peripheral IV access on left forearm; +PrimaFit to cannister on wall suction

## 2024-09-16 NOTE — RAPID RESPONSE TEAM SUMMARY - NSSITUATIONBACKGROUNDRRT_GEN_ALL_CORE
80 yo with hx of diaphragmatic dysfunction and chronic lower extremity edema, hyponatremia on hctz in the past, HTN, now p/w SOB, AMS,  found to be hypoxic and hypercapnic in ED with pCO2 of ~120 and hyponatremic to 121 in ED and MICU consulted for evaluation  Pt placed on NIV with clinical improvement. History obtained from patient's son and daughter in law at bedside. States earlier this year was admitted to [facility] for similar complaints and was discharged home with bipap support. Though patient has been non compliant with bipap. Suspect hypoventilation is multifactorial given hyponatremia, use of muscle relaxers and chronic diaphragmatic dysfunction.  CT Chest: Areas of airspace consolidation, increased in the left lung and new in the right upper lobe. Continue with CTX for PNA.     In the ICU patient was placed on BiPAP and her Mentation improved, used NIV overnight  We held cyclobenzapine and gabapentin for now. Patient will need evaluation for diaphragmatic dysfunction.  Cont. Nocturnal bipap support, encourage compliance with nocturnal bipap She was started on empiric antibiotics for possible pneumonia, pending culture results. Serum sodium is slowly correcting with IV hydration  Avoided rapid correction of sodium > 6-8 meq in 24 hours

## 2024-09-16 NOTE — SWALLOW BEDSIDE ASSESSMENT ADULT - SWALLOW EVAL: DIAGNOSIS
Pt p/w oral dysphagia w/ age related changes (presbyphagia) c/b prolonged mastication 2/2 edentition (c/o loose upper & lower dentures), and slow A-P transport; However, oropharyngeal swallow was intact and timely with no overt s/s of laryngeal penetration or aspiration at this exam.

## 2024-09-16 NOTE — SWALLOW BEDSIDE ASSESSMENT ADULT - SWALLOW EVAL: FUNCTIONAL LEVEL AT TIME OF EVAL
Pt able to self feed hand held food, requires assist w/ spearing/scooping/cutting & reaching, and set up.

## 2024-09-16 NOTE — PROGRESS NOTE ADULT - ATTENDING COMMENTS
80 yo with hx of diaphragmatic dysfunction and chronic lower extremity edema, hyponatremia on hctz in the past, HTN, now p/w SOB, AMS,  found to be hypoxic and hypercapnic in ED with pCO2 of ~120 and hyponatremic to 121 in ED and MICU consulted for evaluation  Pt placed on NIV with clinical improvement. History obtained from patient's son and daughter in law at bedside. States earlier this year was admitted to [facility] for similar complaints and was discharged home with bipap support. Though patient has been non compliant with bipap. Suspect hypoventilation is multifactorial given hyponatremia, use of muscle relaxers and chronic diaphragmatic dysfunction.     Assessment:  1. Acute on chronic hypercapnic and hypoxic respiratory failure  2. Left lung atelectasis with possible super imposed pneumonia   3. Left elevated hemidiaphragm   4. Acute on chronic hyponatremia - likely depletional due to poor oral intake  5. Acute encephalopathy   6. Hypertension     Plan:  Mentation improved, used NIV overnight   Hold cyclobenzapine and gabapentin for now  Will need evaluation for diaphragmatic dysfunction   Cont. Nocturnal bipap support, encourage compliance with nocturnal bipap  Empiric antibiotics for possible pneumonia, follow up culture results  Serum sodium is slowly correcting with IV hydration  Avoid rapid correction of sodium > 6-8 meq in 24 hours   Serial BMP  Cont oral diet  Monitor urine output  PT evaluation   DVT prophylaxis  Transfer out of ICU  Discussed with son and daughter-in-law

## 2024-09-16 NOTE — SWALLOW BEDSIDE ASSESSMENT ADULT - ORAL PREPARATORY PHASE
weak lip seal; weak intraoral pressure observed AEB anterior spillage/Anterior loss of bolus Within functional limits Decreased mastication ability

## 2024-09-16 NOTE — SWALLOW BEDSIDE ASSESSMENT ADULT - SLP PERTINENT HISTORY OF CURRENT PROBLEM
80 yo with hx of diaphragmatic dysfunction and chronic lower extremity edema, hyponatremia on hctz in the past, HTN, now p/w SOB, AMS,  found to be hypoxic and hypercapnic in ED with pCO2 of ~120 and hyponatremic to 121 in ED and MICU consulted for evaluation. CT Chest: Areas of airspace consolidation, increased in the left lung and new in the right upper lobe. Continue with CTX for PNA. Earlier this year was admitted to [facility] for similar complaints and was discharged home with bipap support (though patient has been non compliant with bipap). Suspect hypoventilation is multifactorial given hyponatremia, use of muscle relaxers and chronic diaphragmatic dysfunction. ICU patient was placed on BiPAP and her Mentation improved, used NIV overnight.

## 2024-09-16 NOTE — SWALLOW BEDSIDE ASSESSMENT ADULT - SPECIFY REASON(S)
fed by clinician Subjective assessment of current swallow function spoon/fed by clinician cup/fed by clinician

## 2024-09-16 NOTE — PHYSICAL THERAPY INITIAL EVALUATION ADULT - PATIENT/FAMILY/SIGNIFICANT OTHER GOALS STATEMENT, PT EVAL
patient will be able to participate and perform mobility and ADL tasks independently while using a rolling walker

## 2024-09-16 NOTE — RAPID RESPONSE TEAM SUMMARY - NSOTHERINTERVENTIONSRRT_GEN_ALL_CORE
EKG showing sinus tachycardia with RBBB  Patient to be transferred to [facility] for continuos pulse oxy and sinus tachycardia  Full set of labs ordered, CBC, CMP, Mg, Phos, Lactate, Cardiac Enzymes, will follow  ABG ordered and will obtain after patient on bipap    After placing patient on BiPAP patient appears to be waking up and mentation appears to be improving.  Patients heart rate slowly improving to low 120s, likely 2/2 work of breathing on NC.  Patients BP also improing to 160s, also thought to be due to work of breathing.

## 2024-09-17 DIAGNOSIS — G93.40 ENCEPHALOPATHY, UNSPECIFIED: ICD-10-CM

## 2024-09-17 DIAGNOSIS — J98.6 DISORDERS OF DIAPHRAGM: ICD-10-CM

## 2024-09-17 DIAGNOSIS — I10 ESSENTIAL (PRIMARY) HYPERTENSION: ICD-10-CM

## 2024-09-17 DIAGNOSIS — Z29.9 ENCOUNTER FOR PROPHYLACTIC MEASURES, UNSPECIFIED: ICD-10-CM

## 2024-09-17 DIAGNOSIS — E87.1 HYPO-OSMOLALITY AND HYPONATREMIA: ICD-10-CM

## 2024-09-17 DIAGNOSIS — I87.2 VENOUS INSUFFICIENCY (CHRONIC) (PERIPHERAL): ICD-10-CM

## 2024-09-17 DIAGNOSIS — J96.01 ACUTE RESPIRATORY FAILURE WITH HYPOXIA: ICD-10-CM

## 2024-09-17 DIAGNOSIS — J98.11 ATELECTASIS: ICD-10-CM

## 2024-09-17 DIAGNOSIS — J18.9 PNEUMONIA, UNSPECIFIED ORGANISM: ICD-10-CM

## 2024-09-17 LAB
ALBUMIN SERPL ELPH-MCNC: 2.9 G/DL — LOW (ref 3.5–5)
ALP SERPL-CCNC: 57 U/L — SIGNIFICANT CHANGE UP (ref 40–120)
ALT FLD-CCNC: 19 U/L DA — SIGNIFICANT CHANGE UP (ref 10–60)
ANION GAP SERPL CALC-SCNC: 11 MMOL/L — SIGNIFICANT CHANGE UP (ref 5–17)
ANION GAP SERPL CALC-SCNC: 3 MMOL/L — LOW (ref 5–17)
AST SERPL-CCNC: 27 U/L — SIGNIFICANT CHANGE UP (ref 10–40)
BASE EXCESS BLDA CALC-SCNC: 12.5 MMOL/L — HIGH (ref -2–3)
BILIRUB SERPL-MCNC: 0.3 MG/DL — SIGNIFICANT CHANGE UP (ref 0.2–1.2)
BLOOD GAS COMMENTS ARTERIAL: SIGNIFICANT CHANGE UP
BUN SERPL-MCNC: 3 MG/DL — LOW (ref 7–23)
BUN SERPL-MCNC: 4 MG/DL — LOW (ref 7–18)
CALCIUM SERPL-MCNC: 7.4 MG/DL — LOW (ref 8.5–10.1)
CALCIUM SERPL-MCNC: 7.7 MG/DL — LOW (ref 8.4–10.5)
CHLORIDE SERPL-SCNC: 85 MMOL/L — LOW (ref 96–108)
CHLORIDE SERPL-SCNC: 86 MMOL/L — LOW (ref 96–108)
CO2 SERPL-SCNC: 31 MMOL/L — SIGNIFICANT CHANGE UP (ref 22–31)
CO2 SERPL-SCNC: 40 MMOL/L — HIGH (ref 22–31)
CREAT SERPL-MCNC: 0.34 MG/DL — LOW (ref 0.5–1.3)
CREAT SERPL-MCNC: 0.41 MG/DL — LOW (ref 0.5–1.3)
EGFR: 104 ML/MIN/1.73M2 — SIGNIFICANT CHANGE UP
EGFR: 99 ML/MIN/1.73M2 — SIGNIFICANT CHANGE UP
GLUCOSE BLDC GLUCOMTR-MCNC: 102 MG/DL — HIGH (ref 70–99)
GLUCOSE BLDC GLUCOMTR-MCNC: 106 MG/DL — HIGH (ref 70–99)
GLUCOSE BLDC GLUCOMTR-MCNC: 125 MG/DL — HIGH (ref 70–99)
GLUCOSE BLDC GLUCOMTR-MCNC: 128 MG/DL — HIGH (ref 70–99)
GLUCOSE SERPL-MCNC: 102 MG/DL — HIGH (ref 70–99)
GLUCOSE SERPL-MCNC: 115 MG/DL — HIGH (ref 70–99)
HCO3 BLDA-SCNC: 40 MMOL/L — HIGH (ref 21–28)
HCT VFR BLD CALC: 37.7 % — SIGNIFICANT CHANGE UP (ref 34.5–45)
HGB BLD-MCNC: 12.9 G/DL — SIGNIFICANT CHANGE UP (ref 11.5–15.5)
LACTATE SERPL-SCNC: 1 MMOL/L — SIGNIFICANT CHANGE UP (ref 0.7–2)
M PNEUMO IGM SER-ACNC: 0.19 INDEX — SIGNIFICANT CHANGE UP (ref 0–0.9)
MAGNESIUM SERPL-MCNC: 1.6 MG/DL — SIGNIFICANT CHANGE UP (ref 1.6–2.6)
MCHC RBC-ENTMCNC: 29.9 PG — SIGNIFICANT CHANGE UP (ref 27–34)
MCHC RBC-ENTMCNC: 34.2 GM/DL — SIGNIFICANT CHANGE UP (ref 32–36)
MCV RBC AUTO: 87.5 FL — SIGNIFICANT CHANGE UP (ref 80–100)
MYCOPLASMA AG SPEC QL: NEGATIVE — SIGNIFICANT CHANGE UP
NRBC # BLD: 0 /100 WBCS — SIGNIFICANT CHANGE UP (ref 0–0)
PCO2 BLDA: 65 MMHG — HIGH (ref 32–35)
PH BLDA: 7.4 — SIGNIFICANT CHANGE UP (ref 7.35–7.45)
PHOSPHATE SERPL-MCNC: 2.2 MG/DL — LOW (ref 2.5–4.5)
PLATELET # BLD AUTO: 239 K/UL — SIGNIFICANT CHANGE UP (ref 150–400)
PO2 BLDA: 120 MMHG — HIGH (ref 83–108)
POTASSIUM SERPL-MCNC: 3.4 MMOL/L — LOW (ref 3.5–5.3)
POTASSIUM SERPL-MCNC: 3.8 MMOL/L — SIGNIFICANT CHANGE UP (ref 3.5–5.3)
POTASSIUM SERPL-SCNC: 3.4 MMOL/L — LOW (ref 3.5–5.3)
POTASSIUM SERPL-SCNC: 3.8 MMOL/L — SIGNIFICANT CHANGE UP (ref 3.5–5.3)
PROT SERPL-MCNC: 6.3 G/DL — SIGNIFICANT CHANGE UP (ref 6–8.3)
RBC # BLD: 4.31 M/UL — SIGNIFICANT CHANGE UP (ref 3.8–5.2)
RBC # FLD: 12.2 % — SIGNIFICANT CHANGE UP (ref 10.3–14.5)
SAO2 % BLDA: 100 % — SIGNIFICANT CHANGE UP
SODIUM SERPL-SCNC: 127 MMOL/L — LOW (ref 135–145)
SODIUM SERPL-SCNC: 129 MMOL/L — LOW (ref 135–145)
TROPONIN I, HIGH SENSITIVITY RESULT: 491 NG/L — HIGH
TROPONIN I, HIGH SENSITIVITY RESULT: 579.1 NG/L — HIGH
WBC # BLD: 10.26 K/UL — SIGNIFICANT CHANGE UP (ref 3.8–10.5)
WBC # FLD AUTO: 10.26 K/UL — SIGNIFICANT CHANGE UP (ref 3.8–10.5)

## 2024-09-17 PROCEDURE — 99233 SBSQ HOSP IP/OBS HIGH 50: CPT | Mod: GC

## 2024-09-17 RX ORDER — POTASSIUM PHOSPHATE, MONOBASIC POTASSIUM PHOSPHATE, DIBASIC 224; 236 MG/ML; MG/ML
30 INJECTION, SOLUTION, CONCENTRATE INTRAVENOUS ONCE
Refills: 0 | Status: COMPLETED | OUTPATIENT
Start: 2024-09-17 | End: 2024-09-17

## 2024-09-17 RX ADMIN — POTASSIUM PHOSPHATE, MONOBASIC POTASSIUM PHOSPHATE, DIBASIC 83.33 MILLIMOLE(S): 224; 236 INJECTION, SOLUTION, CONCENTRATE INTRAVENOUS at 12:30

## 2024-09-17 RX ADMIN — Medication 100 MILLIGRAM(S): at 06:13

## 2024-09-17 RX ADMIN — PANTOPRAZOLE SODIUM 40 MILLIGRAM(S): 40 TABLET, DELAYED RELEASE ORAL at 06:14

## 2024-09-17 RX ADMIN — ENOXAPARIN SODIUM 40 MILLIGRAM(S): 150 INJECTION SUBCUTANEOUS at 06:16

## 2024-09-17 NOTE — PROGRESS NOTE ADULT - ASSESSMENT
1. Acute on chronic hypercapnic and hypoxic respiratory failure  2. Left lung atelectasis with possible super imposed pneumonia   3. Left elevated hemidiaphragm   4. Acute on chronic hyponatremia - likely depletional due to poor oral intake  5. Acute encephalopathy   6. Hypertension  79F PMH hypertension, arthritis, Neuralgia, osteoporotic compression fractures L5-S1, hyponatremia (HCTZ induced, corrected), goiter, who presented to the ED for left sided facial droop and SOB. Admitted to ICU for AHHRF 2/2 PNA and hyponatremia

## 2024-09-17 NOTE — PROGRESS NOTE ADULT - PROBLEM SELECTOR PLAN 5
hx of edema of b/l LE, LE duplex b/l neg in 7/2024, can be due to venous insuffiencey (chronic)  2+ pitting edema b/l LEs  proBNP WNL  TTE: 7/2024 EF 58%, G1DD  f/u b/l LE dopplers: no remarkable findings, neg for DVT

## 2024-09-17 NOTE — PROGRESS NOTE ADULT - PROBLEM SELECTOR PLAN 6
presenting with AMS, left facial droop as per ED  no facial droop but confused  --> current is AAOx1-2 and is less confused  likely due to acute on chronic hypercapnia vs infectious etiology vs hyponatremia, also has hx of muscle relaxant use  CT head: No acute intracranial  hemorrhage, mass effect or midline shift. Nonspecific moderate subcortical white matter hypodensities may relate to small vessel ischemic disease.  hold muscle relaxant use  c/w  rocephin and azithro for PNA  f/u blood cultures and urine cultures  trending Na

## 2024-09-17 NOTE — PROGRESS NOTE ADULT - PROBLEM SELECTOR PLAN 3
presenting with SOB   on 3L NC, monitor respiratory function   CTA chest: Areas of airspace consolidation, increased in the left lung and new in the right upper lobe  aspiration precautions  strep: neg  c/w  rocephin for 5 day course (day 3)  f/u BCx, UCx, sputum cultures, strep, mycoplasma, and legionella

## 2024-09-17 NOTE — PROGRESS NOTE ADULT - PROBLEM SELECTOR PLAN 7
on coreg and ?valsartan at home  /76 on admission  will continue to hold home meds for now  monitor BP

## 2024-09-17 NOTE — PROGRESS NOTE ADULT - PROBLEM SELECTOR PLAN 4
previously admitted for hyponatremia in July 2024  was due to HCTZ use during that time, per son, patient is not taking HCTZ after last admission  can be due to poor PO intake  Na 121 --> s/p 500mL NS --> Na 124  c/w 50mL/hr NS  continue to trend Na, goal Na repletion is 133 by 8PM (6-8 mmol/L for 24 hrs)

## 2024-09-17 NOTE — PROGRESS NOTE ADULT - SUBJECTIVE AND OBJECTIVE BOX
PGY-1 Progress Note discussed with attending    PAGER #: [[phone]] TILL 5:00 PM  PLEASE CONTACT ON CALL TEAM:  - On Call Team (Please refer to Chuy) FROM 5:00 PM - 8:30PM  - Nightfloat Team FROM 8:30 -7:30 AM    INTERVAL HPI/OVERNIGHT EVENTS:       REVIEW OF SYSTEMS:  CONSTITUTIONAL: No fever, chills,  or fatigue  RESPIRATORY: No cough, wheezing, No shortness of breath  CARDIOVASCULAR: No chest pain, palpitations,  leg swelling  GASTROINTESTINAL: No abdominal pain. No nausea, vomiting, or hematemesis; No diarrhea or constipation. No bloody or black stool  GENITOURINARY: No dysuria or hematuria, urinary frequency  NEUROLOGICAL: No headaches, dizziness  SKIN: No itching, burning, rashes, or lesions     Vital Signs Last 24 Hrs  T(C): 37 (17 Sep 2024 15:36), Max: 37.1 (17 Sep 2024 07:38)  T(F): 98.6 (17 Sep 2024 15:36), Max: 98.7 (17 Sep 2024 07:38)  HR: 96 (17 Sep 2024 15:36) (84 - 126)  BP: 138/76 (17 Sep 2024 15:36) (118/70 - 192/112)  BP(mean): 98 (16 Sep 2024 20:10) (98 - 98)  RR: 18 (17 Sep 2024 15:36) (18 - 20)  SpO2: 97% (17 Sep 2024 15:36) (88% - 97%)    Parameters below as of 17 Sep 2024 15:36  Patient On (Oxygen Delivery Method): nasal cannula        PHYSICAL EXAMINATION:  GENERAL: NAD,   HEAD:  Atraumatic, Normocephalic  EYES:  PERRLA,  conjunctiva and sclera clear  NECK: Supple,    CHEST WALL: Nontender  LUNG: Clear to auscultation. No rales, rhonchi, wheezing, or rubs  HEART: Regular rate and rhythm; No murmurs,  ABDOMEN: Soft, Nontender, Nondistended; Bowel sounds present, No Rovsing's sign   NERVOUS SYSTEM:  Alert & Oriented X3,    EXTREMITIES:  2+ Peripheral Pulses, No clubbing, cyanosis, or edema  CALF: No Calf tenderness   SKIN: warm dry                          12.9   10.26 )-----------( 239      ( 17 Sep 2024 06:03 )             37.7     09-17    129[L]  |  86[L]  |  4[L]  ----------------------------<  115[H]  3.8   |  40[H]  |  0.34[L]    Ca    7.7[L]      17 Sep 2024 14:33  Phos  2.2     09-17  Mg     1.6     09-17    TPro  6.3  /  Alb  2.9[L]  /  TBili  0.3  /  DBili  x   /  AST  27  /  ALT  19  /  AlkPhos  57  09-17    LIVER FUNCTIONS - ( 17 Sep 2024 14:33 )  Alb: 2.9 g/dL / Pro: 6.3 g/dL / ALK PHOS: 57 U/L / ALT: 19 U/L DA / AST: 27 U/L / GGT: x                   CAPILLARY BLOOD GLUCOSE      RADIOLOGY & ADDITIONAL TESTS:                   PGY-1 Progress Note discussed with attending    PAGER #: [[phone]] TILL 5:00 PM  PLEASE CONTACT ON CALL TEAM:  - On Call Team (Please refer to Chuy) FROM 5:00 PM - 8:30PM  - Nightfloat Team FROM 8:30 -7:30 AM    INTERVAL HPI/OVERNIGHT EVENTS: Patient complains of increased work of breathing and shortness of breath.       REVIEW OF SYSTEMS:  CONSTITUTIONAL: No fever, chills, or fatigue  RESPIRATORY: No cough, wheezing, ++shortness of breath  CARDIOVASCULAR: No chest pain, palpitations,  leg swelling  GASTROINTESTINAL: No abdominal pain. No nausea, vomiting, or hematemesis; No diarrhea or constipation. No bloody or black stool  GENITOURINARY: No dysuria or hematuria, urinary frequency  NEUROLOGICAL: No headaches, dizziness  SKIN: No itching, burning, rashes, or lesions     Vital Signs Last 24 Hrs  T(C): 37 (17 Sep 2024 15:36), Max: 37.1 (17 Sep 2024 07:38)  T(F): 98.6 (17 Sep 2024 15:36), Max: 98.7 (17 Sep 2024 07:38)  HR: 96 (17 Sep 2024 15:36) (84 - 126)  BP: 138/76 (17 Sep 2024 15:36) (118/70 - 192/112)  BP(mean): 98 (16 Sep 2024 20:10) (98 - 98)  RR: 18 (17 Sep 2024 15:36) (18 - 20)  SpO2: 97% (17 Sep 2024 15:36) (88% - 97%)    Parameters below as of 17 Sep 2024 15:36  Patient On (Oxygen Delivery Method): nasal cannula        PHYSICAL EXAMINATION:  GENERAL: NAD,   HEAD:  Atraumatic, Normocephalic  EYES:  PERRLA,  conjunctiva and sclera clear  NECK: Supple,    CHEST WALL: Nontender  LUNG: Clear to auscultation. No rales, rhonchi, wheezing, or rubs  HEART: Regular rate and rhythm; No murmurs,  ABDOMEN: Soft, Nontender, Nondistended; Bowel sounds present  NEUROLOGY: A&Ox1-2, moving all extremities  EXTREMITIES:  2+ Peripheral Pulses, 2+ b/l lower extremity edema  SKIN: warm, dry, normal color, no rash or abnormal lesions                          12.9   10.26 )-----------( 239      ( 17 Sep 2024 06:03 )             37.7     09-17    129[L]  |  86[L]  |  4[L]  ----------------------------<  115[H]  3.8   |  40[H]  |  0.34[L]    Ca    7.7[L]      17 Sep 2024 14:33  Phos  2.2     09-17  Mg     1.6     09-17    TPro  6.3  /  Alb  2.9[L]  /  TBili  0.3  /  DBili  x   /  AST  27  /  ALT  19  /  AlkPhos  57  09-17    LIVER FUNCTIONS - ( 17 Sep 2024 14:33 )  Alb: 2.9 g/dL / Pro: 6.3 g/dL / ALK PHOS: 57 U/L / ALT: 19 U/L DA / AST: 27 U/L / GGT: x                   CAPILLARY BLOOD GLUCOSE      RADIOLOGY & ADDITIONAL TESTS:

## 2024-09-17 NOTE — PROGRESS NOTE ADULT - ATTENDING COMMENTS
Patient seen/evaluated at bedside. I agree with the resident progress note/outlined plan of care. My independent findings and conclusions are documented.    Pt reports increased work of breathing is unchanged since ICU course. No fevers/chills      Assessment:  1. Acute on chronic hypercapnic and hypoxic respiratory failure  2. pneumonia   3. h/o diaphragmatic dysfunction  4. Acute on chronic hyponatremia /volume depletion  5. Acute encephalopathy   6. Hypertension     Plan:  frequent reassessment- appears comfortable currently but low threshold for reassessment for increased wob  would not discharge on [facility]  outpatient eval for diaphragmatic dysfunction  continue with IVF hydration  trial of void today   Serial BMP  Cont oral diet  Monitor urine output  PT evaluation pending  DVT prophylaxis

## 2024-09-17 NOTE — PROGRESS NOTE ADULT - PROBLEM SELECTOR PLAN 2
presenting with SOB   on 3L NC, monitor respiratory function   CTA chest: Areas of airspace consolidation, increased in the left lung and new in the right upper lobe  aspiration precautions  strep: neg  c/w  rocephin for 5 day course  f/u BCx, UCx, sputum cultures, strep, mycoplasma, and legionella

## 2024-09-18 LAB
ANION GAP SERPL CALC-SCNC: 4 MMOL/L — LOW (ref 5–17)
BUN SERPL-MCNC: 4 MG/DL — LOW (ref 7–18)
CALCIUM SERPL-MCNC: 8.5 MG/DL — SIGNIFICANT CHANGE UP (ref 8.4–10.5)
CHLORIDE SERPL-SCNC: 87 MMOL/L — LOW (ref 96–108)
CO2 SERPL-SCNC: 37 MMOL/L — HIGH (ref 22–31)
CREAT SERPL-MCNC: 0.28 MG/DL — LOW (ref 0.5–1.3)
EGFR: 109 ML/MIN/1.73M2 — SIGNIFICANT CHANGE UP
GAS PNL BLDA: SIGNIFICANT CHANGE UP
GLUCOSE BLDC GLUCOMTR-MCNC: 110 MG/DL — HIGH (ref 70–99)
GLUCOSE BLDC GLUCOMTR-MCNC: 112 MG/DL — HIGH (ref 70–99)
GLUCOSE BLDC GLUCOMTR-MCNC: 122 MG/DL — HIGH (ref 70–99)
GLUCOSE BLDC GLUCOMTR-MCNC: 123 MG/DL — HIGH (ref 70–99)
GLUCOSE SERPL-MCNC: 120 MG/DL — HIGH (ref 70–99)
HCT VFR BLD CALC: 38.7 % — SIGNIFICANT CHANGE UP (ref 34.5–45)
HGB BLD-MCNC: 12.6 G/DL — SIGNIFICANT CHANGE UP (ref 11.5–15.5)
MAGNESIUM SERPL-MCNC: 1.9 MG/DL — SIGNIFICANT CHANGE UP (ref 1.6–2.6)
MCHC RBC-ENTMCNC: 29.6 PG — SIGNIFICANT CHANGE UP (ref 27–34)
MCHC RBC-ENTMCNC: 32.6 GM/DL — SIGNIFICANT CHANGE UP (ref 32–36)
MCV RBC AUTO: 90.8 FL — SIGNIFICANT CHANGE UP (ref 80–100)
NRBC # BLD: 0 /100 WBCS — SIGNIFICANT CHANGE UP (ref 0–0)
PHOSPHATE SERPL-MCNC: 2.3 MG/DL — LOW (ref 2.5–4.5)
PLATELET # BLD AUTO: 234 K/UL — SIGNIFICANT CHANGE UP (ref 150–400)
POTASSIUM SERPL-MCNC: 3.7 MMOL/L — SIGNIFICANT CHANGE UP (ref 3.5–5.3)
POTASSIUM SERPL-SCNC: 3.7 MMOL/L — SIGNIFICANT CHANGE UP (ref 3.5–5.3)
RBC # BLD: 4.26 M/UL — SIGNIFICANT CHANGE UP (ref 3.8–5.2)
RBC # FLD: 13 % — SIGNIFICANT CHANGE UP (ref 10.3–14.5)
SODIUM SERPL-SCNC: 128 MMOL/L — LOW (ref 135–145)
SODIUM UR-SCNC: 107 MMOL/L — SIGNIFICANT CHANGE UP
WBC # BLD: 7.42 K/UL — SIGNIFICANT CHANGE UP (ref 3.8–10.5)
WBC # FLD AUTO: 7.42 K/UL — SIGNIFICANT CHANGE UP (ref 3.8–10.5)

## 2024-09-18 PROCEDURE — 71045 X-RAY EXAM CHEST 1 VIEW: CPT | Mod: 26

## 2024-09-18 RX ORDER — CARVEDILOL 3.125 MG
25 TABLET ORAL EVERY 12 HOURS
Refills: 0 | Status: DISCONTINUED | OUTPATIENT
Start: 2024-09-18 | End: 2024-09-25

## 2024-09-18 RX ORDER — SODIUM CHLORIDE 0.9 % (FLUSH) 0.9 %
1000 SYRINGE (ML) INJECTION
Refills: 0 | Status: DISCONTINUED | OUTPATIENT
Start: 2024-09-18 | End: 2024-09-18

## 2024-09-18 RX ORDER — IPRATROPIUM BROMIDE AND ALBUTEROL SULFATE .5; 3 MG/3ML; MG/3ML
3 SOLUTION RESPIRATORY (INHALATION) ONCE
Refills: 0 | Status: COMPLETED | OUTPATIENT
Start: 2024-09-18 | End: 2024-09-18

## 2024-09-18 RX ORDER — FUROSEMIDE 10 MG/ML
40 INJECTION INTRAVENOUS ONCE
Refills: 0 | Status: COMPLETED | OUTPATIENT
Start: 2024-09-18 | End: 2024-09-18

## 2024-09-18 RX ADMIN — IPRATROPIUM BROMIDE AND ALBUTEROL SULFATE 3 MILLILITER(S): .5; 3 SOLUTION RESPIRATORY (INHALATION) at 09:32

## 2024-09-18 RX ADMIN — Medication 25 MILLIGRAM(S): at 16:41

## 2024-09-18 RX ADMIN — FUROSEMIDE 40 MILLIGRAM(S): 10 INJECTION INTRAVENOUS at 12:19

## 2024-09-18 RX ADMIN — PANTOPRAZOLE SODIUM 40 MILLIGRAM(S): 40 TABLET, DELAYED RELEASE ORAL at 06:12

## 2024-09-18 RX ADMIN — Medication 100 MILLIGRAM(S): at 05:35

## 2024-09-18 RX ADMIN — ENOXAPARIN SODIUM 40 MILLIGRAM(S): 150 INJECTION SUBCUTANEOUS at 05:48

## 2024-09-18 NOTE — PROGRESS NOTE ADULT - PROBLEM SELECTOR PLAN 1
likely acute on chronic hypercapnia, PNA  suspect hypoventilation can be 2/2 left elevated hemidiaphragm as seen on CT angio chest, hyponatremia which can cause AMS and hx of muscle relaxant use (?gabapentin x cyclobenzaprine)  non compliant with nightly BIPAP per patient's son  presenting with SOB   ABG 7.27/119/190/55 at admission > s/p BIPAP >  7.36/76/124/43  CTA chest: Areas of airspace consolidation, increased in the left lung and new in the right upper lobe  aspiration precautions  s/p BIPAP, on 2L NC, monitor respiratory function  c/w nocturnal BiPAP

## 2024-09-18 NOTE — PROGRESS NOTE ADULT - ASSESSMENT
79F PMH hypertension, arthritis, Neuralgia, osteoporotic compression fractures L5-S1, hyponatremia (HCTZ induced, corrected), goiter, who presented to the ED for left sided facial droop and SOB. Admitted to ICU for AHHRF 2/2 PNA and hyponatremia.

## 2024-09-18 NOTE — PROGRESS NOTE ADULT - PROBLEM SELECTOR PLAN 4
previously admitted for hyponatremia in July 2024  likely due to hypervolemia  gentle diuresis iso hypervolemia  continue to trend Na, goal Na repletion is 133 by 8PM (6-8 mmol/L for 24 hrs)

## 2024-09-18 NOTE — PROGRESS NOTE ADULT - ASSESSMENT
79 year old female, with PMHx of Diaphragmatic dysfunction and chronic lower extremity edema, Hyponatremia on hctz in the past, HTN, presented with dyspnea and AMS. Found to be hypoxic and hypercapnic in ED with pCO2 of ~120 and hyponatremic to 121 in ED and ICU was consulted for evaluation. Patient was placed on NIV with clinical improvement. Earlier this year, she was admitted to [facility] for similar complaints and was discharged home with BIPAP support. However, she was not compliant with BIPAP. Hypoventilation likely multifactorial given hyponatremia, use of muscle relaxers and chronic diaphragmatic dysfunction. Patient downgraded to medicine on 9/16/24. ICU reconsulted for continues BIPAP in the setting of respiratory distress. Patient now transitioned from BIPAP to 3L NC and tolerating well.     Assessment:  1. Acute on chronic hypercapnic and hypoxic respiratory failure  2. Left lung atelectasis with possible super imposed pneumonia   3. Left elevated hemidiaphragm     Recommendations:   - Increase inspiratory pressure to 16 and continue expiratory pressure with 6. Patient was on 16/6 in the ICU. Noted to be on 10/6 overnight.   - Continues pulse oximeter  - Hold IVFs  - Give Lasix 40 mg IV x1  - Incentive spirometer   - Out of bed to chair      Angel Jimenez  PGY-3  Available on Teams  Pending attending attestation

## 2024-09-18 NOTE — PROGRESS NOTE ADULT - PROBLEM SELECTOR PLAN 3
presenting with SOB   on 3L NC, monitor respiratory function   CTA chest: Areas of airspace consolidation, increased in the left lung and new in the right upper lobe  aspiration precautions  c/w  rocephin for 5 day course (day 4/5)  f/u BCx, UCx negative at 72h (preliminary report)  strep, mycoplasma, and legionella negative

## 2024-09-18 NOTE — PROGRESS NOTE ADULT - SUBJECTIVE AND OBJECTIVE BOX
INTERVAL HPI/OVERNIGHT EVENTS:  Patient was noted to have increased work of breathing after taken off nocturnal BIPAP this morning. She was placed on BIPAP which helped with her respiratory distress. Patient was seen and examined at bedside along with ICU attending. No worsening hypercapnia on ABG today. Patient placed on 3L NC and not on respiratory distress at this time. No complaining of dyspnea, chest pain, or palpitations.     PRESSORS: [  ] YES [ X ] NO    Antimicrobial:  cefTRIAXone   IVPB 1000 milliGRAM(s) IV Intermittent every 24 hours    Cardiovascular:  furosemide   Injectable 40 milliGRAM(s) IV Push once    Hematalogic:  enoxaparin Injectable 40 milliGRAM(s) SubCutaneous every 24 hours    Other:  pantoprazole    Tablet 40 milliGRAM(s) Oral before breakfast  sodium chloride 0.65% Nasal 1 Spray(s) Both Nostrils every 4 hours PRN  sodium chloride 0.9%. 1000 milliLiter(s) IV Continuous <Continuous>    cefTRIAXone   IVPB 1000 milliGRAM(s) IV Intermittent every 24 hours  enoxaparin Injectable 40 milliGRAM(s) SubCutaneous every 24 hours  furosemide   Injectable 40 milliGRAM(s) IV Push once  pantoprazole    Tablet 40 milliGRAM(s) Oral before breakfast  sodium chloride 0.65% Nasal 1 Spray(s) Both Nostrils every 4 hours PRN  sodium chloride 0.9%. 1000 milliLiter(s) IV Continuous <Continuous>    Drug Dosing Weight  Height (cm): 152.4 (15 Sep 2024 04:05)  Weight (kg): 62.1 (15 Sep 2024 04:05)  BMI (kg/m2): 26.7 (15 Sep 2024 04:05)  BSA (m2): 1.59 (15 Sep 2024 04:05)    CENTRAL LINE: [ ] YES [x ] NO    PASCUAL: [ ] YES [x ] NO        A-LINE:  [ ] YES [x ] NO    PMH -reviewed admission note, no change since admission  PAST MEDICAL & SURGICAL HISTORY:  HTN (hypertension)  Lumbar neuralgia  Hyponatremia  Arthritis    ICU Vital Signs Last 24 Hrs  T(C): 37 (18 Sep 2024 07:25), Max: 37 (17 Sep 2024 15:36)  T(F): 98.6 (18 Sep 2024 07:25), Max: 98.6 (17 Sep 2024 15:36)  HR: 119 (18 Sep 2024 07:40) (71 - 119)  BP: 171/100 (18 Sep 2024 07:25) (123/66 - 171/100)  BP(mean): --  ABP: --  ABP(mean): --  RR: 20 (18 Sep 2024 07:25) (18 - 20)  SpO2: 99% (18 Sep 2024 07:40) (95% - 100%)    O2 Parameters below as of 18 Sep 2024 07:25  Patient On (Oxygen Delivery Method): nasal cannula  O2 Flow (L/min): 2    ABG - ( 18 Sep 2024 09:49 )  pH, Arterial: 7.41  pH, Blood: x     /  pCO2: 65    /  pO2: 66    / HCO3: 41    / Base Excess: 13.5  /  SaO2: 97        09-17 @ 07:01  -  09-18 @ 07:00  --------------------------------------------------------  IN: 430 mL / OUT: 950 mL / NET: -520 mL    PHYSICAL EXAM:    GENERAL: NAD, well-groomed, elderly female sitting comfortable in bed on 3L NC  HEAD:  Atraumatic, Normocephalic  EYES: EOMI, PERRLA, conjunctiva and sclera clear  ENMT: No tonsillar erythema, exudates, or enlargement; Moist mucous membranes, No lesions  NECK: Supple, normal appearance, No JVD; Normal thyroid; Trachea midline  NERVOUS SYSTEM:  Alert & Oriented X2 to person and place, No focal sensory or motor deficits are noted; DTRs 2+ intact and symmetric  CHEST/LUNG: No chest deformity; Normal percussion bilaterally; noted to have mild crackles on the right lower zone and decreased breath sounds on the left lower zone, no wheezing   HEART: Rapid and regular rate and rhythm; No murmurs, rubs, or gallops  ABDOMEN: Soft, Nontender, Nondistended; Bowel sounds present  EXTREMITIES:  2+ Peripheral Pulses, No clubbing, cyanosis, pitting edema  LYMPH: No lymphadenopathy noted  SKIN: No rashes or lesions; Good capillary refill, warm    LABS:  CBC Full  -  ( 18 Sep 2024 06:16 )  WBC Count : 7.42 K/uL  RBC Count : 4.26 M/uL  Hemoglobin : 12.6 g/dL  Hematocrit : 38.7 %  Platelet Count - Automated : 234 K/uL  Mean Cell Volume : 90.8 fl  Mean Cell Hemoglobin : 29.6 pg  Mean Cell Hemoglobin Concentration : 32.6 gm/dL  Auto Neutrophil # : x  Auto Lymphocyte # : x  Auto Monocyte # : x  Auto Eosinophil # : x  Auto Basophil # : x  Auto Neutrophil % : x  Auto Lymphocyte % : x  Auto Monocyte % : x  Auto Eosinophil % : x  Auto Basophil % : x    09-18    128[L]  |  87[L]  |  4[L]  ----------------------------<  120[H]  3.7   |  37[H]  |  0.28[L]    Ca    8.5      18 Sep 2024 06:16  Phos  2.3     09-18  Mg     1.9     09-18    TPro  6.3  /  Alb  2.9[L]  /  TBili  0.3  /  DBili  x   /  AST  27  /  ALT  19  /  AlkPhos  57  09-17    RADIOLOGY & ADDITIONAL STUDIES REVIEWED:  ***

## 2024-09-18 NOTE — PROGRESS NOTE ADULT - SUBJECTIVE AND OBJECTIVE BOX
PGY-1 Progress Note discussed with attending    PAGER #: [[phone]] TILL 5:00 PM  PLEASE CONTACT ON CALL TEAM:  - On Call Team (Please refer to Chuy) FROM 5:00 PM - 8:30PM  - Nightfloat Team FROM 8:30 -7:30 AM    INTERVAL HPI/OVERNIGHT EVENTS: No acute events overnight.  Patient examined at bedside this AM.  Patient tolerated BiPAP overnight and was placed on nasal cannula 3L. On NC the patient was in moderate respiratory distress after which BiPAP was placed back on, and work of breathing improved. Apart from that, no acute complaints.       REVIEW OF SYSTEMS:  CONSTITUTIONAL: No fever, chills,  or fatigue  RESPIRATORY: No cough, wheezing, +shortness of breath  CARDIOVASCULAR: No chest pain, palpitations,  leg swelling  GASTROINTESTINAL: No abdominal pain. No nausea, vomiting, or hematemesis; No diarrhea or constipation. No bloody or black stool  GENITOURINARY: No dysuria or hematuria, urinary frequency  NEUROLOGICAL: No headaches, dizziness  SKIN: No itching, burning, rashes, or lesions     Vital Signs Last 24 Hrs  T(C): 36.9 (18 Sep 2024 12:02), Max: 37 (17 Sep 2024 15:36)  T(F): 98.4 (18 Sep 2024 12:02), Max: 98.6 (17 Sep 2024 15:36)  HR: 98 (18 Sep 2024 12:02) (71 - 119)  BP: 161/89 (18 Sep 2024 12:02) (123/66 - 171/100)  BP(mean): --  RR: 20 (18 Sep 2024 12:02) (18 - 20)  SpO2: 97% (18 Sep 2024 12:02) (95% - 100%)    Parameters below as of 18 Sep 2024 12:02  Patient On (Oxygen Delivery Method): nasal cannula 2L        PHYSICAL EXAMINATION:  GENERAL: NAD, in moderate respiratory distress on NC  HEAD:  Atraumatic, Normocephalic  EYES:  PERRLA,  conjunctiva and sclera clear  NECK: Supple, +BL supraclavicular retractions    CHEST WALL: Nontender  LUNG: Clear to auscultation on the right side upper and middle lobes. Right lower lung +crackles. L upper lobe +clear breath sounds. Middle and lower lobe +bowel sounds  HEART: Regular rate and rhythm; No murmurs,  ABDOMEN: Soft, Nontender, Nondistended; Bowel sounds present, No Rovsing's sign   NERVOUS SYSTEM:  Alert & Oriented X2   EXTREMITIES:  2+ Peripheral Pulses, No clubbing, cyanosis. +Pitting edema  CALF: No Calf tenderness   SKIN: warm dry                          12.6   7.42  )-----------( 234      ( 18 Sep 2024 06:16 )             38.7     09-18    128[L]  |  87[L]  |  4[L]  ----------------------------<  120[H]  3.7   |  37[H]  |  0.28[L]    Ca    8.5      18 Sep 2024 06:16  Phos  2.3     09-18  Mg     1.9     09-18    TPro  6.3  /  Alb  2.9[L]  /  TBili  0.3  /  DBili  x   /  AST  27  /  ALT  19  /  AlkPhos  57  09-17    LIVER FUNCTIONS - ( 17 Sep 2024 14:33 )  Alb: 2.9 g/dL / Pro: 6.3 g/dL / ALK PHOS: 57 U/L / ALT: 19 U/L DA / AST: 27 U/L / GGT: x                   CAPILLARY BLOOD GLUCOSE      RADIOLOGY & ADDITIONAL TESTS:

## 2024-09-18 NOTE — PROGRESS NOTE ADULT - ATTENDING COMMENTS
78 yo with hx of diaphragmatic dysfunction and chronic lower extremity edema, hyponatremia on hctz in the past, HTN, now p/w SOB, AMS,  found to be hypoxic and hypercapnic in ED with pCO2 of ~120 and hyponatremic to 121 in ED and MICU consulted for evaluation  Pt placed on NIV with clinical improvement. History obtained from patient's son and daughter in law at bedside. States earlier this year was admitted to [facility] for similar complaints and was discharged home with bipap support. Though patient has been non compliant with bipap. Suspect hypoventilation is multifactorial given hyponatremia, use of muscle relaxers and chronic diaphragmatic dysfunction.     9/18: ICU follow up for respiratory distress. Patient evaluated, comfortable on bipap, though noted to be on lower settings since ICU transfer. Taken off bipap and placed on NC. Denies any chest pain or respiratory discomfort. On Exam noted to have bilateral crackles, concern for pulmonary edema. This morning reported with episode of tachycardia as well.     Assessment:  1. Acute on chronic hypercapnic and hypoxic respiratory failure  2. Left lung atelectasis with possible super imposed pneumonia   3. Left elevated hemidiaphragm   4. Acute on chronic hyponatremia - likely depletional due to poor oral intake  5. Acute encephalopathy   6. Hypertension     Plan:  Cont. Bipap support when sleeping and at night time  Consider gentle diuresis and aim for negative fluid balance   Was on Beta blockers at home, may be developing beta blocker withdrawal, consider reinitiating  Hold cyclobenzapine and gabapentin for now  Will need evaluation for diaphragmatic dysfunction   Empiric antibiotics for possible pneumonia, follow up culture results  Hold further fluid resuscitation   Cont oral diet  Monitor urine output  PT evaluation   DVT prophylaxis  Discussed with primary team (resident and bed side RN)  Cont. care on medical service for now

## 2024-09-19 LAB
ALBUMIN SERPL ELPH-MCNC: 3 G/DL — LOW (ref 3.5–5)
ALP SERPL-CCNC: 56 U/L — SIGNIFICANT CHANGE UP (ref 40–120)
ALT FLD-CCNC: 23 U/L DA — SIGNIFICANT CHANGE UP (ref 10–60)
ANION GAP SERPL CALC-SCNC: 6 MMOL/L — SIGNIFICANT CHANGE UP (ref 5–17)
AST SERPL-CCNC: 26 U/L — SIGNIFICANT CHANGE UP (ref 10–40)
BILIRUB SERPL-MCNC: 0.5 MG/DL — SIGNIFICANT CHANGE UP (ref 0.2–1.2)
BUN SERPL-MCNC: 7 MG/DL — SIGNIFICANT CHANGE UP (ref 7–18)
CALCIUM SERPL-MCNC: 9.3 MG/DL — SIGNIFICANT CHANGE UP (ref 8.4–10.5)
CHLORIDE SERPL-SCNC: 83 MMOL/L — LOW (ref 96–108)
CO2 SERPL-SCNC: 40 MMOL/L — HIGH (ref 22–31)
CREAT SERPL-MCNC: 0.28 MG/DL — LOW (ref 0.5–1.3)
EGFR: 109 ML/MIN/1.73M2 — SIGNIFICANT CHANGE UP
GLUCOSE BLDC GLUCOMTR-MCNC: 105 MG/DL — HIGH (ref 70–99)
GLUCOSE BLDC GLUCOMTR-MCNC: 120 MG/DL — HIGH (ref 70–99)
GLUCOSE BLDC GLUCOMTR-MCNC: 127 MG/DL — HIGH (ref 70–99)
GLUCOSE SERPL-MCNC: 115 MG/DL — HIGH (ref 70–99)
HCT VFR BLD CALC: 37.1 % — SIGNIFICANT CHANGE UP (ref 34.5–45)
HGB BLD-MCNC: 12.1 G/DL — SIGNIFICANT CHANGE UP (ref 11.5–15.5)
MAGNESIUM SERPL-MCNC: 1.9 MG/DL — SIGNIFICANT CHANGE UP (ref 1.6–2.6)
MCHC RBC-ENTMCNC: 29.5 PG — SIGNIFICANT CHANGE UP (ref 27–34)
MCHC RBC-ENTMCNC: 32.6 GM/DL — SIGNIFICANT CHANGE UP (ref 32–36)
MCV RBC AUTO: 90.5 FL — SIGNIFICANT CHANGE UP (ref 80–100)
NRBC # BLD: 0 /100 WBCS — SIGNIFICANT CHANGE UP (ref 0–0)
OSMOLALITY UR: 286 MOSM/KG — SIGNIFICANT CHANGE UP (ref 50–1200)
PHOSPHATE SERPL-MCNC: 2.4 MG/DL — LOW (ref 2.5–4.5)
PLATELET # BLD AUTO: 198 K/UL — SIGNIFICANT CHANGE UP (ref 150–400)
POTASSIUM SERPL-MCNC: 3.7 MMOL/L — SIGNIFICANT CHANGE UP (ref 3.5–5.3)
POTASSIUM SERPL-SCNC: 3.7 MMOL/L — SIGNIFICANT CHANGE UP (ref 3.5–5.3)
PROT SERPL-MCNC: 6.6 G/DL — SIGNIFICANT CHANGE UP (ref 6–8.3)
RBC # BLD: 4.1 M/UL — SIGNIFICANT CHANGE UP (ref 3.8–5.2)
RBC # FLD: 13 % — SIGNIFICANT CHANGE UP (ref 10.3–14.5)
SODIUM SERPL-SCNC: 129 MMOL/L — LOW (ref 135–145)
WBC # BLD: 8.43 K/UL — SIGNIFICANT CHANGE UP (ref 3.8–10.5)
WBC # FLD AUTO: 8.43 K/UL — SIGNIFICANT CHANGE UP (ref 3.8–10.5)

## 2024-09-19 PROCEDURE — 99222 1ST HOSP IP/OBS MODERATE 55: CPT

## 2024-09-19 PROCEDURE — 99233 SBSQ HOSP IP/OBS HIGH 50: CPT | Mod: GC

## 2024-09-19 RX ORDER — PETROLATUM,WHITE
1 JELLY (GRAM) TOPICAL ONCE
Refills: 0 | Status: DISCONTINUED | OUTPATIENT
Start: 2024-09-19 | End: 2024-09-25

## 2024-09-19 RX ORDER — FUROSEMIDE 10 MG/ML
20 INJECTION INTRAVENOUS ONCE
Refills: 0 | Status: DISCONTINUED | OUTPATIENT
Start: 2024-09-19 | End: 2024-09-23

## 2024-09-19 RX ORDER — SOD PHOS DI, MONO/K PHOS MONO 250 MG
1 TABLET ORAL ONCE
Refills: 0 | Status: COMPLETED | OUTPATIENT
Start: 2024-09-19 | End: 2024-09-19

## 2024-09-19 RX ADMIN — ENOXAPARIN SODIUM 40 MILLIGRAM(S): 150 INJECTION SUBCUTANEOUS at 05:24

## 2024-09-19 RX ADMIN — Medication 100 MILLIGRAM(S): at 05:25

## 2024-09-19 RX ADMIN — Medication 1 PACKET(S): at 12:26

## 2024-09-19 RX ADMIN — Medication 25 MILLIGRAM(S): at 05:23

## 2024-09-19 RX ADMIN — PANTOPRAZOLE SODIUM 40 MILLIGRAM(S): 40 TABLET, DELAYED RELEASE ORAL at 05:24

## 2024-09-19 RX ADMIN — Medication 25 MILLIGRAM(S): at 19:24

## 2024-09-19 NOTE — PROGRESS NOTE ADULT - ATTENDING COMMENTS
Patient seen/evaluated at bedside on 09/19/2024. I agree with the resident progress note/outlined plan of care. My independent findings and conclusions are documented. Med Student interpreting at bedside    S: + fatigue, no coughing  update provided to son at bedside      exam: vitals reviewed 96% on 3.0L  appears more comfortable today on nasal cannula  few right sided basilar crackles, decreased breath sounds at left lung field  S1S2 distant  soft, NT, ND + BS  no LE edema/cyanosis/clubbing    labs reviewed    Assessment:  1. Acute on chronic hypercapnic and hypoxic respiratory failure  2. pneumonia   3. h/o diaphragmatic dysfunction  4. Acute on chronic hyponatremia /volume depletion  5. Acute encephalopathy   6. debility  7. Hypertension     Plan:  -pulmonary consult appreciated  -neurology consult  -NIFs/FVC testing- via respiratory therapy  -began mobilization- out of bed to chair  -s/p lasix 40mg IV x 1 yesterday, give additional 20mg IV x 1 today  -outpatient for full eval of diaphragmatic dysfunction  trial of void today   Serial BMP  Cont oral diet  Monitor urine output  PT evaluation pending  DVT prophylaxis .

## 2024-09-19 NOTE — PROGRESS NOTE ADULT - SUBJECTIVE AND OBJECTIVE BOX
PGY-1 Progress Note discussed with attending    PAGER #: [[phone]] TILL 5:00 PM  PLEASE CONTACT ON CALL TEAM:  - On Call Team (Please refer to Chuy) FROM 5:00 PM - 8:30PM  - Nightfloat Team FROM 8:30 -7:30 AM    INTERVAL HPI/OVERNIGHT EVENTS:       REVIEW OF SYSTEMS:  CONSTITUTIONAL: No fever, chills,  or fatigue  RESPIRATORY: No cough, wheezing, No shortness of breath  CARDIOVASCULAR: No chest pain, palpitations,  leg swelling  GASTROINTESTINAL: No abdominal pain. No nausea, vomiting, or hematemesis; No diarrhea or constipation. No bloody or black stool  GENITOURINARY: No dysuria or hematuria, urinary frequency  NEUROLOGICAL: No headaches, dizziness  SKIN: No itching, burning, rashes, or lesions     Vital Signs Last 24 Hrs  T(C): 36.2 (19 Sep 2024 15:13), Max: 37.1 (18 Sep 2024 19:43)  T(F): 97.2 (19 Sep 2024 15:13), Max: 98.8 (18 Sep 2024 19:43)  HR: 72 (19 Sep 2024 15:13) (66 - 90)  BP: 122/64 (19 Sep 2024 15:13) (109/61 - 157/69)  BP(mean): 94 (19 Sep 2024 04:59) (78 - 94)  RR: 18 (19 Sep 2024 15:13) (18 - 24)  SpO2: 98% (19 Sep 2024 15:13) (95% - 100%)    Parameters below as of 19 Sep 2024 15:13  Patient On (Oxygen Delivery Method): nasal cannula  O2 Flow (L/min): 3      PHYSICAL EXAMINATION:  GENERAL: NAD,   HEAD:  Atraumatic, Normocephalic  EYES:  PERRLA,  conjunctiva and sclera clear  NECK: Supple,    CHEST WALL: Nontender  LUNG: Clear to auscultation. No rales, rhonchi, wheezing, or rubs  HEART: Regular rate and rhythm; No murmurs,  ABDOMEN: Soft, Nontender, Nondistended; Bowel sounds present, No Rovsing's sign   NERVOUS SYSTEM:  Alert & Oriented X3,    EXTREMITIES:  2+ Peripheral Pulses, No clubbing, cyanosis, or edema  CALF: No Calf tenderness   SKIN: warm dry                          12.1   8.43  )-----------( 198      ( 19 Sep 2024 05:53 )             37.1     09-19    129[L]  |  83[L]  |  7   ----------------------------<  115[H]  3.7   |  40[H]  |  0.28[L]    Ca    9.3      19 Sep 2024 05:53  Phos  2.4     09-19  Mg     1.9     09-19    TPro  6.6  /  Alb  3.0[L]  /  TBili  0.5  /  DBili  x   /  AST  26  /  ALT  23  /  AlkPhos  56  09-19    LIVER FUNCTIONS - ( 19 Sep 2024 05:53 )  Alb: 3.0 g/dL / Pro: 6.6 g/dL / ALK PHOS: 56 U/L / ALT: 23 U/L DA / AST: 26 U/L / GGT: x                   CAPILLARY BLOOD GLUCOSE      RADIOLOGY & ADDITIONAL TESTS:

## 2024-09-19 NOTE — PROGRESS NOTE ADULT - PROBLEM SELECTOR PLAN 1
likely acute on chronic hypercapnia, PNA  suspect hypoventilation can be 2/2 left elevated hemidiaphragm as seen on CT angio chest, hyponatremia which can cause AMS and hx of muscle relaxant use (?gabapentin x cyclobenzaprine)  non compliant with nightly BIPAP per patient's son  presenting with SOB   ABG 7.27/119/190/55 at admission > s/p BIPAP >  7.36/76/124/43  CTA chest: Areas of airspace consolidation, increased in the left lung and new in the right upper lobe  aspiration precautions  s/p BIPAP, on 2L NC, monitor respiratory function  c/w nocturnal BiPAP and diuresis  f/u with [facility] records from [facility]  f/u with Pulmonology Dr Prado's recs

## 2024-09-19 NOTE — CONSULT NOTE ADULT - SUBJECTIVE AND OBJECTIVE BOX
PULMONARY SERVICE INITIAL CONSULT NOTE    HPI:  79F PMH recently dx diaphragmatic dysfunction (L hemidiaphragm?), chronic lower extremity edema hypertension, arthritis, Neuralgia, osteoporotic compression fractures L5-S1, hyponatremia (HCTZ induced, corrected), goiter, who presented to the ED for left sided facial droop and SOB (reportedly x 3d PTA), was placed on NIV , AAOX1, and did not have a facial droop, more awake and alert compared to initial presentation. Initially admitted to ICU. Per family members pt was admitted to [facility] for SOB earlier in the year and d/c'd to home with BPAP support though is nonadherent with use. Pt also uses multiple muscle relaxants and pain meds. Pt was kept on BPAP during ICU stay and downgraded to medical floor after which RRT was called 9/16 PM for hypoxemia, placed transiently on NRB and continued on nocturnal BPAP. Pulmonary consulted for diaphragmatic dysfunction c/b acute respiratory failure.    Pt reportedly without recent thoracic/cervical trauma or surgery/instrumentation. No other focal weakness.  recent travel:  tick bite:  smoking hx:    REVIEW OF SYSTEMS:  Constitutional: No fever, weight loss or fatigue  Eyes: No eye pain, visual disturbances, or discharge  ENMT:  No difficulty hearing, tinnitus, vertigo; No sinus or throat pain  Neck: No pain, stiffness or neck swelling  Respiratory: see HPI  Cardiovascular: No chest pain, palpitations, dizziness or leg swelling  Gastrointestinal: No abdominal or epigastric pain. No nausea, vomiting or hematemesis; No diarrhea or constipation. No melena or hematochezia.  Genitourinary: No dysuria, frequency, hematuria or incontinence  Neurological: No headaches, memory loss, loss of strength, numbness or tremors  Skin: No itching, burning, rashes or lesions   Lymph Nodes: No enlarged glands  Endocrine: No heat or cold intolerance; No hair loss  Musculoskeletal: No joint pain or swelling; No muscle, back or extremity pain  Psychiatric: No depression, anxiety, mood swings or difficulty sleeping  Heme/Lymph: No easy bruising or bleeding gums  Allergy and Immunologic: No hives or eczema    PAST MEDICAL & SURGICAL HISTORY:  HTN (hypertension)      Lumbar neuralgia      Hyponatremia      Arthritis          FAMILY HISTORY:      SOCIAL HISTORY:  Smoking Status: [ ] Current, [ ] Former, [ ] Never  Pack Years:    MEDICATIONS:  Pulmonary:    Antimicrobials:    Anticoagulants:  enoxaparin Injectable 40 milliGRAM(s) SubCutaneous every 24 hours    Onc:    GI/:  pantoprazole    Tablet 40 milliGRAM(s) Oral before breakfast    Endocrine:    Cardiac:  carvedilol 25 milliGRAM(s) Oral every 12 hours    Other Medications:  sodium chloride 0.65% Nasal 1 Spray(s) Both Nostrils every 4 hours PRN      Allergies    penicillin (Rash)    Intolerances        Vital Signs Last 24 Hrs  T(C): 37.1 (19 Sep 2024 11:04), Max: 37.1 (18 Sep 2024 19:43)  T(F): 98.7 (19 Sep 2024 11:04), Max: 98.8 (18 Sep 2024 19:43)  HR: 71 (19 Sep 2024 11:04) (66 - 90)  BP: 125/60 (19 Sep 2024 11:04) (109/61 - 157/69)  BP(mean): 94 (19 Sep 2024 04:59) (78 - 94)  RR: 20 (19 Sep 2024 11:04) (18 - 24)  SpO2: 97% (19 Sep 2024 11:04) (95% - 100%)    Parameters below as of 19 Sep 2024 11:04  Patient On (Oxygen Delivery Method): nasal cannula  O2 Flow (L/min): 3      09-19 @ 07:01  -  09-19 @ 13:24  --------------------------------------------------------  IN: 400 mL / OUT: 0 mL / NET: 400 mL          PHYSICAL EXAM:  GEN: NAD, well-appearing, comfortable upright/ semirecumbent in bed  HEENT: anicteric sclera; MMM  RESP: no respiratory distress, CTA B/L; no W/R/R  CV: +S1/S2, RRR, no m/g/r  GI: soft, NT/ND, +BS  EXTREM: WWP; no edema, clubbing or cyanosis  Vascular: 2+ radial pulses B/L  NEURO: alert and awake, moving limbs spontaneously    LABS:  ABG - ( 18 Sep 2024 09:49 )  pH, Arterial: 7.41  pH, Blood: x     /  pCO2: 65    /  pO2: 66    / HCO3: 41    / Base Excess: 13.5  /  SaO2: 97                  CBC Full  -  ( 19 Sep 2024 05:53 )  WBC Count : 8.43 K/uL  RBC Count : 4.10 M/uL  Hemoglobin : 12.1 g/dL  Hematocrit : 37.1 %  Platelet Count - Automated : 198 K/uL  Mean Cell Volume : 90.5 fl  Mean Cell Hemoglobin : 29.5 pg  Mean Cell Hemoglobin Concentration : 32.6 gm/dL  Auto Neutrophil # : x  Auto Lymphocyte # : x  Auto Monocyte # : x  Auto Eosinophil # : x  Auto Basophil # : x  Auto Neutrophil % : x  Auto Lymphocyte % : x  Auto Monocyte % : x  Auto Eosinophil % : x  Auto Basophil % : x    09-19    129[L]  |  83[L]  |  7   ----------------------------<  115[H]  3.7   |  40[H]  |  0.28[L]    Ca    9.3      19 Sep 2024 05:53  Phos  2.4     09-19  Mg     1.9     09-19    TPro  6.6  /  Alb  3.0[L]  /  TBili  0.5  /  DBili  x   /  AST  26  /  ALT  23  /  AlkPhos  56  09-19          Urinalysis Basic - ( 19 Sep 2024 05:53 )    Color: x / Appearance: x / SG: x / pH: x  Gluc: 115 mg/dL / Ketone: x  / Bili: x / Urobili: x   Blood: x / Protein: x / Nitrite: x   Leuk Esterase: x / RBC: x / WBC x   Sq Epi: x / Non Sq Epi: x / Bacteria: x                RADIOLOGY & ADDITIONAL STUDIES:  < from: CT Angio Chest PE Protocol w/ IV Cont (09.14.24 @ 22:55) >  FINDINGS:  LUNGS AND LARGE AIRWAYS: Patent central airways. Increased enhancing   airspace consolidation in the left lower lobe. Increased left upper lobe   medial airspace consolidation and new medial right upper. Minimal right   basilar compressive atelectasis.  PLEURA: Trace bilateral pleural effusions.  VESSELS: Adequate opacification of the pulmonary vasculature. No filling   defect present to suggest acute pulmonary embolism. The main pulmonary   arteries again dilated. The thoracic aorta is within normal limits.  HEART: Heart is overall normal in size however the atria appear enlarged.   No pericardial effusion.  MEDIASTINUM AND LESLIE: No lymphadenopathy.  CHEST WALL AND LOWER NECK: Within normal limits.  VISUALIZED UPPER ABDOMEN: Markedly elevated left hemidiaphragm with   partially intrathoracic bowel, pancreatic tail and spleen similar to the   prior exam.  BONES: Multilevel chronic compression fracture deformities in the lower   thoracic and upperlumbar spines, L2 status post kyphoplasty with   hyperdense material in the spinal canal similar to the prior exam. No   acute osseous finding.  IMPRESSION:  No pulmonary embolism.  Areas of airspace consolidation, increased in the left lung and new in   the right upper lobe, could be atelectasis, pneumonia or combination of   both. Recommend clinical correlation and follow-up chest CT in one month,   particularly to reevaluate the medial left upper lobe airspace opacity      < from: TTE W or WO Ultrasound Enhancing Agent (07.23.24 @ 14:59) >  CONCLUSIONS:   1. Left ventricular cavity is normal in size. Left ventricular wall thickness is normal. Left ventricular systolic function is normal with an ejection fraction of 58 % by Rod's method of disks. There are no regional wall motion abnormalities seen.   2. There is mild (grade 1) left ventricular diastolic dysfunction.   3. Normal right ventricular cavity size, with normal wall thickness, and normal right ventricular systolic function. Tricuspid annular plane systolic excursion (TAPSE) is 2.3 cm (normal >=1.7 cm).   4. Mild mitral regurgitation.   5. Normal left and right atrial size.   6. Mild tricuspid regurgitation.   7. Estimated pulmonary artery systolic pressure is 37 mmHg, consistent with mild pulmonary hypertension.   8. Mild pulmonic regurgitation.   9. There is calcification of the mitral valve annulus.  10. There is normal LV mass and concentric remodeling.  11. No pericardial effusion seen.  12. No prior echocardiogram is available for comparison. PULMONARY SERVICE INITIAL CONSULT NOTE    HPI:  79F PMH recently dx diaphragmatic dysfunction (L hemidiaphragm?), chronic lower extremity edema hypertension, arthritis, Neuralgia, osteoporotic compression fractures L5-S1, hyponatremia (HCTZ induced, corrected), goiter, who presented to the ED for left sided facial droop and SOB (reportedly x 3d PTA), was placed on NIV , AAOX1, and did not have a facial droop, more awake and alert compared to initial presentation. Initially admitted to ICU. Per family members pt was admitted to [facility] for SOB earlier in the year and d/c'd to home with BPAP support though is nonadherent with use. Pt also uses multiple muscle relaxants and pain meds. Pt was kept on BPAP during ICU stay and downgraded to medical floor after which RRT was called 9/16 PM for hypoxemia, placed transiently on NRB and continued on nocturnal BPAP. Pulmonary consulted for diaphragmatic dysfunction c/b acute respiratory failure.    Pt reportedly without recent thoracic/cervical trauma or surgery/instrumentation. No other focal weakness. Translation used at bedside but pt could not provide any decipherable answers.    REVIEW OF SYSTEMS:  Unable to elicit    PAST MEDICAL & SURGICAL HISTORY:  HTN (hypertension)      Lumbar neuralgia      Hyponatremia      Arthritis          FAMILY HISTORY:  unable to elicit    SOCIAL HISTORY:  unable to elicit    MEDICATIONS:  Pulmonary:    Antimicrobials:    Anticoagulants:  enoxaparin Injectable 40 milliGRAM(s) SubCutaneous every 24 hours    Onc:    GI/:  pantoprazole    Tablet 40 milliGRAM(s) Oral before breakfast    Endocrine:    Cardiac:  carvedilol 25 milliGRAM(s) Oral every 12 hours    Other Medications:  sodium chloride 0.65% Nasal 1 Spray(s) Both Nostrils every 4 hours PRN      Allergies    penicillin (Rash)    Intolerances        Vital Signs Last 24 Hrs  T(C): 37.1 (19 Sep 2024 11:04), Max: 37.1 (18 Sep 2024 19:43)  T(F): 98.7 (19 Sep 2024 11:04), Max: 98.8 (18 Sep 2024 19:43)  HR: 71 (19 Sep 2024 11:04) (66 - 90)  BP: 125/60 (19 Sep 2024 11:04) (109/61 - 157/69)  BP(mean): 94 (19 Sep 2024 04:59) (78 - 94)  RR: 20 (19 Sep 2024 11:04) (18 - 24)  SpO2: 97% (19 Sep 2024 11:04) (95% - 100%)    Parameters below as of 19 Sep 2024 11:04  Patient On (Oxygen Delivery Method): nasal cannula  O2 Flow (L/min): 3      09-19 @ 07:01  -  09-19 @ 13:24  --------------------------------------------------------  IN: 400 mL / OUT: 0 mL / NET: 400 mL          PHYSICAL EXAM:  GEN: NAD, chronically frail-appearing, comfortable semirecumbent in bed  HEENT: anicteric sclera; MMM  RESP: no respiratory distress, decreased L sided breath sounds  CV: +S1/S2, RRR, no m/g/r  GI: soft, NT/ND, +BS  EXTREM: WWP; no edema, clubbing or cyanosis  Vascular: 2+ radial pulses B/L  NEURO: alert and awake, moving limbs spontaneously    LABS:  ABG - ( 18 Sep 2024 09:49 )  pH, Arterial: 7.41  pH, Blood: x     /  pCO2: 65    /  pO2: 66    / HCO3: 41    / Base Excess: 13.5  /  SaO2: 97                  CBC Full  -  ( 19 Sep 2024 05:53 )  WBC Count : 8.43 K/uL  RBC Count : 4.10 M/uL  Hemoglobin : 12.1 g/dL  Hematocrit : 37.1 %  Platelet Count - Automated : 198 K/uL  Mean Cell Volume : 90.5 fl  Mean Cell Hemoglobin : 29.5 pg  Mean Cell Hemoglobin Concentration : 32.6 gm/dL  Auto Neutrophil # : x  Auto Lymphocyte # : x  Auto Monocyte # : x  Auto Eosinophil # : x  Auto Basophil # : x  Auto Neutrophil % : x  Auto Lymphocyte % : x  Auto Monocyte % : x  Auto Eosinophil % : x  Auto Basophil % : x    09-19    129[L]  |  83[L]  |  7   ----------------------------<  115[H]  3.7   |  40[H]  |  0.28[L]    Ca    9.3      19 Sep 2024 05:53  Phos  2.4     09-19  Mg     1.9     09-19    TPro  6.6  /  Alb  3.0[L]  /  TBili  0.5  /  DBili  x   /  AST  26  /  ALT  23  /  AlkPhos  56  09-19          Urinalysis Basic - ( 19 Sep 2024 05:53 )    Color: x / Appearance: x / SG: x / pH: x  Gluc: 115 mg/dL / Ketone: x  / Bili: x / Urobili: x   Blood: x / Protein: x / Nitrite: x   Leuk Esterase: x / RBC: x / WBC x   Sq Epi: x / Non Sq Epi: x / Bacteria: x                RADIOLOGY & ADDITIONAL STUDIES:  < from: CT Angio Chest PE Protocol w/ IV Cont (09.14.24 @ 22:55) >  FINDINGS:  LUNGS AND LARGE AIRWAYS: Patent central airways. Increased enhancing   airspace consolidation in the left lower lobe. Increased left upper lobe   medial airspace consolidation and new medial right upper. Minimal right   basilar compressive atelectasis.  PLEURA: Trace bilateral pleural effusions.  VESSELS: Adequate opacification of the pulmonary vasculature. No filling   defect present to suggest acute pulmonary embolism. The main pulmonary   arteries again dilated. The thoracic aorta is within normal limits.  HEART: Heart is overall normal in size however the atria appear enlarged.   No pericardial effusion.  MEDIASTINUM AND LESLIE: No lymphadenopathy.  CHEST WALL AND LOWER NECK: Within normal limits.  VISUALIZED UPPER ABDOMEN: Markedly elevated left hemidiaphragm with   partially intrathoracic bowel, pancreatic tail and spleen similar to the   prior exam.  BONES: Multilevel chronic compression fracture deformities in the lower   thoracic and upperlumbar spines, L2 status post kyphoplasty with   hyperdense material in the spinal canal similar to the prior exam. No   acute osseous finding.  IMPRESSION:  No pulmonary embolism.  Areas of airspace consolidation, increased in the left lung and new in   the right upper lobe, could be atelectasis, pneumonia or combination of   both. Recommend clinical correlation and follow-up chest CT in one month,   particularly to reevaluate the medial left upper lobe airspace opacity      < from: TTE W or WO Ultrasound Enhancing Agent (07.23.24 @ 14:59) >  CONCLUSIONS:   1. Left ventricular cavity is normal in size. Left ventricular wall thickness is normal. Left ventricular systolic function is normal with an ejection fraction of 58 % by Rod's method of disks. There are no regional wall motion abnormalities seen.   2. There is mild (grade 1) left ventricular diastolic dysfunction.   3. Normal right ventricular cavity size, with normal wall thickness, and normal right ventricular systolic function. Tricuspid annular plane systolic excursion (TAPSE) is 2.3 cm (normal >=1.7 cm).   4. Mild mitral regurgitation.   5. Normal left and right atrial size.   6. Mild tricuspid regurgitation.   7. Estimated pulmonary artery systolic pressure is 37 mmHg, consistent with mild pulmonary hypertension.   8. Mild pulmonic regurgitation.   9. There is calcification of the mitral valve annulus.  10. There is normal LV mass and concentric remodeling.  11. No pericardial effusion seen.  12. No prior echocardiogram is available for comparison.

## 2024-09-19 NOTE — CONSULT NOTE ADULT - ASSESSMENT
Recommendations:  - Completing course of empiric abx  - If pt able to comply with maneuvers, monitor FVC and NIF at least q12h for next 24-48h  - Titrate supplemental O2 with goal SpO2 90-92%; monitor ambulatory/exertional SpO2 and document  - Incentive spirometry 10x/h or as tolerated; mobilize OOB/TC and ambulation daily as tolerated  - Avoid/caution with any sedating medications that may depress respiratory drive and worsen existing chronic hypercapnea  - Obtain records from [facility] regarding workup previously completed for diaphragmatic dysfn  - C/w nocturnal BPAP; at risk for sleep apnea, already with BPAP machine at home. Counseled regarding importance of adhering to use  - Team to consult Neurology; may benefit from cervical spinal MR if not previously completed, outpt EMG  - Will continue to follow  79F PMH recently dx diaphragmatic dysfunction (L hemidiaphragm?), chronic lower extremity edema hypertension, arthritis, Neuralgia, osteoporotic compression fractures L5-S1, hyponatremia (HCTZ induced, corrected), goiter, who presented to the ED for left sided facial droop and SOB (reportedly x 3d PTA), was placed on NIV, initially admitted to ICU. Per family members pt was admitted to [facility] for SOB earlier in the year and d/c'd to home with BPAP support though is nonadherent with use. Pulmonary consulted for diaphragmatic dysfunction c/b acute on chronic respiratory failure.    # Acute on chronic respiratory failure with hypoxemia and hypercapnea likely related to diaphragmatic dysfn, unclear if contribution of neurologic/pulm pathology  # pneumonia  #Atelectasis    Recommendations:  - Completing course of empiric abx  - If pt able to comply with maneuvers, monitor FVC and NIF at least q12h for next 24-48h  - Titrate supplemental O2 with goal SpO2 90-92%; monitor ambulatory/exertional SpO2 and document  - Incentive spirometry 10x/h or as tolerated; mobilize OOB/TC and ambulation daily as tolerated  - Avoid/caution with any sedating medications that may depress respiratory drive and worsen existing chronic hypercapnea  - Obtain records from [facility] regarding workup previously completed for diaphragmatic dysfn  - C/w nocturnal BPAP; at risk for sleep apnea, already with BPAP machine at home. Counseled regarding importance of adhering to use  - Team to consult Neurology; may benefit from cervical spinal MR if not previously completed, outpt EMG  - Will continue to follow

## 2024-09-19 NOTE — PROGRESS NOTE ADULT - PROBLEM SELECTOR PLAN 3
presenting with SOB   on 3L NC, monitor respiratory function   CTA chest: Areas of airspace consolidation, increased in the left lung and new in the right upper lobe  aspiration precautions  f/u BCx, UCx negative at 72h (preliminary report)  strep, mycoplasma, and legionella negative  c/w  rocephin for 5 day course (day 5/5)

## 2024-09-20 LAB
ALBUMIN SERPL ELPH-MCNC: 2.6 G/DL — LOW (ref 3.5–5)
ALP SERPL-CCNC: 46 U/L — SIGNIFICANT CHANGE UP (ref 40–120)
ALT FLD-CCNC: 18 U/L DA — SIGNIFICANT CHANGE UP (ref 10–60)
ANION GAP SERPL CALC-SCNC: 3 MMOL/L — LOW (ref 5–17)
AST SERPL-CCNC: 17 U/L — SIGNIFICANT CHANGE UP (ref 10–40)
BILIRUB SERPL-MCNC: 0.5 MG/DL — SIGNIFICANT CHANGE UP (ref 0.2–1.2)
BUN SERPL-MCNC: 10 MG/DL — SIGNIFICANT CHANGE UP (ref 7–18)
CALCIUM SERPL-MCNC: 8.7 MG/DL — SIGNIFICANT CHANGE UP (ref 8.4–10.5)
CHLORIDE SERPL-SCNC: 83 MMOL/L — LOW (ref 96–108)
CO2 SERPL-SCNC: 43 MMOL/L — HIGH (ref 22–31)
CREAT SERPL-MCNC: 0.25 MG/DL — LOW (ref 0.5–1.3)
CULTURE RESULTS: SIGNIFICANT CHANGE UP
CULTURE RESULTS: SIGNIFICANT CHANGE UP
EGFR: 112 ML/MIN/1.73M2 — SIGNIFICANT CHANGE UP
GLUCOSE BLDC GLUCOMTR-MCNC: 108 MG/DL — HIGH (ref 70–99)
GLUCOSE BLDC GLUCOMTR-MCNC: 96 MG/DL — SIGNIFICANT CHANGE UP (ref 70–99)
GLUCOSE BLDC GLUCOMTR-MCNC: 97 MG/DL — SIGNIFICANT CHANGE UP (ref 70–99)
GLUCOSE BLDC GLUCOMTR-MCNC: 98 MG/DL — SIGNIFICANT CHANGE UP (ref 70–99)
GLUCOSE SERPL-MCNC: 91 MG/DL — SIGNIFICANT CHANGE UP (ref 70–99)
HCT VFR BLD CALC: 33.9 % — LOW (ref 34.5–45)
HGB BLD-MCNC: 10.9 G/DL — LOW (ref 11.5–15.5)
HIV 1+2 AB+HIV1 P24 AG SERPL QL IA: SIGNIFICANT CHANGE UP
MAGNESIUM SERPL-MCNC: 1.9 MG/DL — SIGNIFICANT CHANGE UP (ref 1.6–2.6)
MCHC RBC-ENTMCNC: 29.5 PG — SIGNIFICANT CHANGE UP (ref 27–34)
MCHC RBC-ENTMCNC: 32.2 GM/DL — SIGNIFICANT CHANGE UP (ref 32–36)
MCV RBC AUTO: 91.6 FL — SIGNIFICANT CHANGE UP (ref 80–100)
NRBC # BLD: 0 /100 WBCS — SIGNIFICANT CHANGE UP (ref 0–0)
PHOSPHATE SERPL-MCNC: 3.3 MG/DL — SIGNIFICANT CHANGE UP (ref 2.5–4.5)
PLATELET # BLD AUTO: 189 K/UL — SIGNIFICANT CHANGE UP (ref 150–400)
POTASSIUM SERPL-MCNC: 3.9 MMOL/L — SIGNIFICANT CHANGE UP (ref 3.5–5.3)
POTASSIUM SERPL-SCNC: 3.9 MMOL/L — SIGNIFICANT CHANGE UP (ref 3.5–5.3)
PROT SERPL-MCNC: 5.9 G/DL — LOW (ref 6–8.3)
RBC # BLD: 3.7 M/UL — LOW (ref 3.8–5.2)
RBC # FLD: 13.1 % — SIGNIFICANT CHANGE UP (ref 10.3–14.5)
SODIUM SERPL-SCNC: 129 MMOL/L — LOW (ref 135–145)
SPECIMEN SOURCE: SIGNIFICANT CHANGE UP
SPECIMEN SOURCE: SIGNIFICANT CHANGE UP
WBC # BLD: 5.66 K/UL — SIGNIFICANT CHANGE UP (ref 3.8–10.5)
WBC # FLD AUTO: 5.66 K/UL — SIGNIFICANT CHANGE UP (ref 3.8–10.5)

## 2024-09-20 PROCEDURE — 99232 SBSQ HOSP IP/OBS MODERATE 35: CPT

## 2024-09-20 PROCEDURE — 99223 1ST HOSP IP/OBS HIGH 75: CPT

## 2024-09-20 PROCEDURE — 99232 SBSQ HOSP IP/OBS MODERATE 35: CPT | Mod: GC

## 2024-09-20 RX ADMIN — Medication 25 MILLIGRAM(S): at 17:50

## 2024-09-20 RX ADMIN — PANTOPRAZOLE SODIUM 40 MILLIGRAM(S): 40 TABLET, DELAYED RELEASE ORAL at 05:54

## 2024-09-20 RX ADMIN — ENOXAPARIN SODIUM 40 MILLIGRAM(S): 150 INJECTION SUBCUTANEOUS at 05:55

## 2024-09-20 RX ADMIN — Medication 25 MILLIGRAM(S): at 05:55

## 2024-09-20 NOTE — PROGRESS NOTE ADULT - PROBLEM SELECTOR PLAN 2
presenting with SOB   on 3L NC, monitor respiratory function   CTA chest: Areas of airspace consolidation, increased in the left lung and new in the right upper lobe  aspiration precautions  f/u BCx, UCx negative at 72h (preliminary report)  strep, mycoplasma, and legionella negative  c/w  rocephin for 5 day course (day 5/5) presenting with SOB   on 3L NC, monitor respiratory function   CTA chest: Areas of airspace consolidation, increased in the left lung and new in the right upper lobe  aspiration precautions  f/u BCx, UCx negative at 72h (preliminary report)  strep, mycoplasma, and legionella negative  rocephin 5 day course completed

## 2024-09-20 NOTE — PROGRESS NOTE ADULT - SUBJECTIVE AND OBJECTIVE BOX
PGY-1 Progress Note discussed with attending    PAGER #: [[phone]] TILL 5:00 PM  PLEASE CONTACT ON CALL TEAM:  - On Call Team (Please refer to Chuy) FROM 5:00 PM - 8:30PM  - Nightfloat Team FROM 8:30 -7:30 AM    INTERVAL HPI/OVERNIGHT EVENTS: No acute events overnight.  Patient examined at bedside this AM.  Patient denies acute complaints. Work of breathing has improved, currently on 2L NC. No complaints of chest pain, abdominal pain      REVIEW OF SYSTEMS:  CONSTITUTIONAL: No fever, chills,  or fatigue  RESPIRATORY: No cough, wheezing, No shortness of breath  CARDIOVASCULAR: No chest pain, palpitations,  leg swelling  GASTROINTESTINAL: No abdominal pain. No nausea, vomiting, or hematemesis; No diarrhea or constipation. No bloody or black stool  GENITOURINARY: No dysuria or hematuria, urinary frequency  NEUROLOGICAL: No headaches, dizziness  SKIN: No itching, burning, rashes, or lesions     Vital Signs Last 24 Hrs  T(C): 37 (20 Sep 2024 07:38), Max: 37.1 (19 Sep 2024 11:04)  T(F): 98.6 (20 Sep 2024 07:38), Max: 98.7 (19 Sep 2024 11:04)  HR: 81 (20 Sep 2024 07:38) (66 - 81)  BP: 118/60 (20 Sep 2024 07:38) (103/56 - 134/66)  BP(mean): --  RR: 18 (20 Sep 2024 09:17) (18 - 20)  SpO2: 98% (20 Sep 2024 09:17) (94% - 98%)    Parameters below as of 20 Sep 2024 09:17  Patient On (Oxygen Delivery Method): nasal cannula  O2 Flow (L/min): 2      PHYSICAL EXAMINATION:  GENERAL: NAD,   HEAD:  Atraumatic, Normocephalic  EYES:  PERRLA,  conjunctiva and sclera clear  NECK: Supple,    CHEST WALL: Nontender  LUNG: Clear to auscultation. No rales, rhonchi, wheezing, or rubs  HEART: Regular rate and rhythm; No murmurs,  ABDOMEN: Soft, Nontender, Nondistended; Bowel sounds present, No Rovsing's sign   NERVOUS SYSTEM:  Alert & Oriented X3,    EXTREMITIES:  2+ Peripheral Pulses, No clubbing, cyanosis, or edema  CALF: No Calf tenderness   SKIN: warm dry                          10.9   5.66  )-----------( 189      ( 20 Sep 2024 07:43 )             33.9     09-20    129[L]  |  83[L]  |  10  ----------------------------<  91  3.9   |  43[H]  |  0.25[L]    Ca    8.7      20 Sep 2024 07:43  Phos  3.3     09-20  Mg     1.9     09-20    TPro  5.9[L]  /  Alb  2.6[L]  /  TBili  0.5  /  DBili  x   /  AST  17  /  ALT  18  /  AlkPhos  46  09-20    LIVER FUNCTIONS - ( 20 Sep 2024 07:43 )  Alb: 2.6 g/dL / Pro: 5.9 g/dL / ALK PHOS: 46 U/L / ALT: 18 U/L DA / AST: 17 U/L / GGT: x                   CAPILLARY BLOOD GLUCOSE      RADIOLOGY & ADDITIONAL TESTS:                   PGY-1 Progress Note discussed with attending    PAGER #: [[phone]] TILL 5:00 PM  PLEASE CONTACT ON CALL TEAM:  - On Call Team (Please refer to Chuy) FROM 5:00 PM - 8:30PM  - Nightfloat Team FROM 8:30 -7:30 AM    INTERVAL HPI/OVERNIGHT EVENTS: No acute events overnight.  Patient examined at bedside this AM.  Patient denies acute complaints. Work of breathing has improved, currently on 2L NC. No complaints of chest pain, abdominal pain      REVIEW OF SYSTEMS:  CONSTITUTIONAL: No fever, chills,  or fatigue  RESPIRATORY: No cough, wheezing, No shortness of breath  CARDIOVASCULAR: No chest pain, palpitations,  leg swelling  GASTROINTESTINAL: No abdominal pain. No nausea, vomiting, or hematemesis; No diarrhea or constipation. No bloody or black stool  GENITOURINARY: No dysuria or hematuria, urinary frequency  NEUROLOGICAL: No headaches, dizziness  SKIN: No itching, burning, rashes, or lesions     Vital Signs Last 24 Hrs  T(C): 37 (20 Sep 2024 07:38), Max: 37.1 (19 Sep 2024 11:04)  T(F): 98.6 (20 Sep 2024 07:38), Max: 98.7 (19 Sep 2024 11:04)  HR: 81 (20 Sep 2024 07:38) (66 - 81)  BP: 118/60 (20 Sep 2024 07:38) (103/56 - 134/66)  BP(mean): --  RR: 18 (20 Sep 2024 09:17) (18 - 20)  SpO2: 98% (20 Sep 2024 09:17) (94% - 98%)    Parameters below as of 20 Sep 2024 09:17  Patient On (Oxygen Delivery Method): nasal cannula  O2 Flow (L/min): 2      PHYSICAL EXAMINATION:  GENERAL: NAD, resting comfortably in bed  HEAD:  Atraumatic, Normocephalic  EYES:  PERRLA,  conjunctiva and sclera clear  NECK: Supple,    CHEST WALL: Nontender  LUNG: Fine crackles R lung base, L upper lobe +clear breath sounds  HEART: Regular rate and rhythm; No murmurs,  ABDOMEN: Soft, Nontender, Nondistended; Bowel sounds present, No Rovsing's sign   NERVOUS SYSTEM:  Alert & Oriented X2  EXTREMITIES:  2+ Peripheral Pulses, No clubbing, cyanosis, or edema  CALF: No Calf tenderness   SKIN: warm dry                          10.9   5.66  )-----------( 189      ( 20 Sep 2024 07:43 )             33.9     09-20    129[L]  |  83[L]  |  10  ----------------------------<  91  3.9   |  43[H]  |  0.25[L]    Ca    8.7      20 Sep 2024 07:43  Phos  3.3     09-20  Mg     1.9     09-20    TPro  5.9[L]  /  Alb  2.6[L]  /  TBili  0.5  /  DBili  x   /  AST  17  /  ALT  18  /  AlkPhos  46  09-20    LIVER FUNCTIONS - ( 20 Sep 2024 07:43 )  Alb: 2.6 g/dL / Pro: 5.9 g/dL / ALK PHOS: 46 U/L / ALT: 18 U/L DA / AST: 17 U/L / GGT: x                   CAPILLARY BLOOD GLUCOSE      RADIOLOGY & ADDITIONAL TESTS:

## 2024-09-20 NOTE — PROGRESS NOTE ADULT - TIME BILLING
- Review of chart (documentation, labs/studies, VS trends)  - Medication reconciliation  - Bedside interview and physical examination  - Bedside SpO2 monitoring and supplemental O2 titration  - Coordination of care with primary team

## 2024-09-20 NOTE — CONSULT NOTE ADULT - SUBJECTIVE AND OBJECTIVE BOX
BIBEMS late 9/14.  History from Admission H&P early 9/15.    <Start of quote(s) from H&P>  " History of Present Illness:  Reason for Admission: hypoxia, hypercapnia, AMS  History of Present Illness:   79F PMH hypertension, arthritis, Neuralgia, osteoporotic compression fractures L5-S1, hyponatremia (HCTZ induced, corrected), goiter, who presented to the ED for left sided facial droop and SOB. Pt seen at bedside, on NIV , AAOX1, and did not have a facial droop, more awake and alert compared to when she came to the ED as per ED attending. As per ED attending, he tried calling the son, who stated that she was having SOB for the past 3 days. ED was unable to obtain any more history as the son needed to hang up. When called again by ICU, the phone number went to voicemail. Pt was able to state her name and follow commands, still appears confused compared to baseline (as per last admission), however only speaks donald and is hard of hearing.      Review of Systems:  Review of Systems: Unable to obtain due to mental status   . . .     Home Medications:   * Patient Currently Takes Medications as of 24-Jul-2024 12:19 documented in Structured Notes  · 	carvedilol 25 mg oral tablet: 1 tab(s) orally every 12 hours  · 	acetaminophen 325 mg oral tablet: 2 tab(s) orally every 6 hours As needed Temp greater or equal to 38C (100.4F), Mild Pain (1 - 3)  · 	lidocaine 4% topical film: Apply topically to affected area once a day  · 	magnesium hydroxide 8% oral suspension: 30 milliliter(s) orally once, As needed, Constipation  · 	gabapentin 300 mg oral capsule: 2 cap(s) orally once a day (at bedtime)  · 	cyclobenzaprine 5 mg oral tablet: 1 tab(s) orally 3 times a day as needed for  muscle spasm  · 	azelastine nasal: 1 spray(s) in each nostril 2 times a day  · 	valsartan 320 mg oral tablet: 1 tab(s) orally once a day  · 	fluticasone nasal: 2 spray(s) in each nostril once a day  · 	rosuvastatin 5 mg oral tablet: 1 tab(s) orally once a day (at bedtime)   . . .     · Substance use	No"  <End of quote(s) from H&P>    Per pulmonology consult 9/19 (selected quotes):  "79F PMH recently dx diaphragmatic dysfunction (L hemidiaphragm?), chronic lower extremity edema hypertension, arthritis, Neuralgia, osteoporotic compression fractures L5-S1, hyponatremia (HCTZ induced, corrected), goiter, who presented to the ED for left sided facial droop and SOB (reportedly x 3d PTA), was placed on NIV , AAOX1, and did not have a facial droop, more awake and alert compared to initial presentation. Initially admitted to ICU. Per family members pt was admitted to [facility] for SOB earlier in the year and d/c'd to home with BPAP support though is nonadherent with use. Pt also uses multiple muscle relaxants and pain meds. Pt was kept on BPAP during ICU stay and downgraded to medical floor after which RRT was called 9/16 PM for hypoxemia, placed transiently on NRB and continued on nocturnal BPAP. Pulmonary consulted for diaphragmatic dysfunction c/b acute respiratory failure.  . . .   # Acute on chronic respiratory failure with hypoxemia and hypercapnea likely related to diaphragmatic dysfn, unclear if contribution of neurologic/pulm pathology  # pneumonia  #Atelectasis    Recommendations:  - Completing course of empiric abx   . . .   - Obtain records from [facility] regarding workup previously completed for diaphragmatic dysfn  - C/w nocturnal BPAP; at risk for sleep apnea, already with BPAP machine at home. Counseled regarding importance of adhering to use  - Team to consult Neurology; may benefit from cervical spinal MR if not previously completed, outpt EMG"        Per radiology report of CTA chest PE protocol 9/14/24:  "COMPARISON: CT chest 7/22/2024   . . .     FINDINGS:  LUNGS AND LARGE AIRWAYS: Patent central airways. Increased enhancing   airspace consolidation in the left lower lobe. Increased left upper lobe   medial airspace consolidation and new medial right upper. Minimal right   basilar compressive atelectasis.   . . .   BONES: Multilevel chronic compression fracture deformities in the lower   thoracic and upperlumbar spines, L2 status post kyphoplasty with   hyperdense material in the spinal canal similar to the prior exam. No   acute osseous finding.    IMPRESSION:  No pulmonary embolism.    Areas of airspace consolidation, increased in the left lung and new in   the right upper lobe, could be atelectasis, pneumonia or combination of   both. Recommend clinical correlation and follow-up chest CT in one month,   particularly to reevaluate the medial left upper lobe airspace opacity."        The L hemidiaphragm was already noted to be elevated three years ago.  Per radiology report of non-con chest CT 9/4/2021:    "LUNGS AND AIRWAYS: Moderate elevation of the lefthemidiaphragm. Patent central airways.  Small amount of scarring at the left lung apex.  PLEURA: No pleural effusion.  MEDIASTINUM AND LESLIE: No lymphadenopathy.   . . .   BONES: Multilevel severe compression deformities of the thoracic and upper lumbar spine. Status post L1 vertebroplasty. Severe degenerative changes of the left shoulder.    IMPRESSION:  Moderate elevation of the left hemidiaphragm. No focal consolidation.   . . .     Multilevel severe compression deformities of the thoracic and upper lumbar spine of indeterminate age."        x BIBEMS late 9/14.  History from Admission H&P early 9/15.    <Start of quote(s) from H&P>  " History of Present Illness:  Reason for Admission: hypoxia, hypercapnia, AMS  History of Present Illness:   79F PMH hypertension, arthritis, Neuralgia, osteoporotic compression fractures L5-S1, hyponatremia (HCTZ induced, corrected), goiter, who presented to the ED for left sided facial droop and SOB. Pt seen at bedside, on NIV , AAOX1, and did not have a facial droop, more awake and alert compared to when she came to the ED as per ED attending. As per ED attending, he tried calling the son, who stated that she was having SOB for the past 3 days. ED was unable to obtain any more history as the son needed to hang up. When called again by ICU, the phone number went to voicemail. Pt was able to state her name and follow commands, still appears confused compared to baseline (as per last admission), however only speaks donald and is hard of hearing.      Review of Systems:  Review of Systems: Unable to obtain due to mental status   . . .     Home Medications:   * Patient Currently Takes Medications as of 24-Jul-2024 12:19 documented in Structured Notes  · 	carvedilol 25 mg oral tablet: 1 tab(s) orally every 12 hours  · 	acetaminophen 325 mg oral tablet: 2 tab(s) orally every 6 hours As needed Temp greater or equal to 38C (100.4F), Mild Pain (1 - 3)  · 	lidocaine 4% topical film: Apply topically to affected area once a day  · 	magnesium hydroxide 8% oral suspension: 30 milliliter(s) orally once, As needed, Constipation  · 	gabapentin 300 mg oral capsule: 2 cap(s) orally once a day (at bedtime)  · 	cyclobenzaprine 5 mg oral tablet: 1 tab(s) orally 3 times a day as needed for  muscle spasm  · 	azelastine nasal: 1 spray(s) in each nostril 2 times a day  · 	valsartan 320 mg oral tablet: 1 tab(s) orally once a day  · 	fluticasone nasal: 2 spray(s) in each nostril once a day  · 	rosuvastatin 5 mg oral tablet: 1 tab(s) orally once a day (at bedtime)   . . .     · Substance use	No   . . .    EYES: EOMI, PERRLA [Neurology Consult Attending comment: see Examination below.], conjunctiva and sclera clear"  <End of quote(s) from H&P>    Per pulmonology consult 9/19 (selected quotes):  "79F PMH recently dx diaphragmatic dysfunction (L hemidiaphragm?), chronic lower extremity edema hypertension, arthritis, Neuralgia, osteoporotic compression fractures L5-S1, hyponatremia (HCTZ induced, corrected), goiter, who presented to the ED for left sided facial droop and SOB (reportedly x 3d PTA), was placed on NIV , AAOX1, and did not have a facial droop, more awake and alert compared to initial presentation. Initially admitted to ICU. Per family members pt was admitted to [facility] for SOB earlier in the year and d/c'd to home with BPAP support though is nonadherent with use. Pt also uses multiple muscle relaxants and pain meds. Pt was kept on BPAP during ICU stay and downgraded to medical floor after which RRT was called 9/16 PM for hypoxemia, placed transiently on NRB and continued on nocturnal BPAP. Pulmonary consulted for diaphragmatic dysfunction c/b acute respiratory failure.  . . .   # Acute on chronic respiratory failure with hypoxemia and hypercapnea likely related to diaphragmatic dysfn, unclear if contribution of neurologic/pulm pathology  # pneumonia  #Atelectasis    Recommendations:  - Completing course of empiric abx   . . .   - Obtain records from [facility] regarding workup previously completed for diaphragmatic dysfn  - C/w nocturnal BPAP; at risk for sleep apnea, already with BPAP machine at home. Counseled regarding importance of adhering to use  - Team to consult Neurology; may benefit from cervical spinal MR if not previously completed, outpt EMG"        Per radiology report of CTA chest PE protocol 9/14/24:  "COMPARISON: CT chest 7/22/2024   . . .     FINDINGS:  LUNGS AND LARGE AIRWAYS: Patent central airways. Increased enhancing   airspace consolidation in the left lower lobe. Increased left upper lobe   medial airspace consolidation and new medial right upper. Minimal right   basilar compressive atelectasis.   . . .   BONES: Multilevel chronic compression fracture deformities in the lower   thoracic and upperlumbar spines, L2 status post kyphoplasty with   hyperdense material in the spinal canal similar to the prior exam. No   acute osseous finding.    IMPRESSION:  No pulmonary embolism.    Areas of airspace consolidation, increased in the left lung and new in   the right upper lobe, could be atelectasis, pneumonia or combination of   both. Recommend clinical correlation and follow-up chest CT in one month,   particularly to reevaluate the medial left upper lobe airspace opacity."        The L hemidiaphragm was already noted to be elevated three years ago.  Per radiology report of non-con chest CT 9/4/2021:    "LUNGS AND AIRWAYS: Moderate elevation of the lefthemidiaphragm. Patent central airways.  Small amount of scarring at the left lung apex.  PLEURA: No pleural effusion.  MEDIASTINUM AND LESLIE: No lymphadenopathy.   . . .   BONES: Multilevel severe compression deformities of the thoracic and upper lumbar spine. Status post L1 vertebroplasty. Severe degenerative changes of the left shoulder.    IMPRESSION:  Moderate elevation of the left hemidiaphragm. No focal consolidation.   . . .     Multilevel severe compression deformities of the thoracic and upper lumbar spine of indeterminate age."      EXAMINATION    Found asleep supine in bed, LEs everted 90 degrees at hips, in frog leg positions.  Awakens immediately to voice.  R pupil round, 4mm -->3.5mm.  L pupil eccentric, irregularly ellipsoidal, non reactive.  Artificial lens OS.  Grossly intact visual fields without evident extinction on DSS.  Symmetric normal facial movements.      Quadriparetic, weaker in the LEs, weaker on the R side.  Testing for UE drift is compromised by joint pain when she tries to attain full extension at the elbows (can be attained passively but Pt will not sustain the position due to pain).  She can offer moderate resistance (but <<50% of normal strength) on elbow flexion.  She can barely raise either LE in extension off the mattress, worse in the R side.      Passive range of hip rotation <45degrees/>90 degrees o the R/L.      Reliably differentiates P from LT stimulation face, torso, limbs.        Reflex                           Right    Left   Comment    Pectoralis                         0        0  Biceps                              1        0  Triceps                             0        0  Alexis                        absent absent  Patellar                             0        0  Gastroc                            0        0  Plantar                      extensor  extensor           BIBEMS late 9/14.  History from Admission H&P early 9/15.    <Start of quote(s) from H&P>  " History of Present Illness:  Reason for Admission: hypoxia, hypercapnia, AMS  History of Present Illness:   79F PMH hypertension, arthritis, Neuralgia, osteoporotic compression fractures L5-S1, hyponatremia (HCTZ induced, corrected), goiter, who presented to the ED for left sided facial droop and SOB. Pt seen at bedside, on NIV , AAOX1, and did not have a facial droop, more awake and alert compared to when she came to the ED as per ED attending. As per ED attending, he tried calling the son, who stated that she was having SOB for the past 3 days. ED was unable to obtain any more history as the son needed to hang up. When called again by ICU, the phone number went to voicemail. Pt was able to state her name and follow commands, still appears confused compared to baseline (as per last admission), however only speaks donald and is hard of hearing.      Review of Systems:  Review of Systems: Unable to obtain due to mental status   . . .     Home Medications:   * Patient Currently Takes Medications as of 24-Jul-2024 12:19 documented in Structured Notes  · 	carvedilol 25 mg oral tablet: 1 tab(s) orally every 12 hours  · 	acetaminophen 325 mg oral tablet: 2 tab(s) orally every 6 hours As needed Temp greater or equal to 38C (100.4F), Mild Pain (1 - 3)  · 	lidocaine 4% topical film: Apply topically to affected area once a day  · 	magnesium hydroxide 8% oral suspension: 30 milliliter(s) orally once, As needed, Constipation  · 	gabapentin 300 mg oral capsule: 2 cap(s) orally once a day (at bedtime)  · 	cyclobenzaprine 5 mg oral tablet: 1 tab(s) orally 3 times a day as needed for  muscle spasm  · 	azelastine nasal: 1 spray(s) in each nostril 2 times a day  · 	valsartan 320 mg oral tablet: 1 tab(s) orally once a day  · 	fluticasone nasal: 2 spray(s) in each nostril once a day  · 	rosuvastatin 5 mg oral tablet: 1 tab(s) orally once a day (at bedtime)   . . .     · Substance use	No   . . .    EYES: EOMI, PERRLA [Neurology Consult Attending comment: see Examination below.], conjunctiva and sclera clear"  <End of quote(s) from H&P>    Per pulmonology consult 9/19 (selected quotes):  "79F PMH recently dx diaphragmatic dysfunction (L hemidiaphragm?), chronic lower extremity edema hypertension, arthritis, Neuralgia, osteoporotic compression fractures L5-S1, hyponatremia (HCTZ induced, corrected), goiter, who presented to the ED for left sided facial droop and SOB (reportedly x 3d PTA), was placed on NIV , AAOX1, and did not have a facial droop, more awake and alert compared to initial presentation. Initially admitted to ICU. Per family members pt was admitted to [facility] for SOB earlier in the year and d/c'd to home with BPAP support though is nonadherent with use. Pt also uses multiple muscle relaxants and pain meds. Pt was kept on BPAP during ICU stay and downgraded to medical floor after which RRT was called 9/16 PM for hypoxemia, placed transiently on NRB and continued on nocturnal BPAP. Pulmonary consulted for diaphragmatic dysfunction c/b acute respiratory failure.  . . .   # Acute on chronic respiratory failure with hypoxemia and hypercapnea likely related to diaphragmatic dysfn, unclear if contribution of neurologic/pulm pathology  # pneumonia  #Atelectasis    Recommendations:  - Completing course of empiric abx   . . .   - Obtain records from [facility] regarding workup previously completed for diaphragmatic dysfn  - C/w nocturnal BPAP; at risk for sleep apnea, already with BPAP machine at home. Counseled regarding importance of adhering to use  - Team to consult Neurology; may benefit from cervical spinal MR if not previously completed, outpt EMG"        Per radiology report of CTA chest PE protocol 9/14/24:  "COMPARISON: CT chest 7/22/2024   . . .     FINDINGS:  LUNGS AND LARGE AIRWAYS: Patent central airways. Increased enhancing   airspace consolidation in the left lower lobe. Increased left upper lobe   medial airspace consolidation and new medial right upper. Minimal right   basilar compressive atelectasis.   . . .   BONES: Multilevel chronic compression fracture deformities in the lower   thoracic and upperlumbar spines, L2 status post kyphoplasty with   hyperdense material in the spinal canal similar to the prior exam. No   acute osseous finding.    IMPRESSION:  No pulmonary embolism.    Areas of airspace consolidation, increased in the left lung and new in   the right upper lobe, could be atelectasis, pneumonia or combination of   both. Recommend clinical correlation and follow-up chest CT in one month,   particularly to reevaluate the medial left upper lobe airspace opacity."        The L hemidiaphragm was already noted to be elevated three years ago.  Per radiology report of non-con chest CT 9/4/2021:    "LUNGS AND AIRWAYS: Moderate elevation of the lefthemidiaphragm. Patent central airways.  Small amount of scarring at the left lung apex.  PLEURA: No pleural effusion.  MEDIASTINUM AND LESLIE: No lymphadenopathy.   . . .   BONES: Multilevel severe compression deformities of the thoracic and upper lumbar spine. Status post L1 vertebroplasty. Severe degenerative changes of the left shoulder.    IMPRESSION:  Moderate elevation of the left hemidiaphragm. No focal consolidation.   . . .     Multilevel severe compression deformities of the thoracic and upper lumbar spine of indeterminate age."      She was hospitalized here 9/3/21 after a fall, sustaining bilateral L5 transverse process fractures and bilateral sacral alar fractures.  At that time she (already) needed to use a walker.         EXAMINATION    Found asleep supine in bed, LEs everted 90 degrees at hips, in frog leg positions.  Awakens immediately to voice.  R pupil round, 4mm -->3.5mm.  L pupil eccentric, irregularly ellipsoidal, non reactive.  Artificial lens OS.  Grossly intact visual fields without evident extinction on DSS.  Symmetric normal facial movements.      Quadriparetic, weaker in the LEs, weaker on the R side.  Testing for UE drift is compromised by joint pain when she tries to attain full extension at the elbows (can be attained passively but Pt will not sustain the position due to pain).  She can offer moderate resistance (but <<50% of normal strength) on elbow flexion.  She can barely raise either LE in extension off the mattress, worse in the R side.      Passive range of hip rotation <45degrees/>90 degrees o the R/L.      Reliably differentiates P from LT stimulation face, torso, limbs.      Multiple small DIP/PIP nodes.        Reflex                           Right    Left   Comment    Pectoralis                         0        0  Biceps                              1        0  Triceps                             0        0  Alexis                        absent absent  Patellar                             0        0  Gastroc                            0        0  Plantar                      extensor  extensor

## 2024-09-20 NOTE — PROGRESS NOTE ADULT - SUBJECTIVE AND OBJECTIVE BOX
PULMONARY CONSULT SERVICE FOLLOW-UP NOTE    INTERVAL HPI:  Reviewed chart and overnight events; patient seen and examined at bedside.    MEDICATIONS:  Pulmonary:    Antimicrobials:    Anticoagulants:  enoxaparin Injectable 40 milliGRAM(s) SubCutaneous every 24 hours    Cardiac:  carvedilol 25 milliGRAM(s) Oral every 12 hours  furosemide   Injectable 20 milliGRAM(s) IV Push once      Allergies    penicillin (Rash)    Intolerances        Vital Signs Last 24 Hrs  T(C): 36.7 (20 Sep 2024 11:11), Max: 37 (20 Sep 2024 07:38)  T(F): 98 (20 Sep 2024 11:11), Max: 98.6 (20 Sep 2024 07:38)  HR: 67 (20 Sep 2024 11:11) (66 - 81)  BP: 118/57 (20 Sep 2024 11:11) (103/56 - 134/66)  BP(mean): --  RR: 18 (20 Sep 2024 11:11) (18 - 20)  SpO2: 98% (20 Sep 2024 11:11) (94% - 98%)    Parameters below as of 20 Sep 2024 11:11  Patient On (Oxygen Delivery Method): nasal cannula  O2 Flow (L/min): 2      09-19 @ 07:01  -  09-20 @ 07:00  --------------------------------------------------------  IN: 400 mL / OUT: 800 mL / NET: -400 mL    09-20 @ 07:01  -  09-20 @ 14:42  --------------------------------------------------------  IN: 200 mL / OUT: 0 mL / NET: 200 mL          PHYSICAL EXAM:  GEN: NAD, chronically frail-appearing, comfortable semirecumbent in bed  HEENT: anicteric sclera; MMM  RESP: no respiratory distress, decreased L sided breath sounds  CV: +S1/S2, RRR, no m/g/r  GI: soft, NT/ND, +BS  EXTREM: WWP; no edema, clubbing or cyanosis  Vascular: 2+ radial pulses B/L  NEURO: alert and awake, moving limbs spontaneously      LABS:      CBC Full  -  ( 20 Sep 2024 07:43 )  WBC Count : 5.66 K/uL  RBC Count : 3.70 M/uL  Hemoglobin : 10.9 g/dL  Hematocrit : 33.9 %  Platelet Count - Automated : 189 K/uL  Mean Cell Volume : 91.6 fl  Mean Cell Hemoglobin : 29.5 pg  Mean Cell Hemoglobin Concentration : 32.2 gm/dL  Auto Neutrophil # : x  Auto Lymphocyte # : x  Auto Monocyte # : x  Auto Eosinophil # : x  Auto Basophil # : x  Auto Neutrophil % : x  Auto Lymphocyte % : x  Auto Monocyte % : x  Auto Eosinophil % : x  Auto Basophil % : x    09-20    129[L]  |  83[L]  |  10  ----------------------------<  91  3.9   |  43[H]  |  0.25[L]    Ca    8.7      20 Sep 2024 07:43  Phos  3.3     09-20  Mg     1.9     09-20    TPro  5.9[L]  /  Alb  2.6[L]  /  TBili  0.5  /  DBili  x   /  AST  17  /  ALT  18  /  AlkPhos  46  09-20          Urinalysis Basic - ( 20 Sep 2024 07:43 )    Color: x / Appearance: x / SG: x / pH: x  Gluc: 91 mg/dL / Ketone: x  / Bili: x / Urobili: x   Blood: x / Protein: x / Nitrite: x   Leuk Esterase: x / RBC: x / WBC x   Sq Epi: x / Non Sq Epi: x / Bacteria: x              RADIOLOGY & ADDITIONAL STUDIES:  No interval chest imaging for review  PULMONARY CONSULT SERVICE FOLLOW-UP NOTE    INTERVAL HPI:  Reviewed chart and overnight events; patient seen and examined at bedside. Video  unable to fully understand pt but overall without acute complaints except for feeling cold.    MEDICATIONS:  Pulmonary:    Antimicrobials:    Anticoagulants:  enoxaparin Injectable 40 milliGRAM(s) SubCutaneous every 24 hours    Cardiac:  carvedilol 25 milliGRAM(s) Oral every 12 hours  furosemide   Injectable 20 milliGRAM(s) IV Push once      Allergies    penicillin (Rash)    Intolerances        Vital Signs Last 24 Hrs  T(C): 36.7 (20 Sep 2024 11:11), Max: 37 (20 Sep 2024 07:38)  T(F): 98 (20 Sep 2024 11:11), Max: 98.6 (20 Sep 2024 07:38)  HR: 67 (20 Sep 2024 11:11) (66 - 81)  BP: 118/57 (20 Sep 2024 11:11) (103/56 - 134/66)  BP(mean): --  RR: 18 (20 Sep 2024 11:11) (18 - 20)  SpO2: 98% (20 Sep 2024 11:11) (94% - 98%)    Parameters below as of 20 Sep 2024 11:11  Patient On (Oxygen Delivery Method): nasal cannula  O2 Flow (L/min): 2      09-19 @ 07:01  -  09-20 @ 07:00  --------------------------------------------------------  IN: 400 mL / OUT: 800 mL / NET: -400 mL    09-20 @ 07:01  -  09-20 @ 14:42  --------------------------------------------------------  IN: 200 mL / OUT: 0 mL / NET: 200 mL          PHYSICAL EXAM:  GEN: NAD, chronically frail-appearing, comfortable semirecumbent in bed  HEENT: anicteric sclera; MMM  RESP: no respiratory distress, decreased L sided breath sounds  CV: +S1/S2, RRR, no m/g/r  GI: soft, NT/ND, +BS  EXTREM: WWP; no edema, clubbing or cyanosis  Vascular: 2+ radial pulses B/L  NEURO: alert and awake, moving limbs spontaneously      LABS:      CBC Full  -  ( 20 Sep 2024 07:43 )  WBC Count : 5.66 K/uL  RBC Count : 3.70 M/uL  Hemoglobin : 10.9 g/dL  Hematocrit : 33.9 %  Platelet Count - Automated : 189 K/uL  Mean Cell Volume : 91.6 fl  Mean Cell Hemoglobin : 29.5 pg  Mean Cell Hemoglobin Concentration : 32.2 gm/dL  Auto Neutrophil # : x  Auto Lymphocyte # : x  Auto Monocyte # : x  Auto Eosinophil # : x  Auto Basophil # : x  Auto Neutrophil % : x  Auto Lymphocyte % : x  Auto Monocyte % : x  Auto Eosinophil % : x  Auto Basophil % : x    09-20    129[L]  |  83[L]  |  10  ----------------------------<  91  3.9   |  43[H]  |  0.25[L]    Ca    8.7      20 Sep 2024 07:43  Phos  3.3     09-20  Mg     1.9     09-20    TPro  5.9[L]  /  Alb  2.6[L]  /  TBili  0.5  /  DBili  x   /  AST  17  /  ALT  18  /  AlkPhos  46  09-20          Urinalysis Basic - ( 20 Sep 2024 07:43 )    Color: x / Appearance: x / SG: x / pH: x  Gluc: 91 mg/dL / Ketone: x  / Bili: x / Urobili: x   Blood: x / Protein: x / Nitrite: x   Leuk Esterase: x / RBC: x / WBC x   Sq Epi: x / Non Sq Epi: x / Bacteria: x              RADIOLOGY & ADDITIONAL STUDIES:  No interval chest imaging for review

## 2024-09-20 NOTE — PROGRESS NOTE ADULT - ASSESSMENT
79F PMH recently dx diaphragmatic dysfunction (L hemidiaphragm?), chronic lower extremity edema hypertension, arthritis, Neuralgia, osteoporotic compression fractures L5-S1, hyponatremia (HCTZ induced, corrected), goiter, who presented to the ED for left sided facial droop and SOB (reportedly x 3d PTA), was placed on NIV, initially admitted to ICU. Per family members pt was admitted to [facility] for SOB earlier in the year and d/c'd to home with BPAP support though is nonadherent with use. Pulmonary consulted for diaphragmatic dysfunction c/b acute on chronic respiratory failure.    # Acute on chronic respiratory failure with hypoxemia and hypercapnea likely related to diaphragmatic dysfn, unclear if contribution of neurologic/pulm pathology  # pneumonia  #Atelectasis    Recommendations:  - Completed course of empiric abx for presumed pna  - Recommended monitoring FVC and NIF at least q12h but pt unable to give good effort; likely not able to comply with maneuvers. Continue to monitor clinically for worsening respiratory distress  - Titrate supplemental O2 with goal SpO2 90-92%; monitor ambulatory/exertional SpO2 and document  - Incentive spirometry 10x/h or as tolerated; mobilize OOB/TC and ambulation daily as tolerated  - Avoid/caution with any sedating medications that may depress respiratory drive and worsen existing chronic hypercapnea  - Obtain records from [facility] regarding workup previously completed for diaphragmatic dysfn  - C/w nocturnal BPAP; at risk for sleep apnea, already with BPAP machine at home. Counseled regarding importance of adhering to use  - Team to consult Neurology; may benefit from cervical spinal MR if not previously completed, outpt EMG  - Will continue to follow     Upon d/c pt should f/u with [facility] within 1-2 weeks; consider referral to [facility].

## 2024-09-20 NOTE — PROGRESS NOTE ADULT - PROBLEM SELECTOR PLAN 7
Pt reports that early Sunday morning, he was found unconscious and not breathing by his wife. He had taken his CPAP machine off and was almost laying in the doorway of the bathroom. Wife turned him on his side so that he could start breathing again, pt bit his tongue and had a lot of blood in his mouth. Pt was not coherent until about 15 min after he started breathing again. Pt states he has also had what he calls spasms/tremors in his hands for the past few weeks. Pt states he has not had any medication changes recently and I have checked med list with pt. on coreg and valsartan at home  /76 on admission  started back on coreg  monitor BP

## 2024-09-20 NOTE — CONSULT NOTE ADULT - ASSESSMENT
Quadriparesis, weaker in the LEs, weaker on the R side.    No gross sensory deficit.    Bilateral extensor plantar responses.      OLD L hemidiaphragm elevation.      Polyarthralgia.      OA.    R/O seropositive arthropathy.    Fall with lumbar spine and pelvic fractures 3 years ago.  Must have already been at least paraparetic at the time, needing to use a walker and having fallen.    She must have cervical spondylosis.      She may well have cervical myelopathy, most likely chronic, longstanding.        RECOMMENDATIONS    Consider non-urgent non-con C-spine CT and MR.  Given chronicity of problems can certainly be done as out-Pt.    Consider spine surgical out-Pt follow-up.      ESR, CRP, RPR, RF, CCP, TSH, FT4.    No further neurologic in-Pt follow-up indicated at this time.      Latoya Martinez M.D.   - Department of Neurology  [place] and [employer] [facility] [employer] at [facility]             Quadriparesis, weaker in the LEs, weaker on the R side.    No gross sensory deficit.    Bilateral extensor plantar responses.      OLD L hemidiaphragm elevation.      Polyarthralgia.      OA.    R/O seropositive arthropathy.    Fall with lumbar spine and pelvic fractures 3 years ago.  Must have already been at least paraparetic at the time, needing to use a walker and having fallen.    She must have cervical spondylosis.      She may well have cervical myelopathy, most likely chronic, longstanding.      Hx of fall.      RECOMMENDATIONS    Consider non-urgent non-con C-spine CT and MR.  Given chronicity of problems can certainly be done as out-Pt.    Consider spine surgical out-Pt follow-up.      ESR, CRP, RPR, RF, CCP, TSH, FT4.    No further neurologic in-Pt follow-up indicated at this time.      Latoya Martinez M.D.   - Department of Neurology  [place] and [employer] [facility] [employer] at [facility]             Quadriparesis, weaker in the LEs, weaker on the R side.    No gross sensory deficit.    Bilateral extensor plantar responses.      OLD L hemidiaphragm elevation.      Polyarthralgia.      OA.    R/O seropositive arthropathy.    Fall with lumbar spine and pelvic fractures 3 years ago.  Must have already been at least paraparetic at the time, needing to use a walker and having fallen.    She must have cervical spondylosis.      She may well have cervical myelopathy, most likely chronic, longstanding.      Hx of fall.      RECOMMENDATIONS    Consider non-urgent non-con C-spine CT and MR.  Given chronicity of problems can certainly be done as out-Pt.    Consider spine surgical out-Pt follow-up.      ESR, CRP, RPR, RF, CCP, TSH, FT4, B12, SERUM (NOT RBC) folic acid, methylmalonic acid+homocysteine, copper, zinc..    Out-Pt follow-up for test results that cannot be obtained expedetiously.        Latoya Martinez M.D.   - Department of Neurology  [place] and [employer] [facility] [employer] at [facility]

## 2024-09-20 NOTE — PROGRESS NOTE ADULT - ATTENDING COMMENTS
This is a late entry, patient was evaluated on 09/20/24     79F PMH hypertension, arthritis, Neuralgia, osteoporotic compression fractures L5-S1, hyponatremia (HCTZ induced, corrected), goiter, who presented to the ED for ?left sided facial droop (none noticed later) and SOB. Admitted to ICU for AHHRF 2/2 PNA, diaphragmatic dysfunction.     Pt was evaluated, c/o some abdominal pain after eating each meal, reports having BMs. No hematochezia. Denies sob, feels better overall    Labs reviewed    PE as above     A/P:  #Acute on chronic hypercapnic and hypoxic respiratory failure  # pneumonia   # h/o diaphragmatic dysfunction  #Acute on chronic hyponatremia /volume depletion  #Acute encephalopathy   #debility  #Hypertension     Plan:  -Appreciate pulmonary consult   -neurology consulted, d/w Dr. Martinez- MR C spine  ordered. Still awaiting to get recs from [facility]   -NIFs/FVC testing- via respiratory therapy though patient unable to participate   -began mobilization- out of bed to chair  -outpatient for full eval of diaphragmatic dysfunction  -Sodium stable at 129, repeat urine studies. Serial BMP  -Cont oral diet  -Monitor urine output  -PT evaluation- CHET  -DVT prophylaxis .   -D/w son at bedside at length, all questions were answered

## 2024-09-21 LAB
ALBUMIN SERPL ELPH-MCNC: 2.6 G/DL — LOW (ref 3.5–5)
ALP SERPL-CCNC: 52 U/L — SIGNIFICANT CHANGE UP (ref 40–120)
ALT FLD-CCNC: 18 U/L DA — SIGNIFICANT CHANGE UP (ref 10–60)
ANION GAP SERPL CALC-SCNC: 4 MMOL/L — LOW (ref 5–17)
AST SERPL-CCNC: 16 U/L — SIGNIFICANT CHANGE UP (ref 10–40)
BILIRUB SERPL-MCNC: 0.5 MG/DL — SIGNIFICANT CHANGE UP (ref 0.2–1.2)
BUN SERPL-MCNC: 10 MG/DL — SIGNIFICANT CHANGE UP (ref 7–18)
CALCIUM SERPL-MCNC: 8.5 MG/DL — SIGNIFICANT CHANGE UP (ref 8.4–10.5)
CHLORIDE SERPL-SCNC: 83 MMOL/L — LOW (ref 96–108)
CO2 SERPL-SCNC: 42 MMOL/L — HIGH (ref 22–31)
CREAT SERPL-MCNC: 0.23 MG/DL — LOW (ref 0.5–1.3)
EGFR: 114 ML/MIN/1.73M2 — SIGNIFICANT CHANGE UP
GLUCOSE BLDC GLUCOMTR-MCNC: 101 MG/DL — HIGH (ref 70–99)
GLUCOSE SERPL-MCNC: 95 MG/DL — SIGNIFICANT CHANGE UP (ref 70–99)
HCT VFR BLD CALC: 34.3 % — LOW (ref 34.5–45)
HGB BLD-MCNC: 11.1 G/DL — LOW (ref 11.5–15.5)
MAGNESIUM SERPL-MCNC: 1.9 MG/DL — SIGNIFICANT CHANGE UP (ref 1.6–2.6)
MCHC RBC-ENTMCNC: 29.4 PG — SIGNIFICANT CHANGE UP (ref 27–34)
MCHC RBC-ENTMCNC: 32.4 GM/DL — SIGNIFICANT CHANGE UP (ref 32–36)
MCV RBC AUTO: 90.7 FL — SIGNIFICANT CHANGE UP (ref 80–100)
NRBC # BLD: 0 /100 WBCS — SIGNIFICANT CHANGE UP (ref 0–0)
OSMOLALITY UR: 670 MOSM/KG — SIGNIFICANT CHANGE UP (ref 50–1200)
PHOSPHATE SERPL-MCNC: 3.4 MG/DL — SIGNIFICANT CHANGE UP (ref 2.5–4.5)
PLATELET # BLD AUTO: 187 K/UL — SIGNIFICANT CHANGE UP (ref 150–400)
POTASSIUM SERPL-MCNC: 3.8 MMOL/L — SIGNIFICANT CHANGE UP (ref 3.5–5.3)
POTASSIUM SERPL-SCNC: 3.8 MMOL/L — SIGNIFICANT CHANGE UP (ref 3.5–5.3)
PROT SERPL-MCNC: 6.2 G/DL — SIGNIFICANT CHANGE UP (ref 6–8.3)
RBC # BLD: 3.78 M/UL — LOW (ref 3.8–5.2)
RBC # FLD: 13.1 % — SIGNIFICANT CHANGE UP (ref 10.3–14.5)
SODIUM SERPL-SCNC: 129 MMOL/L — LOW (ref 135–145)
SODIUM UR-SCNC: 8 MMOL/L — SIGNIFICANT CHANGE UP
WBC # BLD: 5.32 K/UL — SIGNIFICANT CHANGE UP (ref 3.8–10.5)
WBC # FLD AUTO: 5.32 K/UL — SIGNIFICANT CHANGE UP (ref 3.8–10.5)

## 2024-09-21 PROCEDURE — 99232 SBSQ HOSP IP/OBS MODERATE 35: CPT | Mod: GC

## 2024-09-21 RX ADMIN — Medication 25 MILLIGRAM(S): at 05:24

## 2024-09-21 RX ADMIN — Medication 25 MILLIGRAM(S): at 18:29

## 2024-09-21 RX ADMIN — ENOXAPARIN SODIUM 40 MILLIGRAM(S): 150 INJECTION SUBCUTANEOUS at 05:24

## 2024-09-21 RX ADMIN — PANTOPRAZOLE SODIUM 40 MILLIGRAM(S): 40 TABLET, DELAYED RELEASE ORAL at 05:24

## 2024-09-21 NOTE — DISCHARGE NOTE PROVIDER - NSDCCPCAREPLAN_GEN_ALL_CORE_FT
PRINCIPAL DISCHARGE DIAGNOSIS  Diagnosis: Acute hypoxic on chronic hypercapnic respiratory failure  Assessment and Plan of Treatment: You came to the hospital with shortness of breath and increased work of breathing. You were found to be taking gabapentin and other muscle relaxers at home that may have weakened your breathing. You were initially in the ICU for BiPAP use and closer care. You were able to be moved to the TELE floor where you continued to use BiPAP over night. You were seen by a pulmonologist who recommended continuing BiPAP overnight and being seen outpatient by a diaphragm dysfunction neurology team. Your son was able to speak and find you someone to follow up with when you leave the hospital. You were also seen by a neurologist who recommended avoiding muscle relaxants and also continuing to follow up outpatient to do more work up in regard to your diaphragmatic dysfunction. You were seen by physical therapy who recommended you go to a rehab facility, to which your family agreed.  Please continue to follow with your primary care physician and a neurologist for further care.      SECONDARY DISCHARGE DIAGNOSES  Diagnosis: Hyponatremia  Assessment and Plan of Treatment: You were found to have low sodium levels when you came in to the hospital which was initially thought to be secondary to using the thiazide diuretic. Please STOP taking hydrochlorothiazide. You were seen by a nephrologist who recommended you continue taking salt tabs at home and at rehab. Please take 1g salt tab three times a days. You should continue to follow up with a nephrologist for further management.    Diagnosis: Hypertension  Assessment and Plan of Treatment: Your blood pressure remained low in the hospital. You are to STOP taking amlodipine, losartan, hydrochlorothiazide. You can continue taking metoprolol 25mg daily. You can have further management of your blood pressure medications done with your primary care provider.    Diagnosis: Pneumonia  Assessment and Plan of Treatment: You were found to have pneumonia on CT scans in the hospital for which you completed a course of antibiotics for. Your infection cleared up and your started to have an easier time breathing. Please continue to follow up wiht a pulmonologist for further evaluation.    Diagnosis: Elevated diaphragm  Assessment and Plan of Treatment: As mentioned above you have an elevated diaphragm which is chronic. Please follow up with a neurologist for further care. You are recommended to continue using the BIPAP at night time to help you breathe more easily.     PRINCIPAL DISCHARGE DIAGNOSIS  Diagnosis: Acute hypoxic on chronic hypercapnic respiratory failure  Assessment and Plan of Treatment: You came to the hospital with shortness of breath and increased work of breathing. You were found to be taking gabapentin and other muscle relaxers at home that may have weakened your breathing. You were initially in the ICU for BiPAP use and closer care. You were able to be moved to the TELE floor where you continued to use BiPAP over night. You were seen by a pulmonologist who recommended continuing BiPAP overnight and being seen outpatient by a diaphragm dysfunction neurology team. Your son was able to speak and find you someone to follow up with when you leave the hospital. You were also seen by a neurologist who recommended avoiding muscle relaxants and also continuing to follow up outpatient to do more work up in regard to your diaphragmatic dysfunction. You were seen by physical therapy who recommended you go to a rehab facility, to which your family agreed.  Please continue to follow with your primary care physician and a neurologist for further care.      SECONDARY DISCHARGE DIAGNOSES  Diagnosis: Hyponatremia  Assessment and Plan of Treatment: You were found to have low sodium levels when you came in to the hospital which was initially thought to be secondary to using the thiazide diuretic. Please STOP taking hydrochlorothiazide. You were seen by a nephrologist who recommended you continue taking salt tabs at home and at rehab. Please take 1g salt tab three times a days. You should continue to follow up with a nephrologist for further management.    Diagnosis: Hypertension  Assessment and Plan of Treatment: Your blood pressure remained low in the hospital. You are to STOP taking amlodipine, losartan, hydrochlorothiazide. You can continue taking metoprolol 25mg daily. You can have further management of your blood pressure medications done with your primary care provider.    Diagnosis: Pneumonia  Assessment and Plan of Treatment: You were found to have pneumonia on CT scans in the hospital for which you completed a course of antibiotics for. Your infection cleared up and your started to have an easier time breathing. Please continue to follow up wiht a pulmonologist for further evaluation.    Diagnosis: Elevated diaphragm  Assessment and Plan of Treatment: As mentioned above you have an elevated diaphragm which is chronic. You were recommended to have a non-con CT of the C-spine and C-spine MRI which was attempted to be done while in the hospital but you were unable to lie down flat. You should try to have these tests done outpatient. Please follow up with a neurologist for further care. You are recommended to continue using the BIPAP at night time to help you breathe more easily.     PRINCIPAL DISCHARGE DIAGNOSIS  Diagnosis: Acute hypoxic on chronic hypercapnic respiratory failure  Assessment and Plan of Treatment: You came to the hospital with shortness of breath and increased work of breathing. You were found to be taking gabapentin and other muscle relaxers at home that may have weakened your breathing. You were initially in the ICU for BiPAP use and closer care. You were able to be moved to the TELE floor where you continued to use BiPAP over night. You were seen by a pulmonologist who recommended continuing BiPAP overnight and being seen outpatient by a diaphragm dysfunction neurology team. Your son was able to speak and find you someone to follow up with when you leave the hospital. You were also seen by a neurologist who recommended avoiding muscle relaxants and also continuing to follow up outpatient to do more work up in regard to your diaphragmatic dysfunction. You were seen by physical therapy who recommended you go to a rehab facility, to which your family agreed.  Please continue to follow with your primary care physician and a neurologist for further care.      SECONDARY DISCHARGE DIAGNOSES  Diagnosis: Hyponatremia  Assessment and Plan of Treatment: You were found to have low sodium levels when you came in to the hospital which was initially thought to be secondary to using the thiazide diuretic. Please STOP taking hydrochlorothiazide. You were seen by a nephrologist who recommended you continue taking salt tabs at home and at rehab. Please take 1g salt tab three times a days. You should continue to follow up with a nephrologist for further management.    Diagnosis: Hypertension  Assessment and Plan of Treatment: Your blood pressure remained low in the hospital. You are to STOP taking amlodipine, losartan, hydrochlorothiazide. You can continue taking metoprolol 25mg daily. You can have further management of your blood pressure medications done with your primary care provider.    Diagnosis: Pneumonia  Assessment and Plan of Treatment: You were found to have pneumonia on CT scans in the hospital for which you completed a course of antibiotics for. Your infection cleared up and your started to have an easier time breathing. Please continue to follow up wiht a pulmonologist for further evaluation.    Diagnosis: Elevated diaphragm  Assessment and Plan of Treatment: As mentioned above you have an elevated diaphragm which is chronic. You were recommended to have a non-con CT of the C-spine and C-spine MRI which was attempted to be done while in the hospital but you were unable to lie down flat. You should try to have these tests done outpatient. Please follow up with a neurologist for further care. You are recommended to continue using the BIPAP at night time to help you breathe more easily.    Diagnosis: Chronic venous insufficiency  Assessment and Plan of Treatment: You have chronic leg swelling and were taking lasix at home. Please only take lasix 20mg daily as needed for leg swelling and signs of fluid overload. Please consult your doctor before using.

## 2024-09-21 NOTE — DIETITIAN INITIAL EVALUATION ADULT - ETIOLOGY
Alteration in Nutrition Related Lab Values evidenced by low Na, Alb related to dx of hyponatremia and other medical problems.

## 2024-09-21 NOTE — DIETITIAN INITIAL EVALUATION ADULT - ORAL INTAKE PTA/DIET HISTORY
Patient was unable to provide information because she went to sleep while the RD obtained the [employer] apparatus. Previous admission documents were reviewed and there was a small amount  of weight loss noted since July which is not significant. She is vegan and will eat no animal products.

## 2024-09-21 NOTE — DIETITIAN INITIAL EVALUATION ADULT - OTHER INFO
Patient was admitted with acute respiratory failure with hypercapnia, hyponatremia, arthritis, lumbar neuralgia, htn, pneumonia, chronic venous insufficiency.

## 2024-09-21 NOTE — DISCHARGE NOTE PROVIDER - HOSPITAL COURSE
79F PMH hypertension, arthritis, Neuralgia, osteoporotic compression fractures L5-S1, hyponatremia (HCTZ induced, corrected), goiter, who presented to the ED for left sided facial droop and SOB. Pt seen at bedside, on NIV , AAOX1, and did not have a facial droop, more awake and alert compared to when she came to the ED as per ED attending. As per ED attending, he tried calling the son, who stated that she was having SOB for the past 3 days. ED was unable to obtain any more history as the son needed to hang up. When called again by ICU, the phone number went to voicemail. Pt was able to state her name and follow commands, still appears confused compared to baseline (as per last admission), however only speaks donald and is hard of hearing. Pt was able to state her name and follow commands, still appears confused compared to baseline (as per last admission), however only speaks donald and is hard of hearing. History obtained from patient's son and daughter in law at bedside. States earlier this year was admitted to [facility] for similar complaints and was discharged home with bipap support. Though patient has been non compliant with bipap. Patient was admitted to telemetry from the ICU for acute hypoxic on chronic hypercapnic respiratory failure 2/2 to left hemidiaphragm dysfunction and PNA. Crackles were present in the right lower lung lobe and bilateral leg swelling was present and lasix was started. Over a course of 5 days, rocephin was given QD and PNA cleared up, R lung was clear to auscultation. Due to L hemidiaphragm dysfunction, patient was required to be on nocturnal BiPAP and was compliant with it, was on 2-4L NC during the day. Initially there were supraclavicular retractions due to increased work of breathing but slightly subsided over time. TTE EF 58% G1DD. 79F PMH hypertension, arthritis, Neuralgia, osteoporotic compression fractures L5-S1, hyponatremia (HCTZ induced, corrected), goiter, who presented to the ED for left sided facial droop and SOB. Pt seen at bedside, on NIV , AAOX1, and did not have a facial droop, more awake and alert compared to when she came to the ED as per ED attending. As per ED attending, he tried calling the son, who stated that she was having SOB for the past 3 days. ED was unable to obtain any more history as the son needed to hang up. When called again by ICU, the phone number went to voicemail. Pt was able to state her name and follow commands, still appears confused compared to baseline (as per last admission), however only speaks donald and is hard of hearing. Pt was able to state her name and follow commands, still appears confused compared to baseline (as per last admission), however only speaks donald and is hard of hearing. History obtained from patient's son and daughter in law at bedside. States earlier this year was admitted to [facility] for similar complaints and was discharged home with bipap support. Though patient has been non compliant with bipap. Patient was admitted to telemetry from the ICU for acute hypoxic on chronic hypercapnic respiratory failure 2/2 to left hemidiaphragm dysfunction and PNA. Crackles were present in the right lower lung lobe and bilateral leg swelling was present and lasix was started. Over a course of 5 days, rocephin was given QD and PNA cleared up, R lung was clear to auscultation. Due to L hemidiaphragm dysfunction, patient was required to be on nocturnal BiPAP and was compliant with it, was on 2-4L NC during the day. Initially there were supraclavicular retractions due to increased work of breathing but slightly subsided over time. TTE EF 58% G1DD 79F PMH hypertension, arthritis, Neuralgia, osteoporotic compression fractures L5-S1, hyponatremia (HCTZ induced, corrected), goiter, who presented to the ED for left sided facial droop and SOB. Pt seen at bedside, on NIV , AAOX1, and did not have a facial droop, more awake and alert compared to when she came to the ED as per ED attending. As per ED attending, he tried calling the son, who stated that she was having SOB for the past 3 days. ED was unable to obtain any more history as the son needed to hang up. When called again by ICU, the phone number went to voicemail. Pt was able to state her name and follow commands, still appears confused compared to baseline (as per last admission), however only speaks donald and is hard of hearing. Pt was able to state her name and follow commands, still appears confused compared to baseline (as per last admission), however only speaks donald and is hard of hearing. History obtained from patient's son and daughter in law at bedside. States earlier this year was admitted to [facility] for similar complaints and was discharged home with bipap support. Though patient has been non compliant with bipap. Patient was admitted to telemetry from the ICU for acute hypoxic on chronic hypercapnic respiratory failure 2/2 to left hemidiaphragm dysfunction and PNA. Crackles were present in the right lower lung lobe and bilateral leg swelling was present and lasix was started. Over a course of 5 days, rocephin was given QD and PNA cleared up, R lung was clear to auscultation. Due to L hemidiaphragm dysfunction, patient was required to be on nocturnal BiPAP and was compliant with it, was on 2-4L NC during the day. Initially there were supraclavicular retractions due to increased work of breathing but slightly subsided over time. TTE EF 58% G1DD. MR cervical spine was ordered and showed ________. She had a history of chronic venous insufficiency but was negative for DVT. [facility] medical records were requested, _______.Neurology and Pulmonology were consulted who recommended extensive outpatient workup to determine the cause of the left hemidiaphragmatic dysfunction, and suggested a referral to [facility].        Given patient's improved clinical status and current hemodynamic stability, decision was made to discharge after discussing with attending physician. Please refer to patient's complete medical chart with documents for a full hospital course, for this is only a brief summary.     79F PMH hypertension, arthritis, Neuralgia, osteoporotic compression fractures L5-S1, hyponatremia (HCTZ induced, corrected), goiter, who presented to the ED for left sided facial droop and SOB. Pt seen at bedside, on NIV , AAOX1, and did not have a facial droop, more awake and alert compared to when she came to the ED as per ED attending. Patient was admitted to telemetry from the ICU for acute hypoxic on chronic hypercapnic respiratory failure 2/2 to left hemidiaphragm dysfunction and PNA. Lasix was given. Over a course of 5 days, rocephin was given QD and PNA cleared up, R lung was clear to auscultation. Due to L hemidiaphragm dysfunction, patient was required to be on nocturnal BiPAP and was compliant with it, was on 2-4L NC during the day. Initially there were supraclavicular retractions due to increased work of breathing but slightly subsided over time. TTE EF 58% G1DD.  She had a history of chronic venous insufficiency but was negative for DVT. Neurology and Pulmonology were consulted who recommended extensive outpatient workup to determine the cause of the left hemidiaphragmatic dysfunction.     Given patient's improved clinical status and current hemodynamic stability, decision was made to discharge after discussing with attending physician. Please refer to patient's complete medical chart with documents for a full hospital course, for this is only a brief summary.     79F PMH hypertension, arthritis, Neuralgia, osteoporotic compression fractures L5-S1, hyponatremia (HCTZ induced, corrected), goiter, who presented to the ED for left sided facial droop and SOB. Pt seen at bedside, on NIV , AAOX1, and did not have a facial droop, more awake and alert compared to when she came to the ED as per ED attending. Patient was admitted to telemetry from the ICU for acute hypoxic on chronic hypercapnic respiratory failure 2/2 to left hemidiaphragm dysfunction and PNA. Lasix was given. Over a course of 5 days, rocephin was given QD and PNA cleared up, R lung was clear to auscultation. Due to L hemidiaphragm dysfunction, patient was required to be on nocturnal BiPAP and was compliant with it, was on 2-4L NC during the day. Initially there were supraclavicular retractions due to increased work of breathing but slightly subsided over time. TTE EF 58% G1DD.  She had a history of chronic venous insufficiency but was negative for DVT. Neurology and Pulmonology were consulted who recommended extensive outpatient workup to determine the cause of the left hemidiaphragmatic dysfunction. PT evaluated patient and recommended CHET to which family agreed. Pt overall clinical status improved. Pt to continue BiPAP qhs on discharge.     Given patient's improved clinical status and current hemodynamic stability, decision was made to discharge after discussing with attending physician. Please refer to patient's complete medical chart with documents for a full hospital course, for this is only a brief summary.     79F PMH hypertension, arthritis, Neuralgia, osteoporotic compression fractures L5-S1, hyponatremia (HCTZ induced, corrected), goiter, who presented to the ED for left sided facial droop and SOB. Pt seen at bedside, on NIV , AAOX1, and did not have a facial droop, more awake and alert compared to when she came to the ED as per ED attending. Patient was admitted to telemetry from the ICU for acute hypoxic on chronic hypercapnic respiratory failure 2/2 to left hemidiaphragm dysfunction and PNA. Lasix was given. Over a course of 5 days, rocephin was given QD and PNA cleared up, R lung was clear to auscultation. Due to L hemidiaphragm dysfunction, patient was required to be on nocturnal BiPAP and was compliant with it, was on 2-4L NC during the day. Initially there were supraclavicular retractions due to increased work of breathing but slightly subsided over time. TTE EF 58% G1DD.  She had a history of chronic venous insufficiency but was negative for DVT. Neurology and Pulmonology were consulted who recommended extensive outpatient workup to determine the cause of the left hemidiaphragmatic dysfunction. Included in the workup was non-con CT of the C-spine and C-spine MRI, which pt tried to do inpatient but was unable to lie down flat. PT evaluated patient and recommended CHET to which family agreed. Pt overall clinical status improved. Pt to continue BiPAP qhs on discharge.     Given patient's improved clinical status and current hemodynamic stability, decision was made to discharge after discussing with attending physician. Please refer to patient's complete medical chart with documents for a full hospital course, for this is only a brief summary.     79F PMH hypertension, arthritis, Neuralgia, osteoporotic compression fractures L5-S1, hyponatremia (HCTZ induced, corrected), goiter, who presented to the ED for left sided facial droop and SOB. Pt seen at bedside, on NIV , AAOX1, and did not have a facial droop, more awake and alert compared to when she came to the ED as per ED attending. Patient was admitted to telemetry from the ICU for acute hypoxic on chronic hypercapnic respiratory failure 2/2 to left hemidiaphragm dysfunction and PNA. Lasix was given. Over a course of 5 days, rocephin was given QD and PNA cleared up, R lung was clear to auscultation. Due to L hemidiaphragm dysfunction, patient was required to be on nocturnal BiPAP and was compliant with it, was on 2-4L NC during the day. Initially there were supraclavicular retractions due to increased work of breathing but slightly subsided over time. TTE EF 58% G1DD.  She had a history of chronic venous insufficiency but was negative for DVT. Pt diuresed for a few days and then lasix was held due to volume contraction alkalosis and then pt was given diamox with improvement. Pt eventually weaned to room air. Neurology and Pulmonology were consulted who recommended extensive outpatient workup to determine the cause of the left hemidiaphragmatic dysfunction. Included in the workup was non-con CT of the C-spine and C-spine MRI, which pt tried to do inpatient but was unable to lie down flat. PT evaluated patient and recommended CHET to which family agreed. Pt overall clinical status improved. Pt to continue BiPAP qhs on discharge.     Given patient's improved clinical status and current hemodynamic stability, decision was made to discharge after discussing with attending physician. Please refer to patient's complete medical chart with documents for a full hospital course, for this is only a brief summary.

## 2024-09-21 NOTE — DISCHARGE NOTE PROVIDER - CARE PROVIDER_API CALL
Rzo Stewart  Internal Medicine  JEREMIAS COX, Phys,    Phone: ()-  Fax: ()-  Follow Up Time:    Roz Stewart  Internal Medicine  N  WILLIS CXO, Phys,    Phone: ()-  Fax: ()-  Follow Up Time:     Lorin Prado  Pulmonary Disease  [address] [facility], [address]  [place] [address]  Phone: [phone]  Fax: [phone]  Follow Up Time:     Yung Noel  Neurology  [address] [place] [address], [address] [place] [address]  Phone: [phone]  Fax: [phone]  Follow Up Time:    Roz Stewart  Internal Medicine  N  WILLIS COX, Phys,    Phone: ()-  Fax: ()-  Follow Up Time:     Lorin Prado  Pulmonary Disease  [address] [facility], [address]  [place] [address]  Phone: [phone]  Fax: [phone]  Follow Up Time:     Latoya Martinez  Neurology  [address] [place] [address]  Phone: [phone]  Fax: [phone]  Follow Up Time:

## 2024-09-21 NOTE — PROGRESS NOTE ADULT - SUBJECTIVE AND OBJECTIVE BOX
PGY-1 Progress Note discussed with attending    PAGER #: [[phone]] TILL 5:00 PM  PLEASE CONTACT ON CALL TEAM:  - On Call Team (Please refer to Chuy) FROM 5:00 PM - 8:30PM  - Nightfloat Team FROM 8:30 -7:30 AM    INTERVAL HPI/OVERNIGHT EVENTS:       REVIEW OF SYSTEMS:  CONSTITUTIONAL: No fever, chills,  or fatigue  RESPIRATORY: No cough, wheezing, No shortness of breath  CARDIOVASCULAR: No chest pain, palpitations,  leg swelling  GASTROINTESTINAL: No abdominal pain. No nausea, vomiting, or hematemesis; No diarrhea or constipation. No bloody or black stool  GENITOURINARY: No dysuria or hematuria, urinary frequency  NEUROLOGICAL: No headaches, dizziness  SKIN: No itching, burning, rashes, or lesions     Vital Signs Last 24 Hrs  T(C): 37.1 (21 Sep 2024 11:06), Max: 37.1 (21 Sep 2024 04:42)  T(F): 98.8 (21 Sep 2024 11:06), Max: 98.8 (21 Sep 2024 04:42)  HR: 67 (21 Sep 2024 11:06) (66 - 76)  BP: 125/63 (21 Sep 2024 11:06) (105/48 - 136/61)  BP(mean): --  RR: 18 (21 Sep 2024 11:06) (18 - 19)  SpO2: 99% (21 Sep 2024 11:06) (97% - 99%)    Parameters below as of 21 Sep 2024 11:06  Patient On (Oxygen Delivery Method): nasal cannula  O2 Flow (L/min): 2      PHYSICAL EXAMINATION:  GENERAL: NAD, resting comfortably in bed  HEAD:  Atraumatic, Normocephalic  EYES:  PERRLA,  conjunctiva and sclera clear  NECK: Supple, supraclavicular retractions  CHEST WALL: Nontender  LUNG: No crackles at R lung base, L upper lobe +clear breath sounds  HEART: Regular rate and rhythm; No murmurs,  ABDOMEN: Soft, Nontender, Nondistended; Bowel sounds present, No Rovsing's sign   NERVOUS SYSTEM:  Alert & Oriented X2  EXTREMITIES:  2+ Peripheral Pulses, No clubbing, cyanosis, or edema  CALF: No Calf tenderness   SKIN: warm dry                          11.1   5.32  )-----------( 187      ( 21 Sep 2024 06:20 )             34.3     09-21    129[L]  |  83[L]  |  10  ----------------------------<  95  3.8   |  42[H]  |  0.23[L]    Ca    8.5      21 Sep 2024 06:20  Phos  3.4     09-21  Mg     1.9     09-21    TPro  6.2  /  Alb  2.6[L]  /  TBili  0.5  /  DBili  x   /  AST  16  /  ALT  18  /  AlkPhos  52  09-21    LIVER FUNCTIONS - ( 21 Sep 2024 06:20 )  Alb: 2.6 g/dL / Pro: 6.2 g/dL / ALK PHOS: 52 U/L / ALT: 18 U/L DA / AST: 16 U/L / GGT: x                   CAPILLARY BLOOD GLUCOSE      RADIOLOGY & ADDITIONAL TESTS:                   PGY-1 Progress Note discussed with attending    PAGER #: [[phone]] TILL 5:00 PM  PLEASE CONTACT ON CALL TEAM:  - On Call Team (Please refer to Chuy) FROM 5:00 PM - 8:30PM  - Nightfloat Team FROM 8:30 -7:30 AM    INTERVAL HPI/OVERNIGHT EVENTS: No acute events overnight. Patient examined at bedside this AM.  Patient denies acute complaints.      REVIEW OF SYSTEMS:  CONSTITUTIONAL: No fever, chills,  or fatigue  RESPIRATORY: No cough, wheezing, No shortness of breath  CARDIOVASCULAR: No chest pain, palpitations,  leg swelling  GASTROINTESTINAL: No abdominal pain. No nausea, vomiting, or hematemesis; No diarrhea or constipation. No bloody or black stool  GENITOURINARY: No dysuria or hematuria, urinary frequency  NEUROLOGICAL: No headaches, dizziness  SKIN: No itching, burning, rashes, or lesions     Vital Signs Last 24 Hrs  T(C): 37.1 (21 Sep 2024 11:06), Max: 37.1 (21 Sep 2024 04:42)  T(F): 98.8 (21 Sep 2024 11:06), Max: 98.8 (21 Sep 2024 04:42)  HR: 67 (21 Sep 2024 11:06) (66 - 76)  BP: 125/63 (21 Sep 2024 11:06) (105/48 - 136/61)  BP(mean): --  RR: 18 (21 Sep 2024 11:06) (18 - 19)  SpO2: 99% (21 Sep 2024 11:06) (97% - 99%)    Parameters below as of 21 Sep 2024 11:06  Patient On (Oxygen Delivery Method): nasal cannula  O2 Flow (L/min): 2      PHYSICAL EXAMINATION:  GENERAL: NAD, resting comfortably in bed  HEAD:  Atraumatic, Normocephalic  EYES:  PERRLA,  conjunctiva and sclera clear  NECK: Supple, supraclavicular retractions  CHEST WALL: Nontender  LUNG: No crackles at R lung base, L upper lobe +clear breath sounds  HEART: Regular rate and rhythm; No murmurs,  ABDOMEN: Soft, Nontender, Nondistended; Bowel sounds present, No Rovsing's sign   NERVOUS SYSTEM:  Alert & Oriented X2  EXTREMITIES:  2+ Peripheral Pulses, No clubbing, cyanosis, or edema  CALF: No Calf tenderness   SKIN: warm dry                          11.1   5.32  )-----------( 187      ( 21 Sep 2024 06:20 )             34.3     09-21    129[L]  |  83[L]  |  10  ----------------------------<  95  3.8   |  42[H]  |  0.23[L]    Ca    8.5      21 Sep 2024 06:20  Phos  3.4     09-21  Mg     1.9     09-21    TPro  6.2  /  Alb  2.6[L]  /  TBili  0.5  /  DBili  x   /  AST  16  /  ALT  18  /  AlkPhos  52  09-21    LIVER FUNCTIONS - ( 21 Sep 2024 06:20 )  Alb: 2.6 g/dL / Pro: 6.2 g/dL / ALK PHOS: 52 U/L / ALT: 18 U/L DA / AST: 16 U/L / GGT: x                   CAPILLARY BLOOD GLUCOSE      RADIOLOGY & ADDITIONAL TESTS:

## 2024-09-21 NOTE — DIETITIAN INITIAL EVALUATION ADULT - LITERATURE/VIDEOS GIVEN
Patient was not able to participate in diet instruction at this time, too sleepy and lacked interest.

## 2024-09-21 NOTE — DIETITIAN INITIAL EVALUATION ADULT - NS FNS DIET ORDER
Diet, Soft and Bite Sized:   1000mL Fluid Restriction ([id number])  Vegan {Accepts Vegetable Products Only} (09-15-24 @ 22:29) [Active]

## 2024-09-21 NOTE — PROGRESS NOTE ADULT - PROBLEM SELECTOR PLAN 4
previously admitted for hyponatremia in July 2024  likely due to poor oral intake  continue to trend Na, goal Na repletion is 133 by 8PM (6-8 mmol/L for 24 hrs)

## 2024-09-21 NOTE — PROGRESS NOTE ADULT - PROBLEM SELECTOR PLAN 3
presenting with SOB   on 3L NC, monitor respiratory function   CTA chest: Areas of airspace consolidation, increased in the left lung and new in the right upper lobe  aspiration precautions  f/u BCx, UCx negative at 72h (preliminary report)  strep, mycoplasma, and legionella negative  completed rocephin for 5 day course (day 5/5)

## 2024-09-21 NOTE — DISCHARGE NOTE PROVIDER - PROVIDER TOKENS
PROVIDER:[TOKEN:[[id number]]] PROVIDER:[TOKEN:[[id number]]],PROVIDER:[TOKEN:[[id number]]],PROVIDER:[TOKEN:[[id number]]] PROVIDER:[TOKEN:[[id number]]],PROVIDER:[TOKEN:[[id number]]],PROVIDER:[TOKEN:[[id number]]]

## 2024-09-21 NOTE — DISCHARGE NOTE PROVIDER - CARE PROVIDERS DIRECT ADDRESSES
[email or web address]

## 2024-09-21 NOTE — DIETITIAN INITIAL EVALUATION ADULT - NSFNSPHYEXAMSKINFT_GEN_A_CORE
Pressure Injury 1: coccyx, Stage II    Pressure Injury 11: none, none, Pressure Injury 1: coccyx, Stage II

## 2024-09-21 NOTE — DISCHARGE NOTE PROVIDER - ATTENDING DISCHARGE PHYSICAL EXAMINATION:
T(C): 36.8 (09-25-24 @ 15:23), Max: 37.1 (09-25-24 @ 04:45)  HR: 64 (09-25-24 @ 15:23) (64 - 81)  BP: 119/58 (09-25-24 @ 15:23) (99/56 - 142/61)  RR: 18 (09-25-24 @ 15:23) (17 - 18)  SpO2: 100% (09-25-24 @ 15:23) (88% - 100%)    PHYSICAL EXAM:  GENERAL: NAD, lying in bed  HEAD:  Atraumatic, Normocephalic  EYES: EOMI, PERRLA, conjunctiva and sclera clear  NECK: Supple, No elevated JVD  CHEST/LUNG: Clear to auscultation bilaterally; No wheeze  HEART: Regular rate and rhythm; No murmurs, rubs, or gallops  ABDOMEN: Soft, Nontender, Nondistended; Bowel sounds present  EXTREMITIES:  2+ Peripheral Pulses, No clubbing, cyanosis, or edema  PSYCH: Alert, pleasant  NEUROLOGY: CN II-XII grossly intact, moving all extremities  SKIN: No rashes or lesions

## 2024-09-21 NOTE — DIETITIAN INITIAL EVALUATION ADULT - PERTINENT LABORATORY DATA
09-21    129[L]  |  83[L]  |  10  ----------------------------<  95  3.8   |  42[H]  |  0.23[L]    Ca    8.5      21 Sep 2024 06:20  Phos  3.4     09-21  Mg     1.9     09-21    TPro  6.2  /  Alb  2.6[L]  /  TBili  0.5  /  DBili  x   /  AST  16  /  ALT  18  /  AlkPhos  52  09-21  POCT Blood Glucose.: 96 mg/dL (09-20-24 @ 21:14)

## 2024-09-21 NOTE — PROGRESS NOTE ADULT - ATTENDING COMMENTS
79F PMH hypertension, arthritis, Neuralgia, osteoporotic compression fractures L5-S1, hyponatremia (HCTZ induced, corrected), goiter, who presented to the ED for ?left sided facial droop (none noticed later) and SOB. Admitted to ICU for AHHRF 2/2 PNA, diaphragmatic dysfunction.     Pt was evaluated, no new complaints. No ongoing abdominal pain.     PE as above     A/P:  #Acute on chronic hypercapnic and hypoxic respiratory failure~2L   # pneumonia   # h/o diaphragmatic dysfunction  #Acute on chronic hyponatremia /volume depletion  #Acute encephalopathy   #debility  #Hypertension     Plan:  -Appreciate pulmonary consult   -neurology consulted, d/w Dr. Martinez- MR C spine pending. Still awaiting to get recs from [facility]   -NIFs/FVC testing- via respiratory therapy though patient unable to participate   -began mobilization- out of bed to chair  -outpatient for full eval of diaphragmatic dysfunction. Pt can f/u w/ [facility]   -Sodium stable at 129, repeat urine studies noted. Serial BMP  -Cont oral diet  -Monitor urine output  -PT evaluation- CHET  -DVT prophylaxis .

## 2024-09-21 NOTE — DISCHARGE NOTE PROVIDER - NSDCMRMEDTOKEN_GEN_ALL_CORE_FT
acetaminophen 325 mg oral tablet: 2 tab(s) orally every 6 hours As needed Temp greater or equal to 38C (100.4F), Mild Pain (1 - 3)  azelastine nasal: 1 spray(s) in each nostril 2 times a day  carvedilol 25 mg oral tablet: 1 tab(s) orally every 12 hours  cyclobenzaprine 5 mg oral tablet: 1 tab(s) orally 3 times a day as needed for  muscle spasm  fluticasone nasal: 2 spray(s) in each nostril once a day  gabapentin 300 mg oral capsule: 2 cap(s) orally once a day (at bedtime)  lidocaine 4% topical film: Apply topically to affected area once a day  magnesium hydroxide 8% oral suspension: 30 milliliter(s) orally once, As needed, Constipation  rosuvastatin 5 mg oral tablet: 1 tab(s) orally once a day (at bedtime)  valsartan 320 mg oral tablet: 1 tab(s) orally once a day   amLODIPine 5 mg oral tablet: 1 tab(s) orally once a day  carvedilol 25 mg oral tablet: 1 tab(s) orally every 12 hours  furosemide 40 mg oral tablet: 1 tab(s) orally once a day  gabapentin 300 mg oral capsule: 2 cap(s) orally once a day (at bedtime)  gabapentin 300 mg oral capsule: 1 cap(s) orally once a day (in the morning)  losartan-hydrochlorothiazide 50 mg-12.5 mg oral tablet: 1 tab(s) orally once a day  rosuvastatin 5 mg oral tablet: 1 tab(s) orally once a day (at bedtime)   carvedilol 25 mg oral tablet: 1 tab(s) orally every 12 hours  pantoprazole 40 mg oral delayed release tablet: 1 tab(s) orally once a day (before a meal)  rosuvastatin 5 mg oral tablet: 1 tab(s) orally once a day (at bedtime)  sodium chloride 1 g oral tablet: 1 tab(s) orally 3 times a day   carvedilol 25 mg oral tablet: 1 tab(s) orally every 12 hours  Lasix 20 mg oral tablet: 1 tab(s) orally once a day as needed for leg swelling  pantoprazole 40 mg oral delayed release tablet: 1 tab(s) orally once a day (before a meal)  rosuvastatin 5 mg oral tablet: 1 tab(s) orally once a day (at bedtime)  sodium chloride 1 g oral tablet: 1 tab(s) orally 3 times a day

## 2024-09-21 NOTE — DISCHARGE NOTE PROVIDER - NSDCFUADDAPPT_GEN_ALL_CORE_FT
APPTS ARE READY TO BE MADE: [X] YES    Best Family or Patient Contact (if needed):    Additional Information about above appointments (if needed):    1: PCP (Dr Roz Stewart)  2:   3:     Other comments or requests:   APPTS ARE READY TO BE MADE: [X] YES    Best Family or Patient Contact (if needed):    Additional Information about above appointments (if needed):    1: PCP (Dr Roz Stewart)  2:   3:     Other comments or requests:    Patient is being discharged to [facility] for Rehab. Caregiver will arrange follow up. APPTS ARE READY TO BE MADE: [X] YES    Best Family or Patient Contact (if needed):    Additional Information about above appointments (if needed):    1: PCP (Dr Roz Stewart)  2:   3:     Other comments or requests:     APPTS ARE READY TO BE MADE: [X] YES    Best Family or Patient Contact (if needed):    Additional Information about above appointments (if needed):    1: PCP (Dr Roz Stewart)  2:   3:     Other comments or requests:    Patient is being discharged to [facility]. Caregiver will arrange follow up.

## 2024-09-21 NOTE — DIETITIAN INITIAL EVALUATION ADULT - PERTINENT MEDS FT
MEDICATIONS  (STANDING):  carvedilol 25 milliGRAM(s) Oral every 12 hours  enoxaparin Injectable 40 milliGRAM(s) SubCutaneous every 24 hours  furosemide   Injectable 20 milliGRAM(s) IV Push once  pantoprazole    Tablet 40 milliGRAM(s) Oral before breakfast  petrolatum white Ointment 1 Application(s) Topical once    MEDICATIONS  (PRN):  sodium chloride 0.65% Nasal 1 Spray(s) Both Nostrils every 4 hours PRN nasal dryness

## 2024-09-22 LAB
ALBUMIN SERPL ELPH-MCNC: 2.8 G/DL — LOW (ref 3.5–5)
ALP SERPL-CCNC: 54 U/L — SIGNIFICANT CHANGE UP (ref 40–120)
ALT FLD-CCNC: 20 U/L DA — SIGNIFICANT CHANGE UP (ref 10–60)
ANION GAP SERPL CALC-SCNC: 0 MMOL/L — LOW (ref 5–17)
AST SERPL-CCNC: 14 U/L — SIGNIFICANT CHANGE UP (ref 10–40)
BILIRUB SERPL-MCNC: 0.4 MG/DL — SIGNIFICANT CHANGE UP (ref 0.2–1.2)
BUN SERPL-MCNC: 7 MG/DL — SIGNIFICANT CHANGE UP (ref 7–18)
CALCIUM SERPL-MCNC: 8.9 MG/DL — SIGNIFICANT CHANGE UP (ref 8.4–10.5)
CHLORIDE SERPL-SCNC: 85 MMOL/L — LOW (ref 96–108)
CO2 SERPL-SCNC: 42 MMOL/L — HIGH (ref 22–31)
CREAT SERPL-MCNC: 0.24 MG/DL — LOW (ref 0.5–1.3)
EGFR: 113 ML/MIN/1.73M2 — SIGNIFICANT CHANGE UP
GLUCOSE SERPL-MCNC: 107 MG/DL — HIGH (ref 70–99)
HCT VFR BLD CALC: 36.3 % — SIGNIFICANT CHANGE UP (ref 34.5–45)
HGB BLD-MCNC: 11.7 G/DL — SIGNIFICANT CHANGE UP (ref 11.5–15.5)
MAGNESIUM SERPL-MCNC: 1.8 MG/DL — SIGNIFICANT CHANGE UP (ref 1.6–2.6)
MCHC RBC-ENTMCNC: 29.6 PG — SIGNIFICANT CHANGE UP (ref 27–34)
MCHC RBC-ENTMCNC: 32.2 GM/DL — SIGNIFICANT CHANGE UP (ref 32–36)
MCV RBC AUTO: 91.9 FL — SIGNIFICANT CHANGE UP (ref 80–100)
NRBC # BLD: 0 /100 WBCS — SIGNIFICANT CHANGE UP (ref 0–0)
PHOSPHATE SERPL-MCNC: 3.8 MG/DL — SIGNIFICANT CHANGE UP (ref 2.5–4.5)
PLATELET # BLD AUTO: 197 K/UL — SIGNIFICANT CHANGE UP (ref 150–400)
POTASSIUM SERPL-MCNC: 4 MMOL/L — SIGNIFICANT CHANGE UP (ref 3.5–5.3)
POTASSIUM SERPL-SCNC: 4 MMOL/L — SIGNIFICANT CHANGE UP (ref 3.5–5.3)
PROT SERPL-MCNC: 6.2 G/DL — SIGNIFICANT CHANGE UP (ref 6–8.3)
RBC # BLD: 3.95 M/UL — SIGNIFICANT CHANGE UP (ref 3.8–5.2)
RBC # FLD: 12.9 % — SIGNIFICANT CHANGE UP (ref 10.3–14.5)
SODIUM SERPL-SCNC: 127 MMOL/L — LOW (ref 135–145)
WBC # BLD: 5.56 K/UL — SIGNIFICANT CHANGE UP (ref 3.8–10.5)
WBC # FLD AUTO: 5.56 K/UL — SIGNIFICANT CHANGE UP (ref 3.8–10.5)

## 2024-09-22 PROCEDURE — 99232 SBSQ HOSP IP/OBS MODERATE 35: CPT | Mod: GC

## 2024-09-22 RX ORDER — SODIUM CHLORIDE 0.9 % (FLUSH) 0.9 %
1 SYRINGE (ML) INJECTION THREE TIMES A DAY
Refills: 0 | Status: DISCONTINUED | OUTPATIENT
Start: 2024-09-22 | End: 2024-09-25

## 2024-09-22 RX ADMIN — ENOXAPARIN SODIUM 40 MILLIGRAM(S): 150 INJECTION SUBCUTANEOUS at 05:50

## 2024-09-22 RX ADMIN — Medication 25 MILLIGRAM(S): at 18:27

## 2024-09-22 RX ADMIN — Medication 1 GRAM(S): at 16:11

## 2024-09-22 RX ADMIN — PANTOPRAZOLE SODIUM 40 MILLIGRAM(S): 40 TABLET, DELAYED RELEASE ORAL at 05:50

## 2024-09-22 RX ADMIN — Medication 1 GRAM(S): at 22:42

## 2024-09-22 RX ADMIN — Medication 25 MILLIGRAM(S): at 05:50

## 2024-09-22 NOTE — PROGRESS NOTE ADULT - ASSESSMENT
Quadriparesis, weaker in the LEs, weaker on the R side.    No gross sensory deficit.    Bilateral extensor plantar responses.      OLD L hemidiaphragm elevation.      Polyarthralgia.      OA.    R/O seropositive arthropathy.    Fall with lumbar spine and pelvic fractures 3 years ago.  Must have already been at least paraparetic at the time, needing to use a walker and having fallen.    She must have cervical spondylosis.      She may well have cervical myelopathy, most likely chronic, longstanding.      Hx of fall (may have had more than one; just not reported/ecorded).      RECOMMENDATIONS    Request follow-up after C-spine MR.    Would also obtain non-con C-spine CT.    Given chronicity of problems can certainly do C-spine imaging as out-Pt.    Consider spine surgical out-Pt follow-up depending on imaging results.      Please order ESR, CRP, RPR, RF, CCP, TSH, FT4, B12, SERUM (NOT RBC) folic acid, methylmalonic acid+homocysteine, copper, zinc..    Out-Pt follow-up for test results that cannot be obtained expedetiously.                                                             IMPORTANT  -  PLEASE NOTE:                              I am a neurohospitalist. I do not see patients outside of the hospital.        Patients requiring neurological follow-up after discharge may contact one of the following offices.     [facility]  [address] [place] [address].  [place] [address]  [phone]    [place] [address] [facility].  [place], [place]  [phone]    Latoya Martinez M.D.   - Department of Neurology  [employer] [facility]   normal (ped)...

## 2024-09-22 NOTE — PROGRESS NOTE ADULT - PROBLEM SELECTOR PLAN 6
presenting with AMS, left facial droop as per ED  no facial droop but confused  --> current is AAOx1-2 and is less confused  likely due to acute on chronic hypercapnia vs infectious etiology vs hyponatremia, also has hx of muscle relaxant use  CT head: No acute intracranial  hemorrhage, mass effect or midline shift. Nonspecific moderate subcortical white matter hypodensities may relate to small vessel ischemic disease.  hold muscle relaxant use  c/w  rocephin and azithro for PNA  trending Na

## 2024-09-22 NOTE — CONSULT NOTE ADULT - CONSULT REASON
Elevated diaphragm; ?neurologic problem
acute respiratory failure with hypoxemia/hypercapnea
Respiratory distress of 
Hyponatremia

## 2024-09-22 NOTE — PROGRESS NOTE ADULT - ASSESSMENT
79F PMH hypertension, arthritis, Neuralgia, osteoporotic compression fractures L5-S1, hyponatremia (HCTZ induced, corrected), goiter, who presented to the ED for left sided facial droop and SOB. Admitted for AHHRF 2/2 PNA and hyponatremia.

## 2024-09-22 NOTE — PROGRESS NOTE ADULT - SUBJECTIVE AND OBJECTIVE BOX
This is in follow-up to my initial Neurology Consult note of 9/20/24.  Hx and findings as detailed therein.  Again noted are quadriparesis, weaker in the LEs and on the R side.  No facial weakness.       Results of recommended lab tests:    ESR, CRP, RPR, RF, CCP, TSH, FT4, B12, SERUM (NOT RBC) folic acid, methylmalonic acid+homocysteine, copper, zinc apparently not ordered.      Non-con C-spine MR was ordered; pending.

## 2024-09-22 NOTE — PROGRESS NOTE ADULT - ATTENDING COMMENTS
This is a late entry, patient was evaluated on 09/22     79F PMH hypertension, arthritis, Neuralgia, osteoporotic compression fractures L5-S1, hyponatremia (HCTZ induced, corrected), goiter, who presented to the ED for ?left sided facial droop (none noticed later) and SOB. Admitted to ICU for AHHRF 2/2 PNA, diaphragmatic dysfunction.     Pt was evaluated, no new complaints.     PE as above     A/P:  #Hyponatremia- acute on chronic   #Acute on chronic hypercapnic and hypoxic respiratory failure~2L   # pneumonia   # h/o diaphragmatic dysfunction  #Acute on chronic hyponatremia /volume depletion  #Acute encephalopathy   #debility  #Hypertension     Plan:  -Na worsening, consulted Dr. Alcala nephro- He advised to start salt tabs   -CO2 worsening, check vbg.   -Appreciate pulmonary consult   -neurology consulted, d/w Dr. Martinez- MR C spine pending. Still awaiting to get recs from [facility]   -NIFs/FVC testing- via respiratory therapy though patient unable to participate   -began mobilization- out of bed to chair  -outpatient for full eval of diaphragmatic dysfunction. Pt can f/u w/ [facility]   -Cont oral diet  -Monitor urine output  -PT evaluation- CHET  -DVT prophylaxis .

## 2024-09-22 NOTE — PROGRESS NOTE ADULT - PROBLEM SELECTOR PLAN 4
previously admitted for hyponatremia in July 2024  Sodium today 127  Dr Alcala following, rec 1g of NaCl TID, Lasix/Acetazolamide if needed for euvolemic state

## 2024-09-22 NOTE — CONSULT NOTE ADULT - ASSESSMENT
79F PMH hypertension, arthritis, Neuralgia, osteoporotic compression fractures L5-S1, hyponatremia (HCTZ induced, corrected), goiter, who presented to the ED for left sided facial droop and SOB. Pt seen at bedside, on NIV , AAOX1, and did not have a facial droop, more awake and alert compared to when she came to the ED as per ED attending. 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 secondary to underlying lung disease/PNA vs cardiac stretching of ANP/diastolic CHF  2) Acute on chronic cor pulmonale  3) Acute on chronic hypoxic/hypercapnic  respiratory failure on nasal cannula oxygen  4) Hypertension  5) Diaphragmatic paralysis/Thoracic spine disease  6) Electrolytes disorder  7) Acid-base disorder    Recommend:  STrict I/o  Avoid Nephrotoxic agents  Monitor ABG/ supplemental oxygen/ BIPAP  Na level 127  Check urinalysis, urine electrolytes as per ordered  Check TSH, Lipid panel, serum osmolality, uric acid level  Start sodium chloride 1 gm po tid   Intermittent Lasix/Acetazolamide as needed to achieve euvolemic state  Adjust antibiotics as per renal dose adjustment  Optimal BP control  Hold HCTZ  Neurology work up in progress  Chest PT/Pulmonary team follow up  Volume status hypervolemic with electrolytes imbalance  Replete electrolytes with goal K>4 and <5, Mg> 2 and Phos 2.5 to 3.5  Will follow no

## 2024-09-22 NOTE — PROGRESS NOTE ADULT - PROBLEM SELECTOR PLAN 1
likely acute on chronic hypercapnia, PNA  suspect hypoventilation can be 2/2 left elevated hemidiaphragm as seen on CT angio chest, hyponatremia which can cause AMS and hx of muscle relaxant use (?gabapentin x cyclobenzaprine)  non compliant with nightly BIPAP per patient's son  presenting with SOB   ABG 7.27/119/190/55 at admission > s/p BIPAP >  7.36/76/124/43  CTA chest: Areas of airspace consolidation, increased in the left lung and new in the right upper lobe  aspiration precautions  s/p BIPAP, on 2L NC, monitor respiratory function  c/w nocturnal BiPAP and diuresis  f/u with [facility] records from   Pulmonology Dr Prado's following

## 2024-09-22 NOTE — CONSULT NOTE ADULT - TIME BILLING
Patient/primary team
- Review of chart (documentation, labs/studies, VS trends)  - Personal review of chest imaging  - Medication reconciliation  - Bedside interview and physical examination  - Bedside SpO2 monitoring and supplemental O2 titration  - Coordination of care with primary team

## 2024-09-22 NOTE — PROGRESS NOTE ADULT - SUBJECTIVE AND OBJECTIVE BOX
PGY-1 Progress Note discussed with attending    Gregor Abel via Teams TILL 5:00 PM  PLEASE CONTACT ON CALL TEAM:  - On Call Team (Please refer to Chuy) FROM 5:00 PM - 8:30PM  - Nightfloat Team FROM 8:30 -7:30 AM    CHIEF COMPLAINT & BRIEF HOSPITAL COURSE:  79F PMH hypertension, arthritis, Neuralgia, osteoporotic compression fractures L5-S1, hyponatremia (HCTZ induced, corrected), goiter, who presented to the ED for left sided facial droop and SOB. Admitted for AHHRF 2/2 PNA and hyponatremia.    INTERVAL HPI/OVERNIGHT EVENTS:   No acute overnight events reported. Used LL  [id number] Bandar (Bria). Patient is not cooperative with exam and assessment, but states no new complaints. Examined at bedside.       REVIEW OF SYSTEMS: LIMITED ASSESSMENT DUE TO UNCOOPERATIVE PATIENT  CONSTITUTIONAL: No fever, weight loss, or fatigue  RESPIRATORY: No cough, wheezing, chills or hemoptysis; No shortness of breath  CARDIOVASCULAR: No chest pain, palpitations, dizziness, or leg swelling  GASTROINTESTINAL: No abdominal pain. No nausea, vomiting, or hematemesis; No diarrhea or constipation. No melena or hematochezia.  GENITOURINARY: No dysuria or hematuria, urinary frequency  NEUROLOGICAL: No headaches, memory loss, loss of strength, numbness, or tremors  SKIN: No itching, burning, rashes, or lesions     Vital Signs Last 24 Hrs  T(C): 36.8 (22 Sep 2024 11:42), Max: 37.1 (21 Sep 2024 16:02)  T(F): 98.2 (22 Sep 2024 11:42), Max: 98.8 (21 Sep 2024 16:02)  HR: 68 (22 Sep 2024 11:42) (67 - 76)  BP: 125/61 (22 Sep 2024 11:42) (110/55 - 155/67)  BP(mean): --  RR: 18 (22 Sep 2024 11:42) (18 - 18)  SpO2: 100% (22 Sep 2024 11:42) (97% - 100%)    Parameters below as of 22 Sep 2024 11:42  Patient On (Oxygen Delivery Method): room air        PHYSICAL EXAMINATION:  GENERAL: NAD, well built  HEAD:  Atraumatic, Normocephalic  EYES:  conjunctiva and sclera clear  NECK: Supple, No JVD, Normal thyroid  CHEST/LUNG: Does not follow request for inspiration, reduced breath sounds left side  HEART: Regular rate and rhythm; No murmurs, rubs, or gallops  ABDOMEN: Soft, Nontender, Nondistended; Bowel sounds present  NERVOUS SYSTEM:  Alert & Oriented X3,    EXTREMITIES:  2+ Peripheral Pulses, No clubbing, cyanosis, or edema  SKIN: warm dry                          11.7   5.56  )-----------( 197      ( 22 Sep 2024 06:40 )             36.3     09-22    127[L]  |  85[L]  |  7   ----------------------------<  107[H]  4.0   |  42[H]  |  0.24[L]    Ca    8.9      22 Sep 2024 06:40  Phos  3.8     09-22  Mg     1.8     09-22    TPro  6.2  /  Alb  2.8[L]  /  TBili  0.4  /  DBili  x   /  AST  14  /  ALT  20  /  AlkPhos  54  09-22    LIVER FUNCTIONS - ( 22 Sep 2024 06:40 )  Alb: 2.8 g/dL / Pro: 6.2 g/dL / ALK PHOS: 54 U/L / ALT: 20 U/L DA / AST: 14 U/L / GGT: x                   CAPILLARY BLOOD GLUCOSE      RADIOLOGY & ADDITIONAL TESTS:    None

## 2024-09-22 NOTE — CONSULT NOTE ADULT - SUBJECTIVE AND OBJECTIVE BOX
Ramiro Alcala MD (Nephrology)  [address] [facility], [place], [facility],  [address],  [id number]  TEl: [phone]  Cell: [phone]    Patient is a 79y old  Female who presents with a chief complaint of hypoxia, hypercapnia, AMS (22 Sep 2024 08:09)      HPI:  79F PMH hypertension, arthritis, Neuralgia, osteoporotic compression fractures L5-S1, hyponatremia (HCTZ induced, corrected), goiter, who presented to the ED for left sided facial droop and SOB. Pt seen at bedside, on NIV , AAOX1, and did not have a facial droop, more awake and alert compared to when she came to the ED as per ED attending. As per ED attending, he tried calling the son, who stated that she was having SOB for the past 3 days. ED was unable to obtain any more history as the son needed to hang up. When called again by ICU, the phone number went to voicemail. Pt was able to state her name and follow commands, still appears confused compared to baseline (as per last admission), however only speaks donald and is hard of hearing.  (15 Sep 2024 00:51)      PAST MEDICAL & SURGICAL HISTORY:  HTN (hypertension)      Lumbar neuralgia      Hyponatremia      Arthritis            Allergies:  penicillin (Rash)      Home Medications:   carvedilol 25 milliGRAM(s) Oral every 12 hours  enoxaparin Injectable 40 milliGRAM(s) SubCutaneous every 24 hours  furosemide   Injectable 20 milliGRAM(s) IV Push once  pantoprazole    Tablet 40 milliGRAM(s) Oral before breakfast  petrolatum white Ointment 1 Application(s) Topical once  sodium chloride 0.65% Nasal 1 Spray(s) Both Nostrils every 4 hours PRN      Hospital Medications:   MEDICATIONS  (STANDING):  carvedilol 25 milliGRAM(s) Oral every 12 hours  enoxaparin Injectable 40 milliGRAM(s) SubCutaneous every 24 hours  furosemide   Injectable 20 milliGRAM(s) IV Push once  pantoprazole    Tablet 40 milliGRAM(s) Oral before breakfast  petrolatum white Ointment 1 Application(s) Topical once      SOCIAL HISTORY:  Denies ETOh, Smoking,     Family History:  FAMILY HISTORY:      ROS:  Limited  Dyspnea, arm pain  Denies any chest pain, palpitations    VITALS:  T(F): 98.2 (09-22-24 @ 11:42), Max: 98.8 (09-21-24 @ 16:02)  HR: 68 (09-22-24 @ 11:42)  BP: 125/61 (09-22-24 @ 11:42)  RR: 18 (09-22-24 @ 11:42)  SpO2: 100% (09-22-24 @ 11:42)  Wt(kg): --    09-21 @ 07:01  -  09-22 @ 07:00  --------------------------------------------------------  IN: 0 mL / OUT: 600 mL / NET: -600 mL        CAPILLARY BLOOD GLUCOSE      POCT Blood Glucose.: 101 mg/dL (21 Sep 2024 16:47)        PHYSICAL EXAM:  GENERAL: Alert and awake in mild respiratory distress on nasal cannula oxygen  HEENT: RAFAEL, EOMI, neck supple, no JVP  CHEST/LUNG: Bilateral decreased breath sounds , bibasilar rales and crepitations, no wheezing  HEART: Regular rate and rhythm, DAVID II/VI at LPSB, no gallops, no rub   ABDOMEN: Soft, nontender, non distended, bowel sounds present, no palpable organomegaly  : No flank or supra pubic tenderness.  EXTREMITIES: Peripheral pulses are palpable, no pedal edema, no calf tenderness  Musculoskeletal: No joint or spinal tenderness  Neurology: AAOx2, no focal neurological deficit, Motor and sensory systems are intact.  SKIN: No rash or skin lesion    LABS:  09-22    127[L]  |  85[L]  |  7   ----------------------------<  107[H]  4.0   |  42[H]  |  0.24[L]    Ca    8.9      22 Sep 2024 06:40  Phos  3.8     09-22  Mg     1.8     09-22    TPro  6.2  /  Alb  2.8[L]  /  TBili  0.4  /  DBili      /  AST  14  /  ALT  20  /  AlkPhos  54  09-22    Creatinine Trend: 0.24 <--, 0.23 <--, 0.25 <--, 0.28 <--, 0.28 <--, 0.34 <--, 0.41 <--, 0.46 <--, 0.27 <--, 0.24 <--, 0.30 <--, 0.34 <--                        11.7   5.56  )-----------( 197      ( 22 Sep 2024 06:40 )             36.3     Urine Studies:  Urinalysis Basic - ( 22 Sep 2024 06:40 )    Color:  / Appearance:  / SG:  / pH:   Gluc: 107 mg/dL / Ketone:   / Bili:  / Urobili:    Blood:  / Protein:  / Nitrite:    Leuk Esterase:  / RBC:  / WBC    Sq Epi:  / Non Sq Epi:  / Bacteria:       Osmolality, Random Urine: 670 mosm/Kg (09-20 @ 17:50)  Sodium, Random Urine: 8 mmol/L (09-20 @ 03:27)  Sodium, Random Urine: 107 mmol/L (09-18 @ 17:10)  Osmolality, Random Urine: 286 mosm/Kg (09-18 @ 17:10)  Sodium, Random Urine: 6 mmol/L (09-15 @ 16:00)      RADIOLOGY & ADDITIONAL STUDIES:  < from: Xray Chest 1 View- PORTABLE-Urgent (Xray Chest 1 View- PORTABLE-Urgent .) (09.18.24 @ 09:35) >    ACC: [id number] EXAM:  XR CHEST PORTABLE URGENT 1V   ORDERED BY: NHAN YIN     PROCEDURE DATE:  09/18/2024          INTERPRETATION:  TIME OF EXAM: September 18, 2024 at 9:11 AM.    CLINICAL INFORMATION: Diaphragmatic dysfunction. Increased work of   breathing.    COMPARISON:  CTA of the chest from September 14, 2024.    TECHNIQUE:   AP Portable chest x-ray. Rotated.    INTERPRETATION:    Heart size and the mediastinum cannot be accurately evaluated on this   projection.  A markedly elevated left hemidiaphragm is again seen.  There are bilateral lower lung opacities.  Possible bilateral trace pleural effusions.  No pneumothorax.  There is osteoarthritic degenerative change of the spine. There is an   upper lumbar vertebroplasty.    IMPRESSION:  Markedly elevated left hemidiaphragm again seen.    Bilateral lower lung opacities which could be due to atelectasis and/or   infection.    Possible bilateral trace pleural effusions.    --- End of Report ---            LEV LEAL MD; Attending Radiologist  This document has been electronically signed. Sep 19 2024 11:55AM    < end of copied text >    EKG findings reviewed.    Imaging Personally Reviewed:  [x] YES  [ ] NO    Consultant(s) and primary physician Notes Reviewed:  [x] YES  [ ] NO    Care Discussed with Primary team/ Consultants/Other Providers [x] YES  [ ] NO

## 2024-09-23 LAB
ALBUMIN SERPL ELPH-MCNC: 2.7 G/DL — LOW (ref 3.5–5)
ALP SERPL-CCNC: 52 U/L — SIGNIFICANT CHANGE UP (ref 40–120)
ALT FLD-CCNC: 14 U/L DA — SIGNIFICANT CHANGE UP (ref 10–60)
ANION GAP SERPL CALC-SCNC: 4 MMOL/L — LOW (ref 5–17)
APPEARANCE UR: ABNORMAL
AST SERPL-CCNC: 16 U/L — SIGNIFICANT CHANGE UP (ref 10–40)
BACTERIA # UR AUTO: ABNORMAL /HPF
BASE EXCESS BLDV CALC-SCNC: 19.2 MMOL/L — SIGNIFICANT CHANGE UP
BILIRUB SERPL-MCNC: 0.4 MG/DL — SIGNIFICANT CHANGE UP (ref 0.2–1.2)
BILIRUB UR-MCNC: NEGATIVE — SIGNIFICANT CHANGE UP
BLOOD GAS COMMENTS, VENOUS: SIGNIFICANT CHANGE UP
BUN SERPL-MCNC: 8 MG/DL — SIGNIFICANT CHANGE UP (ref 7–18)
CALCIUM SERPL-MCNC: 8.7 MG/DL — SIGNIFICANT CHANGE UP (ref 8.4–10.5)
CHLORIDE SERPL-SCNC: 86 MMOL/L — LOW (ref 96–108)
CHOLEST SERPL-MCNC: 140 MG/DL — SIGNIFICANT CHANGE UP
CO2 SERPL-SCNC: 41 MMOL/L — HIGH (ref 22–31)
COLOR SPEC: YELLOW — SIGNIFICANT CHANGE UP
COMMENT - URINE: SIGNIFICANT CHANGE UP
CREAT SERPL-MCNC: 0.24 MG/DL — LOW (ref 0.5–1.3)
DIFF PNL FLD: NEGATIVE — SIGNIFICANT CHANGE UP
EGFR: 113 ML/MIN/1.73M2 — SIGNIFICANT CHANGE UP
EPI CELLS # UR: PRESENT
GLUCOSE BLDC GLUCOMTR-MCNC: 104 MG/DL — HIGH (ref 70–99)
GLUCOSE BLDC GLUCOMTR-MCNC: 133 MG/DL — HIGH (ref 70–99)
GLUCOSE BLDC GLUCOMTR-MCNC: 90 MG/DL — SIGNIFICANT CHANGE UP (ref 70–99)
GLUCOSE BLDC GLUCOMTR-MCNC: 94 MG/DL — SIGNIFICANT CHANGE UP (ref 70–99)
GLUCOSE SERPL-MCNC: 95 MG/DL — SIGNIFICANT CHANGE UP (ref 70–99)
GLUCOSE UR QL: NEGATIVE MG/DL — SIGNIFICANT CHANGE UP
HCO3 BLDV-SCNC: 47 MMOL/L — CRITICAL HIGH (ref 22–29)
HCT VFR BLD CALC: 35.6 % — SIGNIFICANT CHANGE UP (ref 34.5–45)
HDLC SERPL-MCNC: 59 MG/DL — SIGNIFICANT CHANGE UP
HGB BLD-MCNC: 11.2 G/DL — LOW (ref 11.5–15.5)
KETONES UR-MCNC: 15 MG/DL
LEUKOCYTE ESTERASE UR-ACNC: ABNORMAL
LIPID PNL WITH DIRECT LDL SERPL: 69 MG/DL — SIGNIFICANT CHANGE UP
MAGNESIUM SERPL-MCNC: 2 MG/DL — SIGNIFICANT CHANGE UP (ref 1.6–2.6)
MCHC RBC-ENTMCNC: 29 PG — SIGNIFICANT CHANGE UP (ref 27–34)
MCHC RBC-ENTMCNC: 31.5 GM/DL — LOW (ref 32–36)
MCV RBC AUTO: 92.2 FL — SIGNIFICANT CHANGE UP (ref 80–100)
NITRITE UR-MCNC: NEGATIVE — SIGNIFICANT CHANGE UP
NON HDL CHOLESTEROL: 81 MG/DL — SIGNIFICANT CHANGE UP
NRBC # BLD: 0 /100 WBCS — SIGNIFICANT CHANGE UP (ref 0–0)
PCO2 BLDV: 64 MMHG — HIGH (ref 39–42)
PH BLDV: 7.47 — HIGH (ref 7.32–7.43)
PH UR: 8 — SIGNIFICANT CHANGE UP (ref 5–8)
PHOSPHATE SERPL-MCNC: 2.8 MG/DL — SIGNIFICANT CHANGE UP (ref 2.5–4.5)
PLATELET # BLD AUTO: 192 K/UL — SIGNIFICANT CHANGE UP (ref 150–400)
PO2 BLDV: 57 MMHG — SIGNIFICANT CHANGE UP
POTASSIUM SERPL-MCNC: 4.2 MMOL/L — SIGNIFICANT CHANGE UP (ref 3.5–5.3)
POTASSIUM SERPL-SCNC: 4.2 MMOL/L — SIGNIFICANT CHANGE UP (ref 3.5–5.3)
PROT SERPL-MCNC: 6.2 G/DL — SIGNIFICANT CHANGE UP (ref 6–8.3)
PROT UR-MCNC: ABNORMAL MG/DL
RBC # BLD: 3.86 M/UL — SIGNIFICANT CHANGE UP (ref 3.8–5.2)
RBC # FLD: 12.6 % — SIGNIFICANT CHANGE UP (ref 10.3–14.5)
RBC CASTS # UR COMP ASSIST: 2 /HPF — SIGNIFICANT CHANGE UP (ref 0–4)
SAO2 % BLDV: 93.5 % — SIGNIFICANT CHANGE UP
SODIUM SERPL-SCNC: 131 MMOL/L — LOW (ref 135–145)
SODIUM UR-SCNC: 42 MMOL/L — SIGNIFICANT CHANGE UP
SP GR SPEC: 1.02 — SIGNIFICANT CHANGE UP (ref 1–1.03)
TRI-PHOS CRY UR QL COMP ASSIST: PRESENT
TRIGL SERPL-MCNC: 60 MG/DL — SIGNIFICANT CHANGE UP
TSH SERPL-MCNC: 2.2 UU/ML — SIGNIFICANT CHANGE UP (ref 0.34–4.82)
URATE SERPL-MCNC: 2.5 MG/DL — SIGNIFICANT CHANGE UP (ref 2.5–7)
UROBILINOGEN FLD QL: 1 MG/DL — SIGNIFICANT CHANGE UP (ref 0.2–1)
WBC # BLD: 3.83 K/UL — SIGNIFICANT CHANGE UP (ref 3.8–10.5)
WBC # FLD AUTO: 3.83 K/UL — SIGNIFICANT CHANGE UP (ref 3.8–10.5)
WBC UR QL: 2 /HPF — SIGNIFICANT CHANGE UP (ref 0–5)

## 2024-09-23 PROCEDURE — 99233 SBSQ HOSP IP/OBS HIGH 50: CPT | Mod: GC

## 2024-09-23 RX ORDER — ACETAZOLAMIDE 250 MG/1
250 TABLET ORAL ONCE
Refills: 0 | Status: COMPLETED | OUTPATIENT
Start: 2024-09-23 | End: 2024-09-23

## 2024-09-23 RX ORDER — ACETAZOLAMIDE 250 MG/1
250 TABLET ORAL EVERY 12 HOURS
Refills: 0 | Status: DISCONTINUED | OUTPATIENT
Start: 2024-09-23 | End: 2024-09-24

## 2024-09-23 RX ADMIN — Medication 25 MILLIGRAM(S): at 17:29

## 2024-09-23 RX ADMIN — Medication 1 GRAM(S): at 22:13

## 2024-09-23 RX ADMIN — PANTOPRAZOLE SODIUM 40 MILLIGRAM(S): 40 TABLET, DELAYED RELEASE ORAL at 05:17

## 2024-09-23 RX ADMIN — ACETAZOLAMIDE 105 MILLIGRAM(S): 250 TABLET ORAL at 10:10

## 2024-09-23 RX ADMIN — Medication 1 GRAM(S): at 13:29

## 2024-09-23 RX ADMIN — Medication 1 GRAM(S): at 05:17

## 2024-09-23 RX ADMIN — Medication 25 MILLIGRAM(S): at 05:16

## 2024-09-23 RX ADMIN — ENOXAPARIN SODIUM 40 MILLIGRAM(S): 150 INJECTION SUBCUTANEOUS at 05:17

## 2024-09-23 RX ADMIN — ACETAZOLAMIDE 250 MILLIGRAM(S): 250 TABLET ORAL at 22:13

## 2024-09-23 NOTE — PROGRESS NOTE ADULT - PROBLEM SELECTOR PLAN 6
presenting with AMS, left facial droop as per ED  no facial droop but confused  --> current is AAOx1-2 and is less confused  likely due to acute on chronic hypercapnia vs infectious etiology vs hyponatremia, also has hx of muscle relaxant use  CT head: No acute intracranial  hemorrhage, mass effect or midline shift. Nonspecific moderate subcortical white matter hypodensities may relate to small vessel ischemic disease.  hold muscle relaxant use  c/w  rocephin and azithro for PNA  trending Na presenting with AMS, left facial droop as per ED  no facial droop but confused  --> current is AAOx1-2 and is less confused  likely due to acute on chronic hypercapnia vs infectious etiology vs hyponatremia, also has hx of muscle relaxant use  CT head: No acute intracranial  hemorrhage, mass effect or midline shift. Nonspecific moderate subcortical white matter hypodensities may relate to small vessel ischemic disease.  hold muscle relaxant use  completed rocephin and azithro for PNA  trending Na

## 2024-09-23 NOTE — PROGRESS NOTE ADULT - PROBLEM SELECTOR PLAN 7
on coreg and valsartan at home  /76 on admission  started back on coreg  monitor BP on coreg and valsartan at home  /76 on admission  c/w coreg  monitor BP on coreg and valsartan at home  /76 on admission    c/w coreg  monitor BP

## 2024-09-23 NOTE — PROGRESS NOTE ADULT - PROBLEM SELECTOR PLAN 3
presenting with SOB   on 3L NC, monitor respiratory function   CTA chest: Areas of airspace consolidation, increased in the left lung and new in the right upper lobe  aspiration precautions  f/u BCx, UCx negative at 72h (preliminary report)  strep, mycoplasma, and legionella negative  completed rocephin for 5 day course (day 5/5) presenting with SOB   on 3L NC, monitor respiratory function   CTA chest: Areas of airspace consolidation, increased in the left lung and new in the right upper lobe  aspiration precautions   BCx, UCx negative at 72h  strep, mycoplasma, and legionella negative  completed rocephin for 5 day course (day 5/5) presenting with SOB   on 3L NC, monitor respiratory function   CTA chest: Areas of airspace consolidation, increased in the left lung and new in the right upper lobe  aspiration precautions   BCx, UCx negative at 72h  strep, mycoplasma, and legionella negative  completed rocephin and azithro

## 2024-09-23 NOTE — PROGRESS NOTE ADULT - PROBLEM SELECTOR PLAN 1
likely acute on chronic hypercapnia, PNA  suspect hypoventilation can be 2/2 left elevated hemidiaphragm as seen on CT angio chest, hyponatremia which can cause AMS and hx of muscle relaxant use (?gabapentin x cyclobenzaprine)  non compliant with nightly BIPAP per patient's son  presenting with SOB   ABG 7.27/119/190/55 at admission > s/p BIPAP >  7.36/76/124/43  CTA chest: Areas of airspace consolidation, increased in the left lung and new in the right upper lobe  aspiration precautions  s/p BIPAP, on 2L NC, monitor respiratory function  c/w nocturnal BiPAP and diuresis  f/u with [facility] records from   Pulmonology Dr Prado's following likely acute on chronic hypercapnia, PNA  suspect hypoventilation can be 2/2 left elevated hemidiaphragm as seen on CT angio chest, hyponatremia which can cause AMS and hx of muscle relaxant use (?gabapentin x cyclobenzaprine)  non compliant with nightly BIPAP per patient's son  presenting with SOB   ABG 7.27/119/190/55 at admission > s/p BIPAP >  7.36/76/124/43  CTA chest: Areas of airspace consolidation, increased in the left lung and new in the right upper lobe  aspiration precautions  s/p BIPAP, on 2L NC, monitor respiratory function  Pulmonology Dr Prado's following    -wean O2 as tolerated (1L O2 at home)

## 2024-09-23 NOTE — PROGRESS NOTE ADULT - ATTENDING COMMENTS
79F PMH hypertension, arthritis, Neuralgia, osteoporotic compression fractures L5-S1, hyponatremia (HCTZ induced, corrected), goiter, who presented to the ED for ?left sided facial droop (none noticed later) and SOB. Admitted to ICU for AHHRF 2/2 PNA, diaphragmatic dysfunction.     Pt was evaluated, no new complaints. She has poor appetite.     PE as above     Labs reviewed- cbc, bmp, Vbg    A/P:  #Acute on chronic hypercapnic and hypoxic respiratory failure~2L   #Hyponatremia- improving   #Metabolic alkalosis   # pneumonia   # h/o diaphragmatic dysfunction   #Acute on chronic hyponatremia /volume depletion  #Acute encephalopathy   #debility  #Hypertension     Plan:  -Appreciate nephro recs, sodium improved after salt tabs. Monitor BMP  -CO2 elevated, vbg w/ met alkalosis. Give diamox x1, volume status close to euvolemic   -Pt has completed abx.   -outpatient  f/u for full eval of diaphragmatic dysfunction. Pt can f/u w/ [facility]. D/w patient's son, he wants patient to get transferred to [place] for further evaluation and is working to get an acceptance physician. Alternatively, he is open to DC to [facility] and eventual long term placement.    -Cont oral diet. Get repeat Sns (pt on soft n bite size diet, she has poor appetite) Advancing to regular may help.   -PT evaluation- [facility]  -DVT prophylaxis .  -Dispo once electrolytes and co2 better. 79F PMH hypertension, arthritis, Neuralgia, osteoporotic compression fractures L5-S1, hyponatremia (HCTZ induced, corrected), goiter, who presented to the ED for ?left sided facial droop (none noticed later) and SOB. Admitted to ICU for AHHRF 2/2 PNA, diaphragmatic dysfunction.     Pt was evaluated, no new complaints. She has poor appetite.     PE as above     Labs reviewed- cbc, bmp, Vbg    A/P:  #Acute on chronic hypercapnic and hypoxic respiratory failure~2L   #Hyponatremia- improving   #Metabolic alkalosis   # pneumonia   # h/o diaphragmatic dysfunction   #Acute on chronic hyponatremia /volume depletion  #Acute encephalopathy   #debility  #Hypertension     Plan:  -Appreciate nephro recs, sodium improved after salt tabs. Monitor BMP  -CO2 elevated, vbg w/ met alkalosis. Give diamox x1, volume status close to euvolemic   -Pt has completed abx.   -outpatient  f/u for full eval of diaphragmatic dysfunction. Pt can f/u w/ [facility].  Appreciate pulm recs. Appreciate neuro recs, recommended MR GENE spine, however, patient was not able to tolerate study. Will defer at this time. D/w patient's son, he wants patient to get transferred to [place] for further evaluation and is working to get an acceptance physician. Alternatively, he is open to DC to [facility] and eventual long term placement.    -Cont oral diet. Get repeat Sns (pt on soft n bite size diet, she has poor appetite) Advancing to regular may help.   -PT evaluation- [facility]  -DVT prophylaxis .  -Dispo once electrolytes and co2 better.

## 2024-09-23 NOTE — PROGRESS NOTE ADULT - PROBLEM SELECTOR PLAN 2
presenting with SOB   on 3L NC, monitor respiratory function  nocturnal BiPAP presenting with SOB   on 3L NC, monitor respiratory function    -as above

## 2024-09-23 NOTE — PROGRESS NOTE ADULT - PROBLEM SELECTOR PLAN 4
previously admitted for hyponatremia in July 2024  Sodium today 127  Dr Alcala following, rec 1g of NaCl TID, Lasix/Acetazolamide if needed for euvolemic state previously admitted for hyponatremia in July 2024  Sodium today 131  Dr Alcala following, rec 1g of NaCl TID, Lasix/Acetazolamide if needed for euvolemic state  -f/u BMP in AM

## 2024-09-23 NOTE — PROGRESS NOTE ADULT - PROBLEM SELECTOR PLAN 5
hx of edema of b/l LE, LE duplex b/l neg in 7/2024, can be due to venous insuffiencey (chronic)  2+ pitting edema b/l LEs  proBNP WNL  TTE: 7/2024 EF 58%, G1DD  f/u b/l LE dopplers: no remarkable findings, neg for DVT hx of edema of b/l LE, LE duplex b/l neg in 7/2024, can be due to venous insuffiencey (chronic)  2+ pitting edema b/l LEs  proBNP WNL  TTE: 7/2024 EF 58%, G1DD  b/l LE dopplers: no remarkable findings, neg for DVT

## 2024-09-23 NOTE — PROGRESS NOTE ADULT - SUBJECTIVE AND OBJECTIVE BOX
SUBJECTIVE: **TO UPDATE**  No overnight events. Pt seen at bedside, states that they feel "__________." Pt endorses _____. Pt denies headache, fever, chills, SOB, chest pain, cough, abd pain, n/v/d, constipation, dysuria, urinary frequency, back pain, myalgias, weakness, dizziness, gait disturbance, numbness/tingling, blurry vision.      OBJECTIVE:    T(C): 36.7 (09-23-24 @ 07:41), Max: 37 (09-22-24 @ 15:31)  HR: 74 (09-23-24 @ 08:28) (64 - 77)  BP: 132/99 (09-23-24 @ 07:41) (106/60 - 133/60)  RR: 18 (09-23-24 @ 07:41) (18 - 18)  SpO2: 97% (09-23-24 @ 08:28) (96% - 100%)            ***TO BE EDITED***  CONSTITUTIONAL: No apparent distress, resting comfortably  EYES: Pupils symmetric, EOMI, No conjunctival or scleral injection, non-icteric  ENMT: Oral mucosa with moist membranes  RESP: No respiratory distress, no use of accessory muscles; CTA b/l, no crackles or wheezes  CV: RRR, +S1S2, no murmurs appreciated; no peripheral edema  GI: Soft, NTND, no rebound, no guarding  MSK: No digital clubbing or cyanosis; Extremities moving equally without additional effort; gait not appreciated  : deferred  SKIN: No rashes or ulcers noted  NEURO: CN II-XII grossly intact   PSYCH: Appropriate insight/judgment; A+O x 3, mood and affect appropriate, recent/remote memory intact    penicillin (Rash)      acetaZOLAMIDE  IVPB 250 milliGRAM(s) IV Intermittent once  carvedilol 25 milliGRAM(s) Oral every 12 hours  enoxaparin Injectable 40 milliGRAM(s) SubCutaneous every 24 hours  pantoprazole    Tablet 40 milliGRAM(s) Oral before breakfast  petrolatum white Ointment 1 Application(s) Topical once  sodium chloride 1 Gram(s) Oral three times a day  sodium chloride 0.65% Nasal 1 Spray(s) Both Nostrils every 4 hours PRN      35.6                       SUBJECTIVE: No overnight events. Pt seen at bedside, endorses continuation of SOB, denies new symptoms (headache, fever, chest pain, abd pain, numbness/tingling). Remainder of ROS deferred by pt.      OBJECTIVE:    T(C): 36.7 (09-23-24 @ 07:41), Max: 37 (09-22-24 @ 15:31)  HR: 74 (09-23-24 @ 08:28) (64 - 77)  BP: 132/99 (09-23-24 @ 07:41) (106/60 - 133/60)  RR: 18 (09-23-24 @ 07:41) (18 - 18)  SpO2: 97% (09-23-24 @ 08:28) (96% - 100%)        General: NAD  Head: Atraumatic  Eyes: EOM grossly in tact, no scleral icterus  ENT: moist mucous membranes  Neurology: A&Ox3, nonfocal, NIELSON x 4  Respiratory: normal respiratory effort  CV: Extremities warm and well perfused  Abdominal: Soft, non-distended  Extremities: No edema  Skin: No rashes      penicillin (Rash)      acetaZOLAMIDE  IVPB 250 milliGRAM(s) IV Intermittent once  carvedilol 25 milliGRAM(s) Oral every 12 hours  enoxaparin Injectable 40 milliGRAM(s) SubCutaneous every 24 hours  pantoprazole    Tablet 40 milliGRAM(s) Oral before breakfast  petrolatum white Ointment 1 Application(s) Topical once  sodium chloride 1 Gram(s) Oral three times a day  sodium chloride 0.65% Nasal 1 Spray(s) Both Nostrils every 4 hours PRN                            11.2   3.83  )-----------( 192      ( 23 Sep 2024 07:45 )             35.6     09-23    131[L]  |  86[L]  |  8   ----------------------------<  95  4.2   |  41[H]  |  0.24[L]    Ca    8.7      23 Sep 2024 07:45  Phos  2.8     09-23  Mg     2.0     09-23    TPro  6.2  /  Alb  2.7[L]  /  TBili  0.4  /  DBili  x   /  AST  16  /  ALT  14  /  AlkPhos  52  09-23    < from: TTE W or WO Ultrasound Enhancing Agent (07.23.24 @ 14:59) >  TRANSTHORACIC ECHOCARDIOGRAM REPORT  ________________________________________________________________________________                                      _______       Pt. Name:       CLINTON LIU Study Date:    7/23/2024  MRN:            [id number]             YOB: 1945  Accession #:    [id number]            Age:           79 years  Account#:       [id number]           Gender:        F  Heart Rate:                          Height:        59.84 in (152.00 cm)  Rhythm:                       Weight:        140.00 lb (63.50 kg)  Blood Pressure: 154/84 mmHg          BSA/BMI:       1.60 m² / 27.49 kg/m²  ________________________________________________________________________________________  Referring Physician:    [id number] Sylvie Rosenberg  Interpreting Physician: Mary Hutchison MD  Primary Sonographer:    Dorita Arredondo RDCS    CPT:               ECHO TTE WO CON COMP W DOPP - 42030.m  Indication(s):     Heart failure, unspecified - I50.9  Procedure: Transthoracic echocardiogram with 2-D, M-mode and complete                     spectral and color flow Doppler.  Ordering Location: [facility]  Admission Status:  Inpatient  Study Information: Image quality for this study is adequate.    _______________________________________________________________________________________     CONCLUSIONS:      1. Left ventricular cavity is normal in size. Left ventricular wall thickness is normal. Left ventricular systolic function is normal with an ejection fraction of 58 % by Rod's method of disks. There are no regional wall motion abnormalities seen.   2. There is mild (grade 1) left ventricular diastolic dysfunction.   3. Normal right ventricular cavity size, with normal wall thickness, and normal right ventricular systolic function. Tricuspid annular plane systolic excursion (TAPSE) is 2.3 cm (normal >=1.7 cm).   4. Mild mitral regurgitation.   5. Normal left and right atrial size.   6. Mild tricuspid regurgitation.   7. Estimated pulmonary artery systolic pressure is 37 mmHg, consistent with mild pulmonary hypertension.   8. Mild pulmonic regurgitation.   9. There is calcification of the mitral valve annulus.  10. There is normal LV mass and concentric remodeling.  11. No pericardial effusion seen.  12. No prior echocardiogram is available for comparison.    < end of copied text >  Sodium, Random Urine (09.23.24 @ 12:24)   Sodium, Random Urine: 42Urinalysis + Microscopic Examination (09.23.24 @ 12:24)   pH Urine: 8.0  Urine Appearance: Turbid  Color: Yellow  Specific Gravity: 1.016  Protein, Urine: Trace mg/dL  Glucose Qualitative, Urine: Negative mg/dL  Ketone - Urine: 15 mg/dL  Blood, Urine: Negative  Bilirubin: Negative  Urobilinogen: 1.0 mg/dL  Leukocyte Esterase Concentration: Trace  Nitrite: Negative  White Blood Cell - Urine: 2 /HPF  Red Blood Cell - Urine: 2 /HPF  Bacteria: Many /HPF  Squamous Epithelial Cells: Present  Triple Phosphate Crystals: Present  Comment - Urine: Amorphous phosphates presentUric Acid (09.23.24 @ 07:45)   Uric Acid: 2.5 mg/dLLipid Profile (09.23.24 @ 07:45)   Cholesterol: 140 mg/dL  Triglycerides, Serum: 60 mg/dL  HDL Cholesterol: 59 mg/dL  Non HDL Cholesterol: 81Thyroid Stimulating Hormone, Serum (09.23.24 @ 07:45)   Thyroid Stimulating Hormone, Serum: 2.20 uU/mLBlood Gas Profile - Venous (09.23.24 @ 07:43)   pH, Venous: 7.47  pCO2, Venous: 64 mmHg  pO2, Venous: 57 mmHg  HCO3, Venous: 47                     SUBJECTIVE: Pt O2sat >92% overnight, in AM dropped to 87%, 2L NC put back on with sats returning to >92%. Pt seen at bedside, endorses continuation of SOB, denies new symptoms (headache, fever, chest pain, abd pain, numbness/tingling). Remainder of ROS deferred by pt.      OBJECTIVE:    T(C): 36.7 (09-23-24 @ 07:41), Max: 37 (09-22-24 @ 15:31)  HR: 74 (09-23-24 @ 08:28) (64 - 77)  BP: 132/99 (09-23-24 @ 07:41) (106/60 - 133/60)  RR: 18 (09-23-24 @ 07:41) (18 - 18)  SpO2: 97% (09-23-24 @ 08:28) (96% - 100%)        General: NAD  Head: Atraumatic  Eyes: EOM grossly in tact, no scleral icterus  ENT: moist mucous membranes  Respiratory: shallow breaths, decreased breath sounds b/l  CV: +S1/S2, no murmurs appreciated, Extremities warm and well perfused  Abdominal: Soft, non-distended  Extremities: No edema  Skin: No rashes      penicillin (Rash)      acetaZOLAMIDE  IVPB 250 milliGRAM(s) IV Intermittent once  carvedilol 25 milliGRAM(s) Oral every 12 hours  enoxaparin Injectable 40 milliGRAM(s) SubCutaneous every 24 hours  pantoprazole    Tablet 40 milliGRAM(s) Oral before breakfast  petrolatum white Ointment 1 Application(s) Topical once  sodium chloride 1 Gram(s) Oral three times a day  sodium chloride 0.65% Nasal 1 Spray(s) Both Nostrils every 4 hours PRN                            11.2   3.83  )-----------( 192      ( 23 Sep 2024 07:45 )             35.6     09-23    131[L]  |  86[L]  |  8   ----------------------------<  95  4.2   |  41[H]  |  0.24[L]    Ca    8.7      23 Sep 2024 07:45  Phos  2.8     09-23  Mg     2.0     09-23    TPro  6.2  /  Alb  2.7[L]  /  TBili  0.4  /  DBili  x   /  AST  16  /  ALT  14  /  AlkPhos  52  09-23    < from: TTE W or WO Ultrasound Enhancing Agent (07.23.24 @ 14:59) >  TRANSTHORACIC ECHOCARDIOGRAM REPORT  ________________________________________________________________________________                                      _______       Pt. Name:       CLINTON LIU Study Date:    7/23/2024  MRN:            [id number]             YOB: 1945  Accession #:    [id number]            Age:           79 years  Account#:       [id number]           Gender:        F  Heart Rate:                          Height:        59.84 in (152.00 cm)  Rhythm:                       Weight:        140.00 lb (63.50 kg)  Blood Pressure: 154/84 mmHg          BSA/BMI:       1.60 m² / 27.49 kg/m²  ________________________________________________________________________________________  Referring Physician:    [id number] Sylvie Rosenberg  Interpreting Physician: Mary Hutchison MD  Primary Sonographer:    Dorita Arredondo [facility]    CPT:               ECHO TTE WO CON COMP W DOPP - 65864.m  Indication(s):     Heart failure, unspecified - I50.9  Procedure: Transthoracic echocardiogram with 2-D, M-mode and complete                     spectral and color flow Doppler.  Ordering Location: [facility]  Admission Status:  Inpatient  Study Information: Image quality for this study is adequate.    _______________________________________________________________________________________     CONCLUSIONS:      1. Left ventricular cavity is normal in size. Left ventricular wall thickness is normal. Left ventricular systolic function is normal with an ejection fraction of 58 % by Rod's method of disks. There are no regional wall motion abnormalities seen.   2. There is mild (grade 1) left ventricular diastolic dysfunction.   3. Normal right ventricular cavity size, with normal wall thickness, and normal right ventricular systolic function. Tricuspid annular plane systolic excursion (TAPSE) is 2.3 cm (normal >=1.7 cm).   4. Mild mitral regurgitation.   5. Normal left and right atrial size.   6. Mild tricuspid regurgitation.   7. Estimated pulmonary artery systolic pressure is 37 mmHg, consistent with mild pulmonary hypertension.   8. Mild pulmonic regurgitation.   9. There is calcification of the mitral valve annulus.  10. There is normal LV mass and concentric remodeling.  11. No pericardial effusion seen.  12. No prior echocardiogram is available for comparison.    < end of copied text >  Sodium, Random Urine (09.23.24 @ 12:24)   Sodium, Random Urine: 42Urinalysis + Microscopic Examination (09.23.24 @ 12:24)   pH Urine: 8.0  Urine Appearance: Turbid  Color: Yellow  Specific Gravity: 1.016  Protein, Urine: Trace mg/dL  Glucose Qualitative, Urine: Negative mg/dL  Ketone - Urine: 15 mg/dL  Blood, Urine: Negative  Bilirubin: Negative  Urobilinogen: 1.0 mg/dL  Leukocyte Esterase Concentration: Trace  Nitrite: Negative  White Blood Cell - Urine: 2 /HPF  Red Blood Cell - Urine: 2 /HPF  Bacteria: Many /HPF  Squamous Epithelial Cells: Present  Triple Phosphate Crystals: Present  Comment - Urine: Amorphous phosphates presentUric Acid (09.23.24 @ 07:45)   Uric Acid: 2.5 mg/dLLipid Profile (09.23.24 @ 07:45)   Cholesterol: 140 mg/dL  Triglycerides, Serum: 60 mg/dL  HDL Cholesterol: 59 mg/dL  Non HDL Cholesterol: 81Thyroid Stimulating Hormone, Serum (09.23.24 @ 07:45)   Thyroid Stimulating Hormone, Serum: 2.20 uU/mLBlood Gas Profile - Venous (09.23.24 @ 07:43)   pH, Venous: 7.47  pCO2, Venous: 64 mmHg  pO2, Venous: 57 mmHg  HCO3, Venous: 47

## 2024-09-23 NOTE — PROGRESS NOTE ADULT - SUBJECTIVE AND OBJECTIVE BOX
Ramiro Alcala MD (Nephrology progress note)  [address], [facility],  [address],  [facility]  TEl: [phone]  Cell: [phone]    Patient is a 79y Female seen and evaluated at bedside. Vital signs, laboratory data, imaging studies, consult notes reviewed done within past 24 hours. Overnight events noted.    Allergies    penicillin (Rash)    Intolerances        ROS:  CONSTITUTIONAL: No fever or chills.  HEENT: No headcahe or visual disturbances.  RESPIRATORY: No shortness of breath, cough, hemoptysis or wheezing  CARDIOVASCULAR: No Chest pain, shortness of breath, palpitations, dizziness, syncope, orthopnea, PND or leg swelling.  GASTROINTESTINAL: No abdominal pain, nausea, vomiting, diarrhea, hematemesis or blood per rectum.  GENITOURINARY: No urinary frequency, urgency, gross hematuria, dysuria, flank or supra pubic pain, difficulty passing urine.  NEUROLOGICAL: No headache, focal limb weakness, tingling or numbness or seizure like activity  SKIN: No skin rash or lesion  MUSCULOSKELETAL: No leg pain, calf pain or swelling    VITALS:    T(C): 37.1 (09-23-24 @ 11:15), Max: 37.1 (09-23-24 @ 11:15)  HR: 67 (09-23-24 @ 11:15) (64 - 77)  BP: 138/60 (09-23-24 @ 11:15) (106/60 - 138/60)  RR: 18 (09-23-24 @ 11:15) (18 - 18)  SpO2: 97% (09-23-24 @ 11:15) (96% - 100%)  CAPILLARY BLOOD GLUCOSE      POCT Blood Glucose.: 133 mg/dL (23 Sep 2024 11:20)  POCT Blood Glucose.: 94 mg/dL (23 Sep 2024 07:45)      MEDICATIONS  (STANDING):  carvedilol 25 milliGRAM(s) Oral every 12 hours  enoxaparin Injectable 40 milliGRAM(s) SubCutaneous every 24 hours  pantoprazole    Tablet 40 milliGRAM(s) Oral before breakfast  petrolatum white Ointment 1 Application(s) Topical once  sodium chloride 1 Gram(s) Oral three times a day    MEDICATIONS  (PRN):  sodium chloride 0.65% Nasal 1 Spray(s) Both Nostrils every 4 hours PRN nasal dryness      PHYSICAL EXAM:  GENERAL: Alert, awake and oriented x3 in no distress  HEENT: RAFAEL, EOMI, neck supple, no JVP, no carotid bruit, oral mucosa moist and pink.  CHEST/LUNG: Bilateral clear breath sounds, no rales, no crepitations, no rhonchi, no wheezing  HEART: Regular rate and rhythm, DAVID II/VI at LPSB, no gallops, no rub   ABDOMEN: Soft, nontender, non distended, bowel sounds present, no palpable organomegaly  : No flank or supra pubic tenderness.  EXTREMITIES: Peripheral pulses are palpable, no pedal edema  Neurology: AAOx3, no focal neurological deficit  SKIN: No rash or skin lesion  Musculoskeletal: No joint deformity or spinal tenderness.      Vascular ACCESS:     LABS:                        11.2   3.83  )-----------( 192      ( 23 Sep 2024 07:45 )             35.6     09-23    131[L]  |  86[L]  |  8   ----------------------------<  95  4.2   |  41[H]  |  0.24[L]    Ca    8.7      23 Sep 2024 07:45  Phos  2.8     09-23  Mg     2.0     09-23    TPro  6.2  /  Alb  2.7[L]  /  TBili  0.4  /  DBili  x   /  AST  16  /  ALT  14  /  AlkPhos  52  09-23    Cholesterol: 140 mg/dL (09-23 @ 07:45)  Uric Acid: 2.5 mg/dL [2.5 - 7.0] (09-23 @ 07:45)      Urinalysis Basic - ( 23 Sep 2024 07:45 )    Color: x / Appearance: x / SG: x / pH: x  Gluc: 95 mg/dL / Ketone: x  / Bili: x / Urobili: x   Blood: x / Protein: x / Nitrite: x   Leuk Esterase: x / RBC: x / WBC x   Sq Epi: x / Non Sq Epi: x / Bacteria: x            RADIOLOGY & ADDITIONAL STUDIES:    Imaging Personally Reviewed:  [x] YES  [ ] NO    Consultant(s) Notes Reviewed:  [x] YES  [ ] NO    Care Discussed with Primary team/ Other Providers [x] YES  [ ] NO       Ramiro Alcala MD (Nephrology progress note)  [address], [facility],  [address],  [facility]  TEl: [phone]  Cell: [phone]    Patient is a 79y Female seen and evaluated at bedside. Vital signs, laboratory data, imaging studies, consult notes reviewed done within past 24 hours. Overnight events noted. Patient lying in bed alert and awake in no respiratory distress on nasal cannula oxygen. Interval improving Na level to 131 with stable renal function on po salt tablets    Allergies    penicillin (Rash)    Intolerances        ROS:  Limited  Drowsy but arousable  Offers no complains of pain       VITALS:    T(C): 37.1 (09-23-24 @ 11:15), Max: 37.1 (09-23-24 @ 11:15)  HR: 67 (09-23-24 @ 11:15) (64 - 77)  BP: 138/60 (09-23-24 @ 11:15) (106/60 - 138/60)  RR: 18 (09-23-24 @ 11:15) (18 - 18)  SpO2: 97% (09-23-24 @ 11:15) (96% - 100%)  CAPILLARY BLOOD GLUCOSE      POCT Blood Glucose.: 133 mg/dL (23 Sep 2024 11:20)  POCT Blood Glucose.: 94 mg/dL (23 Sep 2024 07:45)      MEDICATIONS  (STANDING):  carvedilol 25 milliGRAM(s) Oral every 12 hours  enoxaparin Injectable 40 milliGRAM(s) SubCutaneous every 24 hours  pantoprazole    Tablet 40 milliGRAM(s) Oral before breakfast  petrolatum white Ointment 1 Application(s) Topical once  sodium chloride 1 Gram(s) Oral three times a day    MEDICATIONS  (PRN):  sodium chloride 0.65% Nasal 1 Spray(s) Both Nostrils every 4 hours PRN nasal dryness      PHYSICAL EXAM:  GENERAL: Drowsy but easily arousable in no respiratory distress on nasal cannula oxygen  HEENT: RAFAEL, EOMI, neck supple, no JVP  CHEST/LUNG: Bilateral decreased breath sounds, bibasilar rales and crepitations, no wheezing  HEART: Regular rate and rhythm, DAVID II/VI at LPSB, no gallops, no rub   ABDOMEN: Soft, nontender, non distended, bowel sounds present, no palpable organomegaly  : No flank or supra pubic tenderness.  EXTREMITIES: Peripheral pulses are palpable, trace pedal edema  Neurology: AAOx2, no focal neurological deficit  SKIN: No rash or skin lesion  Musculoskeletal: No joint deformity or spinal tenderness.      Vascular ACCESS: None    LABS:                        11.2   3.83  )-----------( 192      ( 23 Sep 2024 07:45 )             35.6     09-23    131[L]  |  86[L]  |  8   ----------------------------<  95  4.2   |  41[H]  |  0.24[L]    Ca    8.7      23 Sep 2024 07:45  Phos  2.8     09-23  Mg     2.0     09-23    TPro  6.2  /  Alb  2.7[L]  /  TBili  0.4  /  DBili  x   /  AST  16  /  ALT  14  /  AlkPhos  52  09-23    Cholesterol: 140 mg/dL (09-23 @ 07:45)  Uric Acid: 2.5 mg/dL [2.5 - 7.0] (09-23 @ 07:45)      Urinalysis Basic - ( 23 Sep 2024 07:45 )    Color: x / Appearance: x / SG: x / pH: x  Gluc: 95 mg/dL / Ketone: x  / Bili: x / Urobili: x   Blood: x / Protein: x / Nitrite: x   Leuk Esterase: x / RBC: x / WBC x   Sq Epi: x / Non Sq Epi: x / Bacteria: x            RADIOLOGY & ADDITIONAL STUDIES:  rad< from: Xray Chest 1 View- PORTABLE-Urgent (Xray Chest 1 View- PORTABLE-Urgent .) (09.18.24 @ 09:35) >    ACC: [id number] EXAM:  XR CHEST PORTABLE URGENT 1V   ORDERED BY: NHAN YIN     PROCEDURE DATE:  09/18/2024          INTERPRETATION:  TIME OF EXAM: September 18, 2024 at 9:11 AM.    CLINICAL INFORMATION: Diaphragmatic dysfunction. Increased work of   breathing.    COMPARISON:  CTA of the chest from September 14, 2024.    TECHNIQUE:   AP Portable chest x-ray. Rotated.    INTERPRETATION:    Heart size and the mediastinum cannot be accurately evaluated on this   projection.  A markedly elevated left hemidiaphragm is again seen.  There are bilateral lower lung opacities.  Possible bilateral trace pleural effusions.  No pneumothorax.  There is osteoarthritic degenerative change of the spine. There is an   upper lumbar vertebroplasty.    IMPRESSION:  Markedly elevated left hemidiaphragm again seen.    Bilateral lower lung opacities which could be due to atelectasis and/or   infection.    Possible bilateral trace pleural effusions.    --- End of Report ---            LEV LEAL MD; Attending Radiologist  This document has been electronically signed. Sep 19 2024 11:55AM    < end of copied text >    Imaging Personally Reviewed:  [x] YES  [ ] NO    Consultant(s) Notes Reviewed:  [x] YES  [ ] NO    Care Discussed with Primary team/ Other Providers [x] YES  [ ] NO

## 2024-09-24 LAB
ALBUMIN SERPL ELPH-MCNC: 2.7 G/DL — LOW (ref 3.5–5)
ALP SERPL-CCNC: 53 U/L — SIGNIFICANT CHANGE UP (ref 40–120)
ALT FLD-CCNC: 16 U/L DA — SIGNIFICANT CHANGE UP (ref 10–60)
ANION GAP SERPL CALC-SCNC: 4 MMOL/L — LOW (ref 5–17)
AST SERPL-CCNC: 15 U/L — SIGNIFICANT CHANGE UP (ref 10–40)
BILIRUB SERPL-MCNC: 0.3 MG/DL — SIGNIFICANT CHANGE UP (ref 0.2–1.2)
BUN SERPL-MCNC: 7 MG/DL — SIGNIFICANT CHANGE UP (ref 7–18)
CALCIUM SERPL-MCNC: 9.2 MG/DL — SIGNIFICANT CHANGE UP (ref 8.4–10.5)
CCP IGG SERPL-ACNC: <8 U/ML — SIGNIFICANT CHANGE UP
CHLORIDE SERPL-SCNC: 92 MMOL/L — LOW (ref 96–108)
CO2 SERPL-SCNC: 35 MMOL/L — HIGH (ref 22–31)
CREAT SERPL-MCNC: 0.35 MG/DL — LOW (ref 0.5–1.3)
CRP SERPL-MCNC: 31 MG/L — HIGH
EGFR: 103 ML/MIN/1.73M2 — SIGNIFICANT CHANGE UP
ERYTHROCYTE [SEDIMENTATION RATE] IN BLOOD: 33 MM/HR — HIGH (ref 0–20)
FOLATE SERPL-MCNC: 16.3 NG/ML — SIGNIFICANT CHANGE UP
GLUCOSE BLDC GLUCOMTR-MCNC: 106 MG/DL — HIGH (ref 70–99)
GLUCOSE BLDC GLUCOMTR-MCNC: 110 MG/DL — HIGH (ref 70–99)
GLUCOSE BLDC GLUCOMTR-MCNC: 133 MG/DL — HIGH (ref 70–99)
GLUCOSE BLDC GLUCOMTR-MCNC: 91 MG/DL — SIGNIFICANT CHANGE UP (ref 70–99)
GLUCOSE SERPL-MCNC: 119 MG/DL — HIGH (ref 70–99)
HCT VFR BLD CALC: 35.8 % — SIGNIFICANT CHANGE UP (ref 34.5–45)
HGB BLD-MCNC: 11.4 G/DL — LOW (ref 11.5–15.5)
MAGNESIUM SERPL-MCNC: 1.8 MG/DL — SIGNIFICANT CHANGE UP (ref 1.6–2.6)
MCHC RBC-ENTMCNC: 29.5 PG — SIGNIFICANT CHANGE UP (ref 27–34)
MCHC RBC-ENTMCNC: 31.8 GM/DL — LOW (ref 32–36)
MCV RBC AUTO: 92.7 FL — SIGNIFICANT CHANGE UP (ref 80–100)
NRBC # BLD: 0 /100 WBCS — SIGNIFICANT CHANGE UP (ref 0–0)
OSMOLALITY UR: 460 MOSM/KG — SIGNIFICANT CHANGE UP (ref 50–1200)
PHOSPHATE SERPL-MCNC: 2.7 MG/DL — SIGNIFICANT CHANGE UP (ref 2.5–4.5)
PLATELET # BLD AUTO: 204 K/UL — SIGNIFICANT CHANGE UP (ref 150–400)
POTASSIUM SERPL-MCNC: 3.1 MMOL/L — LOW (ref 3.5–5.3)
POTASSIUM SERPL-SCNC: 3.1 MMOL/L — LOW (ref 3.5–5.3)
PROT SERPL-MCNC: 6.5 G/DL — SIGNIFICANT CHANGE UP (ref 6–8.3)
RBC # BLD: 3.86 M/UL — SIGNIFICANT CHANGE UP (ref 3.8–5.2)
RBC # FLD: 12.4 % — SIGNIFICANT CHANGE UP (ref 10.3–14.5)
RF+CCP IGG SER-IMP: NEGATIVE — SIGNIFICANT CHANGE UP
RHEUMATOID FACT SERPL-ACNC: <10 IU/ML — SIGNIFICANT CHANGE UP (ref 0–13)
SODIUM SERPL-SCNC: 131 MMOL/L — LOW (ref 135–145)
T PALLIDUM AB TITR SER: NEGATIVE — SIGNIFICANT CHANGE UP
T4 FREE SERPL-MCNC: 1.1 NG/DL — SIGNIFICANT CHANGE UP (ref 0.9–1.8)
TSH SERPL-MCNC: 2.39 UU/ML — SIGNIFICANT CHANGE UP (ref 0.34–4.82)
VIT B12 SERPL-MCNC: 1296 PG/ML — HIGH (ref 232–1245)
WBC # BLD: 4.66 K/UL — SIGNIFICANT CHANGE UP (ref 3.8–10.5)
WBC # FLD AUTO: 4.66 K/UL — SIGNIFICANT CHANGE UP (ref 3.8–10.5)

## 2024-09-24 PROCEDURE — 99233 SBSQ HOSP IP/OBS HIGH 50: CPT | Mod: GC

## 2024-09-24 RX ADMIN — Medication 1 GRAM(S): at 22:21

## 2024-09-24 RX ADMIN — Medication 25 MILLIGRAM(S): at 19:05

## 2024-09-24 RX ADMIN — Medication 1 GRAM(S): at 05:15

## 2024-09-24 RX ADMIN — Medication 25 MILLIGRAM(S): at 05:13

## 2024-09-24 RX ADMIN — Medication 40 MILLIEQUIVALENT(S): at 19:07

## 2024-09-24 RX ADMIN — Medication 100 MILLIEQUIVALENT(S): at 11:37

## 2024-09-24 RX ADMIN — Medication 1 GRAM(S): at 14:08

## 2024-09-24 RX ADMIN — ACETAZOLAMIDE 250 MILLIGRAM(S): 250 TABLET ORAL at 05:14

## 2024-09-24 RX ADMIN — Medication 100 MILLIEQUIVALENT(S): at 09:51

## 2024-09-24 RX ADMIN — PANTOPRAZOLE SODIUM 40 MILLIGRAM(S): 40 TABLET, DELAYED RELEASE ORAL at 05:13

## 2024-09-24 RX ADMIN — ENOXAPARIN SODIUM 40 MILLIGRAM(S): 150 INJECTION SUBCUTANEOUS at 05:14

## 2024-09-24 NOTE — PROGRESS NOTE ADULT - PROBLEM SELECTOR PLAN 5
hx of edema of b/l LE, LE duplex b/l neg in 7/2024, can be due to venous insuffiencey (chronic)  2+ pitting edema b/l LEs  proBNP WNL  TTE: 7/2024 EF 58%, G1DD  b/l LE dopplers: no remarkable findings, neg for DVT

## 2024-09-24 NOTE — PROGRESS NOTE ADULT - ASSESSMENT
79F PMH hypertension, arthritis, Neuralgia, osteoporotic compression fractures L5-S1, hyponatremia (HCTZ induced, corrected), goiter, who presented to the ED for left sided facial droop and SOB. Pt seen at bedside, on NIV , AAOX1, and did not have a facial droop, more awake and alert compared to when she came to the ED as per ED attending. 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 secondary to underlying lung disease/PNA vs cardiac stretching of ANP/diastolic CHF  2) Acute on chronic cor pulmonale  3) Acute on chronic hypoxic/hypercapnic  respiratory failure on nasal cannula oxygen  4) Hypertension  5) Diaphragmatic paralysis/Thoracic spine disease  6) Electrolytes disorder  7) Acid-base disorder  8) Dementia with cognitive impairment    Recommend:  Strict I/o  Avoid Nephrotoxic agents  Monitor ABG/ supplemental oxygen/ BIPAP  Na level 131  Urine studies reviewed   TSH, Lipid panel, serum osmolality, uric acid level reviewed  Start sodium chloride 1 gm po tid   Intermittent Lasix/Acetazolamide as needed to achieve euvolemic state  Adjust antibiotics as per renal dose adjustment  Optimal BP control  Neurology work up in progress  Chest PT/Pulmonary team follow up  Volume status hypervolemic with electrolytes imbalance  Replete electrolytes with goal K>4 and <5, Mg> 2 and Phos 2.5 to 3.5  Further care per primary team  Will follow

## 2024-09-24 NOTE — PROGRESS NOTE ADULT - PROBLEM SELECTOR PLAN 4
previously admitted for hyponatremia in July 2024  Sodium today 131  Dr Alcala following, rec 1g of NaCl TID, Lasix/Acetazolamide if needed for euvolemic state  -f/u BMP in AM

## 2024-09-24 NOTE — PROGRESS NOTE ADULT - PROBLEM SELECTOR PLAN 3
presenting with SOB   on 3L NC, monitor respiratory function   CTA chest: Areas of airspace consolidation, increased in the left lung and new in the right upper lobe  aspiration precautions   BCx, UCx negative at 72h  strep, mycoplasma, and legionella negative  completed rocephin and azithro

## 2024-09-24 NOTE — PROGRESS NOTE ADULT - PROBLEM SELECTOR PLAN 6
presenting with AMS, left facial droop as per ED  no facial droop but confused  --> current is AAOx1-2 and is less confused  likely due to acute on chronic hypercapnia vs infectious etiology vs hyponatremia, also has hx of muscle relaxant use  CT head: No acute intracranial  hemorrhage, mass effect or midline shift. Nonspecific moderate subcortical white matter hypodensities may relate to small vessel ischemic disease.  hold muscle relaxant use  completed rocephin and azithro for PNA  trending Na

## 2024-09-24 NOTE — PROGRESS NOTE ADULT - ATTENDING COMMENTS
79W PMH HTN, osteoporotic compression fractures L5-S1, hyponatremia (HCTZ induced, corrected), p/w L-sided facial droop and SOB. Course was complicated by acute respiratory failure with hypercapnia and hypoxia due to bacterial pneumonia, diaphragmatic dysfunction.     On exam, children are at bedside (son and daughter). She is resting comfortably and without use of accessory muscles. Lungs are CTAB.    A/P:  #Acute on chronic hypercapnic and hypoxic respiratory failure~2L   #Hyponatremia- improving   #Metabolic alkalosis   # pneumonia   # h/o diaphragmatic dysfunction   #Acute on chronic hyponatremia /volume depletion  #Acute encephalopathy   #debility  #Hypertension     Plan:  -Appreciate nephro recs, sodium improved after salt tabs. Monitor BMP  -CO2 elevated, vbg w/ met alkalosis. Give diamox x1, volume status close to euvolemic   -Pt has completed abx.   -outpatient  f/u for full eval of diaphragmatic dysfunction. Pt can f/u w/ [facility].  Appreciate pulm recs. Appreciate neuro recs, recommended MR C spine, however, patient was not able to tolerate study. Will defer at this time. D/w patient's son, he wants patient to get transferred to [place] for further evaluation and is working to get an acceptance physician. Alternatively, he is open to DC to [facility] and eventual long term placement.    -Cont oral diet. Get repeat Sns (pt on soft n bite size diet, she has poor appetite) Advancing to regular may help.   -PT evaluation- [facility]  -DVT prophylaxis .  -Dispo once electrolytes and co2 better. 79W PMH HTN, osteoporotic compression fractures L5-S1, hyponatremia (HCTZ induced, corrected), p/w L-sided facial droop and SOB. Course was complicated by acute respiratory failure with hypercapnia and hypoxia due to bacterial pneumonia, diaphragmatic dysfunction.     On exam, children are at bedside (son and daughter). She is resting comfortably and without use of accessory muscles. Lungs are CTAB.    A/P:  #Acute on chronic hypercapnic and hypoxic respiratory failure due to bacterial pneumonia, diaphragmatic dysfunction  -s/p antibiotics  -weaning supplemental oxygen as tolerated, on 2L/min via NC this AM   - appreciate pulm and neurology ; recommended MR C spine, however, patient was not able to tolerate study  -care d/w Dr José London of [facility] - recommends outpatient f/u with his office and not transfer to inpatient unit; his office will reach out to son to facilitate f/u    #Hyponatremia  #Metabolic alkalosis   -improving, continue to monitor BMP

## 2024-09-24 NOTE — PROGRESS NOTE ADULT - PROBLEM SELECTOR PLAN 1
likely acute on chronic hypercapnia, PNA  suspect hypoventilation can be 2/2 left elevated hemidiaphragm as seen on CT angio chest, hyponatremia which can cause AMS and hx of muscle relaxant use (?gabapentin x cyclobenzaprine)  non compliant with nightly BIPAP per patient's son  presenting with SOB   ABG 7.27/119/190/55 at admission > s/p BIPAP >  7.36/76/124/43  CTA chest: Areas of airspace consolidation, increased in the left lung and new in the right upper lobe  aspiration precautions  s/p BIPAP, on 2L NC, monitor respiratory function  Pulmonology Dr Prado's following    -wean O2 as tolerated (1L O2 at home) likely acute on chronic hypercapnia, PNA  suspect hypoventilation can be 2/2 left elevated hemidiaphragm as seen on CT angio chest, hyponatremia which can cause AMS and hx of muscle relaxant use (?gabapentin x cyclobenzaprine)  non compliant with nightly BIPAP per patient's son  presenting with SOB   ABG 7.27/119/190/55 at admission > s/p BIPAP >  7.36/76/124/43  CTA chest: Areas of airspace consolidation, increased in the left lung and new in the right upper lobe  aspiration precautions  s/p BIPAP, on 2L NC, monitor respiratory function  Pulmonology Dr Prado's following  Pt O2sat well on RA

## 2024-09-24 NOTE — PROGRESS NOTE ADULT - TIME BILLING
Time spent includes direct patient care  (interview and examination of patient), discussion with other providers, support staff and/or patient's family members, review of medical records, ordering diagnostic tests and analyzing results, and documentation. Time spent includes direct patient care  (interview and examination of patient), discussion with other providers, support staff and/or patient's family members, review of medical records, ordering diagnostic tests and analyzing results, and documentation.    I examined this patient and discussed their management with house staff on 9/24/2024.

## 2024-09-24 NOTE — PROGRESS NOTE ADULT - SUBJECTIVE AND OBJECTIVE BOX
Ramiro Alcala MD (Nephrology progress note)  [address], [facility],  [address],  [facility]  TEl: [phone]  Cell: [phone]    Patient is a 79y Female seen and evaluated at bedside. Vital signs, laboratory data, imaging studies, consult notes reviewed done within past 24 hours. Overnight events noted.    Allergies    penicillin (Rash)    Intolerances        ROS:  CONSTITUTIONAL: No fever or chills.  HEENT: No headcahe or visual disturbances.  RESPIRATORY: No shortness of breath, cough, hemoptysis or wheezing  CARDIOVASCULAR: No Chest pain, shortness of breath, palpitations, dizziness, syncope, orthopnea, PND or leg swelling.  GASTROINTESTINAL: No abdominal pain, nausea, vomiting, diarrhea, hematemesis or blood per rectum.  GENITOURINARY: No urinary frequency, urgency, gross hematuria, dysuria, flank or supra pubic pain, difficulty passing urine.  NEUROLOGICAL: No headache, focal limb weakness, tingling or numbness or seizure like activity  SKIN: No skin rash or lesion  MUSCULOSKELETAL: No leg pain, calf pain or swelling    VITALS:    T(C): 37 (09-24-24 @ 11:17), Max: 37.1 (09-24-24 @ 07:36)  HR: 79 (09-24-24 @ 11:17) (71 - 83)  BP: 141/68 (09-24-24 @ 11:17) (128/64 - 147/87)  RR: 18 (09-24-24 @ 11:17) (18 - 18)  SpO2: 98% (09-24-24 @ 11:17) (97% - 100%)  CAPILLARY BLOOD GLUCOSE      POCT Blood Glucose.: 106 mg/dL (24 Sep 2024 07:41)  POCT Blood Glucose.: 104 mg/dL (23 Sep 2024 21:15)  POCT Blood Glucose.: 90 mg/dL (23 Sep 2024 16:55)      09-23-24 @ 07:01  -  09-24-24 @ 07:00  --------------------------------------------------------  IN: 400 mL / OUT: 1750 mL / NET: -1350 mL    09-24-24 @ 07:01  -  09-24-24 @ 11:24  --------------------------------------------------------  IN: 200 mL / OUT: 0 mL / NET: 200 mL      MEDICATIONS  (STANDING):  carvedilol 25 milliGRAM(s) Oral every 12 hours  enoxaparin Injectable 40 milliGRAM(s) SubCutaneous every 24 hours  pantoprazole    Tablet 40 milliGRAM(s) Oral before breakfast  petrolatum white Ointment 1 Application(s) Topical once  potassium chloride  10 mEq/100 mL IVPB 10 milliEquivalent(s) IV Intermittent every 1 hour  sodium chloride 1 Gram(s) Oral three times a day    MEDICATIONS  (PRN):  sodium chloride 0.65% Nasal 1 Spray(s) Both Nostrils every 4 hours PRN nasal dryness      PHYSICAL EXAM:  GENERAL: Alert, awake and oriented x3 in no distress  HEENT: RAFAEL, EOMI, neck supple, no JVP, no carotid bruit, oral mucosa moist and pink.  CHEST/LUNG: Bilateral clear breath sounds, no rales, no crepitations, no rhonchi, no wheezing  HEART: Regular rate and rhythm, DAVID II/VI at LPSB, no gallops, no rub   ABDOMEN: Soft, nontender, non distended, bowel sounds present, no palpable organomegaly  : No flank or supra pubic tenderness.  EXTREMITIES: Peripheral pulses are palpable, no pedal edema  Neurology: AAOx3, no focal neurological deficit  SKIN: No rash or skin lesion  Musculoskeletal: No joint deformity or spinal tenderness.      Vascular ACCESS:     LABS:                        11.4   4.66  )-----------( 204      ( 24 Sep 2024 06:35 )             35.8     09-24    131[L]  |  92[L]  |  7   ----------------------------<  119[H]  3.1[L]   |  35[H]  |  0.35[L]    Ca    9.2      24 Sep 2024 06:35  Phos  2.7     09-24  Mg     1.8     09-24    TPro  6.5  /  Alb  2.7[L]  /  TBili  0.3  /  DBili  x   /  AST  15  /  ALT  16  /  AlkPhos  53  09-24        Urinalysis Basic - ( 24 Sep 2024 06:35 )    Color: x / Appearance: x / SG: x / pH: x  Gluc: 119 mg/dL / Ketone: x  / Bili: x / Urobili: x   Blood: x / Protein: x / Nitrite: x   Leuk Esterase: x / RBC: x / WBC x   Sq Epi: x / Non Sq Epi: x / Bacteria: x      Sodium, Random Urine: 42 mmol/L (09-23 @ 12:24)  Osmolality, Random Urine: 460 mosm/Kg (09-23 @ 12:24)        RADIOLOGY & ADDITIONAL STUDIES:    Imaging Personally Reviewed:  [x] YES  [ ] NO    Consultant(s) Notes Reviewed:  [x] YES  [ ] NO    Care Discussed with Primary team/ Other Providers [x] YES  [ ] NO       Ramiro Alcala MD (Nephrology progress note)  [address], [facility],  [address],  [facility]  TEl: [phone]  Cell: [phone]    Patient is a 79y Female seen and evaluated at bedside. Vital signs, laboratory data, imaging studies, consult notes reviewed done within past 24 hours. Overnight events noted. Patient lying in bed alert and awake in no respiratory distress offers no new complains. Interval Na level 131 with non oliguria. K level 3.1    Allergies    penicillin (Rash)    Intolerances        ROS:  LImited  Awake but slowly responding  Denies any chest pain, SOB    VITALS:    T(C): 37 (09-24-24 @ 11:17), Max: 37.1 (09-24-24 @ 07:36)  HR: 79 (09-24-24 @ 11:17) (71 - 83)  BP: 141/68 (09-24-24 @ 11:17) (128/64 - 147/87)  RR: 18 (09-24-24 @ 11:17) (18 - 18)  SpO2: 98% (09-24-24 @ 11:17) (97% - 100%)  CAPILLARY BLOOD GLUCOSE      POCT Blood Glucose.: 106 mg/dL (24 Sep 2024 07:41)  POCT Blood Glucose.: 104 mg/dL (23 Sep 2024 21:15)  POCT Blood Glucose.: 90 mg/dL (23 Sep 2024 16:55)      09-23-24 @ 07:01  -  09-24-24 @ 07:00  --------------------------------------------------------  IN: 400 mL / OUT: 1750 mL / NET: -1350 mL    09-24-24 @ 07:01  -  09-24-24 @ 11:24  --------------------------------------------------------  IN: 200 mL / OUT: 0 mL / NET: 200 mL      MEDICATIONS  (STANDING):  carvedilol 25 milliGRAM(s) Oral every 12 hours  enoxaparin Injectable 40 milliGRAM(s) SubCutaneous every 24 hours  pantoprazole    Tablet 40 milliGRAM(s) Oral before breakfast  petrolatum white Ointment 1 Application(s) Topical once  potassium chloride  10 mEq/100 mL IVPB 10 milliEquivalent(s) IV Intermittent every 1 hour  sodium chloride 1 Gram(s) Oral three times a day    MEDICATIONS  (PRN):  sodium chloride 0.65% Nasal 1 Spray(s) Both Nostrils every 4 hours PRN nasal dryness      PHYSICAL EXAM:  GENERAL: Alert and awake but slowly responding  HEENT: RAFAEL, EOMI, neck supple, no JVP, no carotid bruit, oral mucosa moist and pink.  CHEST/LUNG: Bilateral decreased breath sounds, bibasilar rales and crepitatio  HEART: Regular rate and rhythm, DAVID II/VI at LPSB, no gallops, no rub   ABDOMEN: Soft, nontender, non distended, bowel sounds present, no palpable organomegaly  : No flank or supra pubic tenderness.  EXTREMITIES: Peripheral pulses are palpable, trace pedal edema  Neurology: AAOx2-3, no focal neurological deficit  SKIN: No rash or skin lesion  Musculoskeletal: No joint deformity or spinal tenderness.      Vascular ACCESS: None    LABS:                        11.4   4.66  )-----------( 204      ( 24 Sep 2024 06:35 )             35.8     09-24    131[L]  |  92[L]  |  7   ----------------------------<  119[H]  3.1[L]   |  35[H]  |  0.35[L]    Ca    9.2      24 Sep 2024 06:35  Phos  2.7     09-24  Mg     1.8     09-24    TPro  6.5  /  Alb  2.7[L]  /  TBili  0.3  /  DBili  x   /  AST  15  /  ALT  16  /  AlkPhos  53  09-24        Urinalysis Basic - ( 24 Sep 2024 06:35 )    Color: x / Appearance: x / SG: x / pH: x  Gluc: 119 mg/dL / Ketone: x  / Bili: x / Urobili: x   Blood: x / Protein: x / Nitrite: x   Leuk Esterase: x / RBC: x / WBC x   Sq Epi: x / Non Sq Epi: x / Bacteria: x      Sodium, Random Urine: 42 mmol/L (09-23 @ 12:24)  Osmolality, Random Urine: 460 mosm/Kg (09-23 @ 12:24)        RADIOLOGY & ADDITIONAL STUDIES:  rad< from: Xray Chest 1 View- PORTABLE-Urgent (Xray Chest 1 View- PORTABLE-Urgent .) (09.18.24 @ 09:35) >    ACC: [id number] EXAM:  XR CHEST PORTABLE URGENT 1V   ORDERED BY: NHAN YIN     PROCEDURE DATE:  09/18/2024          INTERPRETATION:  TIME OF EXAM: September 18, 2024 at 9:11 AM.    CLINICAL INFORMATION: Diaphragmatic dysfunction. Increased work of   breathing.    COMPARISON:  CTA of the chest from September 14, 2024.    TECHNIQUE:   AP Portable chest x-ray. Rotated.    INTERPRETATION:    Heart size and the mediastinum cannot be accurately evaluated on this   projection.  A markedly elevated left hemidiaphragm is again seen.  There are bilateral lower lung opacities.  Possible bilateral trace pleural effusions.  No pneumothorax.  There is osteoarthritic degenerative change of the spine. There is an   upper lumbar vertebroplasty.    IMPRESSION:  Markedly elevated left hemidiaphragm again seen.    Bilateral lower lung opacities which could be due to atelectasis and/or   infection.    Possible bilateral trace pleural effusions.    --- End of Report ---            LEV LEAL MD; Attending Radiologist  This document has been electronically signed. Sep 19 2024 11:55AM    < end of copied text >    Imaging Personally Reviewed:  [x] YES  [ ] NO    Consultant(s) Notes Reviewed:  [x] YES  [ ] NO    Care Discussed with Primary team/ Other Providers [x] YES  [ ] NO

## 2024-09-24 NOTE — PROGRESS NOTE ADULT - SUBJECTIVE AND OBJECTIVE BOX
SUBJECTIVE: Pt O2sat >92% overnight, in AM dropped to 87%, 2L NC put back on with sats returning to >92%. Pt seen at bedside, endorses continuation of SOB, denies new symptoms (headache, fever, chest pain, abd pain, numbness/tingling). Remainder of ROS deferred by pt.      OBJECTIVE:    T(C): 36.7 (09-23-24 @ 07:41), Max: 37 (09-22-24 @ 15:31)  HR: 74 (09-23-24 @ 08:28) (64 - 77)  BP: 132/99 (09-23-24 @ 07:41) (106/60 - 133/60)  RR: 18 (09-23-24 @ 07:41) (18 - 18)  SpO2: 97% (09-23-24 @ 08:28) (96% - 100%)        General: NAD  Head: Atraumatic  Eyes: EOM grossly in tact, no scleral icterus  ENT: moist mucous membranes  Respiratory: shallow breaths, decreased breath sounds b/l  CV: +S1/S2, no murmurs appreciated, Extremities warm and well perfused  Abdominal: Soft, non-distended  Extremities: No edema  Skin: No rashes      penicillin (Rash)      acetaZOLAMIDE  IVPB 250 milliGRAM(s) IV Intermittent once  carvedilol 25 milliGRAM(s) Oral every 12 hours  enoxaparin Injectable 40 milliGRAM(s) SubCutaneous every 24 hours  pantoprazole    Tablet 40 milliGRAM(s) Oral before breakfast  petrolatum white Ointment 1 Application(s) Topical once  sodium chloride 1 Gram(s) Oral three times a day  sodium chloride 0.65% Nasal 1 Spray(s) Both Nostrils every 4 hours PRN                            11.2   3.83  )-----------( 192      ( 23 Sep 2024 07:45 )             35.6     09-23    131[L]  |  86[L]  |  8   ----------------------------<  95  4.2   |  41[H]  |  0.24[L]    Ca    8.7      23 Sep 2024 07:45  Phos  2.8     09-23  Mg     2.0     09-23    TPro  6.2  /  Alb  2.7[L]  /  TBili  0.4  /  DBili  x   /  AST  16  /  ALT  14  /  AlkPhos  52  09-23    < from: TTE W or WO Ultrasound Enhancing Agent (07.23.24 @ 14:59) >  TRANSTHORACIC ECHOCARDIOGRAM REPORT  ________________________________________________________________________________                                      _______       Pt. Name:       CLINTON LIU Study Date:    7/23/2024  MRN:            [id number]             YOB: 1945  Accession #:    [id number]            Age:           79 years  Account#:       [id number]           Gender:        F  Heart Rate:                          Height:        59.84 in (152.00 cm)  Rhythm:                       Weight:        140.00 lb (63.50 kg)  Blood Pressure: 154/84 mmHg          BSA/BMI:       1.60 m² / 27.49 kg/m²  ________________________________________________________________________________________  Referring Physician:    [id number] Sylvie Rosenberg  Interpreting Physician: Mary Htuchison MD  Primary Sonographer:    Dorita Arredondo [facility]    CPT:               ECHO TTE WO CON COMP W DOPP - 32689.m  Indication(s):     Heart failure, unspecified - I50.9  Procedure: Transthoracic echocardiogram with 2-D, M-mode and complete                     spectral and color flow Doppler.  Ordering Location: [facility]  Admission Status:  Inpatient  Study Information: Image quality for this study is adequate.    _______________________________________________________________________________________     CONCLUSIONS:      1. Left ventricular cavity is normal in size. Left ventricular wall thickness is normal. Left ventricular systolic function is normal with an ejection fraction of 58 % by Rod's method of disks. There are no regional wall motion abnormalities seen.   2. There is mild (grade 1) left ventricular diastolic dysfunction.   3. Normal right ventricular cavity size, with normal wall thickness, and normal right ventricular systolic function. Tricuspid annular plane systolic excursion (TAPSE) is 2.3 cm (normal >=1.7 cm).   4. Mild mitral regurgitation.   5. Normal left and right atrial size.   6. Mild tricuspid regurgitation.   7. Estimated pulmonary artery systolic pressure is 37 mmHg, consistent with mild pulmonary hypertension.   8. Mild pulmonic regurgitation.   9. There is calcification of the mitral valve annulus.  10. There is normal LV mass and concentric remodeling.  11. No pericardial effusion seen.  12. No prior echocardiogram is available for comparison.    < end of copied text >  Sodium, Random Urine (09.23.24 @ 12:24)   Sodium, Random Urine: 42Urinalysis + Microscopic Examination (09.23.24 @ 12:24)   pH Urine: 8.0  Urine Appearance: Turbid  Color: Yellow  Specific Gravity: 1.016  Protein, Urine: Trace mg/dL  Glucose Qualitative, Urine: Negative mg/dL  Ketone - Urine: 15 mg/dL  Blood, Urine: Negative  Bilirubin: Negative  Urobilinogen: 1.0 mg/dL  Leukocyte Esterase Concentration: Trace  Nitrite: Negative  White Blood Cell - Urine: 2 /HPF  Red Blood Cell - Urine: 2 /HPF  Bacteria: Many /HPF  Squamous Epithelial Cells: Present  Triple Phosphate Crystals: Present  Comment - Urine: Amorphous phosphates presentUric Acid (09.23.24 @ 07:45)   Uric Acid: 2.5 mg/dLLipid Profile (09.23.24 @ 07:45)   Cholesterol: 140 mg/dL  Triglycerides, Serum: 60 mg/dL  HDL Cholesterol: 59 mg/dL  Non HDL Cholesterol: 81Thyroid Stimulating Hormone, Serum (09.23.24 @ 07:45)   Thyroid Stimulating Hormone, Serum: 2.20 uU/mLBlood Gas Profile - Venous (09.23.24 @ 07:43)   pH, Venous: 7.47  pCO2, Venous: 64 mmHg  pO2, Venous: 57 mmHg  HCO3, Venous: 47                     SUBJECTIVE: **TO UPDATE**  No overnight events. Pt seen at bedside, states that they feel "__________." Pt endorses _____. Pt denies headache, fever, chills, SOB, chest pain, cough, abd pain, n/v/d, constipation, dysuria, urinary frequency, back pain, myalgias, weakness, dizziness, gait disturbance, numbness/tingling, blurry vision.      OBJECTIVE:    T(C): 37 (09-24-24 @ 11:17), Max: 37.1 (09-24-24 @ 07:36)  HR: 77 (09-24-24 @ 14:38) (71 - 83)  BP: 146/73 (09-24-24 @ 14:38) (128/64 - 147/87)  RR: 18 (09-24-24 @ 14:38) (18 - 18)  SpO2: 98% (09-24-24 @ 14:38) (97% - 100%)        09-23-24 @ 07:01  -  09-24-24 @ 07:00  --------------------------------------------------------  IN: 400 mL / OUT: 1750 mL / NET: -1350 mL    09-24-24 @ 07:01  -  09-24-24 @ 15:50  --------------------------------------------------------  IN: 430 mL / OUT: 400 mL / NET: 30 mL          ***TO BE EDITED***  CONSTITUTIONAL: No apparent distress, resting comfortably  EYES: Pupils symmetric, EOMI, No conjunctival or scleral injection, non-icteric  ENMT: Oral mucosa with moist membranes  RESP: No respiratory distress, no use of accessory muscles; CTA b/l, no crackles or wheezes  CV: RRR, +S1S2, no murmurs appreciated; no peripheral edema  GI: Soft, NTND, no rebound, no guarding  MSK: No digital clubbing or cyanosis; Extremities moving equally without additional effort; gait not appreciated  : deferred  SKIN: No rashes or ulcers noted  NEURO: CN II-XII grossly intact   PSYCH: Appropriate insight/judgment; A+O x 3, mood and affect appropriate, recent/remote memory intact    penicillin (Rash)      carvedilol 25 milliGRAM(s) Oral every 12 hours  enoxaparin Injectable 40 milliGRAM(s) SubCutaneous every 24 hours  pantoprazole    Tablet 40 milliGRAM(s) Oral before breakfast  petrolatum white Ointment 1 Application(s) Topical once  potassium chloride   Powder 40 milliEquivalent(s) Oral once  sodium chloride 1 Gram(s) Oral three times a day  sodium chloride 0.65% Nasal 1 Spray(s) Both Nostrils every 4 hours PRN      35.8                       SUBJECTIVE:  No overnight events. Pt seen at bedside with children. Pt endorses no new complaints.      OBJECTIVE:    T(C): 37 (09-24-24 @ 11:17), Max: 37.1 (09-24-24 @ 07:36)  HR: 77 (09-24-24 @ 14:38) (71 - 83)  BP: 146/73 (09-24-24 @ 14:38) (128/64 - 147/87)  RR: 18 (09-24-24 @ 14:38) (18 - 18)  SpO2: 98% (09-24-24 @ 14:38) (97% - 100%)        09-23-24 @ 07:01  -  09-24-24 @ 07:00  --------------------------------------------------------  IN: 400 mL / OUT: 1750 mL / NET: -1350 mL    09-24-24 @ 07:01  -  09-24-24 @ 15:50  --------------------------------------------------------  IN: 430 mL / OUT: 400 mL / NET: 30 mL        General: NAD  Head: Atraumatic  Eyes: EOM grossly in tact, no scleral icterus  ENT: dry mucous membranes  Respiratory: normal respiratory effort, lungs CTAB  CV: Extremities warm and well perfused  Abdominal: Soft, non-distended  Extremities: No edema  Skin: No rashes      penicillin (Rash)      carvedilol 25 milliGRAM(s) Oral every 12 hours  enoxaparin Injectable 40 milliGRAM(s) SubCutaneous every 24 hours  pantoprazole    Tablet 40 milliGRAM(s) Oral before breakfast  petrolatum white Ointment 1 Application(s) Topical once  potassium chloride   Powder 40 milliEquivalent(s) Oral once  sodium chloride 1 Gram(s) Oral three times a day  sodium chloride 0.65% Nasal 1 Spray(s) Both Nostrils every 4 hours PRN                            11.4   4.66  )-----------( 204      ( 24 Sep 2024 06:35 )             35.8     09-24    131[L]  |  92[L]  |  7   ----------------------------<  119[H]  3.1[L]   |  35[H]  |  0.35[L]    Ca    9.2      24 Sep 2024 06:35  Phos  2.7     09-24  Mg     1.8     09-24    TPro  6.5  /  Alb  2.7[L]  /  TBili  0.3  /  DBili  x   /  AST  15  /  ALT  16  /  AlkPhos  53  09-24    < from: TTE W or WO Ultrasound Enhancing Agent (07.23.24 @ 14:59) >  TRANSTHORACIC ECHOCARDIOGRAM REPORT  ________________________________________________________________________________                                      _______       Pt. Name:       CLINTON LIU Study Date:    7/23/2024  MRN:            [id number]             YOB: 1945  Accession #:    [id number]            Age:           79 years  Account#:       [id number]           Gender:        F  Heart Rate:                          Height:        59.84 in (152.00 cm)  Rhythm:                       Weight:        140.00 lb (63.50 kg)  Blood Pressure: 154/84 mmHg          BSA/BMI:       1.60 m² / 27.49 kg/m²  ________________________________________________________________________________________  Referring Physician:    [id number] Sylvie Rosenberg  Interpreting Physician: Mary Hutchison MD  Primary Sonographer:    Dorita Arredondo RDCS    CPT:               ECHO TTE WO CON COMP W DOPP - 69422.m  Indication(s):     Heart failure, unspecified - I50.9  Procedure: Transthoracic echocardiogram with 2-D, M-mode and complete                     spectral and color flow Doppler.  Ordering Location: [facility] [place]  Admission Status:  Inpatient  Study Information: Image quality for this study is adequate.    _______________________________________________________________________________________     CONCLUSIONS:      1. Left ventricular cavity is normal in size. Left ventricular wall thickness is normal. Left ventricular systolic function is normal with an ejection fraction of 58 % by Rod's method of disks. There are no regional wall motion abnormalities seen.   2. There is mild (grade 1) left ventricular diastolic dysfunction.   3. Normal right ventricular cavity size, with normal wall thickness, and normal right ventricular systolic function. Tricuspid annular plane systolic excursion (TAPSE) is 2.3 cm (normal >=1.7 cm).   4. Mild mitral regurgitation.   5. Normal left and right atrial size.   6. Mild tricuspid regurgitation.   7. Estimated pulmonary artery systolic pressure is 37 mmHg, consistent with mild pulmonary hypertension.   8. Mild pulmonic regurgitation.   9. There is calcification of the mitral valve annulus.  10. There is normal LV mass and concentric remodeling.  11. No pericardial effusion seen.  12. No prior echocardiogram is available for comparison.    < end of copied text >  Free Thyroxine, Serum in AM (09.24.24 @ 06:35)   Free Thyroxine, Serum: 1.1 ng/dLVitamin B12, Serum in AM (09.24.24 @ 06:35)   Vitamin B12, Serum: 1296 pg/mLFolate, Serum in AM (09.24.24 @ 06:35)   Folate, Serum: 16.3 ng/mLCyclic Citrullinated Peptide AB (09.24.24 @ 06:35)   Cyclic Cit Pep Result: <8.0:Thyroid Stimulating Hormone, Serum in AM (09.24.24 @ 06:35)   Thyroid Stimulating Hormone, Serum: 2.39 uU/mLRheumatoid Factor Quant, Serum or Plasma in AM (09.24.24 @ 06:35)   Rheumatoid Factor Quant, Serum or Plasma: <10C-Reactive Protein (09.24.24 @ 06:35)   C-Reactive Protein: 31 mg/LSedimentation Rate, Erythrocyte (09.24.24 @ 06:35)   Sedimentation Rate, Erythrocyte: 33 mm/HrUrinalysis + Microscopic Examination (09.23.24 @ 12:24)   pH Urine: 8.0  Urine Appearance: Turbid  Color: Yellow  Specific Gravity: 1.016  Protein, Urine: Trace mg/dL  Glucose Qualitative, Urine: Negative mg/dL  Ketone - Urine: 15 mg/dL  Blood, Urine: Negative  Bilirubin: Negative  Urobilinogen: 1.0 mg/dL  Leukocyte Esterase Concentration: Trace  Nitrite: Negative  White Blood Cell - Urine: 2 /HPF  Red Blood Cell - Urine: 2 /HPF  Bacteria: Many /HPF  Squamous Epithelial Cells: Present  Triple Phosphate Crystals: Present  Comment - Urine: Amorphous phosphates present

## 2024-09-25 ENCOUNTER — TRANSCRIPTION ENCOUNTER (OUTPATIENT)
Age: 79
End: 2024-09-25

## 2024-09-25 VITALS
OXYGEN SATURATION: 100 % | TEMPERATURE: 98 F | HEART RATE: 64 BPM | DIASTOLIC BLOOD PRESSURE: 58 MMHG | RESPIRATION RATE: 18 BRPM | SYSTOLIC BLOOD PRESSURE: 119 MMHG

## 2024-09-25 DIAGNOSIS — R34 ANURIA AND OLIGURIA: ICD-10-CM

## 2024-09-25 LAB
ANION GAP SERPL CALC-SCNC: 5 MMOL/L — SIGNIFICANT CHANGE UP (ref 5–17)
BUN SERPL-MCNC: 8 MG/DL — SIGNIFICANT CHANGE UP (ref 7–18)
CALCIUM SERPL-MCNC: 8.6 MG/DL — SIGNIFICANT CHANGE UP (ref 8.4–10.5)
CHLORIDE SERPL-SCNC: 98 MMOL/L — SIGNIFICANT CHANGE UP (ref 96–108)
CO2 SERPL-SCNC: 32 MMOL/L — HIGH (ref 22–31)
CREAT SERPL-MCNC: 0.32 MG/DL — LOW (ref 0.5–1.3)
EGFR: 106 ML/MIN/1.73M2 — SIGNIFICANT CHANGE UP
GLUCOSE BLDC GLUCOMTR-MCNC: 120 MG/DL — HIGH (ref 70–99)
GLUCOSE SERPL-MCNC: 134 MG/DL — HIGH (ref 70–99)
HCT VFR BLD CALC: 35.6 % — SIGNIFICANT CHANGE UP (ref 34.5–45)
HGB BLD-MCNC: 11.2 G/DL — LOW (ref 11.5–15.5)
MAGNESIUM SERPL-MCNC: 2 MG/DL — SIGNIFICANT CHANGE UP (ref 1.6–2.6)
MCHC RBC-ENTMCNC: 29.3 PG — SIGNIFICANT CHANGE UP (ref 27–34)
MCHC RBC-ENTMCNC: 31.5 GM/DL — LOW (ref 32–36)
MCV RBC AUTO: 93.2 FL — SIGNIFICANT CHANGE UP (ref 80–100)
NRBC # BLD: 0 /100 WBCS — SIGNIFICANT CHANGE UP (ref 0–0)
PHOSPHATE SERPL-MCNC: 2.2 MG/DL — LOW (ref 2.5–4.5)
PLATELET # BLD AUTO: 194 K/UL — SIGNIFICANT CHANGE UP (ref 150–400)
POTASSIUM SERPL-MCNC: 3.7 MMOL/L — SIGNIFICANT CHANGE UP (ref 3.5–5.3)
POTASSIUM SERPL-SCNC: 3.7 MMOL/L — SIGNIFICANT CHANGE UP (ref 3.5–5.3)
RBC # BLD: 3.82 M/UL — SIGNIFICANT CHANGE UP (ref 3.8–5.2)
RBC # FLD: 12.8 % — SIGNIFICANT CHANGE UP (ref 10.3–14.5)
SODIUM SERPL-SCNC: 135 MMOL/L — SIGNIFICANT CHANGE UP (ref 135–145)
WBC # BLD: 2.86 K/UL — LOW (ref 3.8–10.5)
WBC # FLD AUTO: 2.86 K/UL — LOW (ref 3.8–10.5)

## 2024-09-25 PROCEDURE — 99239 HOSP IP/OBS DSCHRG MGMT >30: CPT | Mod: GC

## 2024-09-25 RX ORDER — FUROSEMIDE 10 MG/ML
1 INJECTION INTRAVENOUS
Qty: 30 | Refills: 0
Start: 2024-09-25 | End: 2024-10-24

## 2024-09-25 RX ORDER — GABAPENTIN 800 MG/1
1 TABLET, FILM COATED ORAL
Refills: 0 | DISCHARGE

## 2024-09-25 RX ORDER — SODIUM CHLORIDE 0.9 % (FLUSH) 0.9 %
1 SYRINGE (ML) INJECTION
Qty: 0 | Refills: 0 | DISCHARGE
Start: 2024-09-25

## 2024-09-25 RX ORDER — FUROSEMIDE 10 MG/ML
1 INJECTION INTRAVENOUS
Refills: 0 | DISCHARGE

## 2024-09-25 RX ORDER — LOSARTAN POTASSIUM AND HYDROCHLOROTHIAZIDE 100; 12.5 MG/1; MG/1
1 TABLET, FILM COATED ORAL
Refills: 0 | DISCHARGE

## 2024-09-25 RX ORDER — AMLODIPINE BESYLATE 5 MG
1 TABLET ORAL
Refills: 0 | DISCHARGE

## 2024-09-25 RX ORDER — PANTOPRAZOLE SODIUM 40 MG/1
1 TABLET, DELAYED RELEASE ORAL
Qty: 0 | Refills: 0 | DISCHARGE
Start: 2024-09-25

## 2024-09-25 RX ORDER — SOD PHOS DI, MONO/K PHOS MONO 250 MG
1 TABLET ORAL ONCE
Refills: 0 | Status: COMPLETED | OUTPATIENT
Start: 2024-09-25 | End: 2024-09-25

## 2024-09-25 RX ADMIN — Medication 1 GRAM(S): at 15:21

## 2024-09-25 RX ADMIN — Medication 1 PACKET(S): at 09:15

## 2024-09-25 RX ADMIN — ENOXAPARIN SODIUM 40 MILLIGRAM(S): 150 INJECTION SUBCUTANEOUS at 05:54

## 2024-09-25 RX ADMIN — PANTOPRAZOLE SODIUM 40 MILLIGRAM(S): 40 TABLET, DELAYED RELEASE ORAL at 06:04

## 2024-09-25 RX ADMIN — Medication 1 GRAM(S): at 05:54

## 2024-09-25 RX ADMIN — Medication 25 MILLIGRAM(S): at 05:54

## 2024-09-25 NOTE — PROGRESS NOTE ADULT - PROBLEM SELECTOR PLAN 8
lovenox DVT prophylaxis   protonix GI prophylaxis on coreg and valsartan at home  /76 on admission    c/w coreg  monitor BP

## 2024-09-25 NOTE — DISCHARGE NOTE NURSING/CASE MANAGEMENT/SOCIAL WORK - NSDCFUADDAPPT_GEN_ALL_CORE_FT
APPTS ARE READY TO BE MADE: [X] YES    Best Family or Patient Contact (if needed):    Additional Information about above appointments (if needed):    1: PCP (Dr Roz Stewart)  2:   3:     Other comments or requests:    Patient is being discharged to [facility] for Rehab. Caregiver will arrange follow up.

## 2024-09-25 NOTE — PROGRESS NOTE ADULT - NUTRITIONAL ASSESSMENT
Diet, Soft and Bite Sized:   1000mL Fluid Restriction ([id number])  Vegan {Accepts Vegetable Products Only} (09-15-24 @ 22:29) [Active]
Able to tolerate po intake
Diet, Soft and Bite Sized:   1000mL Fluid Restriction ([id number])  Vegan {Accepts Vegetable Products Only} (09-15-24 @ 22:29) [Active]
Diet, Soft and Bite Sized:   1000mL Fluid Restriction ([id number])  Vegan {Accepts Vegetable Products Only} (09-15-24 @ 22:29) [Active]

## 2024-09-25 NOTE — PROGRESS NOTE ADULT - PROBLEM SELECTOR PROBLEM 4
Hyponatremia
Warm
Hyponatremia

## 2024-09-25 NOTE — DISCHARGE NOTE NURSING/CASE MANAGEMENT/SOCIAL WORK - PATIENT PORTAL LINK FT
You can access the FollowMyHealth Patient Portal offered by [facility] by registering at the following website: [email or web address]. By joining [facility]’s FollowMyHealth portal, you will also be able to view your health information using other applications (apps) compatible with our system.

## 2024-09-25 NOTE — PROGRESS NOTE ADULT - SUBJECTIVE AND OBJECTIVE BOX
SUBJECTIVE: **TO UPDATE**  No overnight events. Pt seen at bedside, states that they feel "__________." Pt endorses _____. Pt denies headache, fever, chills, SOB, chest pain, cough, abd pain, n/v/d, constipation, dysuria, urinary frequency, back pain, myalgias, weakness, dizziness, gait disturbance, numbness/tingling, blurry vision.      OBJECTIVE:    T(C): 36.5 (09-25-24 @ 11:49), Max: 37.1 (09-25-24 @ 04:45)  HR: 73 (09-25-24 @ 11:49) (68 - 81)  BP: 111/58 (09-25-24 @ 11:49) (99/56 - 146/73)  RR: 18 (09-25-24 @ 11:49) (17 - 18)  SpO2: 97% (09-25-24 @ 11:49) (97% - 100%)        09-24-24 @ 07:01  -  09-25-24 @ 07:00  --------------------------------------------------------  IN: 430 mL / OUT: 400 mL / NET: 30 mL          ***TO BE EDITED***  CONSTITUTIONAL: No apparent distress, resting comfortably  EYES: Pupils symmetric, EOMI, No conjunctival or scleral injection, non-icteric  ENMT: Oral mucosa with moist membranes  RESP: No respiratory distress, no use of accessory muscles; CTA b/l, no crackles or wheezes  CV: RRR, +S1S2, no murmurs appreciated; no peripheral edema  GI: Soft, NTND, no rebound, no guarding  MSK: No digital clubbing or cyanosis; Extremities moving equally without additional effort; gait not appreciated  : deferred  SKIN: No rashes or ulcers noted  NEURO: CN II-XII grossly intact   PSYCH: Appropriate insight/judgment; A+O x 3, mood and affect appropriate, recent/remote memory intact    penicillin (Rash)      carvedilol 25 milliGRAM(s) Oral every 12 hours  enoxaparin Injectable 40 milliGRAM(s) SubCutaneous every 24 hours  pantoprazole    Tablet 40 milliGRAM(s) Oral before breakfast  petrolatum white Ointment 1 Application(s) Topical once  sodium chloride 1 Gram(s) Oral three times a day  sodium chloride 0.65% Nasal 1 Spray(s) Both Nostrils every 4 hours PRN                            11.2   2.86  )-----------( 194      ( 25 Sep 2024 07:38 )             35.6   09-25    135  |  98  |  8   ----------------------------<  134[H]  3.7   |  32[H]  |  0.32[L]    Ca    8.6      25 Sep 2024 07:38  Phos  2.2     09-25  Mg     2.0     09-25    TPro  6.5  /  Alb  2.7[L]  /  TBili  0.3  /  DBili  x   /  AST  15  /  ALT  16  /  AlkPhos  53  09-24  Free Thyroxine, Serum in AM (09.24.24 @ 06:35)   Free Thyroxine, Serum: 1.1 ng/dLVitamin B12, Serum in AM (09.24.24 @ 06:35)   Vitamin B12, Serum: 1296 pg/mLFolate, Serum in AM (09.24.24 @ 06:35)   Folate, Serum: 16.3 ng/mL     SUBJECTIVE: Pt low urine output was brought to overnight team attention. Bladder scan 18mL. No interventions. Otherwise no overnight events. Pt seen at bedside alone,  #[id number] used. Pt deferred interview and ROS other than denying pain.      OBJECTIVE:    T(C): 36.5 (09-25-24 @ 11:49), Max: 37.1 (09-25-24 @ 04:45)  HR: 73 (09-25-24 @ 11:49) (68 - 81)  BP: 111/58 (09-25-24 @ 11:49) (99/56 - 146/73)  RR: 18 (09-25-24 @ 11:49) (17 - 18)  SpO2: 97% (09-25-24 @ 11:49) (97% - 100%)        09-24-24 @ 07:01  -  09-25-24 @ 07:00  --------------------------------------------------------  IN: 430 mL / OUT: 400 mL / NET: 30 mL        Limited, largely deferred by pt  General: NAD  Head: Atraumatic  Eyes: EOM grossly in tact, no scleral icterus  ENT: dry mucous membranes  Neurology: limited  Respiratory: tachypneic, shallow breathing  CV: Extremities warm and well perfused  Abdominal: Soft, non-distended  Extremities: No edema  Skin: Limited      penicillin (Rash)      carvedilol 25 milliGRAM(s) Oral every 12 hours  enoxaparin Injectable 40 milliGRAM(s) SubCutaneous every 24 hours  pantoprazole    Tablet 40 milliGRAM(s) Oral before breakfast  petrolatum white Ointment 1 Application(s) Topical once  sodium chloride 1 Gram(s) Oral three times a day  sodium chloride 0.65% Nasal 1 Spray(s) Both Nostrils every 4 hours PRN                            11.2   2.86  )-----------( 194      ( 25 Sep 2024 07:38 )             35.6   09-25    135  |  98  |  8   ----------------------------<  134[H]  3.7   |  32[H]  |  0.32[L]    Ca    8.6      25 Sep 2024 07:38  Phos  2.2     09-25  Mg     2.0     09-25    TPro  6.5  /  Alb  2.7[L]  /  TBili  0.3  /  DBili  x   /  AST  15  /  ALT  16  /  AlkPhos  53  09-24  Free Thyroxine, Serum in AM (09.24.24 @ 06:35)   Free Thyroxine, Serum: 1.1 ng/dLVitamin B12, Serum in AM (09.24.24 @ 06:35)   Vitamin B12, Serum: 1296 pg/mLFolate, Serum in AM (09.24.24 @ 06:35)   Folate, Serum: 16.3 ng/mL

## 2024-09-25 NOTE — PROGRESS NOTE ADULT - PROBLEM SELECTOR PLAN 6
Detail Level: Detailed Quality 110: Preventive Care And Screening: Influenza Immunization: Influenza Immunization Administered during Influenza season presenting with AMS, left facial droop as per ED  no facial droop but confused  --> current is AAOx1-2 and is less confused  likely due to acute on chronic hypercapnia vs infectious etiology vs hyponatremia, also has hx of muscle relaxant use  CT head: No acute intracranial  hemorrhage, mass effect or midline shift. Nonspecific moderate subcortical white matter hypodensities may relate to small vessel ischemic disease.  hold muscle relaxant use  completed rocephin and azithro for PNA  trending Na hx of edema of b/l LE, LE duplex b/l neg in 7/2024, can be due to venous insuffiencey (chronic)  2+ pitting edema b/l LEs  proBNP WNL  TTE: 7/2024 EF 58%, G1DD  b/l LE dopplers: no remarkable findings, neg for DVT

## 2024-09-25 NOTE — PROGRESS NOTE ADULT - PROBLEM SELECTOR PLAN 2
presenting with SOB   on 3L NC, monitor respiratory function    -as above presenting with SOB   Pt O2sat well on RA,  monitor respiratory function    -as above

## 2024-09-25 NOTE — PROGRESS NOTE ADULT - PROBLEM SELECTOR PLAN 1
likely acute on chronic hypercapnia, PNA  suspect hypoventilation can be 2/2 left elevated hemidiaphragm as seen on CT angio chest, hyponatremia which can cause AMS and hx of muscle relaxant use (?gabapentin x cyclobenzaprine)  non compliant with nightly BIPAP per patient's son  presenting with SOB   ABG 7.27/119/190/55 at admission > s/p BIPAP >  7.36/76/124/43  CTA chest: Areas of airspace consolidation, increased in the left lung and new in the right upper lobe  aspiration precautions  s/p BIPAP, on 2L NC, monitor respiratory function  Pulmonology Dr Prado's following  Pt O2sat well on RA

## 2024-09-25 NOTE — PROGRESS NOTE ADULT - PROBLEM SELECTOR PROBLEM 1
Acute hypoxic on chronic hypercapnic respiratory failure

## 2024-09-25 NOTE — PROGRESS NOTE ADULT - PROBLEM SELECTOR PROBLEM 2
Atelectasis, left

## 2024-09-25 NOTE — PROGRESS NOTE ADULT - ASSESSMENT
79F PMH hypertension, arthritis, Neuralgia, osteoporotic compression fractures L5-S1, hyponatremia (HCTZ induced, corrected), goiter, who presented to the ED for left sided facial droop and SOB. Pt seen at bedside, on NIV , AAOX1, and did not have a facial droop, more awake and alert compared to when she came to the ED as per ED attending. 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 /PNA vs cardiac stretching of ANP/diastolic CHF  2) Acute on chronic cor pulmonale  3) Acute on chronic hypoxic/hypercapnic  respiratory failure on nasal cannula oxygen  4) Hypertension  5) Diaphragmatic paralysis/Thoracic spine disease  6) Electrolytes disorder  7) Acid-base disorder  8) Dementia with cognitive impairment    Recommend:  Strict I/o  Avoid Nephrotoxic agents  Monitor ABG/ supplemental oxygen  Na level 135  Urine studies reviewed  Monitor BMP and electrolytes daily  Replete electrolytes with goal K>4 and <5, Mg> 2 and Phos 2.5 to 3.5  TSH, Lipid panel, serum osmolality, uric acid level reviewed  Start sodium chloride 1 gm po tid   Intermittent Lasxi IV/po for clinical volume overload as needed  Adjust antibiotics as per renal dose adjustment  Optimal BP control  Neurology work up per primary team  Chest PT/Pulmonary team follow up  Volume status and electrolytes noted  Further care per primary team  Will follow

## 2024-09-25 NOTE — PROGRESS NOTE ADULT - PROBLEM SELECTOR PLAN 7
on coreg and valsartan at home  /76 on admission    c/w coreg  monitor BP presenting with AMS, left facial droop as per ED  no facial droop but confused  --> current is AAOx1-2 and is less confused  likely due to acute on chronic hypercapnia vs infectious etiology vs hyponatremia, also has hx of muscle relaxant use  CT head: No acute intracranial  hemorrhage, mass effect or midline shift. Nonspecific moderate subcortical white matter hypodensities may relate to small vessel ischemic disease.  hold muscle relaxant use  completed rocephin and azithro for PNA  waxing and waning mental state, possibly hospital ?delirium

## 2024-09-25 NOTE — CHART NOTE - NSCHARTNOTEFT_GEN_A_CORE
Care discussed with Eugenio Dwyer at [facility] via phone. Hospital course and follow-up items discussed. All questions were answered.

## 2024-09-25 NOTE — PROGRESS NOTE ADULT - PROBLEM SELECTOR PLAN 5
hx of edema of b/l LE, LE duplex b/l neg in 7/2024, can be due to venous insuffiencey (chronic)  2+ pitting edema b/l LEs  proBNP WNL  TTE: 7/2024 EF 58%, G1DD  b/l LE dopplers: no remarkable findings, neg for DVT iso fluid restriction  BUN 8 Cr 0.32  /58    18mL bladder scan early AM  180mL bladder scan afternoon    -No interventions at this time  -monitor Cr, BP

## 2024-09-25 NOTE — PROGRESS NOTE ADULT - PROVIDER SPECIALTY LIST ADULT
Internal Medicine
Neurology
Critical Care
Critical Care
Internal Medicine
Nephrology
Pulmonology
Critical Care
Nephrology
Nephrology
Internal Medicine

## 2024-09-25 NOTE — PROGRESS NOTE ADULT - SUBJECTIVE AND OBJECTIVE BOX
Ramiro Alcala MD (Nephrology progress note)  [id number], [facility],  [address],  [facility]  TEl: [phone]  Cell: [phone]    Patient is a 79y Female seen and evaluated at bedside. Vital signs, laboratory data, imaging studies, consult notes reviewed done within past 24 hours. Overnight events noted. Patient lying in bed in no distress on nasal cannula oxygen. Interval Na level 135 today morning on po salt tablets.     Allergies    penicillin (Rash)    Intolerances        ROS:  CONSTITUTIONAL: No fever or chills.  HEENT: No headache or visual disturbances.  RESPIRATORY: Mild cough but denies SOb, hemoptysis or wheezing  CARDIOVASCULAR: Dyspnea but denies chest pain, palpitations, dizziness  GASTROINTESTINAL: No abdominal pain, nausea, vomiting, diarrhea, hematemesis or blood per rectum.  GENITOURINARY: No flank or supra pubic pain.  NEUROLOGICAL: No headache, focal limb weakness, tingling or numbness  SKIN: No skin rash or lesion  MUSCULOSKELETAL: No leg pain, calf pain or swelling    VITALS:    T(C): 36.8 (24 @ 08:33), Max: 37.1 (24 @ 04:45)  HR: 68 (24 @ 08:33) (68 - 81)  BP: 99/56 (24 @ 08:33) (99/56 - 146/73)  RR: 17 (24 @ 08:33) (17 - 18)  SpO2: 97% (24 @ 08:33) (97% - 100%)  CAPILLARY BLOOD GLUCOSE      POCT Blood Glucose.: 120 mg/dL (25 Sep 2024 08:20)  POCT Blood Glucose.: 91 mg/dL (24 Sep 2024 21:29)  POCT Blood Glucose.: 110 mg/dL (24 Sep 2024 16:39)  POCT Blood Glucose.: 133 mg/dL (24 Sep 2024 11:23)      24 @ 07:01  -  24 @ 07:00  --------------------------------------------------------  IN: 430 mL / OUT: 400 mL / NET: 30 mL      MEDICATIONS  (STANDING):  carvedilol 25 milliGRAM(s) Oral every 12 hours  enoxaparin Injectable 40 milliGRAM(s) SubCutaneous every 24 hours  pantoprazole    Tablet 40 milliGRAM(s) Oral before breakfast  petrolatum white Ointment 1 Application(s) Topical once  sodium chloride 1 Gram(s) Oral three times a day    MEDICATIONS  (PRN):  sodium chloride 0.65% Nasal 1 Spray(s) Both Nostrils every 4 hours PRN nasal dryness      PHYSICAL EXAM:  GENERAL: Alert, awake and oriented x2-3 in no distress  HEENT: RAFAEL, EOMI, neck supple, no JVP, no carotid bruit, oral mucosa moist and pale  CHEST/LUNG: Bilateral  breath sounds, bibasilar rales and rhonchi, no wheezing  HEART: Regular rate and rhythm, DAVID II/VI at LPSB, no gallops, no rub   ABDOMEN: Soft, nontender, non distended, bowel sounds present, no palpable organomegaly  : No flank or supra pubic tenderness.  EXTREMITIES: Peripheral pulses are palpable, no pedal edema  Neurology: AAOx2-3, no focal neurological deficit  SKIN: No rash or skin lesion  Musculoskeletal: No joint deformity or spinal tenderness.      Vascular ACCESS: None    LABS:                        11.2   2.86  )-----------( 194      ( 25 Sep 2024 07:38 )             35.6     -    135  |  98  |  8   ----------------------------<  134[H]  3.7   |  32[H]  |  0.32[L]    Ca    8.6      25 Sep 2024 07:38  Phos  2.2       Mg     2.0         TPro  6.5  /  Alb  2.7[L]  /  TBili  0.3  /  DBili  x   /  AST  15  /  ALT  16  /  AlkPhos  53          Urinalysis Basic - ( 25 Sep 2024 07:38 )    Color: x / Appearance: x / SG: x / pH: x  Gluc: 134 mg/dL / Ketone: x  / Bili: x / Urobili: x   Blood: x / Protein: x / Nitrite: x   Leuk Esterase: x / RBC: x / WBC x   Sq Epi: x / Non Sq Epi: x / Bacteria: x      Sodium, Random Urine: 42 mmol/L ( @ 12:24)  Osmolality, Random Urine: 460 mosm/Kg ( @ 12:24)        RADIOLOGY & ADDITIONAL STUDIES:  rad  Imaging Personally Reviewed:  [x] YES  [ ] NO    Consultant(s) Notes Reviewed:  [x] YES  [ ] NO    Care Discussed with Primary team/ Other Providers [x] YES  [ ] NO

## 2024-09-25 NOTE — PROGRESS NOTE ADULT - PROBLEM/PLAN-3
DISPLAY PLAN FREE TEXT
Doxycycline Counseling:  Patient counseled regarding possible photosensitivity and increased risk for sunburn.  Patient instructed to avoid sunlight, if possible.  When exposed to sunlight, patients should wear protective clothing, sunglasses, and sunscreen.  The patient was instructed to call the office immediately if the following severe adverse effects occur:  hearing changes, easy bruising/bleeding, severe headache, or vision changes.  The patient verbalized understanding of the proper use and possible adverse effects of doxycycline.  All of the patient's questions and concerns were addressed.

## 2024-09-25 NOTE — PROGRESS NOTE ADULT - REASON FOR ADMISSION
hypoxia, hypercapnia, AMS

## 2024-09-25 NOTE — PROGRESS NOTE ADULT - PROBLEM SELECTOR PLAN 4
previously admitted for hyponatremia in July 2024  Sodium today 131  Dr Alcala following, rec 1g of NaCl TID, Lasix/Acetazolamide if needed for euvolemic state  -f/u BMP in AM previously admitted for hyponatremia in July 2024  Sodium today 135  Dr Alcala following, rec 1g of NaCl TID, Lasix/Acetazolamide if needed for euvolemic state  RESOLVED

## 2024-09-25 NOTE — PROGRESS NOTE ADULT - ATTENDING COMMENTS
79W PMH HTN, osteoporotic compression fractures L5-S1, hyponatremia (HCTZ induced, corrected), p/w L-sided facial droop and SOB. Course was complicated by acute respiratory failure with hypercapnia and hypoxia due to bacterial pneumonia, diaphragmatic dysfunction.     She is resting comfortably and without use of accessory muscles. Lungs are CTAB.    A/P:  #Acute on chronic hypercapnic and hypoxic respiratory failure due to bacterial pneumonia, diaphragmatic dysfunction  -s/p antibiotics  -weaning supplemental oxygen as tolerated  -appreciate pulm and neurology ; recommended MR C spine, however, patient was not able to tolerate study  -care d/w Dr José London of [facility] - recommends outpatient f/u with his office and not transfer to inpatient unit    #Hyponatremia  #Metabolic alkalosis   -improving, continue to monitor BMP    DC planning to CHET

## 2024-09-26 LAB
COPPER SERPL-MCNC: 82 UG/DL — SIGNIFICANT CHANGE UP (ref 80–158)
ZINC SERPL-MCNC: 55 UG/DL — SIGNIFICANT CHANGE UP (ref 44–115)

## 2024-09-30 LAB
HOMOCYSTEINE LEVEL: 5.4 UMOL/L — SIGNIFICANT CHANGE UP (ref 0–19.2)
METHYLMALONATE SERPL-SCNC: 98 NMOL/L — SIGNIFICANT CHANGE UP (ref 0–378)

## 2024-10-02 ENCOUNTER — INPATIENT (INPATIENT)
Facility: HOSPITAL | Age: 79
LOS: 7 days | Discharge: NURSING, REHAB OR LONG-TERM CARE | DRG: 189 | End: 2024-10-10
Attending: INTERNAL MEDICINE | Admitting: INTERNAL MEDICINE
Payer: COMMERCIAL

## 2024-10-02 VITALS
TEMPERATURE: 97 F | HEIGHT: 60 IN | RESPIRATION RATE: 18 BRPM | OXYGEN SATURATION: 96 % | SYSTOLIC BLOOD PRESSURE: 122 MMHG | HEART RATE: 84 BPM | WEIGHT: 136.03 LBS | DIASTOLIC BLOOD PRESSURE: 74 MMHG

## 2024-10-02 LAB
ALBUMIN SERPL ELPH-MCNC: 2.9 G/DL — LOW (ref 3.5–5)
ALP SERPL-CCNC: 58 U/L — SIGNIFICANT CHANGE UP (ref 40–120)
ALT FLD-CCNC: 16 U/L DA — SIGNIFICANT CHANGE UP (ref 10–60)
ANION GAP SERPL CALC-SCNC: 3 MMOL/L — LOW (ref 5–17)
APTT BLD: 28.3 SEC — SIGNIFICANT CHANGE UP (ref 24.5–35.6)
AST SERPL-CCNC: 17 U/L — SIGNIFICANT CHANGE UP (ref 10–40)
BASE EXCESS BLDA CALC-SCNC: 9.8 MMOL/L — HIGH (ref -2–3)
BASE EXCESS BLDV CALC-SCNC: 14.2 MMOL/L — SIGNIFICANT CHANGE UP
BASOPHILS # BLD AUTO: 0.02 K/UL — SIGNIFICANT CHANGE UP (ref 0–0.2)
BASOPHILS NFR BLD AUTO: 0.4 % — SIGNIFICANT CHANGE UP (ref 0–2)
BILIRUB SERPL-MCNC: 0.2 MG/DL — SIGNIFICANT CHANGE UP (ref 0.2–1.2)
BLOOD GAS COMMENTS ARTERIAL: SIGNIFICANT CHANGE UP
BUN SERPL-MCNC: 17 MG/DL — SIGNIFICANT CHANGE UP (ref 7–18)
CALCIUM SERPL-MCNC: 9.1 MG/DL — SIGNIFICANT CHANGE UP (ref 8.4–10.5)
CHLORIDE SERPL-SCNC: 97 MMOL/L — SIGNIFICANT CHANGE UP (ref 96–108)
CO2 SERPL-SCNC: 37 MMOL/L — HIGH (ref 22–31)
CREAT SERPL-MCNC: 0.41 MG/DL — LOW (ref 0.5–1.3)
EGFR: 99 ML/MIN/1.73M2 — SIGNIFICANT CHANGE UP
EOSINOPHIL # BLD AUTO: 0.04 K/UL — SIGNIFICANT CHANGE UP (ref 0–0.5)
EOSINOPHIL NFR BLD AUTO: 0.8 % — SIGNIFICANT CHANGE UP (ref 0–6)
GAS PNL BLDA: SIGNIFICANT CHANGE UP
GLUCOSE SERPL-MCNC: 159 MG/DL — HIGH (ref 70–99)
HCO3 BLDA-SCNC: 42 MMOL/L — HIGH (ref 21–28)
HCO3 BLDV-SCNC: 45 MMOL/L — CRITICAL HIGH (ref 22–29)
HCT VFR BLD CALC: 38.3 % — SIGNIFICANT CHANGE UP (ref 34.5–45)
HGB BLD-MCNC: 11.7 G/DL — SIGNIFICANT CHANGE UP (ref 11.5–15.5)
HOROWITZ INDEX BLDA+IHG-RTO: 40 — SIGNIFICANT CHANGE UP
IMM GRANULOCYTES NFR BLD AUTO: 0.2 % — SIGNIFICANT CHANGE UP (ref 0–0.9)
INR BLD: 1 RATIO — SIGNIFICANT CHANGE UP (ref 0.85–1.16)
LACTATE SERPL-SCNC: 1.5 MMOL/L — SIGNIFICANT CHANGE UP (ref 0.7–2)
LIDOCAIN IGE QN: 59 U/L — SIGNIFICANT CHANGE UP (ref 13–75)
LYMPHOCYTES # BLD AUTO: 0.66 K/UL — LOW (ref 1–3.3)
LYMPHOCYTES # BLD AUTO: 13.9 % — SIGNIFICANT CHANGE UP (ref 13–44)
MAGNESIUM SERPL-MCNC: 1.7 MG/DL — SIGNIFICANT CHANGE UP (ref 1.6–2.6)
MCHC RBC-ENTMCNC: 29 PG — SIGNIFICANT CHANGE UP (ref 27–34)
MCHC RBC-ENTMCNC: 30.5 GM/DL — LOW (ref 32–36)
MCV RBC AUTO: 95 FL — SIGNIFICANT CHANGE UP (ref 80–100)
MONOCYTES # BLD AUTO: 0.4 K/UL — SIGNIFICANT CHANGE UP (ref 0–0.9)
MONOCYTES NFR BLD AUTO: 8.4 % — SIGNIFICANT CHANGE UP (ref 2–14)
NEUTROPHILS # BLD AUTO: 3.62 K/UL — SIGNIFICANT CHANGE UP (ref 1.8–7.4)
NEUTROPHILS NFR BLD AUTO: 76.3 % — SIGNIFICANT CHANGE UP (ref 43–77)
NRBC # BLD: 0 /100 WBCS — SIGNIFICANT CHANGE UP (ref 0–0)
PCO2 BLDA: 99 MMHG — CRITICAL HIGH (ref 32–35)
PCO2 BLDV: 89 MMHG — CRITICAL HIGH (ref 39–42)
PH BLDA: 7.23 — LOW (ref 7.35–7.45)
PH BLDV: 7.31 — LOW (ref 7.32–7.43)
PLATELET # BLD AUTO: 247 K/UL — SIGNIFICANT CHANGE UP (ref 150–400)
PO2 BLDA: 82 MMHG — LOW (ref 83–108)
PO2 BLDV: 53 MMHG — SIGNIFICANT CHANGE UP
POTASSIUM SERPL-MCNC: 4 MMOL/L — SIGNIFICANT CHANGE UP (ref 3.5–5.3)
POTASSIUM SERPL-SCNC: 4 MMOL/L — SIGNIFICANT CHANGE UP (ref 3.5–5.3)
PROT SERPL-MCNC: 6.6 G/DL — SIGNIFICANT CHANGE UP (ref 6–8.3)
PROTHROM AB SERPL-ACNC: 11.7 SEC — SIGNIFICANT CHANGE UP (ref 9.9–13.4)
RBC # BLD: 4.03 M/UL — SIGNIFICANT CHANGE UP (ref 3.8–5.2)
RBC # FLD: 13.4 % — SIGNIFICANT CHANGE UP (ref 10.3–14.5)
SAO2 % BLDA: 98 % — SIGNIFICANT CHANGE UP
SAO2 % BLDV: 85.3 % — SIGNIFICANT CHANGE UP
SODIUM SERPL-SCNC: 137 MMOL/L — SIGNIFICANT CHANGE UP (ref 135–145)
WBC # BLD: 4.75 K/UL — SIGNIFICANT CHANGE UP (ref 3.8–10.5)
WBC # FLD AUTO: 4.75 K/UL — SIGNIFICANT CHANGE UP (ref 3.8–10.5)

## 2024-10-02 PROCEDURE — 71260 CT THORAX DX C+: CPT | Mod: 26,MC

## 2024-10-02 PROCEDURE — 99285 EMERGENCY DEPT VISIT HI MDM: CPT

## 2024-10-02 PROCEDURE — 74174 CTA ABD&PLVS W/CONTRAST: CPT | Mod: 26,MC

## 2024-10-02 PROCEDURE — 71045 X-RAY EXAM CHEST 1 VIEW: CPT | Mod: 26

## 2024-10-02 NOTE — ED PROVIDER NOTE - RESPIRATORY CHEST EXAM
Patient is scheduled for: Nuclear Lexiscan Stress Test     Date and Time: 5/25/22 at 1:15 pm    Location: [facility]    Patient phone number for medication review: Listed         normal

## 2024-10-02 NOTE — ED PROVIDER NOTE - PROGRESS NOTE DETAILS
Labs are grossly unremarkable.  VBG with a respiratory acidosis.  Patient placed on a BiPAP.  Pending CT a abdomen and pelvis as well as a CT chest.  Will admit. Repeat ABG after patient was on BiPAP with worsening respiratory acidosis.  CT with pneumonia and atelectasis and possible scarring.  ICU consulted given worsening acidosis. Repeat ABG improving. ICU cleared patient for floor ([facility])

## 2024-10-02 NOTE — ED ADULT NURSE NOTE - NS_SISCREENINGSR_GEN_ALL_ED
Negative Consent (Scalp)/Introductory Paragraph: The rationale for Mohs was explained to the patient and consent was obtained. The risks, benefits and alternatives to therapy were discussed in detail. Specifically, the risks of changes in hair growth pattern secondary to repair, infection, scarring, bleeding, prolonged wound healing, incomplete removal, allergy to anesthesia, nerve injury and recurrence were addressed. Prior to the procedure, the treatment site was clearly identified and confirmed by the patient. All components of Universal Protocol/PAUSE Rule completed.

## 2024-10-02 NOTE — ED PROVIDER NOTE - OBJECTIVE STATEMENT
79-year-old female with a past medical history of hypertension, arthritis, goiter, history of respiratory failure presents with bloody stools.  Patient unable to provide any significant history.

## 2024-10-02 NOTE — CONSULT NOTE ADULT - SUBJECTIVE AND OBJECTIVE BOX
79F with chronic hypercapnic respiratory failure (no compliant with bipap), diaphragmatic dysfunction, hypertension, arthritis, Neuralgia, osteoporotic compression fractures L5-S1, hyponatremia (HCTZ induced, corrected), goiter, recent admission to [facility] with hypercapnic resp failure 9/2024, presenting from NH with complaint of rectal bleed, found to have elevated pCO2 on bloodwork prompting ICU consult. Pt is known to be noncompliant with bipap and is known to chronically retain CO2. Pt was seen at bedside, tolerating Bipap, appears comfortable. As per ED attending no signs of rectal bleeding in the ED.      PAST MEDICAL & SURGICAL HISTORY:  HTN (hypertension)      Lumbar neuralgia      Hyponatremia      Arthritis          SOCIAL HX:   Smoking                         ETOH                            Other    FAMILY HISTORY:  :  No known cardiovascular family history     ROS:  See HPI     Allergies    penicillin (Rash)    Intolerances          PHYSICAL EXAM    ICU Vital Signs Last 24 Hrs  T(C): 36.3 (02 Oct 2024 17:45), Max: 36.3 (02 Oct 2024 17:45)  T(F): 97.3 (02 Oct 2024 17:45), Max: 97.3 (02 Oct 2024 17:45)  HR: 79 (02 Oct 2024 23:19) (79 - 84)  BP: 140/82 (02 Oct 2024 23:19) (122/74 - 140/82)  BP(mean): --  ABP: --  ABP(mean): --  RR: 18 (02 Oct 2024 17:45) (18 - 18)  SpO2: 98% (02 Oct 2024 23:19) (96% - 98%)    O2 Parameters below as of 02 Oct 2024 23:19  Patient On (Oxygen Delivery Method): room air            General: Not in distress, tolerating bipap well  HEENT:  EOMI, no scleral icterus              Lymphatic system: No cervical LAD  Lungs: poor air mvmt, no crackles  Cardiovascular: RRR, no peripheral edema  Gastrointestinal: Soft, Positive BS, nontender  Musculoskeletal: No clubbing.  Moves all extremities.    Skin: Warm  Neurological: AOx3  : no suprapubic tenderness        LABS:                          11.7   4.75  )-----------( 247      ( 02 Oct 2024 18:30 )             38.3                                               10-02    137  |  97  |  17  ----------------------------<  159[H]  4.0   |  37[H]  |  0.41[L]    Ca    9.1      02 Oct 2024 18:30  Mg     1.7     10-02    TPro  6.6  /  Alb  2.9[L]  /  TBili  0.2  /  DBili  x   /  AST  17  /  ALT  16  /  AlkPhos  58  10-02      PT/INR - ( 02 Oct 2024 18:30 )   PT: 11.7 sec;   INR: 1.00 ratio         PTT - ( 02 Oct 2024 18:30 )  PTT:28.3 sec                                       Urinalysis Basic - ( 02 Oct 2024 18:30 )    Color: x / Appearance: x / SG: x / pH: x  Gluc: 159 mg/dL / Ketone: x  / Bili: x / Urobili: x   Blood: x / Protein: x / Nitrite: x   Leuk Esterase: x / RBC: x / WBC x   Sq Epi: x / Non Sq Epi: x / Bacteria: x                                                  LIVER FUNCTIONS - ( 02 Oct 2024 18:30 )  Alb: 2.9 g/dL / Pro: 6.6 g/dL / ALK PHOS: 58 U/L / ALT: 16 U/L DA / AST: 17 U/L / GGT: x                                                                                                                                   ABG - ( 02 Oct 2024 23:49 )  pH, Arterial: 7.39  pH, Blood: x     /  pCO2: 71    /  pO2: 90    / HCO3: 43    / Base Excess: 14.5  /  SaO2: 99                  CXR:    ECHO:    MEDICATIONS  (STANDING):    MEDICATIONS  (PRN):

## 2024-10-02 NOTE — ED PROVIDER NOTE - CHILD ABUSE FACILITY
H Pt with recurrent pleural effusion, despite being on lasix and aldactone. Pt was admitted to [facility] on 9/26 to 9/30, s/p pigtail drainage (4L). CTAP on 10/31 showed moderate to severe left pleural effusion with near complete atelectasis of the left lower lobe. CXR on 11/13 showed left lower lung opacification/pleural effusion has substantially improved compared to prior exam 10/23/2023. Small left pleural effusion remains.   - C/w home diuretic as above Mg of 1.4 on 11/13. Improved to 1.8 on 11/14.   - Repleted  - F/u AM Mg level

## 2024-10-02 NOTE — ED ADULT NURSE NOTE - NSFALLHARMRISKINTERV_ED_ALL_ED
Assistance OOB with selected safe patient handling equipment if applicable/Assistance with ambulation/Communicate risk of Fall with Harm to all staff, patient, and family/Provide patient with walking aids/Provide visual cue: red socks, yellow wristband, yellow gown, etc/Reinforce activity limits and safety measures with patient and family/Use of alarms - bed, stretcher, chair and/or video monitoring/Bed in lowest position, wheels locked, appropriate side rails in place/Call bell, personal items and telephone in reach/Instruct patient to call for assistance before getting out of bed/chair/stretcher/Non-slip footwear applied when patient is off stretcher/[place] to call system/Physically safe environment - no spills, clutter or unnecessary equipment/Purposeful Proactive Rounding/Room/bathroom lighting operational, light cord in reach

## 2024-10-02 NOTE — ED PROVIDER NOTE - CLINICAL SUMMARY MEDICAL DECISION MAKING FREE TEXT BOX
79-year-old female presents with GI bleed and respiratory distress.  PE as above.    Labs, CTA abdomen and pelvis, CT chest, BiPAP, likely admission

## 2024-10-03 DIAGNOSIS — J96.22 ACUTE AND CHRONIC RESPIRATORY FAILURE WITH HYPERCAPNIA: ICD-10-CM

## 2024-10-03 DIAGNOSIS — I10 ESSENTIAL (PRIMARY) HYPERTENSION: ICD-10-CM

## 2024-10-03 DIAGNOSIS — R06.03 ACUTE RESPIRATORY DISTRESS: ICD-10-CM

## 2024-10-03 DIAGNOSIS — K62.5 HEMORRHAGE OF ANUS AND RECTUM: ICD-10-CM

## 2024-10-03 DIAGNOSIS — K59.00 CONSTIPATION, UNSPECIFIED: ICD-10-CM

## 2024-10-03 DIAGNOSIS — Z75.8 OTHER PROBLEMS RELATED TO MEDICAL FACILITIES AND OTHER HEALTH CARE: ICD-10-CM

## 2024-10-03 DIAGNOSIS — Z29.9 ENCOUNTER FOR PROPHYLACTIC MEASURES, UNSPECIFIED: ICD-10-CM

## 2024-10-03 LAB
ALBUMIN SERPL ELPH-MCNC: 2.8 G/DL — LOW (ref 3.5–5)
ALP SERPL-CCNC: 55 U/L — SIGNIFICANT CHANGE UP (ref 40–120)
ALT FLD-CCNC: 14 U/L DA — SIGNIFICANT CHANGE UP (ref 10–60)
ANION GAP SERPL CALC-SCNC: 2 MMOL/L — LOW (ref 5–17)
ANION GAP SERPL CALC-SCNC: 2 MMOL/L — LOW (ref 5–17)
AST SERPL-CCNC: 14 U/L — SIGNIFICANT CHANGE UP (ref 10–40)
BASE EXCESS BLDA CALC-SCNC: 16.9 MMOL/L — HIGH (ref -2–3)
BASOPHILS # BLD AUTO: 0.02 K/UL — SIGNIFICANT CHANGE UP (ref 0–0.2)
BASOPHILS NFR BLD AUTO: 0.4 % — SIGNIFICANT CHANGE UP (ref 0–2)
BILIRUB SERPL-MCNC: 0.3 MG/DL — SIGNIFICANT CHANGE UP (ref 0.2–1.2)
BLOOD GAS COMMENTS ARTERIAL: SIGNIFICANT CHANGE UP
BUN SERPL-MCNC: 12 MG/DL — SIGNIFICANT CHANGE UP (ref 7–18)
BUN SERPL-MCNC: 12 MG/DL — SIGNIFICANT CHANGE UP (ref 7–18)
CALCIUM SERPL-MCNC: 8.8 MG/DL — SIGNIFICANT CHANGE UP (ref 8.4–10.5)
CALCIUM SERPL-MCNC: 8.9 MG/DL — SIGNIFICANT CHANGE UP (ref 8.4–10.5)
CHLORIDE SERPL-SCNC: 97 MMOL/L — SIGNIFICANT CHANGE UP (ref 96–108)
CHLORIDE SERPL-SCNC: 98 MMOL/L — SIGNIFICANT CHANGE UP (ref 96–108)
CO2 SERPL-SCNC: 38 MMOL/L — HIGH (ref 22–31)
CO2 SERPL-SCNC: 39 MMOL/L — HIGH (ref 22–31)
CREAT SERPL-MCNC: 0.26 MG/DL — LOW (ref 0.5–1.3)
CREAT SERPL-MCNC: 0.31 MG/DL — LOW (ref 0.5–1.3)
EGFR: 106 ML/MIN/1.73M2 — SIGNIFICANT CHANGE UP
EGFR: 111 ML/MIN/1.73M2 — SIGNIFICANT CHANGE UP
EOSINOPHIL # BLD AUTO: 0.09 K/UL — SIGNIFICANT CHANGE UP (ref 0–0.5)
EOSINOPHIL NFR BLD AUTO: 1.9 % — SIGNIFICANT CHANGE UP (ref 0–6)
GLUCOSE BLDC GLUCOMTR-MCNC: 84 MG/DL — SIGNIFICANT CHANGE UP (ref 70–99)
GLUCOSE BLDC GLUCOMTR-MCNC: 90 MG/DL — SIGNIFICANT CHANGE UP (ref 70–99)
GLUCOSE SERPL-MCNC: 99 MG/DL — SIGNIFICANT CHANGE UP (ref 70–99)
GLUCOSE SERPL-MCNC: 99 MG/DL — SIGNIFICANT CHANGE UP (ref 70–99)
HCO3 BLDA-SCNC: 46 MMOL/L — CRITICAL HIGH (ref 21–28)
HCT VFR BLD CALC: 35.9 % — SIGNIFICANT CHANGE UP (ref 34.5–45)
HCT VFR BLD CALC: 37.4 % — SIGNIFICANT CHANGE UP (ref 34.5–45)
HGB BLD-MCNC: 11.1 G/DL — LOW (ref 11.5–15.5)
HGB BLD-MCNC: 11.4 G/DL — LOW (ref 11.5–15.5)
HOROWITZ INDEX BLDA+IHG-RTO: 28 — SIGNIFICANT CHANGE UP
IMM GRANULOCYTES NFR BLD AUTO: 0.4 % — SIGNIFICANT CHANGE UP (ref 0–0.9)
LYMPHOCYTES # BLD AUTO: 0.87 K/UL — LOW (ref 1–3.3)
LYMPHOCYTES # BLD AUTO: 18.6 % — SIGNIFICANT CHANGE UP (ref 13–44)
MAGNESIUM SERPL-MCNC: 1.8 MG/DL — SIGNIFICANT CHANGE UP (ref 1.6–2.6)
MCHC RBC-ENTMCNC: 29.2 PG — SIGNIFICANT CHANGE UP (ref 27–34)
MCHC RBC-ENTMCNC: 29.4 PG — SIGNIFICANT CHANGE UP (ref 27–34)
MCHC RBC-ENTMCNC: 30.5 GM/DL — LOW (ref 32–36)
MCHC RBC-ENTMCNC: 30.9 GM/DL — LOW (ref 32–36)
MCV RBC AUTO: 95 FL — SIGNIFICANT CHANGE UP (ref 80–100)
MCV RBC AUTO: 95.7 FL — SIGNIFICANT CHANGE UP (ref 80–100)
MONOCYTES # BLD AUTO: 0.4 K/UL — SIGNIFICANT CHANGE UP (ref 0–0.9)
MONOCYTES NFR BLD AUTO: 8.6 % — SIGNIFICANT CHANGE UP (ref 2–14)
NEUTROPHILS # BLD AUTO: 3.27 K/UL — SIGNIFICANT CHANGE UP (ref 1.8–7.4)
NEUTROPHILS NFR BLD AUTO: 70.1 % — SIGNIFICANT CHANGE UP (ref 43–77)
NRBC # BLD: 0 /100 WBCS — SIGNIFICANT CHANGE UP (ref 0–0)
NRBC # BLD: 0 /100 WBCS — SIGNIFICANT CHANGE UP (ref 0–0)
PCO2 BLDA: 76 MMHG — CRITICAL HIGH (ref 32–35)
PH BLDA: 7.39 — SIGNIFICANT CHANGE UP (ref 7.35–7.45)
PHOSPHATE SERPL-MCNC: 3.3 MG/DL — SIGNIFICANT CHANGE UP (ref 2.5–4.5)
PLATELET # BLD AUTO: 229 K/UL — SIGNIFICANT CHANGE UP (ref 150–400)
PLATELET # BLD AUTO: 231 K/UL — SIGNIFICANT CHANGE UP (ref 150–400)
PO2 BLDA: 135 MMHG — HIGH (ref 83–108)
POTASSIUM SERPL-MCNC: 3.7 MMOL/L — SIGNIFICANT CHANGE UP (ref 3.5–5.3)
POTASSIUM SERPL-MCNC: 4.1 MMOL/L — SIGNIFICANT CHANGE UP (ref 3.5–5.3)
POTASSIUM SERPL-SCNC: 3.7 MMOL/L — SIGNIFICANT CHANGE UP (ref 3.5–5.3)
POTASSIUM SERPL-SCNC: 4.1 MMOL/L — SIGNIFICANT CHANGE UP (ref 3.5–5.3)
PROT SERPL-MCNC: 6.3 G/DL — SIGNIFICANT CHANGE UP (ref 6–8.3)
RBC # BLD: 3.78 M/UL — LOW (ref 3.8–5.2)
RBC # BLD: 3.91 M/UL — SIGNIFICANT CHANGE UP (ref 3.8–5.2)
RBC # FLD: 13.6 % — SIGNIFICANT CHANGE UP (ref 10.3–14.5)
RBC # FLD: 13.6 % — SIGNIFICANT CHANGE UP (ref 10.3–14.5)
SAO2 % BLDA: 100 % — SIGNIFICANT CHANGE UP
SODIUM SERPL-SCNC: 138 MMOL/L — SIGNIFICANT CHANGE UP (ref 135–145)
SODIUM SERPL-SCNC: 138 MMOL/L — SIGNIFICANT CHANGE UP (ref 135–145)
WBC # BLD: 4.18 K/UL — SIGNIFICANT CHANGE UP (ref 3.8–10.5)
WBC # BLD: 4.67 K/UL — SIGNIFICANT CHANGE UP (ref 3.8–10.5)
WBC # FLD AUTO: 4.18 K/UL — SIGNIFICANT CHANGE UP (ref 3.8–10.5)
WBC # FLD AUTO: 4.67 K/UL — SIGNIFICANT CHANGE UP (ref 3.8–10.5)

## 2024-10-03 RX ORDER — CARVEDILOL 3.125 MG
25 TABLET ORAL EVERY 12 HOURS
Refills: 0 | Status: DISCONTINUED | OUTPATIENT
Start: 2024-10-03 | End: 2024-10-10

## 2024-10-03 RX ORDER — BISACODYL 5 MG/1
5 TABLET, COATED ORAL ONCE
Refills: 0 | Status: COMPLETED | OUTPATIENT
Start: 2024-10-03 | End: 2024-10-03

## 2024-10-03 RX ORDER — PANTOPRAZOLE SODIUM 40 MG/1
40 TABLET, DELAYED RELEASE ORAL
Refills: 0 | Status: DISCONTINUED | OUTPATIENT
Start: 2024-10-03 | End: 2024-10-07

## 2024-10-03 RX ORDER — FUROSEMIDE 10 MG/ML
20 INJECTION INTRAVENOUS DAILY
Refills: 0 | Status: DISCONTINUED | OUTPATIENT
Start: 2024-10-03 | End: 2024-10-04

## 2024-10-03 RX ORDER — PANTOPRAZOLE SODIUM 40 MG/1
40 TABLET, DELAYED RELEASE ORAL
Refills: 0 | Status: DISCONTINUED | OUTPATIENT
Start: 2024-10-03 | End: 2024-10-03

## 2024-10-03 RX ORDER — ROSUVASTATIN CALCIUM 20 MG/1
5 TABLET, COATED ORAL AT BEDTIME
Refills: 0 | Status: DISCONTINUED | OUTPATIENT
Start: 2024-10-03 | End: 2024-10-03

## 2024-10-03 RX ORDER — SODIUM CHLORIDE 0.9 % (FLUSH) 0.9 %
1 SYRINGE (ML) INJECTION THREE TIMES A DAY
Refills: 0 | Status: DISCONTINUED | OUTPATIENT
Start: 2024-10-03 | End: 2024-10-03

## 2024-10-03 RX ORDER — SENNOSIDES 8.6 MG
2 TABLET ORAL AT BEDTIME
Refills: 0 | Status: DISCONTINUED | OUTPATIENT
Start: 2024-10-03 | End: 2024-10-03

## 2024-10-03 RX ORDER — SODIUM CHLORIDE IRRIG SOLUTION 0.9 %
1000 SOLUTION, IRRIGATION IRRIGATION
Refills: 0 | Status: DISCONTINUED | OUTPATIENT
Start: 2024-10-03 | End: 2024-10-04

## 2024-10-03 RX ORDER — BISACODYL 5 MG/1
10 TABLET, COATED ORAL ONCE
Refills: 0 | Status: COMPLETED | OUTPATIENT
Start: 2024-10-03 | End: 2024-10-03

## 2024-10-03 RX ORDER — IPRATROPIUM BROMIDE AND ALBUTEROL SULFATE .5; 3 MG/3ML; MG/3ML
3 SOLUTION RESPIRATORY (INHALATION) EVERY 6 HOURS
Refills: 0 | Status: DISCONTINUED | OUTPATIENT
Start: 2024-10-03 | End: 2024-10-04

## 2024-10-03 RX ADMIN — FUROSEMIDE 20 MILLIGRAM(S): 10 INJECTION INTRAVENOUS at 20:36

## 2024-10-03 RX ADMIN — PANTOPRAZOLE SODIUM 40 MILLIGRAM(S): 40 TABLET, DELAYED RELEASE ORAL at 18:15

## 2024-10-03 RX ADMIN — BISACODYL 10 MILLIGRAM(S): 5 TABLET, COATED ORAL at 22:26

## 2024-10-03 RX ADMIN — Medication 50 MILLILITER(S): at 12:28

## 2024-10-03 NOTE — CONSULT NOTE ADULT - SUBJECTIVE AND OBJECTIVE BOX
Patient is a 79y old  Female who presents with a chief complaint of Acute respiratory distress     (03 Oct 2024 13:10)      HPI:  79F with chronic hypercapnic respiratory failure (no compliant with bipap), diaphragmatic dysfunction, hypertension, arthritis, Neuralgia, osteoporotic compression fractures L5-S1, hyponatremia (HCTZ induced, corrected), goiter, recent admission to [facility] with hypercapnic resp failure 9/2024, presenting from NH with complaint of rectal bleed, found to have elevated pCO2 on bloodwork prompting ICU consult. Pt is known to be noncompliant with bipap and is known to chronically retain CO2. Pt was seen at bedside, tolerating Bipap, appears comfortable. As per ED attending no signs of rectal bleeding in the ED.  ICU consulted for hypercapnic respiratory failure. Patient is comfortable on Bipap. Has history of non compliance.       PAST MEDICAL & SURGICAL HISTORY:  HTN (hypertension)      Lumbar neuralgia      Hyponatremia      Arthritis          SOCIAL HX:   Smoking                         ETOH                            Other    FAMILY HISTORY:  :  No known cardiovacular family hisotry     ROS:  See HPI     Allergies    penicillin (Rash)    Intolerances          PHYSICAL EXAM    ICU Vital Signs Last 24 Hrs  T(C): 36.6 (03 Oct 2024 12:43), Max: 37.2 (02 Oct 2024 23:19)  T(F): 97.8 (03 Oct 2024 12:43), Max: 98.9 (02 Oct 2024 23:19)  HR: 65 (03 Oct 2024 12:43) (65 - 95)  BP: 130/61 (03 Oct 2024 12:43) (122/74 - 143/77)  BP(mean): --  ABP: --  ABP(mean): --  RR: 19 (03 Oct 2024 12:43) (18 - 22)  SpO2: 97% (03 Oct 2024 12:43) (96% - 100%)    O2 Parameters below as of 03 Oct 2024 12:43  Patient On (Oxygen Delivery Method): BiPAP/CPAP        PHYSICAL EXAMINATION:  GENERAL: Elderly female on Bipap   HEAD:  Atraumatic, Normocephalic  EYES:  conjunctiva and sclera clear  NECK: Supple, No JVD, Normal thyroid  CHEST/LUNG: Decreased breath sounds bilaterally. No rales, rhonchi, wheezing, or rubs  HEART: Regular rate and rhythm; No murmurs, rubs, or gallops  ABDOMEN: Soft, Nontender, Nondistended; Bowel sounds present  NERVOUS SYSTEM:  Alert & Oriented X2,    EXTREMITIES:  2+ Peripheral Pulses, No clubbing, cyanosis, or edema  SKIN: warm dry        10-02-24 @ 07:01  -  10-03-24 @ 07:00  --------------------------------------------------------  IN:  Total IN: 0 mL    OUT:    Voided (mL): 100 mL  Total OUT: 100 mL    Total NET: -100 mL          LABS:                          11.1   4.67  )-----------( 229      ( 03 Oct 2024 07:35 )             35.9                                               10-03    138  |  97  |  12  ----------------------------<  99  3.7   |  39[H]  |  0.31[L]    Ca    8.9      03 Oct 2024 07:35  Phos  3.3     10-03  Mg     1.8     10-03    TPro  6.3  /  Alb  2.8[L]  /  TBili  0.3  /  DBili  x   /  AST  14  /  ALT  14  /  AlkPhos  55  10-03      PT/INR - ( 02 Oct 2024 18:30 )   PT: 11.7 sec;   INR: 1.00 ratio         PTT - ( 02 Oct 2024 18:30 )  PTT:28.3 sec                                       Urinalysis Basic - ( 03 Oct 2024 07:35 )    Color: x / Appearance: x / SG: x / pH: x  Gluc: 99 mg/dL / Ketone: x  / Bili: x / Urobili: x   Blood: x / Protein: x / Nitrite: x   Leuk Esterase: x / RBC: x / WBC x   Sq Epi: x / Non Sq Epi: x / Bacteria: x                                                  LIVER FUNCTIONS - ( 03 Oct 2024 07:35 )  Alb: 2.8 g/dL / Pro: 6.3 g/dL / ALK PHOS: 55 U/L / ALT: 14 U/L DA / AST: 14 U/L / GGT: x                                                                                                                                   ABG - ( 03 Oct 2024 09:49 )  pH, Arterial: 7.39  pH, Blood: x     /  pCO2: 76    /  pO2: 135   / HCO3: 46    / Base Excess: 16.9  /  SaO2: 100                 CXR:    ECHO:    MEDICATIONS  (STANDING):  bisacodyl 5 milliGRAM(s) Oral once  carvedilol 25 milliGRAM(s) Oral every 12 hours  dextrose 5% + sodium chloride 0.9%. 1000 milliLiter(s) (50 mL/Hr) IV Continuous <Continuous>  pantoprazole  Injectable 40 milliGRAM(s) IV Push two times a day  polyethylene glycol 3350 17 Gram(s) Oral two times a day  rosuvastatin 5 milliGRAM(s) Oral at bedtime  senna 2 Tablet(s) Oral at bedtime  sodium chloride 1 Gram(s) Oral three times a day    MEDICATIONS  (PRN):

## 2024-10-03 NOTE — DIETITIAN INITIAL EVALUATION ADULT - PROBLEM SELECTOR PLAN 1
-pt has hx of noncompliance with bipap  -on last admission in 9/2024 was exacerbated by PNA  -CT: Stable left basilar atelectasis. Superimposed pneumonia is not excluded.   Stable biapical consolidative opacities that may represent scarring  -as per labs from last admission, pCO2 is usually in 60s  -current bipap settings 20/5-->adjusted to 18/5 to reduce discomfort from high IPAP and aid in compliance   -ABG showing improvement after 1 hour of bipap, (7.23 / 99 / 82 / 42 >>> 7.39/71/90/45)  -mentating at baseline   -continue patient on nocturnal bipap, w/ 2-4L NC off bipap which is her home regimen,  -recheck ABG in a.m.  -low clinical suspicion for active PNA at present, holding abx for now

## 2024-10-03 NOTE — H&P ADULT - PROBLEM SELECTOR PLAN 1
-pt has hx of noncompliance with bipap  -on last admission in 9/2024 was exacerbated by PNA  -CT on this admission showing stable atelectasis with questionable superimposed PNA  -as per labs from last admission, pCO2 is usually in 60s  -current bipap settings 20/5-->adjusted to 18/5 to reduce discomfort from high IPAP and aid in compliance   -ABG showing improvement after 1 hour of bipap, (7.23 / 99 / 82 / 42 >>> 7.39/71/90/45)  -mentating at baseline   -continue patient on nocturnal bipap, w/ 2-4L NC off bipap which is her home regimen,  -recheck ABG in a.m.  -low concern for PNA at this time -pt has hx of noncompliance with bipap  -on last admission in 9/2024 was exacerbated by PNA  -CT: Stable left basilar atelectasis. Superimposed pneumonia is not excluded.   Stable biapical consolidative opacities that may represent scarring  -as per labs from last admission, pCO2 is usually in 60s  -current bipap settings 20/5-->adjusted to 18/5 to reduce discomfort from high IPAP and aid in compliance   -ABG showing improvement after 1 hour of bipap, (7.23 / 99 / 82 / 42 >>> 7.39/71/90/45)  -mentating at baseline   -continue patient on nocturnal bipap, w/ 2-4L NC off bipap which is her home regimen,  -recheck ABG in a.m.  -Empiric CTX and azithromycin for now for possible superimposed pna -pt has hx of noncompliance with bipap  -on last admission in 9/2024 was exacerbated by PNA  -CT: Stable left basilar atelectasis. Superimposed pneumonia is not excluded.   Stable biapical consolidative opacities that may represent scarring  -as per labs from last admission, pCO2 is usually in 60s  -current bipap settings 20/5-->adjusted to 18/5 to reduce discomfort from high IPAP and aid in compliance   -ABG showing improvement after 1 hour of bipap, (7.23 / 99 / 82 / 42 >>> 7.39/71/90/45)  -mentating at baseline   -continue patient on nocturnal bipap, w/ 2-4L NC off bipap which is her home regimen,  -recheck ABG in a.m.  -low clinical suspicion for active PNA at present, holding abx for now

## 2024-10-03 NOTE — DIETITIAN INITIAL EVALUATION ADULT - OTHER INFO
Pt Visited.  Pt is on BIPAP.  D/W Daughter . Food choices Obtained. F/N Dept Notified.  Pt is NPo at Present. Pt w GI Bleed.  Labs noted HGB 11.1. Bed Scale 127 LB. Ht 5 feet. . Clear Liquid diet. Per daughter Pt  C/O  No BM. GI  Consult Noted. Pt Visited.  Pt is on BIPAP.  D/W Daughter . Food choices Obtained. F/N Dept Notified.  Pt is NPo at Present. Pt w GI Bleed.  Labs noted HGB 11.1. Bed Scale 127 LB. Ht 5 feet. . Clear Liquid diet. Per daughter Pt  C/O  No BM. GI  Consult Noted. When diet is advanced to Full Suggest Easy to chew Ensure Plus HP BID.

## 2024-10-03 NOTE — CONSULT NOTE ADULT - SUBJECTIVE AND OBJECTIVE BOX
INITIAL GI CONSULTATION    Patient is a 79y old  Female who presents with a chief complaint of   HPI:  79F with chronic hypercapnic respiratory failure (no compliant with bipap), diaphragmatic dysfunction, hypertension, arthritis, Neuralgia, osteoporotic compression fractures L5-S1, hyponatremia (HCTZ induced, corrected), goiter, recent admission to [facility] with hypercapnic resp failure 9/2024, presenting from NH with complaint of rectal bleed. As per ED attending no signs of rectal bleeding in the ED. Patient blood gas showed profound elevation in pCO2  prompting ICU consult and initiation of BIPAP. Pt is known to be noncompliant with BIPAP and is known to chronically retain CO2. ABG pH and CO2 improved on BIPAP and determined that patient did not require ICU level of care. Pt was seen at bedside, tolerating BIPAP, appears comfortable, but lethargic. Observed frequently pulling off BIPAP mask and desaturating to low 80s. No acute complaints other than feeling cold.  (03 Oct 2024 01:34)    GI HPI: patient seen and examined on unit. Resting in bed. Patient is lethargic. Responded to verbal cues after repeated instructions. Unable to provide appropriate history    PMH/PSH:  PAST MEDICAL & SURGICAL HISTORY:  HTN (hypertension)      Lumbar neuralgia      Hyponatremia      Arthritis            FH:  FAMILY HISTORY:        MEDS:  MEDICATIONS  (STANDING):  carvedilol 25 milliGRAM(s) Oral every 12 hours  pantoprazole  Injectable 40 milliGRAM(s) IV Push two times a day  rosuvastatin 5 milliGRAM(s) Oral at bedtime  sodium chloride 1 Gram(s) Oral three times a day    MEDICATIONS  (PRN):    Allergies    penicillin (Rash)    Intolerances          ROS: A detailed set of ROS were asked and negative except those outlined in GI HPI.  ______________________________________________________________________  PHYSICAL EXAM:  T(C): 36.4 (10-03-24 @ 09:00), Max: 37.2 (10-02-24 @ 23:19)  HR: 95 (10-03-24 @ 09:00)  BP: 138/73 (10-03-24 @ 09:00)  RR: 20 (10-03-24 @ 09:00)  SpO2: 97% (10-03-24 @ 09:00)  Wt(kg): --    10-02  -  10-03  --------------------------------------------------------  IN:  Total IN: 0 mL    OUT:    Voided (mL): 100 mL  Total OUT: 100 mL    Total NET: -100 mL          GEN: NAD  HEENT: EOMI, conjunctivae anicteric, neck supple, moist mucous membranes  PULM: LSCTAB, no wheezing, rales, or rhonchi  CV: RRR, no m/r/b  GI: Soft, NT, ND; +BS in all four quadrants, no ascites, no Nichols's sign. TRACEY + bright red blood   MSK: NIELSON, no edema  NEURO: lethargic, no gross deficits  ______________________________________________________________________  LABS:                        11.1   4.67  )-----------( 229      ( 03 Oct 2024 07:35 )             35.9     10-03    138  |  97  |  12  ----------------------------<  99  3.7   |  39[H]  |  0.31[L]    Ca    8.9      03 Oct 2024 07:35  Phos  3.3     10-03  Mg     1.8     10-03    TPro  6.3  /  Alb  2.8[L]  /  TBili  0.3  /  DBili  x   /  AST  14  /  ALT  14  /  AlkPhos  55  10-03    LIVER FUNCTIONS - ( 03 Oct 2024 07:35 )  Alb: 2.8 g/dL / Pro: 6.3 g/dL / ALK PHOS: 55 U/L / ALT: 14 U/L DA / AST: 14 U/L / GGT: x           PT/INR - ( 02 Oct 2024 18:30 )   PT: 11.7 sec;   INR: 1.00 ratio         PTT - ( 02 Oct 2024 18:30 )  PTT:28.3 sec  ____________________________________________    IMAGING:    < from: CT Angio Abdomen and Pelvis w/ IV Cont (10.02.24 @ 21:29) >  FINDINGS:  CHEST:  LUNGS AND LARGE AIRWAYS: Patent central airways. Stable biapical apparent   midline consolidative opacities. Stable atelectasis of the basilar   segments of the left lower lobe.. Mild right basilar dependent   atelectasis.  PLEURA:Trace bilateral pleural effusions. Elevated left diaphragm.  VESSELS: Aortic calcifications. Coronary artery calcifications.  HEART: Cardiomegaly. No pericardial effusion.  MEDIASTINUM AND LESLIE: No lymphadenopathy.  CHEST WALL AND LOWER NECK: Stable diffuse thyromegaly..    ABDOMEN AND PELVIS:  LIVER: Within normal limits.  BILE DUCTS: Normal caliber.  GALLBLADDER: Within normal limits.  SPLEEN: Within normal limits.  PANCREAS: Within normal limits.  ADRENALS: Within normal limits.  KIDNEYS/URETERS: Subcentimeter indeterminate hypodense left renal lesion   is too small to characterize. Symmetric renal enhancement without   hydronephrosis. BLADDER: Distended to above the level of the umbilicus..  REPRODUCTIVE ORGANS: Fibroid uterus.    BOWEL: Rectal distention with stool. No evidence for active GI bleed. No   bowel obstruction or evidence of active bowel inflammation. Appendix is   not visualized. No evidence of inflammation in the pericecal region.  PERITONEUM/RETROPERITONEUM: Mild presacral edema..  VESSELS: Within normal limits.  LYMPH NODES: No lymphadenopathy.  ABDOMINAL WALL: Within normal limits.  BONES: DJD of the glenohumeral joints, left greater than right.   Degenerative changes of the spine. L2 kyphoplasty with cement material   noted in the canal at this level, unchanged. Additional multilevel   compression deformities are stable involving mid thoracic to distal   lumbar vertebral bodies..    IMPRESSION:  Stable left basilar atelectasis. Superimposed pneumonia is not excluded.   Stable biapical consolidative opacities that may represent scarring.   Consider short interval follow-up CT.    No evidence for active GI bleed or active bowel inflammation.    Large stool burden in the rectum.    < end of copied text >  *******

## 2024-10-03 NOTE — H&P ADULT - PROBLEM SELECTOR PLAN 3
hx of HTN on coreg and valsartan at home  /80 on admission  c/w home meds   monitor BP. hx of HTN on coreg and lasix prn (for leg swelling) at home  /80 on admission  c/w coreg   monitor BP.

## 2024-10-03 NOTE — CONSULT NOTE ADULT - ATTENDING COMMENTS
Patient seen and examined with ICU resident upon consultation request at 10.15PM. Data reviewed. Case and plan of care discussed with ED physician and ICU resident.  79F with acute on chronic hypercapnic respiratory failure that improved with bipap in the ED. Hemodynamically stable. Recommend to admit to medicine with nocturnal bipap, other management as per medicine team.
IMP: This is an 79 yr old woman  with chronic hypercapnic respiratory failure (no compliant with bipap), diaphragmatic dysfunction, hypertension, arthritis, Neuralgia, osteoporotic compression fractures L5-S1, hyponatremia (HCTZ induced, corrected), goiter, recent admission to [facility] with hypercapnic resp failure 9/2024, presenting from NH.  ICU consulted for hypercapnic respiratory failure. Patient is comfortable on Bipap. Has history of non compliance. Pat with chronic hypercapnic resp failure requiring BIPAP    - Continue BiPaP as tolerate   - Rectal bleed with stable H/H   - PPI   - No anticoag   - No sedation   - Pat is not a candidate for ICU care at this time . Please reconsult as needed     discussed case with Dr King

## 2024-10-03 NOTE — PATIENT PROFILE ADULT - FALL HARM RISK - HARM RISK INTERVENTIONS
Assistance with ambulation/Assistance OOB with selected safe patient handling equipment/Communicate Risk of Fall with Harm to all staff/Discuss with provider need for PT consult/Monitor gait and stability/Reinforce activity limits and safety measures with patient and family/Tailored Fall Risk Interventions/Visual Cue: Yellow wristband and red socks/Bed in lowest position, wheels locked, appropriate side rails in place/Call bell, personal items and telephone in reach/Instruct patient to call for assistance before getting out of bed or chair/Non-slip footwear when patient is out of bed/[place] to call system/Physically safe environment - no spills, clutter or unnecessary equipment/Purposeful Proactive Rounding/Room/bathroom lighting operational, light cord in reach Assistance with ambulation/Assistance OOB with selected safe patient handling equipment/Communicate Risk of Fall with Harm to all staff/Discuss with provider need for PT consult/Monitor gait and stability/Orthostatic vital signs/Reinforce activity limits and safety measures with patient and family/Tailored Fall Risk Interventions/Visual Cue: Yellow wristband and red socks/Bed in lowest position, wheels locked, appropriate side rails in place/Call bell, personal items and telephone in reach/Instruct patient to call for assistance before getting out of bed or chair/Non-slip footwear when patient is out of bed/[place] to call system/Physically safe environment - no spills, clutter or unnecessary equipment/Purposeful Proactive Rounding/Room/bathroom lighting operational, light cord in reach

## 2024-10-03 NOTE — PATIENT PROFILE ADULT - PATIENT'S PREFERRED PRONOUN
Jorge Moeller is here at 12w3d for:    Chief Complaint   Patient presents with    Routine Prenatal Visit     Patient states that she is feeling well but tired. She notes some light pink/tan streaking on the toilet paper during the night. She notes that she continues to have RLQ cramping. Estimated Due Date: Estimated Date of Delivery: 21    OB History    Para Term  AB Living   2 1 1     1   SAB TAB Ectopic Molar Multiple Live Births           0 1      # Outcome Date GA Lbr Allan/2nd Weight Sex Delivery Anes PTL Lv   2 Current            1 Term 18 39w3d  7 lb 7.2 oz (3.378 kg) F CS-LTranv Spinal N [place]      Birth Comments: Breech presentation       Complications: Breech presentation        Past Medical History:   Diagnosis Date    Asthma     Depression     GERD (gastroesophageal reflux disease)     High cholesterol     Hypertension     Polycystic ovarian syndrome     Postpartum depression     Syncope     neurocadiogenic syncope       Past Surgical History:   Procedure Laterality Date     SECTION N/A 2018     SECTION performed by Edita Atkinson MD at [facility]      right ACL reconstruction Dr. Leydi Cardoza Left 2021       Social History     Tobacco Use   Smoking Status Never Smoker   Smokeless Tobacco Never Used        Social History     Substance and Sexual Activity   Alcohol Use No    Comment: occasional       No results found for this visit on 21. Vitals:  /74   Wt 268 lb (121.6 kg)   LMP 2021 (Approximate)   BMI 43.26 kg/m²   Estimated body mass index is 43.26 kg/m² as calculated from the following:    Height as of 21: 5' 6\" (1.676 m). Weight as of this encounter: 268 lb (121.6 kg). HPI: States she is doing okay today. Patient states she did have some brown and red spotting over the last couple days however she has also had cramping as well.     Yes PT denies fever, chills, nausea and vomiting       Abdomen: enlarged, soft     Cervix:  not digitally examined    Results reviewed today:    No results found for this visit on 21. See prenatal vital sign section and fetal assessment section    ASSESSMENT & Plan    Diagnosis Orders   1. 12 weeks gestation of pregnancy  Prenatal Testing for Fetal Aneuploidy   2. S/P myomectomy     3. S/P primary low transverse      4. Right ovarian cyst               I am having Lev Sosa maintain her BIOTIN PO, Prenatal MV-Min-Fe Fum-FA-DHA (PRENATAL 1 PO), sertraline, albuterol sulfate HFA, metFORMIN, Omega-3 Fatty Acids (FISH OIL PO), Pseudoephedrine-guaiFENesin (MUCINEX D PO), pseudoephedrine, and albuterol sulfate HFA. Return in about 4 weeks (around 2021) for OB ultrasound FHT and check cyst size. Patient Instructions       Patient Education        Weeks 10 to 14 of Your Pregnancy: Care Instructions  Overview     By weeks 10 to 14 of your pregnancy, the placenta has formed inside your uterus. The placenta's main job is to give your baby oxygen and nutrients through the umbilical cord. It's possible to hear your baby's heartbeat with a special ultrasound device. Your baby's organs are developing. The arms and legs can bend. This is a good time to think about testing for birth defects. There are two types of tests: screening and diagnostic. Screening tests show the chance that a baby has a certain birth defect. They can't tell you for sure that your baby has a problem. Diagnostic tests show if a baby has a certain birth defect. It's your choice whether to have these tests. You and your partner can talk to your doctor or midwife about tests for birth defects. Follow-up care is a key part of your treatment and safety. Be sure to make and go to all appointments, and call your doctor if you are having problems.  It's also a good idea to know your test results and keep a list of the medicines you take.  How can you care for yourself at home? Decide about tests  · You can have screening tests and diagnostic tests to check for birth defects. The decision to have a test for birth defects is personal. Think about your age, your chance of passing on a family disease, your need to know about any problems, and what you might do after you have the test results. ? Quadruple (quad) blood test. This screening test can be done between 15 and 22 weeks of pregnancy. It checks the amount of four substances in your blood. The doctor looks at these test results, along with your age and other factors, to find out the chance that your baby may have certain problems. ? Amniocentesis. This diagnostic test is used to look for chromosomal problems in the baby's cells. It can be done between 15 and 20 weeks of pregnancy, usually around week 16.  ? Nuchal translucency test. This test uses ultrasound to measure the thickness of the area at the back of the baby's neck. An increase in the thickness can be an early sign of Down syndrome. ? Chorionic villus sampling (CVS). This is a test that looks for certain genetic problems with your baby. The same genes that are in your baby are in the placenta. A small piece of the placenta is taken out and tested. This test is done when you are 10 to 13 weeks pregnant. Ease discomfort  · Slow down and take naps when you feel tired. · If your emotions swing, talk to someone. · If your gums bleed, try a softer toothbrush. If your gums are puffy and bleed a lot, see your dentist.  · If you feel dizzy:  ? Get up slowly after sitting or lying down. ? Drink plenty of fluids. ? Eat small snacks to keep your blood sugar stable. ? Put your head between your legs as though you were tying your shoelaces. ? Lie down with your legs higher than your head. Use pillows to prop up your feet. · If you have a headache:  ? Lie down. ? Ask your partner or a good friend for a neck massage.   ? Try cool cloths over your forehead or across the back of your neck. ? Use acetaminophen (Tylenol) for pain relief. Do not use nonsteroidal anti-inflammatory drugs (NSAIDs), such as ibuprofen (Advil, Motrin) or naproxen (Aleve), unless your doctor says it is okay. · If you have a nosebleed, pinch your nose gently, and hold it for a short while. To prevent nosebleeds, try massaging a small dab of petroleum jelly, such as Vaseline, in your nostrils. · If your nose is stuffed up, try saline (saltwater) nose sprays. Do not use decongestant sprays. Care for your breasts  · Wear a bra that gives you good support. · Know that changes in your breasts are normal.  ? Your breasts may get larger and more tender. Tenderness usually gets better by 12 weeks. ? Your nipples may get darker and larger, and small bumps around your nipples may show more. ? The veins in your chest and breasts may show more. Where can you learn more? Go to https://TweetMemepeiCyt Mission Technology.AdTotum. org and sign in to your [employer] account. Enter E923 in the [facility] box to learn more about \"Weeks 10 to 14 of Your Pregnancy: Care Instructions. \"     If you do not have an account, please click on the \"Sign Up Now\" link. Current as of: October 8, 2020               Content Version: 12.8  © 6415-1565 [employer]. Care instructions adapted under license by [facility] ([facility]). If you have questions about a medical condition or this instruction, always ask your healthcare professional. [place] [address] any warranty or liability for your use of this information. Patient Education        Weeks 14 to 25 of Your Pregnancy: Care Instructions  Your Care Instructions     During this time, you may start to \"show,\" so that you look pregnant to people around you. You may also notice some changes in your skin, such as itchy spots on your palms or acne on your face.   Your baby is now able to pass urine, and your baby's first   · Try to do at least 2½ hours a week of moderate exercise, such as a fast walk. One way to do this is to be active 30 minutes a day, at least 5 days a week.     · Wear loose clothing. And wear shoes and a bra that provide good support.     · Warm up and cool down to start and finish your exercise.     · If you want to use weights, be sure to use light weights. They reduce stress on your joints. Stay at the best weight for you    · Experts recommend that you gain about 1 pound a month during the first 3 months of your pregnancy.     · Experts recommend that you gain about 1 pound a week during your last 6 months of pregnancy, for a total weight gain of 25 to 35 pounds.     · If you are underweight, you will need to gain more weight (about 28 to 40 pounds).     · If you are overweight, you may not need to gain as much weight (about 15 to 25 pounds).     · If you are gaining weight too fast, use common sense. Exercise every day, and limit sweets, fast foods, and fats. Choose lean meats, fruits, and vegetables.     · If you are having twins or more, your doctor may refer you to a dietitian. Where can you learn more? Go to https://QvolvepeMyoKardia.healthStyleShare. org and sign in to your [employer] account. Enter X704 in the [employer] box to learn more about \"Weeks 14 to 18 of Your Pregnancy: Care Instructions. \"     If you do not have an account, please click on the \"Sign Up Now\" link. Current as of: October 8, 2020               Content Version: 12.8  © 6201-6001 [employer]. Care instructions adapted under license by [facility] ([facility]). If you have questions about a medical condition or this instruction, always ask your healthcare professional. [place] [address] any warranty or liability for your use of this information.                    Kimberly Aguilar,4/27/2021 12:49 PM Her/She

## 2024-10-03 NOTE — DIETITIAN INITIAL EVALUATION ADULT - PROBLEM SELECTOR PLAN 3
hx of HTN on coreg and lasix prn (for leg swelling) at home  /81 on admission  c/w coreg   monitor BP.

## 2024-10-03 NOTE — H&P ADULT - ASSESSMENT
79F with chronic hypercapnic respiratory failure (no compliant with bipap), diaphragmatic dysfunction, hypertension, arthritis, Neuralgia, osteoporotic compression fractures L5-S1, hyponatremia (HCTZ induced, corrected), goiter, recent admission to [facility] with hypercapnic resp failure 9/2024, presenting from NH with complaint of rectal bleed. As per ED attending no signs of rectal bleeding in the ED. Patient blood gas showed profound elevation in pCO2  prompting ICU consult and initiation of BIPAP. Pt is known to be noncompliant with BIPAP and is known to chronically retain CO2. ABG pH and CO2 improved on BIPAP and determined that patient did not require ICU level of care. Admitted to medicine for continued management of acute on chronic hypercarbic respiratory failure.

## 2024-10-03 NOTE — CONSULT NOTE ADULT - NS ATTEND AMEND GEN_ALL_CORE FT
Agree with above.    I saw and examined the patient on 10-03-24. I agree with the findings and pan of care as documented in the fellow/resident/medical student/nurse practitioner/physician assistant.    Today we are treating the patient for:   Rectal bleeding    ***General note with plan / recommendation:   79F with chronic hypercapnic respiratory failure (no compliant with bipap), diaphragmatic dysfunction, hypertension, arthritis, Neuralgia, osteoporotic compression fractures L5-S1, hyponatremia (HCTZ induced, corrected), goiter, recent admission to [facility] with hypercapnic resp failure 9/2024, presenting from NH with complaint of rectal bleed. Based on examination, finding of red blood per rectum, Hgb of 11, this is most likely rectal bleeding from hemorrhoids.  On CT patient does have high stool burden in the rectum which is likely exacerbating the problem.    Recommendations:  - Start patient on bowel regiment:  --Miralax 17 g twice daily  --Dolculax 5mg PO daily until bowel movement  - Given the hypercapnic respiratory issues would not recommend colonoscopy at this time, unless there is extensive drop in Hgb as risk of procedures would outweigh risk of intubation and worsening the respiratory failure with anesthesia medication.     Billing:  I have reviewed records from CT, labs.    Other:  - Thank you for involving us in the care of this patient. If you have any questions regarding the plan of care please do not hesitate to call us back.

## 2024-10-03 NOTE — H&P ADULT - NSHPPHYSICALEXAM_GEN_ALL_CORE
LOS:     VITALS:   T(C): 36.8 (10-03-24 @ 04:05), Max: 37.2 (10-02-24 @ 23:19)  HR: 76 (10-03-24 @ 04:05) (76 - 84)  BP: 141/81 (10-03-24 @ 04:05) (122/74 - 143/77)  RR: 18 (10-03-24 @ 04:05) (18 - 18)  SpO2: 100% (10-03-24 @ 04:05) (96% - 100%)    GENERAL: NAD, lying in bed on BIPAP  HEAD:  Atraumatic, Normocephalic  EYES: EOMI, PERRLA, conjunctiva and sclera clear  ENT: dry  mucous membranes  NECK: Supple, No JVD  CHEST/LUNG: poor inspiratory effort, grossly clear to auscultation bilaterally; No rales, rhonchi, wheezing, or rubs.   HEART: Regular rate and rhythm; No murmurs, rubs, or gallops  ABDOMEN: BSx4; Soft, nontender, nondistended  EXTREMITIES:  2+ Peripheral Pulses, brisk capillary refill. No clubbing, cyanosis, or edema  NERVOUS SYSTEM:  A&Ox3, no focal deficits   SKIN: No rashes or lesions

## 2024-10-03 NOTE — PROGRESS NOTE ADULT - ASSESSMENT
79F with chronic hypercapnic respiratory failure (no compliant with bipap), diaphragmatic dysfunction, hypertension, arthritis, Neuralgia, osteoporotic compression fractures L5-S1, hyponatremia (HCTZ induced, corrected), goiter, recent admission to [facility] with hypercapnic resp failure 9/2024, presenting from NH with complaint of rectal bleed. As per ED attending no signs of rectal bleeding in the ED. Patient blood gas showed profound elevation in pCO2  prompting ICU consult and initiation of BIPAP. Pt is known to be noncompliant with BIPAP and is known to chronically retain CO2. ABG pH and CO2 improved on BIPAP and determined that patient did not require ICU level of care. Admitted to medicine for continued management of acute on chronic hypercarbic respiratory failure.

## 2024-10-03 NOTE — PROGRESS NOTE ADULT - SUBJECTIVE AND OBJECTIVE BOX
PATIENT SEEN AND EXAMINED BY CAITLYN TOTH M.D. ON :- 10/3/24  DATE OF SERVICE:   10/3/24          Interim events noted,Labs ,Radiological studies and Cardiology tests reviewed .    Patient is a 79y old  Female who presents with a chief complaint of Rectal bleed and hypercapnia (03 Oct 2024 14:39)      HPI:  79F with chronic hypercapnic respiratory failure (no compliant with bipap), diaphragmatic dysfunction, hypertension, arthritis, Neuralgia, osteoporotic compression fractures L5-S1, hyponatremia (HCTZ induced, corrected), goiter, recent admission to [facility] with hypercapnic resp failure 9/2024, presenting from NH with complaint of rectal bleed. As per ED attending no signs of rectal bleeding in the ED. Patient blood gas showed profound elevation in pCO2  prompting ICU consult and initiation of BIPAP. Pt is known to be noncompliant with BIPAP and is known to chronically retain CO2. ABG pH and CO2 improved on BIPAP and determined that patient did not require ICU level of care. Pt was seen at bedside, tolerating BIPAP, appears comfortable, but lethargic. Observed frequently pulling off BIPAP mask and desaturating to low 80s. No acute complaints other than feeling cold.  (03 Oct 2024 01:34)      PAST MEDICAL & SURGICAL HISTORY:  HTN (hypertension)      Lumbar neuralgia      Hyponatremia      Arthritis          PREVIOUS DIAGNOSTIC TESTING:      ECHO  FINDINGS:    STRESS  FINDINGS:    CATHETERIZATION  FINDINGS:    MEDICATIONS  (STANDING):  bisacodyl Suppository 10 milliGRAM(s) Rectal once  carvedilol 25 milliGRAM(s) Oral every 12 hours  dextrose 5% + sodium chloride 0.9%. 1000 milliLiter(s) (50 mL/Hr) IV Continuous <Continuous>  furosemide   Injectable 20 milliGRAM(s) IV Push daily  pantoprazole  Injectable 40 milliGRAM(s) IV Push two times a day    MEDICATIONS  (PRN):  albuterol/ipratropium for Nebulization 3 milliLiter(s) Nebulizer every 6 hours PRN Shortness of Breath and/or Wheezing      FAMILY HISTORY:      SOCIAL HISTORY:    CIGARETTES:    ALCOHOL:    REVIEW OF SYSTEMS:  CONSTITUTIONAL: No fever, weight loss, or fatigue  EYES: No eye pain, visual disturbances, or discharge  ENMT:  No difficulty hearing, tinnitus, vertigo; No sinus or throat pain  NECK: No pain or stiffness  RESPIRATORY: No cough, wheezing, chills or hemoptysis; No shortness of breath  CARDIOVASCULAR: No chest pain, palpitations, dizziness, or leg swelling  GASTROINTESTINAL: No abdominal or epigastric pain. No nausea, vomiting, or hematemesis; No diarrhea or constipation. No melena or hematochezia.  GENITOURINARY: No dysuria, frequency, hematuria, or incontinence  NEUROLOGICAL: No headaches, memory loss, loss of strength, numbness, or tremors  SKIN: No itching, burning, rashes, or lesions   LYMPH NODES: No enlarged glands  ENDOCRINE: No heat or cold intolerance; No hair loss  MUSCULOSKELETAL: No joint pain or swelling; No muscle, back, or extremity pain  PSYCHIATRIC: No depression, anxiety, mood swings, or difficulty sleeping  HEME/LYMPH: No easy bruising, or bleeding gums  ALLERY AND IMMUNOLOGIC: No hives or eczema    Vital Signs Last 24 Hrs  T(C): 36.4 (03 Oct 2024 20:37), Max: 37.2 (02 Oct 2024 23:19)  T(F): 97.5 (03 Oct 2024 20:37), Max: 98.9 (02 Oct 2024 23:19)  HR: 78 (03 Oct 2024 20:37) (65 - 95)  BP: 121/57 (03 Oct 2024 20:37) (121/57 - 143/77)  BP(mean): --  RR: 20 (03 Oct 2024 20:37) (18 - 22)  SpO2: 99% (03 Oct 2024 20:37) (97% - 100%)    Parameters below as of 03 Oct 2024 20:37  Patient On (Oxygen Delivery Method): nasal cannula  O2 Flow (L/min): 2        PHYSICAL EXAM:  GENERAL: NAD, well-groomed, well-developed  HEAD:  Atraumatic, Normocephalic  EYES: EOMI, PERRLA, conjunctiva and sclera clear  ENMT: No tonsillar erythema, exudates, or enlargement; Moist mucous membranes, Good dentition, No lesions  NECK: Supple, No JVD, Normal thyroid  NERVOUS SYSTEM:  Alert & Oriented X3, Good concentration; Motor Strength 5/5 B/L upper and lower extremities; DTRs 2+ intact and symmetric  CHEST/LUNG: Clear to percussion bilaterally; No rales, rhonchi, wheezing, or rubs  HEART: Regular rate and rhythm; No murmurs, rubs, or gallops  ABDOMEN: Soft, Nontender, Nondistended; Bowel sounds present  EXTREMITIES:  2+ Peripheral Pulses, No clubbing, cyanosis, or edema  LYMPH: No lymphadenopathy noted  SKIN: No rashes or lesions      INTERPRETATION OF TELEMETRY:    ECG:    KAMERON:     LABS:                        11.4   4.18  )-----------( 231      ( 03 Oct 2024 14:55 )             37.4     10-03    138  |  98  |  12  ----------------------------<  99  4.1   |  38[H]  |  0.26[L]    Ca    8.8      03 Oct 2024 14:45  Phos  3.3     10-03  Mg     1.8     10-03    TPro  6.3  /  Alb  2.8[L]  /  TBili  0.3  /  DBili  x   /  AST  14  /  ALT  14  /  AlkPhos  55  10-03        PT/INR - ( 02 Oct 2024 18:30 )   PT: 11.7 sec;   INR: 1.00 ratio         PTT - ( 02 Oct 2024 18:30 )  PTT:28.3 sec  Urinalysis Basic - ( 03 Oct 2024 14:45 )    Color: x / Appearance: x / SG: x / pH: x  Gluc: 99 mg/dL / Ketone: x  / Bili: x / Urobili: x   Blood: x / Protein: x / Nitrite: x   Leuk Esterase: x / RBC: x / WBC x   Sq Epi: x / Non Sq Epi: x / Bacteria: x      Lipid Panel:   I&O's Summary    02 Oct 2024 07:01  -  03 Oct 2024 07:00  --------------------------------------------------------  IN: 0 mL / OUT: 100 mL / NET: -100 mL        RADIOLOGY & ADDITIONAL STUDIES:

## 2024-10-03 NOTE — H&P ADULT - PROBLEM SELECTOR PLAN 2
-no evidence of rectal bleed found  -Hb stable at 11.4  -please monitor for active signs of bleeding  - Maintain active T&S, 2 large bore peripheral IVs, transfuse for goal hgb >7 or if symptomatic

## 2024-10-03 NOTE — PROGRESS NOTE ADULT - ASSESSMENT
79F with chronic hypercapnic respiratory failure (no compliant with bipap), diaphragmatic dysfunction, hypertension, arthritis, Neuralgia, osteoporotic compression fractures L5-S1, hyponatremia (HCTZ induced, corrected), goiter, recent admission to [facility] with hypercapnic resp failure 9/2024, presenting from NH with complaint of rectal bleed. As per ED attending no signs of rectal bleeding in the ED. Patient blood gas showed profound elevation in pCO2  prompting ICU consult and initiation of BIPAP. Pt is known to be noncompliant with BIPAP and is known to chronically retain CO2. ABG pH and CO2 improved on BIPAP and determined that patient did not require ICU level of care. Admitted to medicine for continued management of acute on chronic hypercarbic respiratory failure.   Patient admitted to the medicine floor for further management. Hypercapnic with CO2 of 99>>71>>76 after BiPAP. Patient has hx of non-compliance. Also bloody stool this AM. GI consulted No plans for colonoscopy at this time given patient's respiratory status and stable H&H. Repeat H&H. No further episodes of melena. Stool count. NPO. Failed bedside RN swallow evel. Speech eval pending. Critical care medicine consulted for hypercapnia however given that Co2 improved and chronic retention, CC team will hold off on accepting patient at this time.

## 2024-10-03 NOTE — PROGRESS NOTE ADULT - SUBJECTIVE AND OBJECTIVE BOX
NP note    HPI:  79F with chronic hypercapnic respiratory failure (no compliant with bipap), diaphragmatic dysfunction, hypertension, arthritis, Neuralgia, osteoporotic compression fractures L5-S1, hyponatremia (HCTZ induced, corrected), goiter, recent admission to [facility] with hypercapnic resp failure 9/2024, presenting from NH with complaint of rectal bleed. As per ED attending no signs of rectal bleeding in the ED. Patient blood gas showed profound elevation in pCO2  prompting ICU consult and initiation of BIPAP. Pt is known to be noncompliant with BIPAP and is known to chronically retain CO2. ABG pH and CO2 improved on BIPAP and determined that patient did not require ICU level of care. Pt was seen at bedside, tolerating BIPAP, appears comfortable, but lethargic. Observed frequently pulling off BIPAP mask and desaturating to low 80s. No acute complaints other than feeling cold.  (03 Oct 2024 01:34)      Patient is a 79y old  Female who presents with a chief complaint of Rectal bleed (03 Oct 2024 14:15)      INTERVAL HPI/OVERNIGHT EVENTS:  T(C): 36.6 (10-03-24 @ 12:43), Max: 37.2 (10-02-24 @ 23:19)  HR: 65 (10-03-24 @ 12:43) (65 - 95)  BP: 130/61 (10-03-24 @ 12:43) (122/74 - 143/77)  RR: 19 (10-03-24 @ 12:43) (18 - 22)  SpO2: 97% (10-03-24 @ 12:43) (96% - 100%)  Wt(kg): --  I&O's Summary    02 Oct 2024 07:01  -  03 Oct 2024 07:00  --------------------------------------------------------  IN: 0 mL / OUT: 100 mL / NET: -100 mL        REVIEW OF SYSTEMS: denies fever, chills, SOB, palpitations, chest pain, abdominal pain, nausea, vomitting, diarrhea, constipation, dizziness    MEDICATIONS  (STANDING):  bisacodyl 5 milliGRAM(s) Oral once  carvedilol 25 milliGRAM(s) Oral every 12 hours  dextrose 5% + sodium chloride 0.9%. 1000 milliLiter(s) (50 mL/Hr) IV Continuous <Continuous>  pantoprazole  Injectable 40 milliGRAM(s) IV Push two times a day  polyethylene glycol 3350 17 Gram(s) Oral two times a day  rosuvastatin 5 milliGRAM(s) Oral at bedtime  senna 2 Tablet(s) Oral at bedtime  sodium chloride 1 Gram(s) Oral three times a day    MEDICATIONS  (PRN):      PHYSICAL EXAM:  GENERAL: NAD, well-groomed, well-developed  HEAD:  Atraumatic, Normocephalic  EYES: EOMI, PERRLA, conjunctiva and sclera clear  ENMT: No tonsillar erythema, exudates, or enlargement; Moist mucous membranes, Good dentition, No lesions  NECK: Supple, No JVD, Normal thyroid  NERVOUS SYSTEM:  Alert & Oriented X3, Good concentration; Motor Strength 5/5 B/L upper and lower extremities; DTRs 2+ intact and symmetric  CHEST/LUNG: Clear to percussion bilaterally; No rales, rhonchi, wheezing, or rubs  HEART: Regular rate and rhythm; No murmurs, rubs, or gallops  ABDOMEN: Soft, Nontender, Nondistended; Bowel sounds present  EXTREMITIES:  2+ Peripheral Pulses, No clubbing, cyanosis, or edema  LYMPH: No lymphadenopathy noted  SKIN: No rashes or lesions  LABS:                        11.1   4.67  )-----------( 229      ( 03 Oct 2024 07:35 )             35.9     10-03    138  |  97  |  12  ----------------------------<  99  3.7   |  39[H]  |  0.31[L]    Ca    8.9      03 Oct 2024 07:35  Phos  3.3     10-03  Mg     1.8     10-03    TPro  6.3  /  Alb  2.8[L]  /  TBili  0.3  /  DBili  x   /  AST  14  /  ALT  14  /  AlkPhos  55  10-03    PT/INR - ( 02 Oct 2024 18:30 )   PT: 11.7 sec;   INR: 1.00 ratio         PTT - ( 02 Oct 2024 18:30 )  PTT:28.3 sec  Urinalysis Basic - ( 03 Oct 2024 07:35 )    Color: x / Appearance: x / SG: x / pH: x  Gluc: 99 mg/dL / Ketone: x  / Bili: x / Urobili: x   Blood: x / Protein: x / Nitrite: x   Leuk Esterase: x / RBC: x / WBC x   Sq Epi: x / Non Sq Epi: x / Bacteria: x      CAPILLARY BLOOD GLUCOSE      POCT Blood Glucose.: 90 mg/dL (03 Oct 2024 08:48)      ABG - ( 03 Oct 2024 09:49 )  pH, Arterial: 7.39  pH, Blood: x     /  pCO2: 76    /  pO2: 135   / HCO3: 46    / Base Excess: 16.9  /  SaO2: 100               Urinalysis Basic - ( 03 Oct 2024 07:35 )    Color: x / Appearance: x / SG: x / pH: x  Gluc: 99 mg/dL / Ketone: x  / Bili: x / Urobili: x   Blood: x / Protein: x / Nitrite: x   Leuk Esterase: x / RBC: x / WBC x   Sq Epi: x / Non Sq Epi: x / Bacteria: x     NP note    HPI:  79F with chronic hypercapnic respiratory failure (no compliant with bipap), diaphragmatic dysfunction, hypertension, arthritis, Neuralgia, osteoporotic compression fractures L5-S1, hyponatremia (HCTZ induced, corrected), goiter, recent admission to [facility] with hypercapnic resp failure 9/2024, presenting from NH with complaint of rectal bleed. As per ED attending no signs of rectal bleeding in the ED. Patient blood gas showed profound elevation in pCO2  prompting ICU consult and initiation of BIPAP. Pt is known to be noncompliant with BIPAP and is known to chronically retain CO2. ABG pH and CO2 improved on BIPAP and determined that patient did not require ICU level of care. Pt was seen at bedside, tolerating BIPAP, appears comfortable, but lethargic. Observed frequently pulling off BIPAP mask and desaturating to low 80s. No acute complaints other than feeling cold.  (03 Oct 2024 01:34)      Patient is a 79y old  Female who presents with a chief complaint of Rectal bleed (03 Oct 2024 14:15)      INTERVAL HPI/OVERNIGHT EVENTS:  T(C): 36.6 (10-03-24 @ 12:43), Max: 37.2 (10-02-24 @ 23:19)  HR: 65 (10-03-24 @ 12:43) (65 - 95)  BP: 130/61 (10-03-24 @ 12:43) (122/74 - 143/77)  RR: 19 (10-03-24 @ 12:43) (18 - 22)  SpO2: 97% (10-03-24 @ 12:43) (96% - 100%)  Wt(kg): --  I&O's Summary    02 Oct 2024 07:01  -  03 Oct 2024 07:00  --------------------------------------------------------  IN: 0 mL / OUT: 100 mL / NET: -100 mL        REVIEW OF SYSTEMS: Unattainable.     MEDICATIONS  (STANDING):  bisacodyl 5 milliGRAM(s) Oral once  carvedilol 25 milliGRAM(s) Oral every 12 hours  dextrose 5% + sodium chloride 0.9%. 1000 milliLiter(s) (50 mL/Hr) IV Continuous <Continuous>  pantoprazole  Injectable 40 milliGRAM(s) IV Push two times a day  polyethylene glycol 3350 17 Gram(s) Oral two times a day  rosuvastatin 5 milliGRAM(s) Oral at bedtime  senna 2 Tablet(s) Oral at bedtime  sodium chloride 1 Gram(s) Oral three times a day    MEDICATIONS  (PRN):      PHYSICAL EXAM:  GENERAL: NAD, ill appearing  HEAD:  Atraumatic, Normocephalic  EYES:  PERRLA, conjunctiva and sclera clear  ENMT: Moist mucous membranes  NECK: Supple  NERVOUS SYSTEM:  Lethargic, confused.  Poor concentration; Does not follow commands.   CHEST/LUNG: Diminished bilaterally; No rales, rhonchi, wheezing, or rubs. On 2L NC/BiPAP.   HEART: Regular rate and rhythm; No murmurs, rubs, or gallops.   ABDOMEN: Soft, Nontender, Nondistended; Bowel sounds hypoactive all quads.   EXTREMITIES:  2+ Peripheral Pulses, No clubbing, cyanosis, +2 pitting edema of lower exts.   SKIN: No rashes     LABS:                        11.1   4.67  )-----------( 229      ( 03 Oct 2024 07:35 )             35.9     10-03    138  |  97  |  12  ----------------------------<  99  3.7   |  39[H]  |  0.31[L]    Ca    8.9      03 Oct 2024 07:35  Phos  3.3     10-03  Mg     1.8     10-03    TPro  6.3  /  Alb  2.8[L]  /  TBili  0.3  /  DBili  x   /  AST  14  /  ALT  14  /  AlkPhos  55  10-03    PT/INR - ( 02 Oct 2024 18:30 )   PT: 11.7 sec;   INR: 1.00 ratio         PTT - ( 02 Oct 2024 18:30 )  PTT:28.3 sec  Urinalysis Basic - ( 03 Oct 2024 07:35 )    Color: x / Appearance: x / SG: x / pH: x  Gluc: 99 mg/dL / Ketone: x  / Bili: x / Urobili: x   Blood: x / Protein: x / Nitrite: x   Leuk Esterase: x / RBC: x / WBC x   Sq Epi: x / Non Sq Epi: x / Bacteria: x    CAPILLARY BLOOD GLUCOSE  POCT Blood Glucose.: 90 mg/dL (03 Oct 2024 08:48)    ABG - ( 03 Oct 2024 09:49 )  pH, Arterial: 7.39  pH, Blood: x     /  pCO2: 76    /  pO2: 135   / HCO3: 46    / Base Excess: 16.9  /  SaO2: 100         Urinalysis Basic - ( 03 Oct 2024 07:35 )    Color: x / Appearance: x / SG: x / pH: x  Gluc: 99 mg/dL / Ketone: x  / Bili: x / Urobili: x   Blood: x / Protein: x / Nitrite: x   Leuk Esterase: x / RBC: x / WBC x   Sq Epi: x / Non Sq Epi: x / Bacteria: x    < from: CT Angio Abdomen and Pelvis w/ IV Cont (10.02.24 @ 21:29) >  ACC: [id number] EXAM:  CT ANGIO ABD PELV (W)AW IC   ORDERED BY: RADHA SAWANT     ACC: [id number] EXAM:  CT CHEST IC   ORDERED BY: RADHA SAWANT     PROCEDURE DATE:  10/02/2024          INTERPRETATION:  CLINICAL INFORMATION: Shortness of breath. GI bleed.    COMPARISON: CTA chest 9/14/2024.    CONTRAST/COMPLICATIONS:  IV Contrast: Omnipaque 350 (accession [id number]), IV contrast documented   in unlinked concurrent exam (accession [id number])  90 cc administered   10   cc discarded  Oral Contrast: NONE  Complications: None reported at time of study completion    PROCEDURE:  CT of the Chest, Abdomen and Pelvis was performed.  Precontrast, Arterial and Delayed phases were acquired though the abdomen.  Sagittal and coronal reformats were performed.    FINDINGS:  CHEST:  LUNGS AND LARGE AIRWAYS: Patent central airways. Stable biapical apparent   midline consolidative opacities. Stable atelectasis of the basilar   segments of the left lower lobe.. Mild right basilar dependent   atelectasis.  PLEURA:Trace bilateral pleural effusions. Elevated left diaphragm.  VESSELS: Aortic calcifications. Coronary artery calcifications.  HEART: Cardiomegaly. No pericardial effusion.  MEDIASTINUM AND LESLIE: No lymphadenopathy.  CHEST WALL AND LOWER NECK: Stable diffuse thyromegaly..    ABDOMEN AND PELVIS:  LIVER: Within normal limits.  BILE DUCTS: Normal caliber.  GALLBLADDER: Within normal limits.  SPLEEN: Within normal limits.  PANCREAS: Within normal limits.  ADRENALS: Within normal limits.  KIDNEYS/URETERS: Subcentimeter indeterminate hypodense left renal lesion   is too small to characterize. Symmetric renal enhancement without   hydronephrosis. BLADDER: Distended to above the level of the umbilicus..  REPRODUCTIVE ORGANS: Fibroid uterus.    BOWEL: Rectal distention with stool. No evidence for active GI bleed. No   bowel obstruction or evidence of active bowel inflammation. Appendix is   not visualized. No evidence of inflammation in the pericecal region.  PERITONEUM/RETROPERITONEUM: Mild presacral edema..  VESSELS: Within normal limits.  LYMPH NODES: No lymphadenopathy.  ABDOMINAL WALL: Within normal limits.  BONES: DJD of the glenohumeral joints, left greater than right.   Degenerative changes of the spine. L2 kyphoplasty with cement material   noted in the canal at this level, unchanged. Additional multilevel   compression deformities are stable involving mid thoracic to distal   lumbar vertebral bodies..    IMPRESSION:  Stable left basilar atelectasis. Superimposed pneumonia is not excluded.   Stable biapical consolidative opacities that may represent scarring.   Consider short interval follow-up CT.    No evidence for active GI bleed or active bowel inflammation.    Large stool burden in the rectum.        --- End of Report ---      DA ALVARES MD; Attending Radiologist  This document has been electronically signed. Oct  2 2024 10:10PM    < end of copied text >

## 2024-10-03 NOTE — DIETITIAN INITIAL EVALUATION ADULT - PERTINENT LABORATORY DATA
10-03    138  |  97  |  12  ----------------------------<  99  3.7   |  39[H]  |  0.31[L]    Ca    8.9      03 Oct 2024 07:35  Phos  3.3     10-03  Mg     1.8     10-03    TPro  6.3  /  Alb  2.8[L]  /  TBili  0.3  /  DBili  x   /  AST  14  /  ALT  14  /  AlkPhos  55  10-03  POCT Blood Glucose.: 90 mg/dL (10-03-24 @ 08:48)

## 2024-10-03 NOTE — DIETITIAN INITIAL EVALUATION ADULT - PERTINENT MEDS FT
MEDICATIONS  (STANDING):  carvedilol 25 milliGRAM(s) Oral every 12 hours  dextrose 5% + sodium chloride 0.9%. 1000 milliLiter(s) (50 mL/Hr) IV Continuous <Continuous>  pantoprazole  Injectable 40 milliGRAM(s) IV Push two times a day  rosuvastatin 5 milliGRAM(s) Oral at bedtime  sodium chloride 1 Gram(s) Oral three times a day    MEDICATIONS  (PRN):

## 2024-10-03 NOTE — DIETITIAN INITIAL EVALUATION ADULT - WEIGHT (LBS)
Lesia Rodriguez  6/1/2023  Tyra Grijalva   Laparoscopic Sleeve Gastrectomy  1.5 Year Post-Operative Follow-up       Lesia Rodriguez is a 39 y.o. male 1.5 years post Laparoscopic Sleeve Gastrectomy. He reports no issues, is not having swallowing difficulty, is noncompliant some of the time with the multivitamins and calcium + Vit D. He is meeting fluid recommendations of at least 64 ounces per day and is meeting protein recommendations. Heis not exercising: no regular exercise. Weight=(!) 324 lb (147 kg)  Today's weight represents a total weight loss of 38 pounds since surgery and regained 13 pounds since the last visit. Prior to Admission medications    Medication Sig Start Date End Date Taking? Authorizing Provider   Misc. Devices KIT Dispense 1 blood pressure cuff. 1/27/22  Yes Javan Grullon MD   sertraline (ZOLOFT) 50 MG tablet Take 1 tablet by mouth nightly 12/20/21  Yes Javan Grullon MD   omeprazole (PRILOSEC) 20 MG delayed release capsule Take 1 capsule by mouth 2 times daily (before meals) 11/17/21  Yes Kristine Trejo MD   Multiple Vitamin (MVI, BARIATRIC ADVANTAGE MULTI-FORMULA, CHEW TAB) Take 1 tablet by mouth daily   Yes Historical Provider, MD   Calcium Carbonate (CALCI-CHEW PO) Take 1 tablet by mouth 3 times daily   Yes Historical Provider, MD   Ferrous Sulfate (IRON PO) Take 1 tablet by mouth daily   Yes Historical Provider, MD   Cholecalciferol (VITAMIN D3) 125 MCG (5000 UT) TABS Take 1 tablet by mouth daily   Yes Historical Provider, MD   Misc. Devices MISC Give one Accucheck glucometer monitor or whatever the insurance approves.  9/1/17  Yes Priya Baker MD   LORATADINE PO Take 10 mg by mouth as needed   Yes Historical Provider, MD          Physical exam:   BP (!) 151/88 (Site: Right Lower Arm, Position: Sitting, Cuff Size: Medium Adult)   Pulse 80   Temp 97.7 °F (36.5 °C) (Temporal)   Resp 20   Ht 6' (1.829 m)   Wt (!) 324 lb (147 kg)   BMI 43.94 kg/m²   General appearance: alert, appears stated age and cooperative  Head: Normocephalic, without obvious abnormality, atraumatic  Neck: no adenopathy, no carotid bruit, no JVD, supple, symmetrical, trachea midline and thyroid not enlarged, symmetric, no tenderness/mass/nodules  Lungs: clear to auscultation bilaterally  Heart: regular rate and rhythm  Abdomen: soft, non-tender; bowel sounds normal; no masses,  no organomegaly, no hernias  Extremities: extremities normal, atraumatic, no cyanosis or edema    Assessment: wt regain post Laparoscopic Sleeve Gastrectomy. He does complain of GERD,  does not have sleep apnea,  does not have diabetes,  does have hypertension off medical treatment. Malnutrition, hypoalbuminemia    Plan:  Get back on diet. Continue to eat a high protein, low calorie diet, eat small portions very slowly and chew well before swallowing. Drink plenty of water and fluids. Make sure to use fiber to keep the bowels regular. Try to exercise 7 days per week, maintain adequate variety and balance. Always notify the clinic if you have any medical problems. Follow up in 6 months.  Will check several lab panels      Physician Signature: Electronically signed by Dr. Sachin Navarrete MD 127

## 2024-10-03 NOTE — H&P ADULT - HISTORY OF PRESENT ILLNESS
79F with chronic hypercapnic respiratory failure (no compliant with bipap), diaphragmatic dysfunction, hypertension, arthritis, Neuralgia, osteoporotic compression fractures L5-S1, hyponatremia (HCTZ induced, corrected), goiter, recent admission to [facility] with hypercapnic resp failure 9/2024, presenting from NH with complaint of rectal bleed. As per ED attending no signs of rectal bleeding in the ED. Patient blood gas showed profound elevation in pCO2  prompting ICU consult and initiation of BIPAP. Pt is known to be noncompliant with BIPAP and is known to chronically retain CO2. ABG pH and CO2 improved on BIPAP and determined that patient did not require ICU level of care. Pt was seen at bedside, tolerating BIPAP, appears comfortable. No acute complaints.   79F with chronic hypercapnic respiratory failure (no compliant with bipap), diaphragmatic dysfunction, hypertension, arthritis, Neuralgia, osteoporotic compression fractures L5-S1, hyponatremia (HCTZ induced, corrected), goiter, recent admission to [facility] with hypercapnic resp failure 9/2024, presenting from NH with complaint of rectal bleed. As per ED attending no signs of rectal bleeding in the ED. Patient blood gas showed profound elevation in pCO2  prompting ICU consult and initiation of BIPAP. Pt is known to be noncompliant with BIPAP and is known to chronically retain CO2. ABG pH and CO2 improved on BIPAP and determined that patient did not require ICU level of care. Pt was seen at bedside, tolerating BIPAP, appears comfortable, but lethargic. Observed frequently pulling off BIPAP mask and desaturating to low 80s. No acute complaints other than feeling cold.

## 2024-10-03 NOTE — CONSULT NOTE ADULT - PROBLEM SELECTOR RECOMMENDATION 9
-CTAP noted above  -TRACEY positive bright red blood  - Clear Liquid diet  - Maintain active T&S, transfuse for goal Hgb >7 or if symptomatic  - Trend H/H  - IV Protonix 40mg BID  - Fluid resuscitation/HD stability per primary team  - Avoid NSAIDs and hold anticoagulation (if safe per primary team)  -Bowel regiment in the presentation of constipation. Miralax BID. Senna daily    This note and its recommendations herein are preliminary until such time as cosigned by an attending.

## 2024-10-04 DIAGNOSIS — E87.6 HYPOKALEMIA: ICD-10-CM

## 2024-10-04 LAB
ANION GAP SERPL CALC-SCNC: 2 MMOL/L — LOW (ref 5–17)
ANION GAP SERPL CALC-SCNC: 3 MMOL/L — LOW (ref 5–17)
BASE EXCESS BLDA CALC-SCNC: 16.6 MMOL/L — HIGH (ref -2–3)
BLOOD GAS COMMENTS ARTERIAL: SIGNIFICANT CHANGE UP
BUN SERPL-MCNC: 7 MG/DL — SIGNIFICANT CHANGE UP (ref 7–18)
BUN SERPL-MCNC: 7 MG/DL — SIGNIFICANT CHANGE UP (ref 7–18)
CALCIUM SERPL-MCNC: 8.7 MG/DL — SIGNIFICANT CHANGE UP (ref 8.4–10.5)
CALCIUM SERPL-MCNC: 8.9 MG/DL — SIGNIFICANT CHANGE UP (ref 8.4–10.5)
CHLORIDE SERPL-SCNC: 91 MMOL/L — LOW (ref 96–108)
CHLORIDE SERPL-SCNC: 91 MMOL/L — LOW (ref 96–108)
CO2 SERPL-SCNC: 43 MMOL/L — HIGH (ref 22–31)
CO2 SERPL-SCNC: 43 MMOL/L — HIGH (ref 22–31)
CREAT SERPL-MCNC: 0.28 MG/DL — LOW (ref 0.5–1.3)
CREAT SERPL-MCNC: 0.42 MG/DL — LOW (ref 0.5–1.3)
EGFR: 109 ML/MIN/1.73M2 — SIGNIFICANT CHANGE UP
EGFR: 99 ML/MIN/1.73M2 — SIGNIFICANT CHANGE UP
GLUCOSE BLDC GLUCOMTR-MCNC: 103 MG/DL — HIGH (ref 70–99)
GLUCOSE SERPL-MCNC: 118 MG/DL — HIGH (ref 70–99)
GLUCOSE SERPL-MCNC: 146 MG/DL — HIGH (ref 70–99)
HCO3 BLDA-SCNC: 47 MMOL/L — CRITICAL HIGH (ref 21–28)
HCT VFR BLD CALC: 35.4 % — SIGNIFICANT CHANGE UP (ref 34.5–45)
HGB BLD-MCNC: 10.9 G/DL — LOW (ref 11.5–15.5)
HOROWITZ INDEX BLDA+IHG-RTO: 28 — SIGNIFICANT CHANGE UP
MAGNESIUM SERPL-MCNC: 1.4 MG/DL — LOW (ref 1.6–2.6)
MAGNESIUM SERPL-MCNC: 3 MG/DL — HIGH (ref 1.6–2.6)
MCHC RBC-ENTMCNC: 29.1 PG — SIGNIFICANT CHANGE UP (ref 27–34)
MCHC RBC-ENTMCNC: 30.8 GM/DL — LOW (ref 32–36)
MCV RBC AUTO: 94.7 FL — SIGNIFICANT CHANGE UP (ref 80–100)
MRSA PCR RESULT.: SIGNIFICANT CHANGE UP
NRBC # BLD: 0 /100 WBCS — SIGNIFICANT CHANGE UP (ref 0–0)
PCO2 BLDA: 87 MMHG — CRITICAL HIGH (ref 32–35)
PH BLDA: 7.34 — LOW (ref 7.35–7.45)
PLATELET # BLD AUTO: 239 K/UL — SIGNIFICANT CHANGE UP (ref 150–400)
PO2 BLDA: 160 MMHG — HIGH (ref 83–108)
POTASSIUM SERPL-MCNC: 2.8 MMOL/L — CRITICAL LOW (ref 3.5–5.3)
POTASSIUM SERPL-MCNC: 3.2 MMOL/L — LOW (ref 3.5–5.3)
POTASSIUM SERPL-SCNC: 2.8 MMOL/L — CRITICAL LOW (ref 3.5–5.3)
POTASSIUM SERPL-SCNC: 3.2 MMOL/L — LOW (ref 3.5–5.3)
RBC # BLD: 3.74 M/UL — LOW (ref 3.8–5.2)
RBC # FLD: 13.6 % — SIGNIFICANT CHANGE UP (ref 10.3–14.5)
S AUREUS DNA NOSE QL NAA+PROBE: SIGNIFICANT CHANGE UP
SAO2 % BLDA: 100 % — SIGNIFICANT CHANGE UP
SODIUM SERPL-SCNC: 136 MMOL/L — SIGNIFICANT CHANGE UP (ref 135–145)
SODIUM SERPL-SCNC: 137 MMOL/L — SIGNIFICANT CHANGE UP (ref 135–145)
WBC # BLD: 4.79 K/UL — SIGNIFICANT CHANGE UP (ref 3.8–10.5)
WBC # FLD AUTO: 4.79 K/UL — SIGNIFICANT CHANGE UP (ref 3.8–10.5)

## 2024-10-04 RX ORDER — IPRATROPIUM BROMIDE AND ALBUTEROL SULFATE .5; 3 MG/3ML; MG/3ML
3 SOLUTION RESPIRATORY (INHALATION) EVERY 6 HOURS
Refills: 0 | Status: DISCONTINUED | OUTPATIENT
Start: 2024-10-04 | End: 2024-10-10

## 2024-10-04 RX ORDER — LACTULOSE 10 G/15ML
10 SOLUTION ORAL; RECTAL
Refills: 0 | Status: DISCONTINUED | OUTPATIENT
Start: 2024-10-04 | End: 2024-10-05

## 2024-10-04 RX ORDER — SALINE LAXATIVE 7; 19 G/118ML; G/118ML
1 ENEMA RECTAL ONCE
Refills: 0 | Status: COMPLETED | OUTPATIENT
Start: 2024-10-04 | End: 2024-10-04

## 2024-10-04 RX ORDER — SODIUM CHLORIDE IRRIG SOLUTION 0.9 %
1000 SOLUTION, IRRIGATION IRRIGATION
Refills: 0 | Status: DISCONTINUED | OUTPATIENT
Start: 2024-10-04 | End: 2024-10-05

## 2024-10-04 RX ORDER — MAGNESIUM SULFATE 500 MG/ML
2 VIAL (ML) INJECTION
Refills: 0 | Status: COMPLETED | OUTPATIENT
Start: 2024-10-04 | End: 2024-10-04

## 2024-10-04 RX ADMIN — Medication 25 GRAM(S): at 10:30

## 2024-10-04 RX ADMIN — Medication 100 MILLIEQUIVALENT(S): at 09:20

## 2024-10-04 RX ADMIN — LACTULOSE 10 GRAM(S): 10 SOLUTION ORAL; RECTAL at 18:12

## 2024-10-04 RX ADMIN — Medication 100 MILLIEQUIVALENT(S): at 19:35

## 2024-10-04 RX ADMIN — PANTOPRAZOLE SODIUM 40 MILLIGRAM(S): 40 TABLET, DELAYED RELEASE ORAL at 18:12

## 2024-10-04 RX ADMIN — SALINE LAXATIVE 1 ENEMA: 7; 19 ENEMA RECTAL at 22:11

## 2024-10-04 RX ADMIN — Medication 25 GRAM(S): at 12:20

## 2024-10-04 RX ADMIN — Medication 25 MILLIGRAM(S): at 18:12

## 2024-10-04 RX ADMIN — IPRATROPIUM BROMIDE AND ALBUTEROL SULFATE 3 MILLILITER(S): .5; 3 SOLUTION RESPIRATORY (INHALATION) at 20:05

## 2024-10-04 RX ADMIN — IPRATROPIUM BROMIDE AND ALBUTEROL SULFATE 3 MILLILITER(S): .5; 3 SOLUTION RESPIRATORY (INHALATION) at 17:35

## 2024-10-04 RX ADMIN — PANTOPRAZOLE SODIUM 40 MILLIGRAM(S): 40 TABLET, DELAYED RELEASE ORAL at 05:02

## 2024-10-04 RX ADMIN — FUROSEMIDE 20 MILLIGRAM(S): 10 INJECTION INTRAVENOUS at 05:02

## 2024-10-04 RX ADMIN — SALINE LAXATIVE 1 ENEMA: 7; 19 ENEMA RECTAL at 11:02

## 2024-10-04 RX ADMIN — Medication 50 MILLILITER(S): at 08:03

## 2024-10-04 RX ADMIN — Medication 100 MILLIEQUIVALENT(S): at 20:50

## 2024-10-04 RX ADMIN — Medication 50 MILLILITER(S): at 10:58

## 2024-10-04 RX ADMIN — Medication 100 MILLIEQUIVALENT(S): at 08:03

## 2024-10-04 RX ADMIN — Medication 100 MILLIEQUIVALENT(S): at 07:45

## 2024-10-04 RX ADMIN — LACTULOSE 10 GRAM(S): 10 SOLUTION ORAL; RECTAL at 14:18

## 2024-10-04 NOTE — CHART NOTE - NSCHARTNOTEFT_GEN_A_CORE
EVENT: Pt refusing BiPAP overnight despite importance explained. Maintained on NC     BRIEF HPI: 79F with chronic hypercapnic respiratory failure (no compliant with BiPAP), diaphragmatic dysfunction, hypertension, arthritis, Neuralgia, osteoporotic compression fractures L5-S1, hyponatremia (HCTZ induced, corrected), goiter, recent admission to [facility] with hypercapnic resp failure 9/2024, presenting from NH with complaint of rectal bleed. As per ED attending no signs of rectal bleeding in the ED. Patient blood gas showed profound elevation in pCO2  prompting ICU consult and initiation of BiPAP. Pt is known to be noncompliant with BiPAP and is known to chronically retain CO2. ABG pH and CO2 improved on BiPAP and determined that patient did not require ICU level of care.    Vital Signs Last 24 Hrs  T(C): 36.4 (03 Oct 2024 20:37), Max: 36.8 (03 Oct 2024 04:05)  T(F): 97.5 (03 Oct 2024 20:37), Max: 98.2 (03 Oct 2024 04:05)  HR: 80 (04 Oct 2024 00:32) (65 - 95)  BP: 121/57 (03 Oct 2024 20:37) (121/57 - 143/75)  BP(mean): --  RR: 20 (03 Oct 2024 20:37) (18 - 22)  SpO2: 100% (04 Oct 2024 00:32) (97% - 100%)    Parameters below as of 04 Oct 2024 00:32  Patient On (Oxygen Delivery Method): nasal cannula  O2 Flow (L/min): 2    FOCUSSED PE:  RESP: Diminish lung sounds  NEURO: Easily awaken  CV: S1 S2 regular    LAB      PLAN EVENT: Pt refusing BiPAP overnight despite importance explained. Maintained on NC 2 L    BRIEF HPI: 79F with chronic hypercapnic respiratory failure (no compliant with BiPAP), diaphragmatic dysfunction, hypertension, arthritis, Neuralgia, osteoporotic compression fractures L5-S1, hyponatremia (HCTZ induced, corrected), goiter, recent admission to [facility] with hypercapnic resp failure 9/2024, presenting from NH with complaint of rectal bleed. As per ED attending no signs of rectal bleeding in the ED. Patient blood gas showed profound elevation in pCO2  prompting ICU consult and initiation of BiPAP. Pt is known to be noncompliant with BiPAP and is known to chronically retain CO2. ABG pH and CO2 improved on BiPAP and determined that patient did not require ICU level of care.    Vital Signs Last 24 Hrs  T(C): 36.4 (03 Oct 2024 20:37), Max: 36.8 (03 Oct 2024 04:05)  T(F): 97.5 (03 Oct 2024 20:37), Max: 98.2 (03 Oct 2024 04:05)  HR: 80 (04 Oct 2024 00:32) (65 - 95)  BP: 121/57 (03 Oct 2024 20:37) (121/57 - 143/75)  BP(mean): --  RR: 20 (03 Oct 2024 20:37) (18 - 22)  SpO2: 100% (04 Oct 2024 00:32) (97% - 100%)    Parameters below as of 04 Oct 2024 00:32  Patient On (Oxygen Delivery Method): nasal cannula  O2 Flow (L/min): 2    FOCUSSED PE:  RESP: Diminish lung sounds  NEURO: Easily awaken  CV: S1 S2 regular    LAB  Blood Gas Profile - Arterial in AM (10.04.24 @ 03:30)    pH, Arterial: 7.34    pCO2, Arterial: 87:    pO2, Arterial: 160 mmHg    HCO3, Arterial: 47:     Base Excess, Arterial: 16.6 mmol/L    Oxygen Saturation, Arterial: 100 %    FIO2, Arterial: 28.0    Blood Gas Comments Arterial: nc  2 L   RR    PROBLEM: Hypercapnia due to BiPAP refusal overnight  PLAN  RT will attempt BiPAP with settings as ordered    FOLLOW UP: Pt adherence to therapy

## 2024-10-04 NOTE — GOALS OF CARE CONVERSATION - ADVANCED CARE PLANNING - CONVERSATION DETAILS
Discussed with patient's HCP Mr. bOregon regarding prognosis, imaging results, treatment plan and patient's poor compliance with BiPAP which id needed in order to keep patient's CO2 at an acceptable level. Also explained laboratory results and chronicity of patient's illness and the possibility of deterioration and further hospitalizations. Patient' son is concerned about the care that the patient is receiving at the facility [facility]. Son expressed that he no longer wants patient to return to rehab but instead, to go home with HHA.     Mr. Obregon expressed that he wants patient to remain DNR/DNI with trial of intubation as she was previously.

## 2024-10-04 NOTE — PROGRESS NOTE ADULT - ASSESSMENT
79F with chronic hypercapnic respiratory failure (no compliant with bipap), diaphragmatic dysfunction, hypertension, arthritis, Neuralgia, osteoporotic compression fractures L5-S1, hyponatremia (HCTZ induced, corrected), goiter, recent admission to [facility] with hypercapnic resp failure 9/2024, presenting from NH with complaint of rectal bleed. As per ED attending no signs of rectal bleeding in the ED. Patient blood gas showed profound elevation in pCO2  prompting ICU consult and initiation of BIPAP. Pt is known to be noncompliant with BIPAP and is known to chronically retain CO2. ABG pH and CO2 improved on BIPAP and determined that patient did not require ICU level of care. Admitted to medicine for continued management of acute on chronic hypercarbic respiratory failure.   Patient admitted to the medicine floor for further management. Hypercapnic with CO2 of 99>>71>>76 after BiPAP. Patient has hx of non-compliance. Also bloody stool this AM. GI consulted No plans for colonoscopy at this time given patient's respiratory status and stable H&H. Repeat H&H. No further episodes of melena. Stool count. NPO. Failed bedside RN swallow evel. Speech eval pending. Critical care medicine consulted for hypercapnia however given that Co2 improved and chronic retention, CC team will hold off on accepting patient at this time.   10/04:

## 2024-10-04 NOTE — PROGRESS NOTE ADULT - SUBJECTIVE AND OBJECTIVE BOX
PATIENT SEEN AND EXAMINED BY CAITLYN TOTH M.D. ON :- 10/4/24  DATE OF SERVICE:      10/4/24       Interim events noted,Labs ,Radiological studies and Cardiology tests reviewed .    Patient is a 79y old  Female who presents with a chief complaint of Acute on chronic hypercarbic respiratory failure and rectal bleed (04 Oct 2024 07:24)      HPI:  79F with chronic hypercapnic respiratory failure (no compliant with bipap), diaphragmatic dysfunction, hypertension, arthritis, Neuralgia, osteoporotic compression fractures L5-S1, hyponatremia (HCTZ induced, corrected), goiter, recent admission to [facility] with hypercapnic resp failure 9/2024, presenting from NH with complaint of rectal bleed. As per ED attending no signs of rectal bleeding in the ED. Patient blood gas showed profound elevation in pCO2  prompting ICU consult and initiation of BIPAP. Pt is known to be noncompliant with BIPAP and is known to chronically retain CO2. ABG pH and CO2 improved on BIPAP and determined that patient did not require ICU level of care. Pt was seen at bedside, tolerating BIPAP, appears comfortable, but lethargic. Observed frequently pulling off BIPAP mask and desaturating to low 80s. No acute complaints other than feeling cold.  (03 Oct 2024 01:34)      PAST MEDICAL & SURGICAL HISTORY:  HTN (hypertension)      Lumbar neuralgia      Hyponatremia      Arthritis          PREVIOUS DIAGNOSTIC TESTING:      ECHO  FINDINGS:    STRESS  FINDINGS:    CATHETERIZATION  FINDINGS:    MEDICATIONS  (STANDING):  albuterol/ipratropium for Nebulization 3 milliLiter(s) Nebulizer every 6 hours  carvedilol 25 milliGRAM(s) Oral every 12 hours  dextrose 5% + sodium chloride 0.9%. 1000 milliLiter(s) (50 mL/Hr) IV Continuous <Continuous>  lactulose Syrup 10 Gram(s) Oral two times a day  pantoprazole  Injectable 40 milliGRAM(s) IV Push two times a day    MEDICATIONS  (PRN):      FAMILY HISTORY:      SOCIAL HISTORY:    CIGARETTES:    ALCOHOL:    REVIEW OF SYSTEMS:  CONSTITUTIONAL: No fever, weight loss, or fatigue  EYES: No eye pain, visual disturbances, or discharge  ENMT:  No difficulty hearing, tinnitus, vertigo; No sinus or throat pain  NECK: No pain or stiffness  RESPIRATORY: No cough, wheezing, chills or hemoptysis; No shortness of breath  CARDIOVASCULAR: No chest pain, palpitations, dizziness, or leg swelling  GASTROINTESTINAL: No abdominal or epigastric pain. No nausea, vomiting, or hematemesis; No diarrhea or constipation. No melena or hematochezia.  GENITOURINARY: No dysuria, frequency, hematuria, or incontinence  NEUROLOGICAL: No headaches, memory loss, loss of strength, numbness, or tremors  SKIN: No itching, burning, rashes, or lesions   LYMPH NODES: No enlarged glands  ENDOCRINE: No heat or cold intolerance; No hair loss  MUSCULOSKELETAL: No joint pain or swelling; No muscle, back, or extremity pain  PSYCHIATRIC: No depression, anxiety, mood swings, or difficulty sleeping  HEME/LYMPH: No easy bruising, or bleeding gums  ALLERY AND IMMUNOLOGIC: No hives or eczema    Vital Signs Last 24 Hrs  T(C): 36.4 (04 Oct 2024 20:32), Max: 36.7 (04 Oct 2024 04:46)  T(F): 97.6 (04 Oct 2024 20:32), Max: 98 (04 Oct 2024 04:46)  HR: 75 (04 Oct 2024 20:32) (72 - 90)  BP: 122/55 (04 Oct 2024 20:32) (122/55 - 149/85)  BP(mean): 72 (04 Oct 2024 20:32) (72 - 72)  RR: 18 (04 Oct 2024 20:32) (18 - 18)  SpO2: 100% (04 Oct 2024 20:32) (96% - 100%)    Parameters below as of 04 Oct 2024 20:32  Patient On (Oxygen Delivery Method): nasal cannula  O2 Flow (L/min): 2        PHYSICAL EXAM:  GENERAL: NAD, well-groomed, well-developed  HEAD:  Atraumatic, Normocephalic  EYES: EOMI, PERRLA, conjunctiva and sclera clear  ENMT: No tonsillar erythema, exudates, or enlargement; Moist mucous membranes, Good dentition, No lesions  NECK: Supple, No JVD, Normal thyroid  NERVOUS SYSTEM:  Alert & Oriented X3, Good concentration; Motor Strength 5/5 B/L upper and lower extremities; DTRs 2+ intact and symmetric  CHEST/LUNG: Clear to percussion bilaterally; No rales, rhonchi, wheezing, or rubs  HEART: Regular rate and rhythm; No murmurs, rubs, or gallops  ABDOMEN: Soft, Nontender, Nondistended; Bowel sounds present  EXTREMITIES:  2+ Peripheral Pulses, No clubbing, cyanosis, or edema  LYMPH: No lymphadenopathy noted  SKIN: No rashes or lesions      INTERPRETATION OF TELEMETRY:    ECG:    KAMERON:     LABS:                        10.9   4.79  )-----------( 239      ( 04 Oct 2024 06:03 )             35.4     10-04    136  |  91[L]  |  7   ----------------------------<  146[H]  3.2[L]   |  43[H]  |  0.42[L]    Ca    8.7      04 Oct 2024 16:19  Phos  3.3     10-03  Mg     3.0     10-04    TPro  6.3  /  Alb  2.8[L]  /  TBili  0.3  /  DBili  x   /  AST  14  /  ALT  14  /  AlkPhos  55  10-03          Urinalysis Basic - ( 04 Oct 2024 16:19 )    Color: x / Appearance: x / SG: x / pH: x  Gluc: 146 mg/dL / Ketone: x  / Bili: x / Urobili: x   Blood: x / Protein: x / Nitrite: x   Leuk Esterase: x / RBC: x / WBC x   Sq Epi: x / Non Sq Epi: x / Bacteria: x      Lipid Panel:   I&O's Summary    04 Oct 2024 07:01  -  04 Oct 2024 22:23  --------------------------------------------------------  IN: 0 mL / OUT: 400 mL / NET: -400 mL        RADIOLOGY & ADDITIONAL STUDIES:    CONCLUSIONS:      1. Left ventricular cavity is normal in size. Left ventricular wall thickness is normal. Left ventricular systolic function is normal with an ejection fraction of 58 % by Rod's method of disks. There are no regional wall motion abnormalities seen.   2. There is mild (grade 1) left ventricular diastolic dysfunction.   3. Normal right ventricular cavity size, with normal wall thickness, and normal right ventricular systolic function. Tricuspid annular plane systolic excursion (TAPSE) is 2.3 cm (normal >=1.7 cm).   4. Mild mitral regurgitation.   5. Normal left and right atrial size.   6. Mild tricuspid regurgitation.   7. Estimated pulmonary artery systolic pressure is 37 mmHg, consistent with mild pulmonary hypertension.   8. Mild pulmonic regurgitation.   9. There is calcification of the mitral valve annulus.  10. There is normal LV mass and concentric remodeling.  11. No pericardial effusion seen.  12. No prior echocardiogram is available for comparison.

## 2024-10-04 NOTE — SWALLOW BEDSIDE ASSESSMENT ADULT - COMMENTS
Pt is AA+Ox1, AA+Ox1, confused, confabulatory, received OOB, seated upright in chair for exam. LOCO Carrillo provided translation in [place]. Oral residue cleared with liquid wash.

## 2024-10-04 NOTE — SWALLOW BEDSIDE ASSESSMENT ADULT - SWALLOW EVAL: DIAGNOSIS
Pt p/w adequate oral & pharygneal phases of chew & swallow for tolerating soft chewable diet: Good labial seal, Slow but functional bolus manipulation, timely bolus propulsion; intact oropharyngeal swallow with no overt s/s of laryngeal penetration or aspiration at this exam.

## 2024-10-04 NOTE — SWALLOW BEDSIDE ASSESSMENT ADULT - ORAL PHASE
Stasis in anterior sulcus Decreased anterior-posterior movement of the bolus/Stasis in anterior sulcus/Lingual stasis Within functional limits

## 2024-10-04 NOTE — SWALLOW BEDSIDE ASSESSMENT ADULT - ORAL PREPARATORY PHASE
Decreased mastication ability weak lip seal; weak intraoral pressure observed AEB anterior spillage/Anterior loss of bolus/Bolus falls into anterior sulcus Within functional limits

## 2024-10-04 NOTE — PROGRESS NOTE ADULT - SUBJECTIVE AND OBJECTIVE BOX
NP note    HPI:  79F with chronic hypercapnic respiratory failure (no compliant with bipap), diaphragmatic dysfunction, hypertension, arthritis, Neuralgia, osteoporotic compression fractures L5-S1, hyponatremia (HCTZ induced, corrected), goiter, recent admission to [facility] with hypercapnic resp failure 9/2024, presenting from NH with complaint of rectal bleed. As per ED attending no signs of rectal bleeding in the ED. Patient blood gas showed profound elevation in pCO2  prompting ICU consult and initiation of BIPAP. Pt is known to be noncompliant with BIPAP and is known to chronically retain CO2. ABG pH and CO2 improved on BIPAP and determined that patient did not require ICU level of care. Pt was seen at bedside, tolerating BIPAP, appears comfortable, but lethargic. Observed frequently pulling off BIPAP mask and desaturating to low 80s. No acute complaints other than feeling cold.  (03 Oct 2024 01:34)      Patient is a 79y old  Female who presents with a chief complaint of Rectal bleed and hypercapnia (03 Oct 2024 14:39)      INTERVAL HPI/OVERNIGHT EVENTS:  T(C): 36.7 (10-04-24 @ 04:46), Max: 36.7 (10-04-24 @ 04:46)  HR: 90 (10-04-24 @ 04:46) (65 - 95)  BP: 132/81 (10-04-24 @ 04:46) (121/57 - 138/73)  RR: 18 (10-04-24 @ 04:46) (18 - 20)  SpO2: 100% (10-04-24 @ 04:46) (97% - 100%)  Wt(kg): --  I&O's Summary      REVIEW OF SYSTEMS: Unatainable    MEDICATIONS  (STANDING):  carvedilol 25 milliGRAM(s) Oral every 12 hours  dextrose 5% + sodium chloride 0.9%. 1000 milliLiter(s) (50 mL/Hr) IV Continuous <Continuous>  pantoprazole  Injectable 40 milliGRAM(s) IV Push two times a day  potassium chloride  10 mEq/100 mL IVPB 10 milliEquivalent(s) IV Intermittent every 1 hour    MEDICATIONS  (PRN):  albuterol/ipratropium for Nebulization 3 milliLiter(s) Nebulizer every 6 hours PRN Shortness of Breath and/or Wheezing      PHYSICAL EXAM:  GENERAL: NAD  HEAD:  Atraumatic, Normocephalic  EYES:  conjunctiva and sclera clear  ENMT:   NECK: Supple, No JVD, Normal thyroid  NERVOUS SYSTEM:  CHEST/LUNG:   HEART: Regular rate and rhythm; No murmurs, rubs, or gallops  ABDOMEN: Soft, Nontender, Nondistended; Bowel sounds present  EXTREMITIES:  2+ Peripheral Pulses, No clubbing, cyanosis, or edema  SKIN: No rashes or lesions    LABS:                        10.9   4.79  )-----------( 239      ( 04 Oct 2024 06:03 )             35.4     10-04    137  |  91[L]  |  7   ----------------------------<  118[H]  2.8[LL]   |  43[H]  |  0.28[L]    Ca    8.9      04 Oct 2024 06:03  Phos  3.3     10-03  Mg     1.8     10-03    TPro  6.3  /  Alb  2.8[L]  /  TBili  0.3  /  DBili  x   /  AST  14  /  ALT  14  /  AlkPhos  55  10-03    PT/INR - ( 02 Oct 2024 18:30 )   PT: 11.7 sec;   INR: 1.00 ratio         PTT - ( 02 Oct 2024 18:30 )  PTT:28.3 sec  Urinalysis Basic - ( 04 Oct 2024 06:03 )    Color: x / Appearance: x / SG: x / pH: x  Gluc: 118 mg/dL / Ketone: x  / Bili: x / Urobili: x   Blood: x / Protein: x / Nitrite: x   Leuk Esterase: x / RBC: x / WBC x   Sq Epi: x / Non Sq Epi: x / Bacteria: x      CAPILLARY BLOOD GLUCOSE  POCT Blood Glucose.: 103 mg/dL (04 Oct 2024 00:00)  POCT Blood Glucose.: 84 mg/dL (03 Oct 2024 16:40)  POCT Blood Glucose.: 90 mg/dL (03 Oct 2024 08:48)      ABG - ( 04 Oct 2024 03:30 )  pH, Arterial: 7.34  pH, Blood: x     /  pCO2: 87    /  pO2: 160   / HCO3: 47    / Base Excess: 16.6  /  SaO2: 100         Urinalysis Basic - ( 04 Oct 2024 06:03 )  Color: x / Appearance: x / SG: x / pH: x  Gluc: 118 mg/dL / Ketone: x  / Bili: x / Urobili: x   Blood: x / Protein: x / Nitrite: x   Leuk Esterase: x / RBC: x / WBC x   Sq Epi: x / Non Sq Epi: x / Bacteria: x

## 2024-10-04 NOTE — PROGRESS NOTE ADULT - SUBJECTIVE AND OBJECTIVE BOX
Time of Visit:  Patient seen and examined. pat is sitting in chair     MEDICATIONS  (STANDING):  albuterol/ipratropium for Nebulization 3 milliLiter(s) Nebulizer every 6 hours  carvedilol 25 milliGRAM(s) Oral every 12 hours  dextrose 5% + sodium chloride 0.9%. 1000 milliLiter(s) (50 mL/Hr) IV Continuous <Continuous>  lactulose Syrup 10 Gram(s) Oral two times a day  pantoprazole  Injectable 40 milliGRAM(s) IV Push two times a day  potassium chloride  10 mEq/100 mL IVPB 10 milliEquivalent(s) IV Intermittent every 1 hour  saline laxative (FLEET) Rectal Enema 1 Enema Rectal once      MEDICATIONS  (PRN):       Medications up to date at time of exam.      PHYSICAL EXAMINATION:  Patient has no new complaints.  GENERAL: The patient  in no apparent distress.     Vital Signs Last 24 Hrs  T(C): 36.6 (04 Oct 2024 13:52), Max: 36.7 (04 Oct 2024 04:46)  T(F): 97.8 (04 Oct 2024 13:52), Max: 98 (04 Oct 2024 04:46)  HR: 76 (04 Oct 2024 14:20) (72 - 90)  BP: 144/78 (04 Oct 2024 14:20) (121/57 - 149/85)  BP(mean): --  RR: 18 (04 Oct 2024 13:52) (18 - 20)  SpO2: 97% (04 Oct 2024 14:20) (96% - 100%)    Parameters below as of 04 Oct 2024 14:20  Patient On (Oxygen Delivery Method): nasal cannula       (if applicable)    Chest Tube (if applicable)    HEENT: Head is normocephalic and atraumatic. Extraocular muscles are intact. Mucous membranes are moist.     NECK: Supple, no palpable adenopathy.    LUNGS: Fair bilateral air entry   no wheezing, rales, or rhonchi.    HEART: Regular rate and rhythm without murmur.    ABDOMEN: Soft, nontender, and nondistended.  No hepatosplenomegaly is noted.    : No painful voiding, no pelvic pain    EXTREMITIES: Without any cyanosis, clubbing, rash, lesions or edema.    NEUROLOGIC: Awake,     SKIN: Warm, dry, good turgor.      LABS:                        10.9   4.79  )-----------( 239      ( 04 Oct 2024 06:03 )             35.4     10-04    136  |  91[L]  |  7   ----------------------------<  146[H]  3.2[L]   |  43[H]  |  0.42[L]    Ca    8.7      04 Oct 2024 16:19  Phos  3.3     10-03  Mg     3.0     10-04    TPro  6.3  /  Alb  2.8[L]  /  TBili  0.3  /  DBili  x   /  AST  14  /  ALT  14  /  AlkPhos  55  10-03      Urinalysis Basic - ( 04 Oct 2024 16:19 )    Color: x / Appearance: x / SG: x / pH: x  Gluc: 146 mg/dL / Ketone: x  / Bili: x / Urobili: x   Blood: x / Protein: x / Nitrite: x   Leuk Esterase: x / RBC: x / WBC x   Sq Epi: x / Non Sq Epi: x / Bacteria: x      ABG - ( 04 Oct 2024 03:30 )  pH, Arterial: 7.34  pH, Blood: x     /  pCO2: 87    /  pO2: 160   / HCO3: 47    / Base Excess: 16.6  /  SaO2: 100       MICROBIOLOGY: (if applicable)    RADIOLOGY & ADDITIONAL STUDIES:  EKG:   CXR:  ECHO:    IMPRESSION: 79y Female PAST MEDICAL & SURGICAL HISTORY:  HTN (hypertension)      Lumbar neuralgia      Hyponatremia      Arthritis       p/w       IMP: This is an 79 yr old woman  with chronic hypercapnic respiratory failure (no compliant with bipap), diaphragmatic dysfunction, hypertension, arthritis, Neuralgia, osteoporotic compression fractures L5-S1, hyponatremia (HCTZ induced, corrected), goiter, recent admission to [facility] with hypercapnic resp failure 9/2024, presenting from NH.  ICU consulted for hypercapnic respiratory failure. Patient is comfortable on Bipap. Has history of non compliance. Pat with chronic hypercapnic resp failure requiring BIPAP    - Continue BiPaP as tolerate   - Rectal bleed with stable H/H   - PPI   - No anticoag   - No sedation   - Continue duoneb q6h   - Add Advair 250 /50 Q12h   - OOB to chair

## 2024-10-04 NOTE — PROGRESS NOTE ADULT - ASSESSMENT
79F with chronic hypercapnic respiratory failure (no compliant with bipap), diaphragmatic dysfunction, hypertension, arthritis, Neuralgia, osteoporotic compression fractures L5-S1, hyponatremia (HCTZ induced, corrected), goiter, [facility], presenting from NH with complaint of rectal bleed. As per ED attending no signs of rectal bleeding in the ED. Patient blood gas showed profound elevation in pCO2  prompting ICU consult and initiation of BIPAP. Pt is known to be noncompliant with BIPAP and is known to chronically retain CO2. ABG pH and CO2 improved on BIPAP and determined that patient did not require ICU level of care. Admitted to medicine for continued management of acute on chronic hypercarbic respiratory failure.

## 2024-10-05 ENCOUNTER — TRANSCRIPTION ENCOUNTER (OUTPATIENT)
Age: 79
End: 2024-10-05

## 2024-10-05 LAB
ANION GAP SERPL CALC-SCNC: 3 MMOL/L — LOW (ref 5–17)
BUN SERPL-MCNC: 6 MG/DL — LOW (ref 7–18)
CALCIUM SERPL-MCNC: 8.4 MG/DL — SIGNIFICANT CHANGE UP (ref 8.4–10.5)
CHLORIDE SERPL-SCNC: 96 MMOL/L — SIGNIFICANT CHANGE UP (ref 96–108)
CO2 SERPL-SCNC: 43 MMOL/L — HIGH (ref 22–31)
CREAT SERPL-MCNC: 0.31 MG/DL — LOW (ref 0.5–1.3)
EGFR: 106 ML/MIN/1.73M2 — SIGNIFICANT CHANGE UP
GLUCOSE SERPL-MCNC: 129 MG/DL — HIGH (ref 70–99)
HCT VFR BLD CALC: 35.6 % — SIGNIFICANT CHANGE UP (ref 34.5–45)
HGB BLD-MCNC: 11.1 G/DL — LOW (ref 11.5–15.5)
MAGNESIUM SERPL-MCNC: 2.3 MG/DL — SIGNIFICANT CHANGE UP (ref 1.6–2.6)
MCHC RBC-ENTMCNC: 29.5 PG — SIGNIFICANT CHANGE UP (ref 27–34)
MCHC RBC-ENTMCNC: 31.2 GM/DL — LOW (ref 32–36)
MCV RBC AUTO: 94.7 FL — SIGNIFICANT CHANGE UP (ref 80–100)
NRBC # BLD: 0 /100 WBCS — SIGNIFICANT CHANGE UP (ref 0–0)
PHOSPHATE SERPL-MCNC: 4 MG/DL — SIGNIFICANT CHANGE UP (ref 2.5–4.5)
PLATELET # BLD AUTO: 209 K/UL — SIGNIFICANT CHANGE UP (ref 150–400)
POTASSIUM SERPL-MCNC: 3.5 MMOL/L — SIGNIFICANT CHANGE UP (ref 3.5–5.3)
POTASSIUM SERPL-SCNC: 3.5 MMOL/L — SIGNIFICANT CHANGE UP (ref 3.5–5.3)
RBC # BLD: 3.76 M/UL — LOW (ref 3.8–5.2)
RBC # FLD: 13.6 % — SIGNIFICANT CHANGE UP (ref 10.3–14.5)
SODIUM SERPL-SCNC: 142 MMOL/L — SIGNIFICANT CHANGE UP (ref 135–145)
WBC # BLD: 4.47 K/UL — SIGNIFICANT CHANGE UP (ref 3.8–10.5)
WBC # FLD AUTO: 4.47 K/UL — SIGNIFICANT CHANGE UP (ref 3.8–10.5)

## 2024-10-05 RX ORDER — FLUTICASONE PROPION/SALMETEROL 100-50 MCG
1 BLISTER, WITH INHALATION DEVICE INHALATION
Refills: 0 | Status: DISCONTINUED | OUTPATIENT
Start: 2024-10-05 | End: 2024-10-10

## 2024-10-05 RX ORDER — BISACODYL 5 MG/1
5 TABLET, COATED ORAL AT BEDTIME
Refills: 0 | Status: DISCONTINUED | OUTPATIENT
Start: 2024-10-05 | End: 2024-10-10

## 2024-10-05 RX ADMIN — Medication 1 DOSE(S): at 10:38

## 2024-10-05 RX ADMIN — PANTOPRAZOLE SODIUM 40 MILLIGRAM(S): 40 TABLET, DELAYED RELEASE ORAL at 18:09

## 2024-10-05 RX ADMIN — IPRATROPIUM BROMIDE AND ALBUTEROL SULFATE 3 MILLILITER(S): .5; 3 SOLUTION RESPIRATORY (INHALATION) at 20:10

## 2024-10-05 RX ADMIN — IPRATROPIUM BROMIDE AND ALBUTEROL SULFATE 3 MILLILITER(S): .5; 3 SOLUTION RESPIRATORY (INHALATION) at 08:22

## 2024-10-05 RX ADMIN — Medication 1 DOSE(S): at 21:05

## 2024-10-05 RX ADMIN — PANTOPRAZOLE SODIUM 40 MILLIGRAM(S): 40 TABLET, DELAYED RELEASE ORAL at 05:42

## 2024-10-05 RX ADMIN — LACTULOSE 10 GRAM(S): 10 SOLUTION ORAL; RECTAL at 05:42

## 2024-10-05 RX ADMIN — Medication 25 MILLIGRAM(S): at 05:42

## 2024-10-05 RX ADMIN — BISACODYL 5 MILLIGRAM(S): 5 TABLET, COATED ORAL at 21:03

## 2024-10-05 RX ADMIN — Medication 25 MILLIGRAM(S): at 18:09

## 2024-10-05 RX ADMIN — IPRATROPIUM BROMIDE AND ALBUTEROL SULFATE 3 MILLILITER(S): .5; 3 SOLUTION RESPIRATORY (INHALATION) at 03:21

## 2024-10-05 NOTE — PROGRESS NOTE ADULT - SUBJECTIVE AND OBJECTIVE BOX
PATIENT SEEN AND EXAMINED BY CAITLYN TOTH M.D. ON :- 10/5/24  DATE OF SERVICE:     10/5/24       Interim events noted,Labs ,Radiological studies and Cardiology tests reviewed .    Patient is a 79y old  Female who presents with a chief complaint of Acute on chronic hypercarbic respiratory failure and rectal bleed (04 Oct 2024 07:24)      HPI:  79F with chronic hypercapnic respiratory failure (no compliant with bipap), diaphragmatic dysfunction, hypertension, arthritis, Neuralgia, osteoporotic compression fractures L5-S1, hyponatremia (HCTZ induced, corrected), goiter, recent admission to [facility] with hypercapnic resp failure 9/2024, presenting from NH with complaint of rectal bleed. As per ED attending no signs of rectal bleeding in the ED. Patient blood gas showed profound elevation in pCO2  prompting ICU consult and initiation of BIPAP. Pt is known to be noncompliant with BIPAP and is known to chronically retain CO2. ABG pH and CO2 improved on BIPAP and determined that patient did not require ICU level of care. Pt was seen at bedside, tolerating BIPAP, appears comfortable, but lethargic. Observed frequently pulling off BIPAP mask and desaturating to low 80s. No acute complaints other than feeling cold.  (03 Oct 2024 01:34)      PAST MEDICAL & SURGICAL HISTORY:  HTN (hypertension)      Lumbar neuralgia      Hyponatremia      Arthritis          PREVIOUS DIAGNOSTIC TESTING:      ECHO  FINDINGS:    STRESS  FINDINGS:    CATHETERIZATION  FINDINGS:    MEDICATIONS  (STANDING):  albuterol/ipratropium for Nebulization 3 milliLiter(s) Nebulizer every 6 hours  bisacodyl 5 milliGRAM(s) Oral at bedtime  carvedilol 25 milliGRAM(s) Oral every 12 hours  fluticasone propionate/ salmeterol 250-50 MICROgram(s) Diskus 1 Dose(s) Inhalation two times a day  pantoprazole  Injectable 40 milliGRAM(s) IV Push two times a day  polyethylene glycol 3350 17 Gram(s) Oral two times a day    MEDICATIONS  (PRN):      FAMILY HISTORY:      SOCIAL HISTORY:    CIGARETTES:    ALCOHOL:    REVIEW OF SYSTEMS:  CONSTITUTIONAL: No fever, weight loss, or fatigue  EYES: No eye pain, visual disturbances, or discharge  ENMT:  No difficulty hearing, tinnitus, vertigo; No sinus or throat pain  NECK: No pain or stiffness  RESPIRATORY: No cough, wheezing, chills or hemoptysis; No shortness of breath  CARDIOVASCULAR: No chest pain, palpitations, dizziness, or leg swelling  GASTROINTESTINAL: No abdominal or epigastric pain. No nausea, vomiting, or hematemesis; No diarrhea or constipation. No melena or hematochezia.  GENITOURINARY: No dysuria, frequency, hematuria, or incontinence  NEUROLOGICAL: No headaches, memory loss, loss of strength, numbness, or tremors  SKIN: No itching, burning, rashes, or lesions   LYMPH NODES: No enlarged glands  ENDOCRINE: No heat or cold intolerance; No hair loss  MUSCULOSKELETAL: No joint pain or swelling; No muscle, back, or extremity pain  PSYCHIATRIC: No depression, anxiety, mood swings, or difficulty sleeping  HEME/LYMPH: No easy bruising, or bleeding gums  ALLERY AND IMMUNOLOGIC: No hives or eczema    Vital Signs Last 24 Hrs  T(C): 36.3 (05 Oct 2024 21:07), Max: 36.7 (05 Oct 2024 05:07)  T(F): 97.4 (05 Oct 2024 21:07), Max: 98 (05 Oct 2024 05:07)  HR: 84 (05 Oct 2024 21:07) (76 - 96)  BP: 117/51 (05 Oct 2024 21:07) (117/51 - 161/78)  BP(mean): 66 (05 Oct 2024 21:07) (66 - 66)  RR: 18 (05 Oct 2024 21:07) (18 - 19)  SpO2: 95% (05 Oct 2024 21:07) (94% - 97%)    Parameters below as of 05 Oct 2024 21:07  Patient On (Oxygen Delivery Method): nasal cannula  O2 Flow (L/min): 2        PHYSICAL EXAM:  GENERAL: NAD, well-groomed, well-developed  HEAD:  Atraumatic, Normocephalic  EYES: EOMI, PERRLA, conjunctiva and sclera clear  ENMT: No tonsillar erythema, exudates, or enlargement; Moist mucous membranes, Good dentition, No lesions  NECK: Supple, No JVD, Normal thyroid  NERVOUS SYSTEM:  Alert & Oriented X3, Good concentration; Motor Strength 5/5 B/L upper and lower extremities; DTRs 2+ intact and symmetric  CHEST/LUNG: Clear to percussion bilaterally; No rales, rhonchi, wheezing, or rubs  HEART: Regular rate and rhythm; No murmurs, rubs, or gallops  ABDOMEN: Soft, Nontender, Nondistended; Bowel sounds present  EXTREMITIES:  2+ Peripheral Pulses, No clubbing, cyanosis, or edema  LYMPH: No lymphadenopathy noted  SKIN: No rashes or lesions      INTERPRETATION OF TELEMETRY:    [facility]    [facility]     LABS:                        11.1   4.47  )-----------( 209      ( 05 Oct 2024 07:09 )             35.6     10-05    142  |  96  |  6[L]  ----------------------------<  129[H]  3.5   |  43[H]  |  0.31[L]    Ca    8.4      05 Oct 2024 07:09  Phos  4.0     10-05  Mg     2.3     10-05            Urinalysis Basic - ( 05 Oct 2024 07:09 )    Color: x / Appearance: x / SG: x / pH: x  Gluc: 129 mg/dL / Ketone: x  / Bili: x / Urobili: x   Blood: x / Protein: x / Nitrite: x   Leuk Esterase: x / RBC: x / WBC x   Sq Epi: x / Non Sq Epi: x / Bacteria: x      Lipid Panel:   I&O's Summary    04 Oct 2024 07:01  -  05 Oct 2024 07:00  --------------------------------------------------------  IN: 0 mL / OUT: 400 mL / NET: -400 mL        RADIOLOGY & ADDITIONAL STUDIES:    CONCLUSIONS:      1. Left ventricular cavity is normal in size. Left ventricular wall thickness is normal. Left ventricular systolic function is normal with an ejection fraction of 58 % by Rod's method of disks. There are no regional wall motion abnormalities seen.   2. There is mild (grade 1) left ventricular diastolic dysfunction.   3. Normal right ventricular cavity size, with normal wall thickness, and normal right ventricular systolic function. Tricuspid annular plane systolic excursion (TAPSE) is 2.3 cm (normal >=1.7 cm).   4. Mild mitral regurgitation.   5. Normal left and right atrial size.   6. Mild tricuspid regurgitation.   7. Estimated pulmonary artery systolic pressure is 37 mmHg, consistent with mild pulmonary hypertension.   8. Mild pulmonic regurgitation.   9. There is calcification of the mitral valve annulus.  10. There is normal LV mass and concentric remodeling.  11. No pericardial effusion seen.  12. No prior echocardiogram is available for comparison.

## 2024-10-05 NOTE — DISCHARGE NOTE PROVIDER - CARE PROVIDERS DIRECT ADDRESSES
,DirectAddress_Unknown,DirectAddress_Unknown ,DirectAddress_Unknown,DirectAddress_Unknown,DirectAddress_Unknown ,DirectAddress_Unknown,DirectAddress_Unknown,DirectAddress_Unknown,[email or web address]

## 2024-10-05 NOTE — DISCHARGE NOTE PROVIDER - NSDCMRMEDTOKEN_GEN_ALL_CORE_FT
carvedilol 25 mg oral tablet: 1 tab(s) orally every 12 hours  Lasix 20 mg oral tablet: 1 tab(s) orally once a day as needed for leg swelling  pantoprazole 40 mg oral delayed release tablet: 1 tab(s) orally once a day (before a meal)  rosuvastatin 5 mg oral tablet: 1 tab(s) orally once a day (at bedtime)  sodium chloride 1 g oral tablet: 1 tab(s) orally 3 times a day   bisacodyl 5 mg oral delayed release tablet: 1 tab(s) orally once a day (at bedtime)  carvedilol 25 mg oral tablet: 1 tab(s) orally every 12 hours  fluticasone-salmeterol: 1 puff(s) 2 times a day  ipratropium-albuterol 0.5 mg-2.5 mg/3 mL inhalation solution: 3 milliliter(s) inhaled every 6 hours  pantoprazole 40 mg oral delayed release tablet: 1 tab(s) orally once a day (before a meal)  polyethylene glycol 3350 oral powder for reconstitution: 17 gram(s) orally 2 times a day  rosuvastatin 5 mg oral tablet: 1 tab(s) orally once a day (at bedtime)

## 2024-10-05 NOTE — PROGRESS NOTE ADULT - SUBJECTIVE AND OBJECTIVE BOX
pt seen and examined      MEDICATIONS  (STANDING):  albuterol/ipratropium for Nebulization 3 milliLiter(s) Nebulizer every 6 hours  bisacodyl 5 milliGRAM(s) Oral at bedtime  carvedilol 25 milliGRAM(s) Oral every 12 hours  fluticasone propionate/ salmeterol 250-50 MICROgram(s) Diskus 1 Dose(s) Inhalation two times a day  pantoprazole  Injectable 40 milliGRAM(s) IV Push two times a day  polyethylene glycol 3350 17 Gram(s) Oral two times a day    MEDICATIONS  (PRN):      Vital Signs Last 24 Hrs  T(C): 36.3 (10-05-24 @ 21:07), Max: 36.7 (10-05-24 @ 05:07)  T(F): 97.4 (10-05-24 @ 21:07), Max: 98 (10-05-24 @ 05:07)  HR: 84 (10-05-24 @ 21:07) (76 - 96)  BP: 117/51 (10-05-24 @ 21:07) (117/51 - 161/78)  BP(mean): 66 (10-05-24 @ 21:07) (66 - 66)  RR: 18 (10-05-24 @ 21:07) (18 - 19)  SpO2: 95% (10-05-24 @ 21:07) (94% - 97%)      PHYSICAL EXAM:  GENERAL: NAD  HEAD:  Atraumatic, Normocephalic  EYES:  conjunctiva and sclera clear  ENMT:   NECK: Supple, No JVD, Normal thyroid  NERVOUS SYSTEM:alert awake  CHEST/LUNG: dec breath sounds at bases  HEART: s1, s2+  ABDOMEN: Soft, Nontender, Nondistended; Bowel sounds present  EXTREMITIES:  edema    LABS:  10-05    142  |  96  |  6[L]  ----------------------------<  129[H]  3.5   |  43[H]  |  0.31[L]    Ca    8.4      05 Oct 2024 07:09  Phos  4.0     10-05  Mg     2.3     10-05      Creatinine Trend: 0.31 <--, 0.42 <--, 0.28 <--, 0.26 <--, 0.31 <--, 0.41 <--                        11.1   4.47  )-----------( 209      ( 05 Oct 2024 07:09 )             35.6     Urine Studies:  Urinalysis Basic - ( 05 Oct 2024 07:09 )    Color:  / Appearance:  / SG:  / pH:   Gluc: 129 mg/dL / Ketone:   / Bili:  / Urobili:    Blood:  / Protein:  / Nitrite:    Leuk Esterase:  / RBC:  / WBC    Sq Epi:  / Non Sq Epi:  / Bacteria:             ABG - ( 04 Oct 2024 03:30 )  pH, Arterial: 7.34  pH, Blood: x     /  pCO2: 87    /  pO2: 160   / HCO3: 47    / Base Excess: 16.6  /  SaO2: 100

## 2024-10-05 NOTE — DISCHARGE NOTE PROVIDER - NSDCFUADDAPPT_GEN_ALL_CORE_FT
Please call and schedule appointment to follow up with [facility] Neurology (current provider).  Please call and schedule appointment to follow up with [facility] Neurology (current provider).   APPTS ARE READY TO BE MADE: [X] YES    Best Family or Patient Contact (if needed):    Additional Information about above appointments (if needed):    1: Ayde Patten [phone]  2: Lorin Prado [phone]  3:     Other comments or requests:    Please call and schedule appointment to follow up with [facility] Neurology (current provider).   APPTS ARE READY TO BE MADE: [X] YES    Best Family or Patient Contact (if needed):    Additional Information about above appointments (if needed):    1: Ayde Patten [phone]  2: Lorin Prado [phone]  3:     Other comments or requests:   Patient is being discharged to rehab. Patient/caregiver will arrange follow up appointments.  DANKBEC

## 2024-10-05 NOTE — DISCHARGE NOTE PROVIDER - HOSPITAL COURSE
79F with chronic hypercapnic respiratory failure (no compliant with bipap), diaphragmatic dysfunction, hypertension, arthritis, Neuralgia, osteoporotic compression fractures L5-S1, hyponatremia (HCTZ induced, corrected), goiter, recent admission to [facility] with hypercapnic resp failure 9/2024, presenting from NH with complaint of rectal bleed. As per ED attending no signs of rectal bleeding in the ED. Patient blood gas showed profound elevation in pCO2  prompting ICU consult and initiation of BIPAP. Pt is known to be noncompliant with BIPAP and is known to chronically retain CO2. ABG pH and CO2 improved on BIPAP and determined that patient did not require ICU level of care. Admitted to medicine for continued management of acute on chronic hypercarbic respiratory failure. Duoneb and Advair. BiPAP at night and as needed during the day. Pulmonology consulted and reccs appreciated. GI was consulted for rectal bleed but no intervention. No further episodes of rectal bleed and H&H stable. Stool burden on CT chest. Agressive bowel regimen. O2 supplement.     Patient's HCP (son) wants to take patient home with HHA and unhappy with current rehab. PT reccs CHET. CM following to set up HHA. Patient will need BiPAP at night and Pulmonology outpatient. SOn requesting inpatient Neuro follow up for w/u for muscle skeletal disorder? However per son, patient already being seen and evaluated at [facility] by Neurology department. Patient to follow up with Neurology at [facility]. Discussed with attending who agrees with plan of care. Attending also discussed plan of care with son who is in agreement.                  79F with chronic hypercapnic respiratory failure (no compliant with bipap), diaphragmatic dysfunction, hypertension, arthritis, Neuralgia, osteoporotic compression fractures L5-S1, hyponatremia (HCTZ induced, corrected), goiter, recent admission to [facility] with hypercapnic resp failure 9/2024, presenting from NH with complaint of rectal bleed. As per ED attending no signs of rectal bleeding in the ED. Patient blood gas showed profound elevation in pCO2  prompting ICU consult and initiation of BIPAP. Pt is known to be noncompliant with BIPAP and is known to chronically retain CO2. ABG pH and CO2 improved on BIPAP and determined that patient did not require ICU level of care. Admitted to medicine for continued management of acute on chronic hypercarbic respiratory failure. Duoneb and Advair. BiPAP at night and as needed during the day. Pulmonology consulted and reccs appreciated. GI was consulted for rectal bleed but no intervention. No further episodes of rectal bleed and H&H stable. Stool burden on CT chest. Agressive bowel regimen. O2 supplement.     PT reccs CHET. Patient requires BiPAP at night and Pulmonology follow up. Son requesting inpatient Neuro follow up for w/u for muscle skeletal disorder? Patient already being seen and evaluated by Neuro Dr. Patten. No further work up is needed inpatient.  atient to follow up with Neurology at rehab center. Per Dr. King will make arrangements to see neurology as outpatient. Discussed with attending who agrees with plan of care. Attending also discussed plan of care with son who is in agreement. Now optimized for discharge.    Given the clinical course, decision was made to discharge patient with outpatient follow up   Discharge plan discussed with attending   This is only a brief summary for the whole hospital course, please follow up with EMR.

## 2024-10-05 NOTE — DISCHARGE NOTE PROVIDER - NSDCHHPTASSISTHOME_GEN_ALL_CORE
Patient Needs Assistance to Leave Residence... W Plasty Text: The lesion was extirpated to the level of the fat with a #15 scalpel blade.  Given the location of the defect, shape of the defect and the proximity to free margins a W-plasty was deemed most appropriate for repair.  Using a sterile surgical marker, the appropriate transposition arms of the W-plasty were drawn incorporating the defect and placing the expected incisions within the relaxed skin tension lines where possible.    The area thus outlined was incised deep to adipose tissue with a #15 scalpel blade.  The skin margins were undermined to an appropriate distance in all directions utilizing iris scissors.  The opposing transposition arms were then transposed into place in opposite direction and anchored with interrupted buried subcutaneous sutures.

## 2024-10-05 NOTE — PROGRESS NOTE ADULT - ASSESSMENT
79F with chronic hypercapnic respiratory failure (no compliant with bipap), diaphragmatic dysfunction, hypertension, arthritis, Neuralgia, osteoporotic compression fractures L5-S1, hyponatremia (HCTZ induced, corrected), goiter, recent admission to [facility] with hypercapnic resp failure 9/2024, presenting from NH with complaint of rectal bleed. As per ED attending no signs of rectal bleeding in the ED. Patient blood gas showed profound elevation in pCO2  prompting ICU consult and initiation of BIPAP. Pt is known to be noncompliant with BIPAP and is known to chronically retain CO2. ABG pH and CO2 improved on BIPAP and determined that patient did not require ICU level of care. Admitted to medicine for continued management of acute on chronic hypercarbic respiratory failure.

## 2024-10-05 NOTE — DISCHARGE NOTE PROVIDER - NPI NUMBER (FOR SYSADMIN USE ONLY) :
[[id number] [[id number]],[[id number]] [[id number]],[[id number]]

## 2024-10-05 NOTE — CHART NOTE - NSCHARTNOTEFT_GEN_A_CORE
Spoke to Milton today, updated on clinical status, treatment plan/options and discharge plan/options, all questions answered

## 2024-10-05 NOTE — PROGRESS NOTE ADULT - ASSESSMENT
79F with chronic hypercapnic respiratory failure (no compliant with bipap), diaphragmatic dysfunction, hypertension, arthritis, Neuralgia, osteoporotic compression fractures L5-S1, hyponatremia (HCTZ induced, corrected), goiter, recent admission to [facility] with hypercapnic resp failure 9/2024, presenting from NH with complaint of rectal bleed. As per ED attending no signs of rectal bleeding in the ED. Patient blood gas showed profound elevation in pCO2  prompting ICU consult and initiation of BIPAP. Pt is known to be noncompliant with BIPAP and is known to chronically retain CO2. ABG pH and CO2 improved on BIPAP and determined that patient did not require ICU level of care. Admitted to medicine for continued management of acute on chronic hypercarbic respiratory failure.   Patient admitted to the medicine floor for further management. Hypercapnic with CO2 of 99>>71>>76 after BiPAP. Patient has hx of non-compliance. Also bloody stool this AM. GI consulted No plans for colonoscopy at this time given patient's respiratory status and stable H&H. Repeat H&H. No further episodes of melena. Stool count. NPO. Failed bedside RN swallow evel. Speech eval pending. Critical care medicine consulted for hypercapnia however given that Co2 improved and chronic retention, CC team will hold off on accepting patient at this time.   10/04:

## 2024-10-05 NOTE — DISCHARGE NOTE PROVIDER - CARE PROVIDER_API CALL
Aramis King  Cardiology  [address] [place] [address]  Phone: [phone]  Fax: [phone]  Follow Up Time: 1 week    Lorin Prado  Pulmonary Disease  [address], [address]  [place] [address]  Phone: [phone]  Fax: [phone]  Follow Up Time: 1 week   Aramis King  Cardiology  [address] [place] [address]  Phone: [phone]  Fax: [phone]  Follow Up Time: 1 week    Lorin Prado  Pulmonary Disease  [address], [address]  [place] [address]  Phone: [phone]  Fax: [phone]  Follow Up Time: 1 week    Cornell Romero  Gastroenterology  [address] [place] [address]  Phone: [phone]  Fax: [phone]  Follow Up Time: 1 week   Aramis King  Cardiology  [address] [place] [address]  Phone: [phone]  Fax: [phone]  Follow Up Time: 1 week    Lorin Prado  Pulmonary Disease  [address], [address]  [place] [address]  Phone: [phone]  Fax: [phone]  Follow Up Time: 1 week    Cornell Romero  Gastroenterology  [address] [place] [address]  Phone: [phone]  Fax: [phone]  Follow Up Time: 1 week    Ayde Patten  Neurology  [address], [address]  [place] [address]  Phone: [phone]  Fax: [phone]  Follow Up Time: 1 week

## 2024-10-05 NOTE — DISCHARGE NOTE PROVIDER - PROVIDER TOKENS
PROVIDER:[TOKEN:[[id number]],FOLLOWUP:[1 week]],PROVIDER:[TOKEN:[[id number]],FOLLOWUP:[1 week]] PROVIDER:[TOKEN:[[id number]],FOLLOWUP:[1 week]],PROVIDER:[TOKEN:[[id number]],FOLLOWUP:[1 week]],PROVIDER:[TOKEN:[[id number]],FOLLOWUP:[1 week]] PROVIDER:[TOKEN:[[id number]],FOLLOWUP:[1 week]],PROVIDER:[TOKEN:[[id number]],FOLLOWUP:[1 week]],PROVIDER:[TOKEN:[[id number]],FOLLOWUP:[1 week]],PROVIDER:[TOKEN:[[id number]],FOLLOWUP:[1 week]]

## 2024-10-06 LAB
ANION GAP SERPL CALC-SCNC: 1 MMOL/L — LOW (ref 5–17)
BASE EXCESS BLDA CALC-SCNC: 21.5 MMOL/L — HIGH (ref -2–3)
BLOOD GAS COMMENTS ARTERIAL: SIGNIFICANT CHANGE UP
BUN SERPL-MCNC: 7 MG/DL — SIGNIFICANT CHANGE UP (ref 7–18)
CALCIUM SERPL-MCNC: 8.8 MG/DL — SIGNIFICANT CHANGE UP (ref 8.4–10.5)
CHLORIDE SERPL-SCNC: 96 MMOL/L — SIGNIFICANT CHANGE UP (ref 96–108)
CO2 SERPL-SCNC: 42 MMOL/L — HIGH (ref 22–31)
CREAT SERPL-MCNC: 0.32 MG/DL — LOW (ref 0.5–1.3)
EGFR: 106 ML/MIN/1.73M2 — SIGNIFICANT CHANGE UP
GLUCOSE SERPL-MCNC: 102 MG/DL — HIGH (ref 70–99)
HCO3 BLDA-SCNC: 49 MMOL/L — CRITICAL HIGH (ref 21–28)
HCT VFR BLD CALC: 35.2 % — SIGNIFICANT CHANGE UP (ref 34.5–45)
HGB BLD-MCNC: 11 G/DL — LOW (ref 11.5–15.5)
HOROWITZ INDEX BLDA+IHG-RTO: 28 — SIGNIFICANT CHANGE UP
MAGNESIUM SERPL-MCNC: 1.8 MG/DL — SIGNIFICANT CHANGE UP (ref 1.6–2.6)
MCHC RBC-ENTMCNC: 29.4 PG — SIGNIFICANT CHANGE UP (ref 27–34)
MCHC RBC-ENTMCNC: 31.3 GM/DL — LOW (ref 32–36)
MCV RBC AUTO: 94.1 FL — SIGNIFICANT CHANGE UP (ref 80–100)
NRBC # BLD: 0 /100 WBCS — SIGNIFICANT CHANGE UP (ref 0–0)
PCO2 BLDA: 66 MMHG — HIGH (ref 32–35)
PH BLDA: 7.48 — HIGH (ref 7.35–7.45)
PHOSPHATE SERPL-MCNC: 2.9 MG/DL — SIGNIFICANT CHANGE UP (ref 2.5–4.5)
PLATELET # BLD AUTO: 205 K/UL — SIGNIFICANT CHANGE UP (ref 150–400)
PO2 BLDA: 75 MMHG — LOW (ref 83–108)
POTASSIUM SERPL-MCNC: 3 MMOL/L — LOW (ref 3.5–5.3)
POTASSIUM SERPL-SCNC: 3 MMOL/L — LOW (ref 3.5–5.3)
RBC # BLD: 3.74 M/UL — LOW (ref 3.8–5.2)
RBC # FLD: 13.8 % — SIGNIFICANT CHANGE UP (ref 10.3–14.5)
SAO2 % BLDA: 97 % — SIGNIFICANT CHANGE UP
SODIUM SERPL-SCNC: 139 MMOL/L — SIGNIFICANT CHANGE UP (ref 135–145)
WBC # BLD: 4.52 K/UL — SIGNIFICANT CHANGE UP (ref 3.8–10.5)
WBC # FLD AUTO: 4.52 K/UL — SIGNIFICANT CHANGE UP (ref 3.8–10.5)

## 2024-10-06 RX ADMIN — PANTOPRAZOLE SODIUM 40 MILLIGRAM(S): 40 TABLET, DELAYED RELEASE ORAL at 17:35

## 2024-10-06 RX ADMIN — Medication 25 MILLIGRAM(S): at 05:00

## 2024-10-06 RX ADMIN — BISACODYL 5 MILLIGRAM(S): 5 TABLET, COATED ORAL at 21:47

## 2024-10-06 RX ADMIN — IPRATROPIUM BROMIDE AND ALBUTEROL SULFATE 3 MILLILITER(S): .5; 3 SOLUTION RESPIRATORY (INHALATION) at 20:48

## 2024-10-06 RX ADMIN — Medication 1 DOSE(S): at 21:47

## 2024-10-06 RX ADMIN — Medication 40 MILLIEQUIVALENT(S): at 09:22

## 2024-10-06 RX ADMIN — Medication 25 MILLIGRAM(S): at 17:32

## 2024-10-06 RX ADMIN — PANTOPRAZOLE SODIUM 40 MILLIGRAM(S): 40 TABLET, DELAYED RELEASE ORAL at 05:00

## 2024-10-06 RX ADMIN — Medication 1 DOSE(S): at 09:22

## 2024-10-06 NOTE — PROGRESS NOTE ADULT - SUBJECTIVE AND OBJECTIVE BOX
pt seen and examined    MEDICATIONS  (STANDING):  albuterol/ipratropium for Nebulization 3 milliLiter(s) Nebulizer every 6 hours  bisacodyl 5 milliGRAM(s) Oral at bedtime  carvedilol 25 milliGRAM(s) Oral every 12 hours  fluticasone propionate/ salmeterol 250-50 MICROgram(s) Diskus 1 Dose(s) Inhalation two times a day  pantoprazole  Injectable 40 milliGRAM(s) IV Push two times a day  polyethylene glycol 3350 17 Gram(s) Oral two times a day    MEDICATIONS  (PRN):    Vital Signs Last 24 Hrs  T(C): 36.7 (10-06-24 @ 20:04), Max: 36.7 (10-06-24 @ 20:04)  T(F): 98.1 (10-06-24 @ 20:04), Max: 98.1 (10-06-24 @ 20:04)  HR: 75 (10-06-24 @ 20:04) (75 - 87)  BP: 136/78 (10-06-24 @ 20:04) (136/78 - 165/82)  BP(mean): 101 (10-06-24 @ 05:36) (101 - 101)  RR: 20 (10-06-24 @ 20:04) (15 - 20)  SpO2: 95% (10-06-24 @ 20:04) (94% - 95%)        PHYSICAL EXAM:  GENERAL: NAD  HEAD:  Atraumatic, Normocephalic  EYES:  conjunctiva and sclera clear  ENMT:   NECK: Supple, No JVD, Normal thyroid  NERVOUS SYSTEM:alert awake  CHEST/LUNG: dec breath sounds at bases  HEART: s1, s2+  ABDOMEN: Soft, Nontender, Nondistended; Bowel sounds present  EXTREMITIES:  edema    LABS:  10-06    139  |  96  |  7   ----------------------------<  102[H]  3.0[L]   |  42[H]  |  0.32[L]    Ca    8.8      06 Oct 2024 05:36  Phos  2.9     10-06  Mg     1.8     10-06      Creatinine Trend: 0.32 <--, 0.31 <--, 0.42 <--, 0.28 <--, 0.26 <--, 0.31 <--, 0.41 <--                        11.0   4.52  )-----------( 205      ( 06 Oct 2024 05:36 )             35.2     Urine Studies:  Urinalysis Basic - ( 06 Oct 2024 05:36 )    Color:  / Appearance:  / SG:  / pH:   Gluc: 102 mg/dL / Ketone:   / Bili:  / Urobili:    Blood:  / Protein:  / Nitrite:    Leuk Esterase:  / RBC:  / WBC    Sq Epi:  / Non Sq Epi:  / Bacteria:             ABG - ( 06 Oct 2024 16:34 )  pH, Arterial: 7.48  pH, Blood: x     /  pCO2: 66    /  pO2: 75    / HCO3: 49    / Base Excess: 21.5  /  SaO2: 97

## 2024-10-06 NOTE — PROGRESS NOTE ADULT - SUBJECTIVE AND OBJECTIVE BOX
PATIENT SEEN AND EXAMINED BY CAITLYN TOTH M.D. ON :- 10/6/24  DATE OF SERVICE:   10/6/24          Interim events noted,Labs ,Radiological studies and Cardiology tests reviewed .    Patient is a 79y old  Female who presents with a chief complaint of Acute on chronic hypercarbic respiratory failure and rectal bleed (05 Oct 2024 09:21)      HPI:  79F with chronic hypercapnic respiratory failure (no compliant with bipap), diaphragmatic dysfunction, hypertension, arthritis, Neuralgia, osteoporotic compression fractures L5-S1, hyponatremia (HCTZ induced, corrected), goiter, recent admission to [facility] with hypercapnic resp failure 9/2024, presenting from NH with complaint of rectal bleed. As per ED attending no signs of rectal bleeding in the ED. Patient blood gas showed profound elevation in pCO2  prompting ICU consult and initiation of BIPAP. Pt is known to be noncompliant with BIPAP and is known to chronically retain CO2. ABG pH and CO2 improved on BIPAP and determined that patient did not require ICU level of care. Pt was seen at bedside, tolerating BIPAP, appears comfortable, but lethargic. Observed frequently pulling off BIPAP mask and desaturating to low 80s. No acute complaints other than feeling cold.  (03 Oct 2024 01:34)      PAST MEDICAL & SURGICAL HISTORY:  HTN (hypertension)      Lumbar neuralgia      Hyponatremia      Arthritis          PREVIOUS DIAGNOSTIC TESTING:      ECHO  FINDINGS:    STRESS  FINDINGS:    CATHETERIZATION  FINDINGS:    MEDICATIONS  (STANDING):  albuterol/ipratropium for Nebulization 3 milliLiter(s) Nebulizer every 6 hours  bisacodyl 5 milliGRAM(s) Oral at bedtime  carvedilol 25 milliGRAM(s) Oral every 12 hours  fluticasone propionate/ salmeterol 250-50 MICROgram(s) Diskus 1 Dose(s) Inhalation two times a day  pantoprazole  Injectable 40 milliGRAM(s) IV Push two times a day  polyethylene glycol 3350 17 Gram(s) Oral two times a day    MEDICATIONS  (PRN):      FAMILY HISTORY:      SOCIAL HISTORY:    CIGARETTES:    ALCOHOL:    REVIEW OF SYSTEMS:  CONSTITUTIONAL: No fever, weight loss, or fatigue  EYES: No eye pain, visual disturbances, or discharge  ENMT:  No difficulty hearing, tinnitus, vertigo; No sinus or throat pain  NECK: No pain or stiffness  RESPIRATORY: No cough, wheezing, chills or hemoptysis; No shortness of breath  CARDIOVASCULAR: No chest pain, palpitations, dizziness, or leg swelling  GASTROINTESTINAL: No abdominal or epigastric pain. No nausea, vomiting, or hematemesis; No diarrhea or constipation. No melena or hematochezia.  GENITOURINARY: No dysuria, frequency, hematuria, or incontinence  NEUROLOGICAL: No headaches, memory loss, loss of strength, numbness, or tremors  SKIN: No itching, burning, rashes, or lesions   LYMPH NODES: No enlarged glands  ENDOCRINE: No heat or cold intolerance; No hair loss  MUSCULOSKELETAL: No joint pain or swelling; No muscle, back, or extremity pain  PSYCHIATRIC: No depression, anxiety, mood swings, or difficulty sleeping  HEME/LYMPH: No easy bruising, or bleeding gums  ALLERY AND IMMUNOLOGIC: No hives or eczema    Vital Signs Last 24 Hrs  T(C): 36.7 (06 Oct 2024 20:04), Max: 36.7 (06 Oct 2024 20:04)  T(F): 98.1 (06 Oct 2024 20:04), Max: 98.1 (06 Oct 2024 20:04)  HR: 75 (06 Oct 2024 20:04) (75 - 87)  BP: 136/78 (06 Oct 2024 20:04) (136/78 - 165/82)  BP(mean): 101 (06 Oct 2024 05:36) (101 - 101)  RR: 20 (06 Oct 2024 20:04) (15 - 20)  SpO2: 95% (06 Oct 2024 20:04) (94% - 95%)    Parameters below as of 06 Oct 2024 20:04  Patient On (Oxygen Delivery Method): nasal cannula  O2 Flow (L/min): 2        PHYSICAL EXAM:  GENERAL: NAD, well-groomed, well-developed  HEAD:  Atraumatic, Normocephalic  EYES: EOMI, PERRLA, conjunctiva and sclera clear  ENMT: No tonsillar erythema, exudates, or enlargement; Moist mucous membranes, Good dentition, No lesions  NECK: Supple, No JVD, Normal thyroid  NERVOUS SYSTEM:  Alert & Oriented X3, Good concentration; Motor Strength 5/5 B/L upper and lower extremities; DTRs 2+ intact and symmetric  CHEST/LUNG: Clear to percussion bilaterally; No rales, rhonchi, wheezing, or rubs  HEART: Regular rate and rhythm; No murmurs, rubs, or gallops  ABDOMEN: Soft, Nontender, Nondistended; Bowel sounds present  EXTREMITIES:  2+ Peripheral Pulses, No clubbing, cyanosis, or edema  LYMPH: No lymphadenopathy noted  SKIN: No rashes or lesions      INTERPRETATION OF TELEMETRY:    ECG:    PEARLDSVASC:     LABS:                        11.0   4.52  )-----------( 205      ( 06 Oct 2024 05:36 )             35.2     10-06    139  |  96  |  7   ----------------------------<  102[H]  3.0[L]   |  42[H]  |  0.32[L]    Ca    8.8      06 Oct 2024 05:36  Phos  2.9     10-06  Mg     1.8     10-06            Urinalysis Basic - ( 06 Oct 2024 05:36 )    Color: x / Appearance: x / SG: x / pH: x  Gluc: 102 mg/dL / Ketone: x  / Bili: x / Urobili: x   Blood: x / Protein: x / Nitrite: x   Leuk Esterase: x / RBC: x / WBC x   Sq Epi: x / Non Sq Epi: x / Bacteria: x      Lipid Panel:   I&O's Summary      RADIOLOGY & ADDITIONAL STUDIES:      TRANSTHORACIC ECHOCARDIOGRAM REPORT  ________________________________________________________________________________                                      _______       Pt. Name:       CLINTON LIU Study Date:    7/23/2024  MRN:            [id number]             YOB: 1945  Accession #:    [id number]            Age:           79 years  Account#:       [id number]           Gender:        F  Heart Rate:                          Height:        59.84 in (152.00 cm)  Rhythm:                       Weight:        140.00 lb (63.50 kg)  Blood Pressure: 154/84 mmHg          BSA/BMI:       1.60 m² / 27.49 kg/m²  ________________________________________________________________________________________  Referring Physician:    [id number] Sylvie Rosenberg  Interpreting Physician: Mary Hutchison MD  Primary Sonographer:    Dorita Arredondo RDCS    CPT:               ECHO TTE WO CON COMP W DOPP - 09641.m  Indication(s):     Heart failure, unspecified - I50.9  Procedure: Transthoracic echocardiogram with 2-D, M-mode and complete                     spectral and color flow Doppler.  Ordering Location: [facility]  Admission Status:  Inpatient  Study Information: Image quality for this study is adequate.    _______________________________________________________________________________________     CONCLUSIONS:      1. Left ventricular cavity is normal in size. Left ventricular wall thickness is normal. Left ventricular systolic function is normal with an ejection fraction of 58 % by Rod's method of disks. There are no regional wall motion abnormalities seen.   2. There is mild (grade 1) left ventricular diastolic dysfunction.   3. Normal right ventricular cavity size, with normal wall thickness, and normal right ventricular systolic function. Tricuspid annular plane systolic excursion (TAPSE) is 2.3 cm (normal >=1.7 cm).   4. Mild mitral regurgitation.   5. Normal left and right atrial size.   6. Mild tricuspid regurgitation.   7. Estimated pulmonary artery systolic pressure is 37 mmHg, consistent with mild pulmonary hypertension.   8. Mild pulmonic regurgitation.   9. There is calcification of the mitral valve annulus.  10. There is normal LV mass and concentric remodeling.  11. No pericardial effusion seen.  12. No prior echocardiogram is available for comparison.    ________________________________________________________________________________________  FINDINGS:     Left Ventricle:  The left ventricular cavity is normal in size. Left ventricular wall thickness is normal. Left ventricular systolic function is normal with a calculated ejection fraction of 58 % by the Rod's biplane method of disks. There are no regional wall motion abnormalities seen. There is mild (grade 1) left ventriculardiastolic dysfunction. There is normal LV mass and concentric remodeling.     Right Ventricle:  The right ventricular cavity is normal in size, with normal wall thickness and right ventricular systolic function is normal. Tricuspid annular plane systolic excursion (TAPSE) is 2.3 cm (normal >=1.7 cm).     Left Atrium:  The left atrium is normal in size with an indexed volume of 32 ml/m².     Right Atrium:  The right atrium is normal in size.     Interatrial Septum:  The interatrial septum appears intact.     Aortic Valve:  The aortic valve is tricuspid with normal leaflet excursion. There is no aortic valve stenosis. There is no evidence of aortic regurgitation.     Mitral Valve:  Structurally normal mitral valve with normal leaflet excursion.There is calcification of the mitral valve annulus. There is no mitral valve stenosis. There is mild mitral regurgitation.     Tricuspid Valve:  Structurally normal tricuspid valve with normal leaflet excursion. There is mild tricuspid regurgitation.Estimated pulmonary artery systolic pressure is 37 mmHg, consistent with mild pulmonary hypertension.     Pulmonic Valve:  Structurally normal pulmonic valve with normal leaflet excursion. There is mild pulmonic regurgitation.     Aorta:  The aortic root appears normal in size.     Pericardium:  No pericardial effusion seen.

## 2024-10-06 NOTE — PROGRESS NOTE ADULT - ASSESSMENT
79F with chronic hypercapnic respiratory failure (no compliant with bipap), diaphragmatic dysfunction, hypertension, arthritis, Neuralgia, osteoporotic compression fractures L5-S1, hyponatremia (HCTZ induced, corrected), goiter, recent admission to [facility] with hypercapnic resp failure 9/2024, presenting from NH with complaint of rectal bleed. As per ED attending no signs of rectal bleeding in the ED. Patient blood gas showed profound elevation in pCO2  prompting ICU consult and initiation of BIPAP. Pt is known to be noncompliant with BIPAP and is known to chronically retain CO2. ABG pH and CO2 improved on BIPAP and determined that patient did not require ICU level of care. Admitted to medicine for continued management of acute on chronic hypercarbic respiratory failure.

## 2024-10-06 NOTE — PROGRESS NOTE ADULT - ASSESSMENT
79F with chronic hypercapnic respiratory failure (no compliant with bipap), diaphragmatic dysfunction, hypertension, arthritis, Neuralgia, osteoporotic compression fractures L5-S1, hyponatremia (HCTZ induced, corrected), goiter, recent admission to [facility] with hypercapnic resp failure 9/2024, presenting from NH with complaint of rectal bleed. As per ED attending no signs of rectal bleeding in the ED. Patient blood gas showed profound elevation in pCO2  prompting ICU consult and initiation of BIPAP. Pt is known to be noncompliant with BIPAP and is known to chronically retain CO2. ABG pH and CO2 improved on BIPAP and determined that patient did not require ICU level of care. Admitted to medicine for continued management of acute on chronic hypercarbic respiratory failure.   Patient admitted to the medicine floor for further management. Hypercapnic with CO2 of 99>>71>>76 after BiPAP. Patient has hx of non-compliance. Also bloody stool this AM. GI consulted No plans for colonoscopy at this time given patient's respiratory status and stable H&H. Repeat H&H. No further episodes of melena. Stool count. NPO. Failed bedside RN swallow evel. Speech eval pending. Critical care medicine consulted for hypercapnia however given that Co2 improved and chronic retention, CC team will hold off on accepting patient at this time.   10/04:

## 2024-10-07 DIAGNOSIS — E78.5 HYPERLIPIDEMIA, UNSPECIFIED: ICD-10-CM

## 2024-10-07 DIAGNOSIS — E87.8 OTHER DISORDERS OF ELECTROLYTE AND FLUID BALANCE, NOT ELSEWHERE CLASSIFIED: ICD-10-CM

## 2024-10-07 LAB
ANION GAP SERPL CALC-SCNC: 4 MMOL/L — LOW (ref 5–17)
BUN SERPL-MCNC: 8 MG/DL — SIGNIFICANT CHANGE UP (ref 7–18)
CALCIUM SERPL-MCNC: 8.6 MG/DL — SIGNIFICANT CHANGE UP (ref 8.4–10.5)
CHLORIDE SERPL-SCNC: 92 MMOL/L — LOW (ref 96–108)
CO2 SERPL-SCNC: 39 MMOL/L — HIGH (ref 22–31)
CREAT SERPL-MCNC: 0.26 MG/DL — LOW (ref 0.5–1.3)
EGFR: 111 ML/MIN/1.73M2 — SIGNIFICANT CHANGE UP
GLUCOSE SERPL-MCNC: 100 MG/DL — HIGH (ref 70–99)
HCT VFR BLD CALC: 35.3 % — SIGNIFICANT CHANGE UP (ref 34.5–45)
HGB BLD-MCNC: 11.3 G/DL — LOW (ref 11.5–15.5)
MAGNESIUM SERPL-MCNC: 1.6 MG/DL — SIGNIFICANT CHANGE UP (ref 1.6–2.6)
MCHC RBC-ENTMCNC: 29.3 PG — SIGNIFICANT CHANGE UP (ref 27–34)
MCHC RBC-ENTMCNC: 32 GM/DL — SIGNIFICANT CHANGE UP (ref 32–36)
MCV RBC AUTO: 91.5 FL — SIGNIFICANT CHANGE UP (ref 80–100)
NRBC # BLD: 0 /100 WBCS — SIGNIFICANT CHANGE UP (ref 0–0)
PHOSPHATE SERPL-MCNC: 2.3 MG/DL — LOW (ref 2.5–4.5)
PLATELET # BLD AUTO: 209 K/UL — SIGNIFICANT CHANGE UP (ref 150–400)
POTASSIUM SERPL-MCNC: 3.2 MMOL/L — LOW (ref 3.5–5.3)
POTASSIUM SERPL-SCNC: 3.2 MMOL/L — LOW (ref 3.5–5.3)
RBC # BLD: 3.86 M/UL — SIGNIFICANT CHANGE UP (ref 3.8–5.2)
RBC # FLD: 13.8 % — SIGNIFICANT CHANGE UP (ref 10.3–14.5)
SODIUM SERPL-SCNC: 135 MMOL/L — SIGNIFICANT CHANGE UP (ref 135–145)
WBC # BLD: 4.24 K/UL — SIGNIFICANT CHANGE UP (ref 3.8–10.5)
WBC # FLD AUTO: 4.24 K/UL — SIGNIFICANT CHANGE UP (ref 3.8–10.5)

## 2024-10-07 RX ORDER — ROSUVASTATIN CALCIUM 20 MG/1
5 TABLET, COATED ORAL AT BEDTIME
Refills: 0 | Status: DISCONTINUED | OUTPATIENT
Start: 2024-10-07 | End: 2024-10-10

## 2024-10-07 RX ORDER — PANTOPRAZOLE SODIUM 40 MG/1
40 TABLET, DELAYED RELEASE ORAL
Refills: 0 | Status: DISCONTINUED | OUTPATIENT
Start: 2024-10-07 | End: 2024-10-10

## 2024-10-07 RX ORDER — SOD PHOS DI, MONO/K PHOS MONO 250 MG
1 TABLET ORAL
Refills: 0 | Status: COMPLETED | OUTPATIENT
Start: 2024-10-07 | End: 2024-10-07

## 2024-10-07 RX ADMIN — Medication 17 GRAM(S): at 06:34

## 2024-10-07 RX ADMIN — Medication 17 GRAM(S): at 18:13

## 2024-10-07 RX ADMIN — IPRATROPIUM BROMIDE AND ALBUTEROL SULFATE 3 MILLILITER(S): .5; 3 SOLUTION RESPIRATORY (INHALATION) at 08:13

## 2024-10-07 RX ADMIN — PANTOPRAZOLE SODIUM 40 MILLIGRAM(S): 40 TABLET, DELAYED RELEASE ORAL at 06:33

## 2024-10-07 RX ADMIN — Medication 1 PACKET(S): at 10:42

## 2024-10-07 RX ADMIN — Medication 1 DOSE(S): at 21:13

## 2024-10-07 RX ADMIN — Medication 25 MILLIGRAM(S): at 18:12

## 2024-10-07 RX ADMIN — Medication 1 DOSE(S): at 11:45

## 2024-10-07 RX ADMIN — IPRATROPIUM BROMIDE AND ALBUTEROL SULFATE 3 MILLILITER(S): .5; 3 SOLUTION RESPIRATORY (INHALATION) at 15:01

## 2024-10-07 RX ADMIN — ROSUVASTATIN CALCIUM 5 MILLIGRAM(S): 20 TABLET, COATED ORAL at 21:13

## 2024-10-07 RX ADMIN — PANTOPRAZOLE SODIUM 40 MILLIGRAM(S): 40 TABLET, DELAYED RELEASE ORAL at 18:13

## 2024-10-07 RX ADMIN — Medication 25 MILLIGRAM(S): at 06:33

## 2024-10-07 RX ADMIN — Medication 40 MILLIEQUIVALENT(S): at 09:06

## 2024-10-07 RX ADMIN — Medication 1 PACKET(S): at 12:57

## 2024-10-07 RX ADMIN — BISACODYL 5 MILLIGRAM(S): 5 TABLET, COATED ORAL at 21:13

## 2024-10-07 NOTE — PROGRESS NOTE ADULT - ASSESSMENT
78 yo F, from [facility], with pmhx of chronic hypercapnic respiratory failure, diaphragmatic dysfunction, HTN, arthritis, neuralgia, osteoporotic compression fractures L5-S1, hyponatremia, and goiter with recent admission for hypercapnic respiratory failure 09/2024, who presented with rectal bleed, no signs of rectal bleeding in ED. blood gas with profoundly elevated pCO2. Pt placed on bipap, ICU consulted, but did not require ICU admission. Pt admitted for acute on chronic hypercarbic respiratory failure. Pt with bloody stool. GI consulted, not recommending colonoscopy at this time d/t respiratory failure. Pt with no more blood BMs. PT recc CHET, pending acceptance and authorization.

## 2024-10-07 NOTE — PROGRESS NOTE ADULT - SUBJECTIVE AND OBJECTIVE BOX
NP Note discussed with  Primary Attending    INTERVAL HPI/OVERNIGHT EVENTS: no new complaints    MEDICATIONS  (STANDING):  albuterol/ipratropium for Nebulization 3 milliLiter(s) Nebulizer every 6 hours  bisacodyl 5 milliGRAM(s) Oral at bedtime  carvedilol 25 milliGRAM(s) Oral every 12 hours  fluticasone propionate/ salmeterol 250-50 MICROgram(s) Diskus 1 Dose(s) Inhalation two times a day  pantoprazole  Injectable 40 milliGRAM(s) IV Push two times a day  polyethylene glycol 3350 17 Gram(s) Oral two times a day    MEDICATIONS  (PRN):  __________________________________________________  REVIEW OF SYSTEMS:  CONSTITUTIONAL: No fever,   EYES: no acute visual disturbances  NECK: No pain or stiffness  RESPIRATORY: No cough; No shortness of breath  CARDIOVASCULAR: No chest pain, no palpitations  GASTROINTESTINAL: No pain. No nausea or vomiting; No diarrhea   NEUROLOGICAL: No headache or numbness, no tremors  MUSCULOSKELETAL: No joint pain, no muscle pain  GENITOURINARY: no dysuria, no frequency, no hesitancy  PSYCHIATRY: no depression , no anxiety  ALL OTHER  ROS negative      Vital Signs Last 24 Hrs  T(C): 36.2 (07 Oct 2024 13:07), Max: 36.7 (06 Oct 2024 20:04)  T(F): 97.2 (07 Oct 2024 13:07), Max: 98.1 (06 Oct 2024 20:04)  HR: 79 (07 Oct 2024 13:07) (75 - 87)  BP: 137/82 (07 Oct 2024 13:07) (136/78 - 166/82)  BP(mean): 110 (07 Oct 2024 04:51) (110 - 110)  RR: 15 (07 Oct 2024 13:07) (15 - 20)  SpO2: 95% (07 Oct 2024 13:07) (95% - 96%)    Parameters below as of 07 Oct 2024 13:07  Patient On (Oxygen Delivery Method): nasal cannula  O2 Flow (L/min): 2    ________________________________________________  PHYSICAL EXAM:  GENERAL: NAD  HEENT: Normocephalic;  conjunctivae and sclerae clear; moist mucous membranes;   NECK : supple  CHEST/LUNG: Clear to auscultation bilaterally with good air entry   HEART: S1 S2  regular; no murmurs, gallops or rubs  ABDOMEN: Soft, Nontender, Nondistended; Bowel sounds present  EXTREMITIES: no cyanosis; no edema; no calf tenderness  SKIN: warm and dry; no rash  NERVOUS SYSTEM:  Awake and alert; Oriented to place and person only; no new deficits  _________________________________________________  LABS:                        11.3   4.24  )-----------( 209      ( 07 Oct 2024 05:43 )             35.3     10-07    135  |  92[L]  |  8   ----------------------------<  100[H]  3.2[L]   |  39[H]  |  0.26[L]    Ca    8.6      07 Oct 2024 05:43  Phos  2.3     10-07  Mg     1.6     10-07  Blood Gas Profile - Arterial in AM (10.06.24 @ 16:34)   pH, Arterial: 7.48  pCO2, Arterial: 66 mmHg  pO2, Arterial: 75 mmHg  HCO3, Arterial: 49      RADIOLOGY & ADDITIONAL TESTS:  < from: Xray Chest 1 View- PORTABLE-Urgent (Xray Chest 1 View- PORTABLE-Urgent .) (10.02.24 @ 19:42) >  ACC: [id number] EXAM:  XR CHEST PORTABLE URGENT 1V   ORDERED BY: RADHA SAWANT   PROCEDURE DATE:  10/02/2024    INTERPRETATION:  AP semierect chest on October 2, 2024 at 7:07 PM.   Patient is short of breath.  Elevated left hemidiaphragm again noted.  Heart magnified by technique.  There are congestive like findings on September 18 this shows some   improvement on this study.    IMPRESSION: Improved congestive findings compared to prior.  --- End of Report ---  DARA MARIEE MD; Attending Radiologist  This document has been electronically signed. Oct  3 2024  9:40AM  < end of copied text >      < from: CT Chest w/ IV Cont (10.02.24 @ 21:28) >  ACC: [id number] EXAM:  CT ANGIO ABD PELV (W)AW IC   ORDERED BY: RADHA SAWANT   ACC: [id number] EXAM:  CT CHEST IC   ORDERED BY: RADHA SAWANT   PROCEDURE DATE:  10/02/2024    INTERPRETATION:  CLINICAL INFORMATION: Shortness of breath. GI bleed.  COMPARISON: CTA chest 9/14/2024.  CONTRAST/COMPLICATIONS:  IV Contrast: Omnipaque 350 (accession [id number]), IV contrast documented   in unlinked concurrent exam (accession [id number])  90 cc administered   10   cc discarded  Oral Contrast: NONE  Complications: None reported at time of study completion  PROCEDURE:  CT of the Chest, Abdomen and Pelvis was performed.  Precontrast, Arterial and Delayed phases were acquired though the abdomen.  Sagittal and coronal reformats were performed.  FINDINGS:  CHEST:  LUNGS AND LARGE AIRWAYS: Patent central airways. Stable biapical apparent   midline consolidative opacities. Stable atelectasis of the basilar   segments of the left lower lobe.. Mild right basilar dependent   atelectasis.  PLEURA:Trace bilateral pleural effusions. Elevated left diaphragm.  VESSELS: Aortic calcifications. Coronary artery calcifications.  HEART: Cardiomegaly. No pericardial effusion.  MEDIASTINUM AND LESLIE: No lymphadenopathy.  CHEST WALL AND LOWER NECK: Stable diffuse thyromegaly..  ABDOMEN AND PELVIS:  LIVER: Within normal limits.  BILE DUCTS: Normal caliber.  GALLBLADDER: Within normal limits.  SPLEEN: Within normal limits.  PANCREAS: Within normal limits.  ADRENALS: Within normal limits.  KIDNEYS/URETERS: Subcentimeter indeterminate hypodense left renal lesion   is too small to characterize. Symmetric renal enhancement without   hydronephrosis. BLADDER: Distended to above the level of the umbilicus..  REPRODUCTIVE ORGANS: Fibroid uterus.  BOWEL: Rectal distention with stool. No evidence for active GI bleed. No   bowel obstruction or evidence of active bowel inflammation. Appendix is   not visualized. No evidence of inflammation in the pericecal region.  PERITONEUM/RETROPERITONEUM: Mild presacral edema..  VESSELS: Within normal limits.  LYMPH NODES: No lymphadenopathy.  ABDOMINAL WALL: Within normal limits.  BONES: DJD of the glenohumeral joints, left greater than right.   Degenerative changes of the spine. L2 kyphoplasty with cement material   noted in the canal at this level, unchanged. Additional multilevel   compression deformities are stable involving mid thoracic to distal   lumbar vertebral bodies..    IMPRESSION:  Stable left basilar atelectasis. Superimposed pneumonia is not excluded.   Stable biapical consolidative opacities that may represent scarring.   Consider short interval follow-up CT.  No evidence for active GI bleed or active bowel inflammation.  Large stool burden in the rectum.  --- End of Report ---  DA ALVARES MD; Attending Radiologist  This document has been electronically signed. Oct  2 2024 10:10PM  < end of copied text >      Imaging  Reviewed:  YES    Consultant(s) Notes Reviewed:   YES    Plan of care was discussed with patient and /or primary care giver; all questions and concerns were addressed

## 2024-10-07 NOTE — PROGRESS NOTE ADULT - ASSESSMENT
78 yo F, from [facility], with pmhx of chronic hypercapnic respiratory failure, HTN, arthritis, neuralgia, osteoporotic compression fractures, hyponatremia, and goiter who presented with rectal bleed.  80 yo F, from [facility], with pmhx of chronic hypercapnic respiratory failure, diaphragmatic dysfunction, HTN, arthritis, neuralgia, osteoporotic compression fractures L5-S1, hyponatremia, and goiter with recent admission for hypercapnic respiratory failure 09/2024, who presented with rectal bleed, no signs of rectal bleeding in ED. blood gas with profoundly elevated pCO2. Pt placed on bipap, ICU consulted, but did not require ICU admission. Pt admitted for acute on chronic hypercarbic respiratory failure. Pt with bloody stool. GI consulted, not recommending colonoscopy at this time d/t respiratory failure. Pt with no more blood BMs. PT recc CHET, pending acceptance and authorization.

## 2024-10-07 NOTE — PROGRESS NOTE ADULT - SUBJECTIVE AND OBJECTIVE BOX
Time of Visit:  Patient seen and examined.     MEDICATIONS  (STANDING):  albuterol/ipratropium for Nebulization 3 milliLiter(s) Nebulizer every 6 hours  bisacodyl 5 milliGRAM(s) Oral at bedtime  carvedilol 25 milliGRAM(s) Oral every 12 hours  fluticasone propionate/ salmeterol 250-50 MICROgram(s) Diskus 1 Dose(s) Inhalation two times a day  pantoprazole  Injectable 40 milliGRAM(s) IV Push two times a day  polyethylene glycol 3350 17 Gram(s) Oral two times a day      MEDICATIONS  (PRN):       Medications up to date at time of exam.    ROS; No fever, chills, cough , nasal congestion on exam.   PHYSICAL EXAMINATION:    Vital Signs Last 24 Hrs  T(C): 36.2 (07 Oct 2024 13:07), Max: 36.7 (06 Oct 2024 20:04)  T(F): 97.2 (07 Oct 2024 13:07), Max: 98.1 (06 Oct 2024 20:04)  HR: 79 (07 Oct 2024 13:07) (75 - 87)  BP: 137/82 (07 Oct 2024 13:07) (136/78 - 166/82)  BP(mean): 110 (07 Oct 2024 04:51) (110 - 110)  RR: 15 (07 Oct 2024 13:07) (15 - 20)  SpO2: 95% (07 Oct 2024 13:07) (95% - 96%)    Parameters below as of 07 Oct 2024 13:07  Patient On (Oxygen Delivery Method): nasal cannula  O2 Flow (L/min): 2     (if applicable)    General: Alert and oriented, forgetful  . No acute distress.     HEENT: Head is normocephalic and atraumatic. Extraocular muscles are intact. Mucous membranes are moist.     NECK: Supple, no palpable adenopathy.    LUNGS: Fair air entrance.  Non labored . No wheezing. No use of accessory muscle.     HEART: S1 S2 Regular rate and rhythm .    ABDOMEN: Soft, nontender, and nondistended. Active bowel sounds.     EXTREMITIES: Without any cyanosis, clubbing, rash, lesions .    NEUROLOGIC: Awake, alert, oriented.     SKIN: Warm, dry, good turgor.      LABS:                        11.3   4.24  )-----------( 209      ( 07 Oct 2024 05:43 )             35.3     10-07    135  |  92[L]  |  8   ----------------------------<  100[H]  3.2[L]   |  39[H]  |  0.26[L]    Ca    8.6      07 Oct 2024 05:43  Phos  2.3     10-07  Mg     1.6     10-07        Urinalysis Basic - ( 07 Oct 2024 05:43 )    Color: x / Appearance: x / SG: x / pH: x  Gluc: 100 mg/dL / Ketone: x  / Bili: x / Urobili: x   Blood: x / Protein: x / Nitrite: x   Leuk Esterase: x / RBC: x / WBC x   Sq Epi: x / Non Sq Epi: x / Bacteria: x      ABG - ( 06 Oct 2024 16:34 )  pH, Arterial: 7.48  pH, Blood: x     /  pCO2: 66    /  pO2: 75    / HCO3: 49    / Base Excess: 21.5  /  SaO2: 97          MICROBIOLOGY: (if applicable)    RADIOLOGY & ADDITIONAL STUDIES:  EKG:   CXR: < from: Xray Chest 1 View- PORTABLE-Urgent (Xray Chest 1 View- PORTABLE-Urgent .) (10.02.24 @ 19:42) >    ACC: [id number] EXAM:  XR CHEST PORTABLE URGENT 1V   ORDERED BY: RADHA SAWANT     PROCEDURE DATE:  10/02/2024          INTERPRETATION:  AP semierect chest on October 2, 2024 at 7:07 PM.   Patient is short of breath.    Elevated left hemidiaphragm again noted.    Heart magnified by technique.    There are congestive like findings on September 18 this shows some   improvement on this study.    IMPRESSION: Improved congestive findings compared to prior.    --- End of Report ---            DARA MARIEE MD; Attending Radiologist  This document has been electronically signed. Oct  3 2024  9:40AM    < end of copied text >      < from: CT Chest w/ IV Cont (10.02.24 @ 21:28) >    ACC: [id number] EXAM:  CT ANGIO ABD PELV (W)AW IC   ORDERED BY: RADHA SAWANT     ACC: [id number] EXAM:  CT CHEST IC   ORDERED BY: RADHA SAWANT     PROCEDURE DATE:  10/02/2024          INTERPRETATION:  CLINICAL INFORMATION: Shortness of breath. GI bleed.    COMPARISON: CTA chest 9/14/2024.    CONTRAST/COMPLICATIONS:  IV Contrast: Omnipaque 350 (accession [id number]), IV contrast documented   in unlinked concurrent exam (accession [id number])  90 cc administered   10   cc discarded  Oral Contrast: NONE  Complications: None reported at time of study completion    PROCEDURE:  CT of the Chest, Abdomen and Pelvis was performed.  Precontrast, Arterial and Delayed phases were acquired though the abdomen.  Sagittal and coronal reformats were performed.    FINDINGS:  CHEST:  LUNGS AND LARGE AIRWAYS: Patent central airways. Stable biapical apparent   midline consolidative opacities. Stable atelectasis of the basilar   segments of the left lower lobe.. Mild right basilar dependent   atelectasis.  PLEURA:Trace bilateral pleural effusions. Elevated left diaphragm.  VESSELS: Aortic calcifications. Coronary artery calcifications.  HEART: Cardiomegaly. No pericardial effusion.  MEDIASTINUM AND LESLIE: No lymphadenopathy.  CHEST WALL AND LOWER NECK: Stable diffuse thyromegaly..    ABDOMEN AND PELVIS:  LIVER: Within normal limits.  BILE DUCTS: Normal caliber.  GALLBLADDER: Within normal limits.  SPLEEN: Within normal limits.  PANCREAS: Within normal limits.  ADRENALS: Within normal limits.  KIDNEYS/URETERS: Subcentimeter indeterminate hypodense left renal lesion   is too small to characterize. Symmetric renal enhancement without   hydronephrosis. BLADDER: Distended to above the level of the umbilicus..  REPRODUCTIVE ORGANS: Fibroid uterus.    BOWEL: Rectal distention with stool. No evidence for active GI bleed. No   bowel obstruction or evidence of active bowel inflammation. Appendix is   not visualized. No evidence of inflammation in the pericecal region.  PERITONEUM/RETROPERITONEUM: Mild presacral edema..  VESSELS: Within normal limits.  LYMPH NODES: No lymphadenopathy.  ABDOMINAL WALL: Within normal limits.  BONES: DJD of the glenohumeral joints, left greater than right.   Degenerative changes of the spine. L2 kyphoplasty with cement material   noted in the canal at this level, unchanged. Additional multilevel   compression deformities are stable involving mid thoracic to distal   Imlumbar vertebral bodies..    IMPRESSION:  Stable left basilar atelectasis. Superimposed pneumonia is not excluded.   Stable biapical consolidative opacities that may represent scarring.   Consider short interval follow-up CT.    No evidence for active GI bleed or active bowel inflammation.    Large stool burden in the rectum.        --- End of Report ---            DA ALVARES MD; Attending Radiologist  This document has been electronically signed. Oct  2 2024 10:10PM    < end of copied text >    ECHO:    IMPRESSION: 79y Female PAST MEDICAL & SURGICAL HISTORY:  HTN (hypertension)      Lumbar neuralgia      Hyponatremia      Arthritis    Impression; This is an 78 Y/O Female from [facility] presented to ED with Rectal Bleed . Had a recent admission here at [facility] due to Hypercapnia Respiratory Failure in 09/ 2024 . Patient blood gas showed profound elevation in PCO2 ( prompted to ICU  and need initiation of Bipap . Patient is known CO2 retainer due to Non compliance to Bipap. For Pulmonary follow up of Acute on chronic hypercapnic respiratory failure .    Suggestion:   O2 saturation 96% with O2 supplement. Continue Oxygen supplementation when off NIPPV / PAP.   Continue Bipap 13/5 FIO2 40% at Night .   Continue Duoneb via nebulization Q 6 Hours .   Continue   Advair 250- 50 mcg Twice Daily  .              Pulmonary oral hygiene care especially every after oral inhaler used.

## 2024-10-07 NOTE — PROGRESS NOTE ADULT - SUBJECTIVE AND OBJECTIVE BOX
PATIENT SEEN AND EXAMINED BY CAITLYN TOTH M.D. ON :- 10/7/24  DATE OF SERVICE:   10/7/24          Interim events noted,Labs ,Radiological studies and Cardiology tests reviewed .    Patient is a 79y old  Female who presents with a chief complaint of Acute respiratory distress . (07 Oct 2024 11:19)      HPI:  79F with chronic hypercapnic respiratory failure (no compliant with bipap), diaphragmatic dysfunction, hypertension, arthritis, Neuralgia, osteoporotic compression fractures L5-S1, hyponatremia (HCTZ induced, corrected), goiter, recent admission to [facility] with hypercapnic resp failure 9/2024, presenting from NH with complaint of rectal bleed. As per ED attending no signs of rectal bleeding in the ED. Patient blood gas showed profound elevation in pCO2  prompting ICU consult and initiation of BIPAP. Pt is known to be noncompliant with BIPAP and is known to chronically retain CO2. ABG pH and CO2 improved on BIPAP and determined that patient did not require ICU level of care. Pt was seen at bedside, tolerating BIPAP, appears comfortable, but lethargic. Observed frequently pulling off BIPAP mask and desaturating to low 80s. No acute complaints other than feeling cold.  (03 Oct 2024 01:34)      PAST MEDICAL & SURGICAL HISTORY:  HTN (hypertension)      Lumbar neuralgia      Hyponatremia      Arthritis          PREVIOUS DIAGNOSTIC TESTING:      ECHO  FINDINGS:    STRESS  FINDINGS:    CATHETERIZATION  FINDINGS:    MEDICATIONS  (STANDING):  albuterol/ipratropium for Nebulization 3 milliLiter(s) Nebulizer every 6 hours  bisacodyl 5 milliGRAM(s) Oral at bedtime  carvedilol 25 milliGRAM(s) Oral every 12 hours  fluticasone propionate/ salmeterol 250-50 MICROgram(s) Diskus 1 Dose(s) Inhalation two times a day  pantoprazole    Tablet 40 milliGRAM(s) Oral two times a day  polyethylene glycol 3350 17 Gram(s) Oral two times a day  rosuvastatin 5 milliGRAM(s) Oral at bedtime    MEDICATIONS  (PRN):      FAMILY HISTORY:      SOCIAL HISTORY:    CIGARETTES:    ALCOHOL:    REVIEW OF SYSTEMS:  CONSTITUTIONAL: No fever, weight loss, or fatigue  EYES: No eye pain, visual disturbances, or discharge  ENMT:  No difficulty hearing, tinnitus, vertigo; No sinus or throat pain  NECK: No pain or stiffness  RESPIRATORY: No cough, wheezing, chills or hemoptysis; No shortness of breath  CARDIOVASCULAR: No chest pain, palpitations, dizziness, or leg swelling  GASTROINTESTINAL: No abdominal or epigastric pain. No nausea, vomiting, or hematemesis; No diarrhea or constipation. No melena or hematochezia.  GENITOURINARY: No dysuria, frequency, hematuria, or incontinence  NEUROLOGICAL: No headaches, memory loss, loss of strength, numbness, or tremors  SKIN: No itching, burning, rashes, or lesions   LYMPH NODES: No enlarged glands  ENDOCRINE: No heat or cold intolerance; No hair loss  MUSCULOSKELETAL: No joint pain or swelling; No muscle, back, or extremity pain  PSYCHIATRIC: No depression, anxiety, mood swings, or difficulty sleeping  HEME/LYMPH: No easy bruising, or bleeding gums  ALLERY AND IMMUNOLOGIC: No hives or eczema    Vital Signs Last 24 Hrs  T(C): 36.2 (07 Oct 2024 13:07), Max: 36.7 (06 Oct 2024 20:04)  T(F): 97.2 (07 Oct 2024 13:07), Max: 98.1 (06 Oct 2024 20:04)  HR: 85 (07 Oct 2024 18:09) (75 - 85)  BP: 161/79 (07 Oct 2024 18:09) (136/78 - 166/82)  BP(mean): 110 (07 Oct 2024 04:51) (110 - 110)  RR: 15 (07 Oct 2024 13:07) (15 - 20)  SpO2: 95% (07 Oct 2024 13:07) (95% - 96%)    Parameters below as of 07 Oct 2024 13:07  Patient On (Oxygen Delivery Method): nasal cannula  O2 Flow (L/min): 2        PHYSICAL EXAM:  GENERAL: NAD, well-groomed, well-developed  HEAD:  Atraumatic, Normocephalic  EYES: EOMI, PERRLA, conjunctiva and sclera clear  ENMT: No tonsillar erythema, exudates, or enlargement; Moist mucous membranes, Good dentition, No lesions  NECK: Supple, No JVD, Normal thyroid  NERVOUS SYSTEM:  Alert & Oriented X3, Good concentration; Motor Strength 5/5 B/L upper and lower extremities; DTRs 2+ intact and symmetric  CHEST/LUNG: Clear to percussion bilaterally; No rales, rhonchi, wheezing, or rubs  HEART: Regular rate and rhythm; No murmurs, rubs, or gallops  ABDOMEN: Soft, Nontender, Nondistended; Bowel sounds present  EXTREMITIES:  2+ Peripheral Pulses, No clubbing, cyanosis, or edema  LYMPH: No lymphadenopathy noted  SKIN: No rashes or lesions      INTERPRETATION OF TELEMETRY:    ECG[facility]    [facility]     LABS:                        11.3   4.24  )-----------( 209      ( 07 Oct 2024 05:43 )             35.3     10-07    135  |  92[L]  |  8   ----------------------------<  100[H]  3.2[L]   |  39[H]  |  0.26[L]    Ca    8.6      07 Oct 2024 05:43  Phos  2.3     10-07  Mg     1.6     10-07            Urinalysis Basic - ( 07 Oct 2024 05:43 )    Color: x / Appearance: x / SG: x / pH: x  Gluc: 100 mg/dL / Ketone: x  / Bili: x / Urobili: x   Blood: x / Protein: x / Nitrite: x   Leuk Esterase: x / RBC: x / WBC x   Sq Epi: x / Non Sq Epi: x / Bacteria: x      Lipid Panel:   I&O's Summary    06 Oct 2024 07:01  -  07 Oct 2024 07:00  --------------------------------------------------------  IN: 0 mL / OUT: 550 mL / NET: -550 mL        RADIOLOGY & ADDITIONAL STUDIES:    CONCLUSIONS:      1. Left ventricular cavity is normal in size. Left ventricular wall thickness is normal. Left ventricular systolic function is normal with an ejection fraction of 58 % by Rod's method of disks. There are no regional wall motion abnormalities seen.   2. There is mild (grade 1) left ventricular diastolic dysfunction.   3. Normal right ventricular cavity size, with normal wall thickness, and normal right ventricular systolic function. Tricuspid annular plane systolic excursion (TAPSE) is 2.3 cm (normal >=1.7 cm).   4. Mild mitral regurgitation.   5. Normal left and right atrial size.   6. Mild tricuspid regurgitation.   7. Estimated pulmonary artery systolic pressure is 37 mmHg, consistent with mild pulmonary hypertension.   8. Mild pulmonic regurgitation.   9. There is calcification of the mitral valve annulus.  10. There is normal LV mass and concentric remodeling.  11. No pericardial effusion seen.  12. No prior echocardiogram is available for comparison.

## 2024-10-07 NOTE — PROGRESS NOTE ADULT - ASSESSMENT
78 yo F, from [facility], with pmhx of chronic hypercapnic respiratory failure, diaphragmatic dysfunction, HTN, arthritis, neuralgia, osteoporotic compression fractures L5-S1, hyponatremia, and goiter with recent admission for hypercapnic respiratory failure 09/2024, who presented with rectal bleed, no signs of rectal bleeding in ED. blood gas with profoundly elevated pCO2. Pt placed on bipap, ICU consulted, but did not require ICU admission. Pt admitted for acute on chronic hypercarbic respiratory failure. Pt with bloody stool. GI consulted, not recommending colonoscopy at this time d/t respiratory failure. Pt with no more blood BMs. PT recc CHET, pending acceptance and authorization.

## 2024-10-07 NOTE — PROGRESS NOTE ADULT - SUBJECTIVE AND OBJECTIVE BOX
INTERVAL HPI/OVERNIGHT EVENTS: no new complaints    MEDICATIONS  (STANDING):  albuterol/ipratropium for Nebulization 3 milliLiter(s) Nebulizer every 6 hours  bisacodyl 5 milliGRAM(s) Oral at bedtime  carvedilol 25 milliGRAM(s) Oral every 12 hours  fluticasone propionate/ salmeterol 250-50 MICROgram(s) Diskus 1 Dose(s) Inhalation two times a day  pantoprazole  Injectable 40 milliGRAM(s) IV Push two times a day  polyethylene glycol 3350 17 Gram(s) Oral two times a day    MEDICATIONS  (PRN):  __________________________________________________  REVIEW OF SYSTEMS:  CONSTITUTIONAL: No fever,   EYES: no acute visual disturbances  NECK: No pain or stiffness  RESPIRATORY: No cough; No shortness of breath  CARDIOVASCULAR: No chest pain, no palpitations  GASTROINTESTINAL: No pain. No nausea or vomiting; No diarrhea   NEUROLOGICAL: No headache or numbness, no tremors  MUSCULOSKELETAL: No joint pain, no muscle pain  GENITOURINARY: no dysuria, no frequency, no hesitancy  PSYCHIATRY: no depression , no anxiety  ALL OTHER  ROS negative      Vital Signs Last 24 Hrs  T(C): 36.2 (07 Oct 2024 13:07), Max: 36.7 (06 Oct 2024 20:04)  T(F): 97.2 (07 Oct 2024 13:07), Max: 98.1 (06 Oct 2024 20:04)  HR: 79 (07 Oct 2024 13:07) (75 - 87)  BP: 137/82 (07 Oct 2024 13:07) (136/78 - 166/82)  BP(mean): 110 (07 Oct 2024 04:51) (110 - 110)  RR: 15 (07 Oct 2024 13:07) (15 - 20)  SpO2: 95% (07 Oct 2024 13:07) (95% - 96%)    Parameters below as of 07 Oct 2024 13:07  Patient On (Oxygen Delivery Method): nasal cannula  O2 Flow (L/min): 2    ________________________________________________  PHYSICAL EXAM:  GENERAL: NAD  HEENT: Normocephalic;  conjunctivae and sclerae clear; moist mucous membranes;   NECK : supple  CHEST/LUNG: dec breath sounds at bases  HEART: S1 S2 +  ABDOMEN: Soft, Nontender, Nondistended; Bowel sounds present  EXTREMITIES: no cyanosis; no edema; no calf tenderness  SKIN: warm and dry; no rash  NERVOUS SYSTEM:  Awake and alert;    _________________________________________________  LABS:                        11.3   4.24  )-----------( 209      ( 07 Oct 2024 05:43 )             35.3     10-07    135  |  92[L]  |  8   ----------------------------<  100[H]  3.2[L]   |  39[H]  |  0.26[L]    Ca    8.6      07 Oct 2024 05:43  Phos  2.3     10-07  Mg     1.6     10-07  Blood Gas Profile - Arterial in AM (10.06.24 @ 16:34)   pH, Arterial: 7.48  pCO2, Arterial: 66 mmHg  pO2, Arterial: 75 mmHg  HCO3, Arterial: 49      RADIOLOGY & ADDITIONAL TESTS:  < from: Xray Chest 1 View- PORTABLE-Urgent (Xray Chest 1 View- PORTABLE-Urgent .) (10.02.24 @ 19:42) >  ACC: [id number] EXAM:  XR CHEST PORTABLE URGENT 1V   ORDERED BY: RADHA SAWANT   PROCEDURE DATE:  10/02/2024    INTERPRETATION:  AP semierect chest on October 2, 2024 at 7:07 PM.   Patient is short of breath.  Elevated left hemidiaphragm again noted.  Heart magnified by technique.  There are congestive like findings on September 18 this shows some   improvement on this study.    IMPRESSION: Improved congestive findings compared to prior.  --- End of Report ---  DARA MARIEE MD; Attending Radiologist  This document has been electronically signed. Oct  3 2024  9:40AM  < end of copied text >      < from: CT Chest w/ IV Cont (10.02.24 @ 21:28) >  ACC: [id number] EXAM:  CT ANGIO ABD PELV (W)AW IC   ORDERED BY: RADHA SAWANT   ACC: [id number] EXAM:  CT CHEST IC   ORDERED BY: RADHA SAWANT   PROCEDURE DATE:  10/02/2024    INTERPRETATION:  CLINICAL INFORMATION: Shortness of breath. GI bleed.  COMPARISON: CTA chest 9/14/2024.  CONTRAST/COMPLICATIONS:  IV Contrast: Omnipaque 350 (accession [id number]), IV contrast documented   in unlinked concurrent exam (accession [id number])  90 cc administered   10   cc discarded  Oral Contrast: NONE  Complications: None reported at time of study completion  PROCEDURE:  CT of the Chest, Abdomen and Pelvis was performed.  Precontrast, Arterial and Delayed phases were acquired though the abdomen.  Sagittal and coronal reformats were performed.  FINDINGS:  CHEST:  LUNGS AND LARGE AIRWAYS: Patent central airways. Stable biapical apparent   midline consolidative opacities. Stable atelectasis of the basilar   segments of the left lower lobe.. Mild right basilar dependent   atelectasis.  PLEURA:Trace bilateral pleural effusions. Elevated left diaphragm.  VESSELS: Aortic calcifications. Coronary artery calcifications.  HEART: Cardiomegaly. No pericardial effusion.  MEDIASTINUM AND LESLIE: No lymphadenopathy.  CHEST WALL AND LOWER NECK: Stable diffuse thyromegaly..  ABDOMEN AND PELVIS:  LIVER: Within normal limits.  BILE DUCTS: Normal caliber.  GALLBLADDER: Within normal limits.  SPLEEN: Within normal limits.  PANCREAS: Within normal limits.  ADRENALS: Within normal limits.  KIDNEYS/URETERS: Subcentimeter indeterminate hypodense left renal lesion   is too small to characterize. Symmetric renal enhancement without   hydronephrosis. BLADDER: Distended to above the level of the umbilicus..  REPRODUCTIVE ORGANS: Fibroid uterus.  BOWEL: Rectal distention with stool. No evidence for active GI bleed. No   bowel obstruction or evidence of active bowel inflammation. Appendix is   not visualized. No evidence of inflammation in the pericecal region.  PERITONEUM/RETROPERITONEUM: Mild presacral edema..  VESSELS: Within normal limits.  LYMPH NODES: No lymphadenopathy.  ABDOMINAL WALL: Within normal limits.  BONES: DJD of the glenohumeral joints, left greater than right.   Degenerative changes of the spine. L2 kyphoplasty with cement material   noted in the canal at this level, unchanged. Additional multilevel   compression deformities are stable involving mid thoracic to distal   lumbar vertebral bodies..    IMPRESSION:  Stable left basilar atelectasis. Superimposed pneumonia is not excluded.   Stable biapical consolidative opacities that may represent scarring.   Consider short interval follow-up CT.  No evidence for active GI bleed or active bowel inflammation.  Large stool burden in the rectum.  --- End of Report ---  DA ALVARES MD; Attending Radiologist  This document has been electronically signed. Oct  2 2024 10:10PM  < end of copied text >      Imaging  Reviewed:  YES    Consultant(s) Notes Reviewed:   YES    Plan of care was discussed with patient and /or primary care giver; all questions and concerns were addressed

## 2024-10-08 ENCOUNTER — TRANSCRIPTION ENCOUNTER (OUTPATIENT)
Age: 79
End: 2024-10-08

## 2024-10-08 LAB
ALBUMIN SERPL ELPH-MCNC: 2.6 G/DL — LOW (ref 3.5–5)
ALP SERPL-CCNC: 60 U/L — SIGNIFICANT CHANGE UP (ref 40–120)
ALT FLD-CCNC: 13 U/L DA — SIGNIFICANT CHANGE UP (ref 10–60)
ANION GAP SERPL CALC-SCNC: 6 MMOL/L — SIGNIFICANT CHANGE UP (ref 5–17)
AST SERPL-CCNC: 14 U/L — SIGNIFICANT CHANGE UP (ref 10–40)
BILIRUB SERPL-MCNC: 0.5 MG/DL — SIGNIFICANT CHANGE UP (ref 0.2–1.2)
BLD GP AB SCN SERPL QL: SIGNIFICANT CHANGE UP
BUN SERPL-MCNC: 7 MG/DL — SIGNIFICANT CHANGE UP (ref 7–18)
CALCIUM SERPL-MCNC: 8.4 MG/DL — SIGNIFICANT CHANGE UP (ref 8.4–10.5)
CHLORIDE SERPL-SCNC: 91 MMOL/L — LOW (ref 96–108)
CO2 SERPL-SCNC: 38 MMOL/L — HIGH (ref 22–31)
CREAT SERPL-MCNC: 0.25 MG/DL — LOW (ref 0.5–1.3)
EGFR: 112 ML/MIN/1.73M2 — SIGNIFICANT CHANGE UP
GLUCOSE SERPL-MCNC: 116 MG/DL — HIGH (ref 70–99)
HCT VFR BLD CALC: 31.5 % — LOW (ref 34.5–45)
HGB BLD-MCNC: 10.4 G/DL — LOW (ref 11.5–15.5)
MAGNESIUM SERPL-MCNC: 1.8 MG/DL — SIGNIFICANT CHANGE UP (ref 1.6–2.6)
MCHC RBC-ENTMCNC: 29.5 PG — SIGNIFICANT CHANGE UP (ref 27–34)
MCHC RBC-ENTMCNC: 33 GM/DL — SIGNIFICANT CHANGE UP (ref 32–36)
MCV RBC AUTO: 89.2 FL — SIGNIFICANT CHANGE UP (ref 80–100)
NRBC # BLD: 0 /100 WBCS — SIGNIFICANT CHANGE UP (ref 0–0)
PHOSPHATE SERPL-MCNC: 3.4 MG/DL — SIGNIFICANT CHANGE UP (ref 2.5–4.5)
PLATELET # BLD AUTO: 200 K/UL — SIGNIFICANT CHANGE UP (ref 150–400)
POTASSIUM SERPL-MCNC: 3.5 MMOL/L — SIGNIFICANT CHANGE UP (ref 3.5–5.3)
POTASSIUM SERPL-SCNC: 3.5 MMOL/L — SIGNIFICANT CHANGE UP (ref 3.5–5.3)
PROT SERPL-MCNC: 6 G/DL — SIGNIFICANT CHANGE UP (ref 6–8.3)
RBC # BLD: 3.53 M/UL — LOW (ref 3.8–5.2)
RBC # FLD: 13.7 % — SIGNIFICANT CHANGE UP (ref 10.3–14.5)
SODIUM SERPL-SCNC: 135 MMOL/L — SIGNIFICANT CHANGE UP (ref 135–145)
WBC # BLD: 4.45 K/UL — SIGNIFICANT CHANGE UP (ref 3.8–10.5)
WBC # FLD AUTO: 4.45 K/UL — SIGNIFICANT CHANGE UP (ref 3.8–10.5)

## 2024-10-08 RX ORDER — FLUTICASONE PROPION/SALMETEROL 100-50 MCG
1 BLISTER, WITH INHALATION DEVICE INHALATION
Qty: 0 | Refills: 0 | DISCHARGE
Start: 2024-10-08

## 2024-10-08 RX ORDER — IPRATROPIUM BROMIDE AND ALBUTEROL SULFATE .5; 3 MG/3ML; MG/3ML
3 SOLUTION RESPIRATORY (INHALATION)
Qty: 0 | Refills: 0 | DISCHARGE
Start: 2024-10-08

## 2024-10-08 RX ORDER — HYDRALAZINE HYDROCHLORIDE 100 MG/1
5 TABLET ORAL ONCE
Refills: 0 | Status: COMPLETED | OUTPATIENT
Start: 2024-10-08 | End: 2024-10-08

## 2024-10-08 RX ORDER — BISACODYL 5 MG/1
1 TABLET, COATED ORAL
Qty: 0 | Refills: 0 | DISCHARGE
Start: 2024-10-08

## 2024-10-08 RX ADMIN — PANTOPRAZOLE SODIUM 40 MILLIGRAM(S): 40 TABLET, DELAYED RELEASE ORAL at 06:09

## 2024-10-08 RX ADMIN — HYDRALAZINE HYDROCHLORIDE 5 MILLIGRAM(S): 100 TABLET ORAL at 17:43

## 2024-10-08 RX ADMIN — PANTOPRAZOLE SODIUM 40 MILLIGRAM(S): 40 TABLET, DELAYED RELEASE ORAL at 17:47

## 2024-10-08 RX ADMIN — ROSUVASTATIN CALCIUM 5 MILLIGRAM(S): 20 TABLET, COATED ORAL at 22:17

## 2024-10-08 RX ADMIN — Medication 1 DOSE(S): at 22:22

## 2024-10-08 RX ADMIN — Medication 1 DOSE(S): at 11:17

## 2024-10-08 RX ADMIN — Medication 25 MILLIGRAM(S): at 15:46

## 2024-10-08 RX ADMIN — Medication 17 GRAM(S): at 17:47

## 2024-10-08 RX ADMIN — BISACODYL 5 MILLIGRAM(S): 5 TABLET, COATED ORAL at 22:38

## 2024-10-08 RX ADMIN — Medication 17 GRAM(S): at 06:09

## 2024-10-08 RX ADMIN — Medication 25 MILLIGRAM(S): at 06:09

## 2024-10-08 RX ADMIN — IPRATROPIUM BROMIDE AND ALBUTEROL SULFATE 3 MILLILITER(S): .5; 3 SOLUTION RESPIRATORY (INHALATION) at 20:38

## 2024-10-08 NOTE — DISCHARGE NOTE NURSING/CASE MANAGEMENT/SOCIAL WORK - NSTOBACCONEVERSMOKERY/N_GEN_A
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 Pt was referred to IR by Dr. Davey for embolization. Pt reported no complaints. Denied fever, CP, SOB, NVD. 
Yes

## 2024-10-08 NOTE — PROGRESS NOTE ADULT - ASSESSMENT
80 yo F, from [facility], with pmhx of chronic hypercapnic respiratory failure, diaphragmatic dysfunction, HTN, arthritis, neuralgia, osteoporotic compression fractures L5-S1, hyponatremia, and goiter with recent admission for hypercapnic respiratory failure 09/2024, who presented with rectal bleed, no signs of rectal bleeding in ED. blood gas with profoundly elevated pCO2. Pt placed on bipap, ICU consulted, but did not require ICU admission. Pt admitted for acute on chronic hypercarbic respiratory failure. Pt with bloody stool. GI consulted, not recommending colonoscopy at this time d/t respiratory failure. Pt with no more blood BMs. PT recc CHET, pending acceptance and authorization.

## 2024-10-08 NOTE — DISCHARGE NOTE NURSING/CASE MANAGEMENT/SOCIAL WORK - NSDCFUADDAPPT_GEN_ALL_CORE_FT
Please call and schedule appointment to follow up with [facility] (current provider).

## 2024-10-08 NOTE — PROGRESS NOTE ADULT - SUBJECTIVE AND OBJECTIVE BOX
PATIENT SEEN AND EXAMINED BY CAITLYN TOTH M.D. ON :- 10/8/24  DATE OF SERVICE:   10/8/24          Interim events noted,Labs ,Radiological studies and Cardiology tests reviewed .    Patient is a 79y old  Female who presents with a chief complaint of Acute respiratory distress . (07 Oct 2024 11:19)      HPI:  79F with chronic hypercapnic respiratory failure (no compliant with bipap), diaphragmatic dysfunction, hypertension, arthritis, Neuralgia, osteoporotic compression fractures L5-S1, hyponatremia (HCTZ induced, corrected), goiter, recent admission to [facility] with hypercapnic resp failure 9/2024, presenting from NH with complaint of rectal bleed. As per ED attending no signs of rectal bleeding in the ED. Patient blood gas showed profound elevation in pCO2  prompting ICU consult and initiation of BIPAP. Pt is known to be noncompliant with BIPAP and is known to chronically retain CO2. ABG pH and CO2 improved on BIPAP and determined that patient did not require ICU level of care. Pt was seen at bedside, tolerating BIPAP, appears comfortable, but lethargic. Observed frequently pulling off BIPAP mask and desaturating to low 80s. No acute complaints other than feeling cold.  (03 Oct 2024 01:34)      PAST MEDICAL & SURGICAL HISTORY:  HTN (hypertension)      Lumbar neuralgia      Hyponatremia      Arthritis          PREVIOUS DIAGNOSTIC TESTING:      ECHO  FINDINGS:    STRESS  FINDINGS:    CATHETERIZATION  FINDINGS:    MEDICATIONS  (STANDING):  albuterol/ipratropium for Nebulization 3 milliLiter(s) Nebulizer every 6 hours  bisacodyl 5 milliGRAM(s) Oral at bedtime  carvedilol 25 milliGRAM(s) Oral every 12 hours  fluticasone propionate/ salmeterol 250-50 MICROgram(s) Diskus 1 Dose(s) Inhalation two times a day  pantoprazole    Tablet 40 milliGRAM(s) Oral two times a day  polyethylene glycol 3350 17 Gram(s) Oral two times a day  rosuvastatin 5 milliGRAM(s) Oral at bedtime    MEDICATIONS  (PRN):      FAMILY HISTORY:      SOCIAL HISTORY:    CIGARETTES:    ALCOHOL:    REVIEW OF SYSTEMS:  CONSTITUTIONAL: No fever, weight loss, or fatigue  EYES: No eye pain, visual disturbances, or discharge  ENMT:  No difficulty hearing, tinnitus, vertigo; No sinus or throat pain  NECK: No pain or stiffness  RESPIRATORY: No cough, wheezing, chills or hemoptysis; No shortness of breath  CARDIOVASCULAR: No chest pain, palpitations, dizziness, or leg swelling  GASTROINTESTINAL: No abdominal or epigastric pain. No nausea, vomiting, or hematemesis; No diarrhea or constipation. No melena or hematochezia.  GENITOURINARY: No dysuria, frequency, hematuria, or incontinence  NEUROLOGICAL: No headaches, memory loss, loss of strength, numbness, or tremors  SKIN: No itching, burning, rashes, or lesions   LYMPH NODES: No enlarged glands  ENDOCRINE: No heat or cold intolerance; No hair loss  MUSCULOSKELETAL: No joint pain or swelling; No muscle, back, or extremity pain  PSYCHIATRIC: No depression, anxiety, mood swings, or difficulty sleeping  HEME/LYMPH: No easy bruising, or bleeding gums  ALLERY AND IMMUNOLOGIC: No hives or eczema    Vital Signs Last 24 Hrs  T(C): 36.3 (08 Oct 2024 21:39), Max: 36.6 (08 Oct 2024 06:04)  T(F): 97.3 (08 Oct 2024 21:39), Max: 97.9 (08 Oct 2024 06:04)  HR: 90 (08 Oct 2024 21:39) (74 - 90)  BP: 138/68 (08 Oct 2024 21:39) (138/68 - 182/85)  BP(mean): --  RR: 19 (08 Oct 2024 21:39) (18 - 19)  SpO2: 94% (08 Oct 2024 21:39) (93% - 97%)    Parameters below as of 08 Oct 2024 15:10  Patient On (Oxygen Delivery Method): nasal cannula  O2 Flow (L/min): 2        PHYSICAL EXAM:  GENERAL: NAD, well-groomed, well-developed  HEAD:  Atraumatic, Normocephalic  EYES: EOMI, PERRLA, conjunctiva and sclera clear  ENMT: No tonsillar erythema, exudates, or enlargement; Moist mucous membranes, Good dentition, No lesions  NECK: Supple, No JVD, Normal thyroid  NERVOUS SYSTEM:  Alert & Oriented X3, Good concentration; Motor Strength 5/5 B/L upper and lower extremities; DTRs 2+ intact and symmetric  CHEST/LUNG: Clear to percussion bilaterally; No rales, rhonchi, wheezing, or rubs  HEART: Regular rate and rhythm; No murmurs, rubs, or gallops  ABDOMEN: Soft, Nontender, Nondistended; Bowel sounds present  EXTREMITIES:  2+ Peripheral Pulses, No clubbing, cyanosis, or edema  LYMPH: No lymphadenopathy noted  SKIN: No rashes or lesions      INTERPRETATION OF TELEMETRY:    ECG:    [facility]:     LABS:                        10.4   4.45  )-----------( 200      ( 08 Oct 2024 05:31 )             31.5     10-08    135  |  91[L]  |  7   ----------------------------<  116[H]  3.5   |  38[H]  |  0.25[L]    Ca    8.4      08 Oct 2024 05:31  Phos  3.4     10-08  Mg     1.8     10-08    TPro  6.0  /  Alb  2.6[L]  /  TBili  0.5  /  DBili  x   /  AST  14  /  ALT  13  /  AlkPhos  60  10-08          Urinalysis Basic - ( 08 Oct 2024 05:31 )    Color: x / Appearance: x / SG: x / pH: x  Gluc: 116 mg/dL / Ketone: x  / Bili: x / Urobili: x   Blood: x / Protein: x / Nitrite: x   Leuk Esterase: x / RBC: x / WBC x   Sq Epi: x / Non Sq Epi: x / Bacteria: x      Lipid Panel:   I&O's Summary      RADIOLOGY & ADDITIONAL STUDIES:    CONCLUSIONS:      1. Left ventricular cavity is normal in size. Left ventricular wall thickness is normal. Left ventricular systolic function is normal with an ejection fraction of 58 % by Rod's method of disks. There are no regional wall motion abnormalities seen.   2. There is mild (grade 1) left ventricular diastolic dysfunction.   3. Normal right ventricular cavity size, with normal wall thickness, and normal right ventricular systolic function. Tricuspid annular plane systolic excursion (TAPSE) is 2.3 cm (normal >=1.7 cm).   4. Mild mitral regurgitation.   5. Normal left and right atrial size.   6. Mild tricuspid regurgitation.   7. Estimated pulmonary artery systolic pressure is 37 mmHg, consistent with mild pulmonary hypertension.   8. Mild pulmonic regurgitation.   9. There is calcification of the mitral valve annulus.  10. There is normal LV mass and concentric remodeling.  11. No pericardial effusion seen.  12. No prior echocardiogram is available for comparison.

## 2024-10-08 NOTE — PROGRESS NOTE ADULT - SUBJECTIVE AND OBJECTIVE BOX
Time of Visit:  Patient seen and examined. spoke with son Flex at bedside     MEDICATIONS  (STANDING):  albuterol/ipratropium for Nebulization 3 milliLiter(s) Nebulizer every 6 hours  bisacodyl 5 milliGRAM(s) Oral at bedtime  carvedilol 25 milliGRAM(s) Oral every 12 hours  fluticasone propionate/ salmeterol 250-50 MICROgram(s) Diskus 1 Dose(s) Inhalation two times a day  pantoprazole    Tablet 40 milliGRAM(s) Oral two times a day  polyethylene glycol 3350 17 Gram(s) Oral two times a day  rosuvastatin 5 milliGRAM(s) Oral at bedtime      MEDICATIONS  (PRN):       Medications up to date at time of exam.      PHYSICAL EXAMINATION:  Patient has no new complaints.  GENERAL: The patient  in no apparent distress.     Vital Signs Last 24 Hrs  T(C): 36.6 (08 Oct 2024 13:41), Max: 36.6 (08 Oct 2024 06:04)  T(F): 97.8 (08 Oct 2024 13:41), Max: 97.9 (08 Oct 2024 06:04)  HR: 78 (08 Oct 2024 15:10) (74 - 88)  BP: 157/86 (08 Oct 2024 15:10) (150/75 - 161/79)  BP(mean): --  RR: 18 (08 Oct 2024 13:41) (16 - 18)  SpO2: 97% (08 Oct 2024 15:10) (93% - 97%)    Parameters below as of 08 Oct 2024 15:10  Patient On (Oxygen Delivery Method): nasal cannula  O2 Flow (L/min): 2     (if applicable)    Chest Tube (if applicable)    HEENT: Head is normocephalic and atraumatic. Extraocular muscles are intact. Mucous membranes are moist.     NECK: Supple, no palpable adenopathy.    LUNGS: Fair bilateral air entry   no wheezing, rales, or rhonchi.    HEART: Regular rate and rhythm without murmur.    ABDOMEN: Soft, nontender, and nondistended.  No hepatosplenomegaly is noted.    : No painful voiding, no pelvic pain    EXTREMITIES: Without any cyanosis, clubbing, rash, lesions or edema.    NEUROLOGIC: Awake, alert, oriented, grossly intact    SKIN: Warm, dry, good turgor.      LABS:                        10.4   4.45  )-----------( 200      ( 08 Oct 2024 05:31 )             31.5     10-08    135  |  91[L]  |  7   ----------------------------<  116[H]  3.5   |  38[H]  |  0.25[L]    Ca    8.4      08 Oct 2024 05:31  Phos  3.4     10-08  Mg     1.8     10-08    TPro  6.0  /  Alb  2.6[L]  /  TBili  0.5  /  DBili  x   /  AST  14  /  ALT  13  /  AlkPhos  60  10-08      Urinalysis Basic - ( 08 Oct 2024 05:31 )    Color: x / Appearance: x / SG: x / pH: x  Gluc: 116 mg/dL / Ketone: x  / Bili: x / Urobili: x   Blood: x / Protein: x / Nitrite: x   Leuk Esterase: x / RBC: x / WBC x   Sq Epi: x / Non Sq Epi: x / Bacteria: x                      MICROBIOLOGY: (if applicable)    RADIOLOGY & ADDITIONAL STUDIES:  EKG:   CXR:  ECHO:    IMPRESSION: 79y Female PAST MEDICAL & SURGICAL HISTORY:  HTN (hypertension)      Lumbar neuralgia      Hyponatremia      Arthritis       p/w            Time of Visit:  Patient seen and examined. spoke with son Flex at bedside     MEDICATIONS  (STANDING):  albuterol/ipratropium for Nebulization 3 milliLiter(s) Nebulizer every 6 hours  bisacodyl 5 milliGRAM(s) Oral at bedtime  carvedilol 25 milliGRAM(s) Oral every 12 hours  fluticasone propionate/ salmeterol 250-50 MICROgram(s) Diskus 1 Dose(s) Inhalation two times a day  pantoprazole    Tablet 40 milliGRAM(s) Oral two times a day  polyethylene glycol 3350 17 Gram(s) Oral two times a day  rosuvastatin 5 milliGRAM(s) Oral at bedtime      MEDICATIONS  (PRN):       Medications up to date at time of exam.      PHYSICAL EXAMINATION:  Patient has no new complaints.  GENERAL: The patient  in no apparent distress.     Vital Signs Last 24 Hrs  T(C): 36.6 (08 Oct 2024 13:41), Max: 36.6 (08 Oct 2024 06:04)  T(F): 97.8 (08 Oct 2024 13:41), Max: 97.9 (08 Oct 2024 06:04)  HR: 78 (08 Oct 2024 15:10) (74 - 88)  BP: 157/86 (08 Oct 2024 15:10) (150/75 - 161/79)  BP(mean): --  RR: 18 (08 Oct 2024 13:41) (16 - 18)  SpO2: 97% (08 Oct 2024 15:10) (93% - 97%)    Parameters below as of 08 Oct 2024 15:10  Patient On (Oxygen Delivery Method): nasal cannula  O2 Flow (L/min): 2     (if applicable)    Chest Tube (if applicable)    HEENT: Head is normocephalic and atraumatic. Extraocular muscles are intact. Mucous membranes are moist.     NECK: Supple, no palpable adenopathy.    LUNGS: Fair bilateral air entry   no wheezing, rales, or rhonchi.    HEART: Regular rate and rhythm without murmur.    ABDOMEN: Soft, nontender, and nondistended.  No hepatosplenomegaly is noted.    : No painful voiding, no pelvic pain    EXTREMITIES: Without any cyanosis, clubbing, rash, lesions or edema.    NEUROLOGIC: Awake,     SKIN: Warm, dry, good turgor.      LABS:                        10.4   4.45  )-----------( 200      ( 08 Oct 2024 05:31 )             31.5     10-08    135  |  91[L]  |  7   ----------------------------<  116[H]  3.5   |  38[H]  |  0.25[L]    Ca    8.4      08 Oct 2024 05:31  Phos  3.4     10-08  Mg     1.8     10-08    TPro  6.0  /  Alb  2.6[L]  /  TBili  0.5  /  DBili  x   /  AST  14  /  ALT  13  /  AlkPhos  60  10-08      Urinalysis Basic - ( 08 Oct 2024 05:31 )    Color: x / Appearance: x / SG: x / pH: x  Gluc: 116 mg/dL / Ketone: x  / Bili: x / Urobili: x   Blood: x / Protein: x / Nitrite: x   Leuk Esterase: x / RBC: x / WBC x   Sq Epi: x / Non Sq Epi: x / Bacteria: x      MICROBIOLOGY: (if applicable)    RADIOLOGY & ADDITIONAL STUDIES:  EKG:   CXR:  ECHO:    IMPRESSION: 79y Female PAST MEDICAL & SURGICAL HISTORY:  HTN (hypertension)      Lumbar neuralgia      Hyponatremia      Arthritis       p/w         Impression; This is an 80 Y/O Female from [facility] presented to ED with Rectal Bleed . Had a recent admission here at [facility] due to Hypercapnia Respiratory Failure in 09/ 2024 . Patient blood gas showed profound elevation in PCO2 ( prompted to ICU  and need initiation of Bipap . Patient is known CO2 retainer due to Non compliance to Bipap. For Pulmonary follow up of Acute on chronic hypercapnic respiratory failure .    Suggestion:   - O2 supp as needed   - Continue Bipap 13/5 FIO2 40% at Night .   - Continue Duoneb via nebulization Q 6 Hours .   - Continue   Advair 250- 50 mcg Twice Daily  .              - Pulmonary oral hygiene care especially every after oral inhaler used.    spoke with son Flex at bedside

## 2024-10-08 NOTE — DISCHARGE NOTE NURSING/CASE MANAGEMENT/SOCIAL WORK - NSDCPEFALRISK_GEN_ALL_CORE
After Visit Summary   11/27/2018    Erica Gordon    MRN: [id number]           Patient Information     Date Of Birth          1989        Visit Information        Provider Department      11/27/2018 10:15 AM Charlene Culver MD [facility]        Today's Diagnoses     Difficulty sleeping    -  1    Snoring        Morbid obesity (H)        TANMAY (generalized anxiety disorder)          Care Instructions    DECREASE Wellbutrin to 75mg (half tab) daily x 2 weeks, then stop.    CONTINUE Prozac 80mg daily.    Seroquel 50mg at night.    Refill of Xanax provided, #30 should last at least 2-3 months if not longer. Please use very sparingly. This medication is impairing (no drinking or driving while taking this).    Follow-up with me in 6-8 weeks please.                   Follow-ups after your visit        Additional Services     SLEEP EVALUATION & MANAGEMENT REFERRAL - [facility] [phone]  (Age 18 and up)       Please be aware that coverage of these services is subject to the terms and limitations of your health insurance plan.  Call member services at your health plan with any benefit or coverage questions.      Please bring the following to your appointment:    >>   List of current medications   >>   This referral request   >>   Any documents/labs given to you for this referral                      Future tests that were ordered for you today     Open Future Orders        Priority Expected Expires Ordered    SLEEP EVALUATION & MANAGEMENT REFERRAL - [facility] [phone]  (Age 18 and up) Routine  11/27/2019 11/27/2018            Who to contact     If you have questions or need follow up information about today's clinic visit or your schedule please contact [facility] directly at [phone].  Normal or non-critical lab and imaging results will be communicated to you by MyChart, letter or  phone within 4 business days after the clinic has received the results. If you do not hear from us within 7 days, please contact the clinic through [employer] or phone. If you have a critical or abnormal lab result, we will notify you by phone as soon as possible.  Submit refill requests through [employer] or call your pharmacy and they will forward the refill request to us. Please allow 3 business days for your refill to be completed.          Additional Information About Your Visit        Care EveryWhere ID     This is your Care EveryWhere ID. This could be used by other organizations to access your [facility] medical records  [id number]        Your Vitals Were     Pulse Temperature Respirations Pulse Oximetry BMI (Body Mass Index)       90 98.4  F (36.9  C) (Oral) 18 98% 45.6 kg/m2        Blood Pressure from Last 3 Encounters:   11/27/18 110/80   10/27/18 138/81   09/27/18 132/86    Weight from Last 3 Encounters:   11/27/18 290 lb 4.8 oz (131.7 kg)   10/27/18 295 lb 11.2 oz (134.1 kg)   09/27/18 296 lb 3.2 oz (134.4 kg)                 Today's Medication Changes          These changes are accurate as of 11/27/18 10:37 AM.  If you have any questions, ask your nurse or doctor.               Start taking these medicines.        Dose/Directions    QUEtiapine 50 MG tablet   Commonly known as:  SEROquel   Used for:  Difficulty sleeping   Started by:  Charlene Culver MD        Dose:  50 mg   Take 1 tablet (50 mg) by mouth At Bedtime   Quantity:  90 tablet   Refills:  3         These medicines have changed or have updated prescriptions.        Dose/Directions    ALPRAZolam 1 MG tablet   Commonly known as:  XANAX   This may have changed:  when to take this   Used for:  TANMAY (generalized anxiety disorder)   Changed by:  Charlene Culver MD        Dose:  1 mg   Take 1 tablet (1 mg) by mouth daily as needed for anxiety   Quantity:  30 tablet   Refills:  0            Where to get your medicines      These medications were sent  to [facility] [address] [facility] [address] AT [facility] [address], [facility] [address]     Phone:  [phone]     QUEtiapine 50 MG tablet         Some of these will need a paper prescription and others can be bought over the counter.  Ask your nurse if you have questions.     Bring a paper prescription for each of these medications     ALPRAZolam 1 MG tablet                Primary Care Provider Office Phone # Fax #    Charlene Culver [phone]       [address]  [facility] [address]        Equal Access to Services     CYRUS ZAMORA : Hadii aad ku hadasho Soomaali, waaxda luqadaha, qaybta kaalmada adeegyada, waxgrace loin hayclinton johnson. So [facility] [phone].    ATENCIÓN: Si habla español, tiene a bassett disposición servicios gratuitos de asistencia lingüística. Llame al [phone].    We comply with applicable federal civil rights laws and [place] laws. We do not discriminate on the basis of race, color, national origin, age, disability, sex, sexual orientation, or gender identity.            Thank you!     Thank you for choosing [facility]  for your care. Our goal is always to provide you with excellent care. Hearing back from our patients is one way we can continue to improve our services. Please take a few minutes to complete the written survey that you may receive in the mail after your visit with us. Thank you!             Your Updated Medication List - Protect others around you: Learn how to safely use, store and throw away your medicines at www.disposemymeds.org.          This list is accurate as of 11/27/18 10:37 AM.  Always use your most recent med list.                   Brand Name Dispense Instructions for use Diagnosis    albuterol 108 (90 Base) MCG/ACT inhaler    VENTOLIN HFA    18 g    Inhale 1-2 puffs into the lungs every 4 hours as needed for shortness of breath / dyspnea or wheezing     For information on Fall & Injury Prevention, visit: [email or web address] OR  [email or web address] OR  https://www.cdc.gov/steadi/patient.html Mild persistent asthma without complication       ALPRAZolam 1 MG tablet    XANAX    30 tablet    Take 1 tablet (1 mg) by mouth daily as needed for anxiety    TANMAY (generalized anxiety disorder)       beclomethasone HFA 80 MCG/ACT inhaler    QVAR REDIHALER    1 Inhaler    Inhale 2 puffs into the lungs 2 times daily    Moderate persistent asthma without complication       buPROPion 150 MG 24 hr tablet    WELLBUTRIN XL    90 tablet    Take 1 tablet (150 mg) by mouth every morning        FLUoxetine 40 MG capsule    PROzac    60 capsule    TAKE 2 CAPSULES(80 MG) BY MOUTH DAILY    Severe episode of recurrent major depressive disorder, without psychotic features (H)       levonorgestrel 20 MCG/24HR IUD    MIRENA     1 each (20 mcg) by Intrauterine route continuous    Encounter for IUD insertion       QUEtiapine 50 MG tablet    SEROquel    90 tablet    Take 1 tablet (50 mg) by mouth At Bedtime    Difficulty sleeping          Render Post-Care Instructions In Note?: no Show Aperture Variable?: Yes Post-Care Instructions: I reviewed with the patient in detail post-care instructions. Patient is to wear sunprotection, and avoid picking at any of the treated lesions. Pt may apply Vaseline to crusted or scabbing areas. Consent: The patient's consent was obtained including but not limited to risks of crusting, scabbing, blistering, scarring, darker or lighter pigmentary change, recurrence, incomplete removal and infection. Duration Of Freeze Thaw-Cycle (Seconds): 5 Detail Level: Detailed Number Of Freeze-Thaw Cycles: 2 freeze-thaw cycles

## 2024-10-08 NOTE — DISCHARGE NOTE NURSING/CASE MANAGEMENT/SOCIAL WORK - PATIENT PORTAL LINK FT
You can access the FollowMyHealth Patient Portal offered by [facility] by registering at the following website: [email or web address]. By joining [facility]’s FollowMyHealth portal, you will also be able to view your health information using other applications (apps) compatible with our system.

## 2024-10-09 PROCEDURE — 99223 1ST HOSP IP/OBS HIGH 75: CPT

## 2024-10-09 RX ADMIN — IPRATROPIUM BROMIDE AND ALBUTEROL SULFATE 3 MILLILITER(S): .5; 3 SOLUTION RESPIRATORY (INHALATION) at 08:37

## 2024-10-09 RX ADMIN — BISACODYL 5 MILLIGRAM(S): 5 TABLET, COATED ORAL at 21:48

## 2024-10-09 RX ADMIN — IPRATROPIUM BROMIDE AND ALBUTEROL SULFATE 3 MILLILITER(S): .5; 3 SOLUTION RESPIRATORY (INHALATION) at 15:47

## 2024-10-09 RX ADMIN — IPRATROPIUM BROMIDE AND ALBUTEROL SULFATE 3 MILLILITER(S): .5; 3 SOLUTION RESPIRATORY (INHALATION) at 20:09

## 2024-10-09 RX ADMIN — Medication 25 MILLIGRAM(S): at 05:53

## 2024-10-09 RX ADMIN — PANTOPRAZOLE SODIUM 40 MILLIGRAM(S): 40 TABLET, DELAYED RELEASE ORAL at 05:54

## 2024-10-09 RX ADMIN — IPRATROPIUM BROMIDE AND ALBUTEROL SULFATE 3 MILLILITER(S): .5; 3 SOLUTION RESPIRATORY (INHALATION) at 03:32

## 2024-10-09 RX ADMIN — PANTOPRAZOLE SODIUM 40 MILLIGRAM(S): 40 TABLET, DELAYED RELEASE ORAL at 17:11

## 2024-10-09 RX ADMIN — Medication 25 MILLIGRAM(S): at 17:11

## 2024-10-09 RX ADMIN — Medication 17 GRAM(S): at 05:57

## 2024-10-09 RX ADMIN — Medication 1 DOSE(S): at 11:15

## 2024-10-09 RX ADMIN — Medication 1 DOSE(S): at 21:47

## 2024-10-09 RX ADMIN — ROSUVASTATIN CALCIUM 5 MILLIGRAM(S): 20 TABLET, COATED ORAL at 21:48

## 2024-10-09 NOTE — PROGRESS NOTE ADULT - SUBJECTIVE AND OBJECTIVE BOX
PATIENT SEEN AND EXAMINED BY CAITLYN TOTH M.D. ON :- 10/9/24  DATE OF SERVICE:     10/9/24        Interim events noted,Labs ,Radiological studies and Cardiology tests reviewed .    Patient is a 79y old  Female who presents with a chief complaint of Acute respiratory distress . (07 Oct 2024 11:19)      HPI:  79F with chronic hypercapnic respiratory failure (no compliant with bipap), diaphragmatic dysfunction, hypertension, arthritis, Neuralgia, osteoporotic compression fractures L5-S1, hyponatremia (HCTZ induced, corrected), goiter, recent admission to [facility] with hypercapnic resp failure 9/2024, presenting from NH with complaint of rectal bleed. As per ED attending no signs of rectal bleeding in the ED. Patient blood gas showed profound elevation in pCO2  prompting ICU consult and initiation of BIPAP. Pt is known to be noncompliant with BIPAP and is known to chronically retain CO2. ABG pH and CO2 improved on BIPAP and determined that patient did not require ICU level of care. Pt was seen at bedside, tolerating BIPAP, appears comfortable, but lethargic. Observed frequently pulling off BIPAP mask and desaturating to low 80s. No acute complaints other than feeling cold.  (03 Oct 2024 01:34)      PAST MEDICAL & SURGICAL HISTORY:  HTN (hypertension)      Lumbar neuralgia      Hyponatremia      Arthritis          PREVIOUS DIAGNOSTIC TESTING:      ECHO  FINDINGS:    STRESS  FINDINGS:    CATHETERIZATION  FINDINGS:    MEDICATIONS  (STANDING):  albuterol/ipratropium for Nebulization 3 milliLiter(s) Nebulizer every 6 hours  bisacodyl 5 milliGRAM(s) Oral at bedtime  carvedilol 25 milliGRAM(s) Oral every 12 hours  fluticasone propionate/ salmeterol 250-50 MICROgram(s) Diskus 1 Dose(s) Inhalation two times a day  pantoprazole    Tablet 40 milliGRAM(s) Oral two times a day  polyethylene glycol 3350 17 Gram(s) Oral two times a day  rosuvastatin 5 milliGRAM(s) Oral at bedtime    MEDICATIONS  (PRN):      FAMILY HISTORY:      SOCIAL HISTORY:    CIGARETTES:    ALCOHOL:    REVIEW OF SYSTEMS:  CONSTITUTIONAL: No fever, weight loss, or fatigue  EYES: No eye pain, visual disturbances, or discharge  ENMT:  No difficulty hearing, tinnitus, vertigo; No sinus or throat pain  NECK: No pain or stiffness  RESPIRATORY: No cough, wheezing, chills or hemoptysis; No shortness of breath  CARDIOVASCULAR: No chest pain, palpitations, dizziness, or leg swelling  GASTROINTESTINAL: No abdominal or epigastric pain. No nausea, vomiting, or hematemesis; No diarrhea or constipation. No melena or hematochezia.  GENITOURINARY: No dysuria, frequency, hematuria, or incontinence  NEUROLOGICAL: No headaches, memory loss, loss of strength, numbness, or tremors  SKIN: No itching, burning, rashes, or lesions   LYMPH NODES: No enlarged glands  ENDOCRINE: No heat or cold intolerance; No hair loss  MUSCULOSKELETAL: No joint pain or swelling; No muscle, back, or extremity pain  PSYCHIATRIC: No depression, anxiety, mood swings, or difficulty sleeping  HEME/LYMPH: No easy bruising, or bleeding gums  ALLERY AND IMMUNOLOGIC: No hives or eczema    Vital Signs Last 24 Hrs  T(C): 36.4 (09 Oct 2024 20:44), Max: 36.4 (09 Oct 2024 05:15)  T(F): 97.6 (09 Oct 2024 20:44), Max: 97.6 (09 Oct 2024 05:15)  HR: 83 (09 Oct 2024 20:44) (64 - 90)  BP: 105/65 (09 Oct 2024 20:44) (105/65 - 142/80)  BP(mean): --  RR: 18 (09 Oct 2024 20:44) (16 - 19)  SpO2: 96% (09 Oct 2024 20:44) (94% - 96%)    Parameters below as of 09 Oct 2024 20:44  Patient On (Oxygen Delivery Method): nasal cannula  O2 Flow (L/min): 2        PHYSICAL EXAM:  GENERAL: NAD, well-groomed, well-developed  HEAD:  Atraumatic, Normocephalic  EYES: EOMI, PERRLA, conjunctiva and sclera clear  ENMT: No tonsillar erythema, exudates, or enlargement; Moist mucous membranes, Good dentition, No lesions  NECK: Supple, No JVD, Normal thyroid  NERVOUS SYSTEM:  Alert & Oriented X3, Good concentration; Motor Strength 5/5 B/L upper and lower extremities; DTRs 2+ intact and symmetric  CHEST/LUNG: Clear to percussion bilaterally; No rales, rhonchi, wheezing, or rubs  HEART: Regular rate and rhythm; No murmurs, rubs, or gallops  ABDOMEN: Soft, Nontender, Nondistended; Bowel sounds present  EXTREMITIES:  2+ Peripheral Pulses, No clubbing, cyanosis, or edema  LYMPH: No lymphadenopathy noted  SKIN: No rashes or lesions      INTERPRETATION OF TELEMETRY:    [facility]    [facility]     LABS:                        10.4   4.45  )-----------( 200      ( 08 Oct 2024 05:31 )             31.5     10-08    135  |  91[L]  |  7   ----------------------------<  116[H]  3.5   |  38[H]  |  0.25[L]    Ca    8.4      08 Oct 2024 05:31  Phos  3.4     10-08  Mg     1.8     10-08    TPro  6.0  /  Alb  2.6[L]  /  TBili  0.5  /  DBili  x   /  AST  14  /  ALT  13  /  AlkPhos  60  10-08          Urinalysis Basic - ( 08 Oct 2024 05:31 )    Color: x / Appearance: x / SG: x / pH: x  Gluc: 116 mg/dL / Ketone: x  / Bili: x / Urobili: x   Blood: x / Protein: x / Nitrite: x   Leuk Esterase: x / RBC: x / WBC x   Sq Epi: x / Non Sq Epi: x / Bacteria: x      Lipid Panel:   I&O's Summary      RADIOLOGY & ADDITIONAL STUDIES:      TRANSTHORACIC ECHOCARDIOGRAM REPORT  ________________________________________________________________________________                                      _______       Pt. Name:       CLINTON LIU Study Date:    7/23/2024  MRN:            [id number]             YOB: 1945  Accession #:    [id number]            Age:           79 years  Account#:       [id number]           Gender:        F  Heart Rate:                          Height:        59.84 in (152.00 cm)  Rhythm:                       Weight:        140.00 lb (63.50 kg)  Blood Pressure: 154/84 mmHg          BSA/BMI:       1.60 m² / 27.49 kg/m²  ________________________________________________________________________________________  Referring Physician:    [id number] Sylvie Rosenberg  Interpreting Physician: Mary Hutchison MD  Primary Sonographer:    Dorita Arredondo RDCS    CPT:               ECHO TTE WO CON COMP W DOPP - 45450.m  Indication(s):     Heart failure, unspecified - I50.9  Procedure: Transthoracic echocardiogram with 2-D, M-mode and complete                     spectral and color flow Doppler.  Ordering Location: [facility]  Admission Status:  Inpatient  Study Information: Image quality for this study is adequate.    _______________________________________________________________________________________     CONCLUSIONS:      1. Left ventricular cavity is normal in size. Left ventricular wall thickness is normal. Left ventricular systolic function is normal with an ejection fraction of 58 % by Rod's method of disks. There are no regional wall motion abnormalities seen.   2. There is mild (grade 1) left ventricular diastolic dysfunction.   3. Normal right ventricular cavity size, with normal wall thickness, and normal right ventricular systolic function. Tricuspid annular plane systolic excursion (TAPSE) is 2.3 cm (normal >=1.7 cm).   4. Mild mitral regurgitation.   5. Normal left and right atrial size.   6. Mild tricuspid regurgitation.   7. Estimated pulmonary artery systolic pressure is 37 mmHg, consistent with mild pulmonary hypertension.   8. Mild pulmonic regurgitation.   9. There is calcification of the mitral valve annulus.  10. There is normal LV mass and concentric remodeling.  11. No pericardial effusion seen.  12. No prior echocardiogram is available for comparison.    ________________________________________________________________________________________  FINDINGS:     Left Ventricle:  The left ventricular cavity is normal in size. Left ventricular wall thickness is normal. Left ventricular systolic function is normal with a calculated ejection fraction of 58 % by the Rod's biplane method of disks. There are no regional wall motion abnormalities seen. There is mild (grade 1) left ventriculardiastolic dysfunction. There is normal LV mass and concentric remodeling.     Right Ventricle:  The right ventricular cavity is normal in size, with normal wall thickness and right ventricular systolic function is normal. Tricuspid annular plane systolic excursion (TAPSE) is 2.3 cm (normal >=1.7 cm).     Left Atrium:  The left atrium is normal in size with an indexed volume of 32 ml/m².     Right Atrium:  The right atrium is normal in size.     Interatrial Septum:  The interatrial septum appears intact.     Aortic Valve:  The aortic valve is tricuspid with normal leaflet excursion. There is no aortic valve stenosis. There is no evidence of aortic regurgitation.     Mitral Valve:  Structurally normal mitral valve with normal leaflet excursion.There is calcification of the mitral valve annulus. There is no mitral valve stenosis. There is mild mitral regurgitation.     Tricuspid Valve:  Structurally normal tricuspid valve with normal leaflet excursion. There is mild tricuspid regurgitation.Estimated pulmonary artery systolic pressure is 37 mmHg, consistent with mild pulmonary hypertension.     Pulmonic Valve:  Structurally normal pulmonic valve with normal leaflet excursion. There is mild pulmonic regurgitation.     Aorta:  The aortic root appears normal in size.     Pericardium:  No pericardial effusion seen.     ____________________________________________________________________  QUANTITATIVE DATA:  Left Ventricle Measurements: (Indexed to BSA)     IVSd (2D):   1.1 cm  LVPWd (2D):  1.0 cm  LVIDd (2D):  4.2 cm  LVIDs (2D):  3.0 cm  LV Mass:     144 g  89.9 g/m²  LV Vol d, MOD A2C: 49.9 ml 31.18 ml/m²  LV Vol d, MOD A4C: 68.9 ml 43.06 ml/m²  LV Vol d, MOD BP:  59.2 ml 36.98 ml/m²  LV Vol s, MOD A2C: 18.8 ml 11.71 ml/m²  LV Vols, MOD A4C: 32.5 ml 20.32 ml/m²  LV Vol s, MOD BP:  25.0 ml 15.62 ml/m²  LVOT SV MOD BP:    34.2 ml  LV EF% MOD BP:     58 %     MV E Vmax: 0.75 m/s  MV A Vmax: 0.84 m/s  MV E/A:    0.90  MV DT:     198 msec    Aorta Measurements: (Normal range) (Indexed to BSA)     Ao Root 3.0 cm       Left Atrium Measurements: (Indexed to BSA)  LA Diam 2D:        3.87 cm  LA Vol s, MOD A4C: 47.67 ml.  LA Vol s, MOD A2C: 52.73 ml.  LA Vol BP:         51.0 ml.  32 ml/m².         Right Ventricle Measurements:     TAPSE: 2.3 cm       LVOT / RVOT/ Qp/Qs Data: (Indexed to BSA)  LVOT Diameter:  1.93 cm  LVOT Area:      2.92 cm²  LVOT Vmax:      1.03 m/s  LVOT Vmn:       0.719 m/s  LVOT VTI:       25.55 cm  LVOT peak grad: 4 mmHg  LVOT mean grad: 2.3 mmHg  LVOT SV:       74.6 ml   46.62 ml/m²    Aortic Valve Measurements:  AV Vmax:                1.4 m/s  AV Peak Gradient:       8.0 mmHg  AV Mean Gradient:       5.0 mmHg  AV VTI:                 37.7 cm  AV VTI Ratio:           0.68  AoV EOA, Contin:        1.98 cm²  AoV EOA, Contin i:      1.24 cm²/m²  AoV Dimensionless Index 0.68    Mitral Valve Measurements:     MV E Vmax: 0.7 m/s  MV A Vmax: 0.8 m/s  MV E/A:    0.9       Tricuspid Valve Measurements:     TR Vmax:          2.9 m/s  TR Peak Gradient: 33.8 mmHg  RA Pressure:      3 mmHg  PASP:             37 mmHg    ________________________________________________________________________________________  Electronically signed on 7/24/2024 at 7:25:05 AM by Mary Hutchison MD         *** Final ***

## 2024-10-09 NOTE — PROGRESS NOTE ADULT - ASSESSMENT
80 yo F, from [facility], with pmhx of chronic hypercapnic respiratory failure, diaphragmatic dysfunction, HTN, arthritis, neuralgia, osteoporotic compression fractures L5-S1, hyponatremia, and goiter with recent admission for hypercapnic respiratory failure 09/2024, who presented with rectal bleed, no signs of rectal bleeding in ED. blood gas with profoundly elevated pCO2. Pt placed on bipap, ICU consulted, but did not require ICU admission. Pt admitted for acute on chronic hypercarbic respiratory failure. Pt with bloody stool. GI consulted, not recommending colonoscopy at this time d/t respiratory failure. Pt with no more blood BMs. PT recc CHET, plan for patient to leave to [facility].     Son is requesting neurology consult prior to discharge.

## 2024-10-09 NOTE — PROGRESS NOTE ADULT - ASSESSMENT
78 yo F, from [facility], with pmhx of chronic hypercapnic respiratory failure, diaphragmatic dysfunction, HTN, arthritis, neuralgia, osteoporotic compression fractures L5-S1, hyponatremia, and goiter with recent admission for hypercapnic respiratory failure 09/2024, who presented with rectal bleed, no signs of rectal bleeding in ED. blood gas with profoundly elevated pCO2. Pt placed on bipap, ICU consulted, but did not require ICU admission. Pt admitted for acute on chronic hypercarbic respiratory failure. Pt with bloody stool. GI consulted, not recommending colonoscopy at this time d/t respiratory failure. Pt with no more blood BMs. PT recc CHET, pending acceptance and authorization.

## 2024-10-09 NOTE — CONSULT NOTE ADULT - SUBJECTIVE AND OBJECTIVE BOX
****TEMPLATE ONLY***      Patient is a 79y old  Female who presents with a chief complaint of Acute respiratory distress . (07 Oct 2024 11:19)      HPI:  79F with chronic hypercapnic respiratory failure (no compliant with bipap), diaphragmatic dysfunction, hypertension, arthritis, Neuralgia, osteoporotic compression fractures L5-S1, hyponatremia (HCTZ induced, corrected), goiter, recent admission to [facility] with hypercapnic resp failure 9/2024, presenting from NH with complaint of rectal bleed. As per ED attending no signs of rectal bleeding in the ED. Patient blood gas showed profound elevation in pCO2  prompting ICU consult and initiation of BIPAP. Pt is known to be noncompliant with BIPAP and is known to chronically retain CO2. ABG pH and CO2 improved on BIPAP and determined that patient did not require ICU level of care. Pt was seen at bedside, tolerating BIPAP, appears comfortable, but lethargic. Observed frequently pulling off BIPAP mask and desaturating to low 80s. No acute complaints other than feeling cold.  (03 Oct 2024 01:34)         Neurological Review of Systems:  No difficulty with language.  No vision loss or double vision.  No dizziness, vertigo or new hearing loss.  No difficulty with speech or swallowing.  No focal weakness.  No focal sensory changes.  No numbness or tingling in the bilateral lower extremities.  No difficulty with balance.  No difficulty with ambulation.        MEDICATIONS  (STANDING):  albuterol/ipratropium for Nebulization 3 milliLiter(s) Nebulizer every 6 hours  bisacodyl 5 milliGRAM(s) Oral at bedtime  carvedilol 25 milliGRAM(s) Oral every 12 hours  fluticasone propionate/ salmeterol 250-50 MICROgram(s) Diskus 1 Dose(s) Inhalation two times a day  pantoprazole    Tablet 40 milliGRAM(s) Oral two times a day  polyethylene glycol 3350 17 Gram(s) Oral two times a day  rosuvastatin 5 milliGRAM(s) Oral at bedtime    MEDICATIONS  (PRN):    Allergies    penicillin (Rash)    Intolerances      PAST MEDICAL & SURGICAL HISTORY:  HTN (hypertension)      Lumbar neuralgia      Hyponatremia      Arthritis        FAMILY HISTORY:    SOCIAL HISTORY: non smoker/ former smoker/ active smoker    Review of Systems:  Constitutional: No generalized weakness. No fevers or chills.                    Eyes, Ears, Mouth, Throat: No vision loss   Respiratory: No shortness of breath or cough.                                Cardiovascular: No chest pain or palpitations  Gastrointestinal: No nausea or vomiting.                                         Genitourinary: No urinary incontinence or burning on urination.  Musculoskeletal: No joint pain.                                                           Dermatologic: No rash.  Neurological: as per HPI                                                                      Psychiatric: No behavioral problems.  Endocrine: No known hypoglycemia.               Hematologic/Lymphatic: No easy bleeding.    O:  Vital Signs Last 24 Hrs  T(C): 36.4 (09 Oct 2024 20:44), Max: 36.4 (09 Oct 2024 05:15)  T(F): 97.6 (09 Oct 2024 20:44), Max: 97.6 (09 Oct 2024 05:15)  HR: 83 (09 Oct 2024 20:44) (64 - 89)  BP: 105/65 (09 Oct 2024 20:44) (105/65 - 142/80)  BP(mean): --  RR: 18 (09 Oct 2024 20:44) (16 - 18)  SpO2: 96% (09 Oct 2024 20:44) (94% - 96%)    Parameters below as of 09 Oct 2024 20:44  Patient On (Oxygen Delivery Method): nasal cannula  O2 Flow (L/min): 2      General Exam:   General appearance: No acute distress                 Cardiovascular: Pedal dorsalis pulses intact bilaterally    Mental Status: Orientated to self, date and place.  Attention intact.  No dysarthria, aphasia or neglect.  Knowledge intact.  Registration intact.  Short and long term memory grossly intact.      Cranial Nerves: CN I - not tested.  PERRL, EOMI, VFF, no nystagmus or diplopia.  No APD.  Fundi not visualized.  CN V1-3 intact to light touch and pinprick.  No facial asymmetry.  Hearing intact to finger rub bilaterally.  Tongue, uvula and palate midline.  Sternocleidomastoid and Trapezius intact bilaterally.    Motor:   Tone: normal.                  Strength intact throughout  No pronator drift bilaterally                      No dysmetria on finger-nose-finger or heel-shin-heel  No truncal ataxia.  No resting, postural or action tremor.  No myoclonus.    Sensation: intact to light touch, pinprick, vibration and proprioception    Deep Tendon Reflexes: 1+ bilateral biceps, triceps, brachioradialis, knee and ankle  Toes flexor bilaterally    Gait: normal and stable.  Rhomberg -henri.    Other:     LABS:                        10.4   4.45  )-----------( 200      ( 08 Oct 2024 05:31 )             31.5     10-08    135  |  91[L]  |  7   ----------------------------<  116[H]  3.5   |  38[H]  |  0.25[L]    Ca    8.4      08 Oct 2024 05:31  Phos  3.4     10-08  Mg     1.8     10-08    TPro  6.0  /  Alb  2.6[L]  /  TBili  0.5  /  DBili  x   /  AST  14  /  ALT  13  /  AlkPhos  60  10-08      Urinalysis Basic - ( 08 Oct 2024 05:31 )    Color: x / Appearance: x / SG: x / pH: x  Gluc: 116 mg/dL / Ketone: x  / Bili: x / Urobili: x   Blood: x / Protein: x / Nitrite: x   Leuk Esterase: x / RBC: x / WBC x   Sq Epi: x / Non Sq Epi: x / Bacteria: x          RADIOLOGY & ADDITIONAL STUDIES:    EKG: < from: 12 Lead ECG (09.16.24 @ 21:35) >    Diagnosis Line Sinus tachycardia  Right bundle branch block  Abnormal ECG    < end of copied text >  < from: CT Head No Cont (09.14.24 @ 20:02) >  No acute intracranial  hemorrhage, mass effect or midline shift.    Nonspecific moderate subcortical white matter hypodensities may relate to   small vessel ischemic disease.    < end of copied text >       Patient is a 79y old  Female who presents with a chief complaint of Acute respiratory distress . (07 Oct 2024 11:19)      HPI:  79F with chronic hypercapnic respiratory failure (no compliant with bipap), diaphragmatic dysfunction, hypertension, arthritis, Neuralgia, osteoporotic compression fractures L5-S1, hyponatremia (HCTZ induced, corrected), goiter, recent admission to [facility] with hypercapnic resp failure 9/2024, presenting from NH with complaint of rectal bleed neuro called to evaluate patient due to weakness in the legs present years ago, now resolved. As per ED attending no signs of rectal bleeding in the ED. Patient blood gas showed profound elevation in pCO2  prompting ICU consult and initiation of BIPAP. Pt is known to be noncompliant with BIPAP and is known to chronically retain CO2. ABG pH and CO2 improved on BIPAP and determined that patient did not require ICU level of care. Pt was seen at bedside, tolerating BIPAP, appears comfortable, but lethargic. Observed frequently pulling off BIPAP mask and desaturating to low 80s. No acute complaints other than feeling cold.  (03 Oct 2024 01:34)      Neurological Review of Systems as per patient and NP:  No difficulty with language.  No vision loss or double vision.  No dizziness, vertigo or new hearing loss.  No difficulty with speech or swallowing.  No focal weakness at this time.  No focal sensory changes.   No difficulty with balance.  + difficulty with ambulation.        MEDICATIONS  (STANDING):  albuterol/ipratropium for Nebulization 3 milliLiter(s) Nebulizer every 6 hours  bisacodyl 5 milliGRAM(s) Oral at bedtime  carvedilol 25 milliGRAM(s) Oral every 12 hours  fluticasone propionate/ salmeterol 250-50 MICROgram(s) Diskus 1 Dose(s) Inhalation two times a day  pantoprazole    Tablet 40 milliGRAM(s) Oral two times a day  polyethylene glycol 3350 17 Gram(s) Oral two times a day  rosuvastatin 5 milliGRAM(s) Oral at bedtime    MEDICATIONS  (PRN):    Allergies    penicillin (Rash)    Intolerances      PAST MEDICAL & SURGICAL HISTORY:  HTN (hypertension)      Lumbar neuralgia      Hyponatremia      Arthritis        FAMILY HISTORY: nc son    SOCIAL HISTORY: non smoker    Review of Systems:  Constitutional: No fevers.                    Eyes, Ears, Mouth, Throat: No vision loss   Respiratory: No  cough.                                Cardiovascular: No chest pain  Gastrointestinal: No vomiting.                                         Genitourinary: No burning on urination.  Musculoskeletal: No joint pain.                                                           Dermatologic: No rash.  Neurological: as per HPI                                                                      Psychiatric: No behavioral problems.  Endocrine: No known hypoglycemia.               Hematologic/Lymphatic: No easy bleeding.    O:  Vital Signs Last 24 Hrs  T(C): 36.4 (09 Oct 2024 20:44), Max: 36.4 (09 Oct 2024 05:15)  T(F): 97.6 (09 Oct 2024 20:44), Max: 97.6 (09 Oct 2024 05:15)  HR: 83 (09 Oct 2024 20:44) (64 - 89)  BP: 105/65 (09 Oct 2024 20:44) (105/65 - 142/80)  BP(mean): --  RR: 18 (09 Oct 2024 20:44) (16 - 18)  SpO2: 96% (09 Oct 2024 20:44) (94% - 96%)    Parameters below as of 09 Oct 2024 20:44  Patient On (Oxygen Delivery Method): nasal cannula  O2 Flow (L/min): 2      General Exam:   General appearance: No acute distress                 Cardiovascular: Pedal dorsalis pulses intact bilaterally    Mental Status: Orientated to self but not to date and place.  Attention intact.  No gross dysarthria, aphasia or neglect.  Knowledge intact.  Registration intact.  Short and long term memory grossly intact.      Cranial Nerves: CN I - not tested.  PERRL, EOMI, VFF, no nystagmus or diplopia.  No APD.  Fundi not visualized.  CN V1-3 intact to light touch and pinprick.  No facial asymmetry.  Hearing intact to finger rub bilaterally.  Tongue, uvula and palate midline.  Sternocleidomastoid and Trapezius intact bilaterally.    Motor:   Tone: normal.                  Strength grossly intact throughout, patient not fully cooperative                      No dysmetria on finger-nose-finger or heel-shin-heel  No truncal ataxia.  No resting, postural or action tremor.  No myoclonus.    Sensation: intact to light touch and PP    Deep Tendon Reflexes: 1+ bilateral biceps, triceps, brachioradialis, knee and ankle  Toes flexor bilaterally    Gait: pt declines    Other:     LABS:                        10.4   4.45  )-----------( 200      ( 08 Oct 2024 05:31 )             31.5     10-08    135  |  91[L]  |  7   ----------------------------<  116[H]  3.5   |  38[H]  |  0.25[L]    Ca    8.4      08 Oct 2024 05:31  Phos  3.4     10-08  Mg     1.8     10-08    TPro  6.0  /  Alb  2.6[L]  /  TBili  0.5  /  DBili  x   /  AST  14  /  ALT  13  /  AlkPhos  60  10-08      Urinalysis Basic - ( 08 Oct 2024 05:31 )    Color: x / Appearance: x / SG: x / pH: x  Gluc: 116 mg/dL / Ketone: x  / Bili: x / Urobili: x   Blood: x / Protein: x / Nitrite: x   Leuk Esterase: x / RBC: x / WBC x   Sq Epi: x / Non Sq Epi: x / Bacteria: x          RADIOLOGY & ADDITIONAL STUDIES:    EKG: < from: 12 Lead ECG (09.16.24 @ 21:35) >    Diagnosis Line Sinus tachycardia  Right bundle branch block  Abnormal ECG    < end of copied text >  < from: CT Head No Cont (09.14.24 @ 20:02) >  No acute intracranial  hemorrhage, mass effect or midline shift.    Nonspecific moderate subcortical white matter hypodensities may relate to   small vessel ischemic disease.    < end of copied text >

## 2024-10-09 NOTE — CHART NOTE - NSCHARTNOTEFT_GEN_A_CORE
Assessment:   79yFemalePatient is a 79y old  Female who presents with a chief complaint of Acute respiratory distress . (07 Oct 2024 11:19) Nutrition Consult Noted . Pt OOB to chair. Pt is Bria Speaking. Pt Agreed to try Supplement Likes Vanilla Flavour. S/P SWALLOW EVAL  on 10/4-Easy to chew Thin liquids.      Factors impacting intake: [ ] none [ ] nausea  [ ] vomiting [ ] diarrhea [ ] constipation  [ ]chewing problems [ ] swallowing issues  [ ] other:     Diet Prescription: Easy to chew Lacto ovo Veg  Intake: 0-25 % Meal.    Current Weight: Weight (kg): 61.7 (10-02 @ 17:45)  % Weight Change    Pertinent Medications: MEDICATIONS  (STANDING):  albuterol/ipratropium for Nebulization 3 milliLiter(s) Nebulizer every 6 hours  bisacodyl 5 milliGRAM(s) Oral at bedtime  carvedilol 25 milliGRAM(s) Oral every 12 hours  fluticasone propionate/ salmeterol 250-50 MICROgram(s) Diskus 1 Dose(s) Inhalation two times a day  pantoprazole    Tablet 40 milliGRAM(s) Oral two times a day  polyethylene glycol 3350 17 Gram(s) Oral two times a day  rosuvastatin 5 milliGRAM(s) Oral at bedtime    MEDICATIONS  (PRN):    Pertinent Labs: 10-08 Na135 mmol/L Glu 116 mg/dL[H] K+ 3.5 mmol/L Cr  0.25 mg/dL[L] BUN 7 mg/dL 10-08 Phos 3.4 mg/dL 10-08 Alb 2.6 g/dL[L] 09-23 Chol 140 mg/dL LDL --    HDL 59 mg/dL Trig 60 mg/dL     CAPILLARY BLOOD GLUCOSE        Skin:     Estimated Needs:   [ ] no change since previous assessment  [ ] recalculated:     Previous Nutrition Diagnosis:   [ ] Inadequate Energy Intake [ ]Inadequate Oral Intake [ ] Excessive Energy Intake   [ ] Underweight [ ] Increased Nutrient Needs [ ] Overweight/Obesity  ( x) Altered Nutrition Related Labs  [ ] Altered GI Function [ ] Unintended Weight Loss [ ] Food & Nutrition Related Knowledge Deficit [ ] Malnutrition     Nutrition Diagnosis is [ x] ongoing  [ ] resolved [ ] not applicable     New Nutrition Diagnosis: [ ] not applicable       Interventions:   Recommend  [ ] Change Diet To:  [ x] Nutrition Supplement Ensure Enlive BID  [ ] Nutrition Support  [ ] Other:     Monitoring and Evaluation:   [ ] PO intake [ x ] Tolerance to diet prescription [ x ] weights [ x ] labs[ x ] follow up per protocol  [ ] other:

## 2024-10-09 NOTE — CONSULT NOTE ADULT - ASSESSMENT
79F with chronic hypercapnic respiratory failure (no compliant with bipap), diaphragmatic dysfunction, hypertension, arthritis, Neuralgia, osteoporotic compression fractures L5-S1, hyponatremia (HCTZ induced, corrected), goiter, recent admission to [facility] with hypercapnic resp failure 9/2024, presenting from NH with complaint of rectal bleed, found to have acute on chronic hypercapnic resp failure (likely iso noncompliance with bipap), no evidence of rectal bleed in ED.    #acute on chronic hypoxic and hypercapnic respiratory failure  #?PNA  -pt has hx of noncompliance with bipap  -on last admission in 9/2024 was exacerbated by PNA  -CT on this admission showing stable atelectasis with questionable superimposed PNA  -as per labs from last admission, pCO2 is usu in 60s  -current bipap settings 20/5  -ABG showing improvement after 1 hour of bipap, (7.23 / 99 / 82 / 42 >>> 7.39/71/90/45)  -mentating at baseline (pt is known to me from prior admission)  -please continue patient on nocturnal bipap, which is her home regimen, recheck gas in a.m.  -empiric tx for PNA    #rectal bleed?  -no evidence of rectal bleed found  -Hb stable at 11.4  -please monitor for active signs of bleeding  - Maintain active T&S, 2 large bore peripheral IVs, transfuse for goal hgb >7 or if symptomatic     Given overall clinical picture, patient does not currently require ICU level of care. They are stable for   floors    . Please reconsult if concerns or if deterioration.  
Assessment:  79F with chronic hypercapnic respiratory failure (no compliant with bipap), diaphragmatic dysfunction, hypertension, arthritis, Neuralgia, osteoporotic compression fractures L5-S1, hyponatremia (HCTZ induced, corrected), goiter, recent admission to [facility] with hypercapnic resp failure 9/2024, presenting from NH.  ICU consulted for hypercapnic respiratory failure. Patient is comfortable on Bipap. Has history of non compliance.     Plan:  Neuro:  #At baseline Aox2     Cardiovascular:  #No issues    Pulmonary:   #Acute on chronic hypercapnia respiratory failure  -pt has hx of noncompliance with bipap  -on last admission in 9/2024 was exacerbated by PNA  -CT on this admission showing stable atelectasis with questionable superimposed PNA. Not febrile no WBC less likely   -As per labs from last admission, pCO2 is usu in 60s  -ABG PH: 7.39/Pco2: 76/Po2 135/ Hco3 46  -mentating at baseline> comfortable on Bipap   -please continue patient on nocturnal bipap, which is her home regimen, recheck gas in a.m.  - Pulmonology consult recommended to follow   - Goals of care assessment     Infectious Diseases:  #No issues     Gastrointestinal:  #Hx Rectal  bleed  #Stool impaction   CT showing stool impaction. No bleed  trend Hb   f/u GI recs  Aggressive Bowel Regimen     Renal:  #No issues     Heme/onc:   #Anemia     Endo:   #No issues     Skin/ catheter:   #Peripheral     Prophylaxis:   #SCD until r/o bleed     Goals of Care: Primary team to address    Dispo: Remain on floor   
79F with chronic hypercapnic respiratory failure (no compliant with bipap), diaphragmatic dysfunction, hypertension, arthritis, Neuralgia, osteoporotic compression fractures L5-S1, hyponatremia (HCTZ induced, corrected), goiter, recent admission to [facility] with hypercapnic resp failure 9/2024, presenting from NH with complaint of rectal bleed. GI consulted for rectal bleed    
Asked to evaluate patient as per RN due to weakness in the leg present years ago, resolved, due to concern for Multiple Sclerosis and [place].  No focal weakness at this time.  Given the chronic nature of issue there is no need for acute neuro eval and can consider outpatient MRI brain, C and T spine to evaluate for MS and ncs/emg to eval for CIDP (may or may not need LP as well).    dw NP  time spent 80min

## 2024-10-09 NOTE — PROGRESS NOTE ADULT - SUBJECTIVE AND OBJECTIVE BOX
Time of Visit:  Patient seen and examined.     MEDICATIONS  (STANDING):  albuterol/ipratropium for Nebulization 3 milliLiter(s) Nebulizer every 6 hours  bisacodyl 5 milliGRAM(s) Oral at bedtime  carvedilol 25 milliGRAM(s) Oral every 12 hours  fluticasone propionate/ salmeterol 250-50 MICROgram(s) Diskus 1 Dose(s) Inhalation two times a day  pantoprazole    Tablet 40 milliGRAM(s) Oral two times a day  polyethylene glycol 3350 17 Gram(s) Oral two times a day  rosuvastatin 5 milliGRAM(s) Oral at bedtime      MEDICATIONS  (PRN):       Medications up to date at time of exam.      PHYSICAL EXAMINATION:  Patient has no new complaints.  GENERAL: The patient  in no apparent distress.     Vital Signs Last 24 Hrs  T(C): 36.3 (09 Oct 2024 13:56), Max: 36.4 (09 Oct 2024 05:15)  T(F): 97.4 (09 Oct 2024 13:56), Max: 97.6 (09 Oct 2024 05:15)  HR: 85 (09 Oct 2024 13:56) (64 - 93)  BP: 138/80 (09 Oct 2024 13:56) (138/68 - 142/80)  BP(mean): --  RR: 16 (09 Oct 2024 13:56) (16 - 19)  SpO2: 96% (09 Oct 2024 13:56) (94% - 96%)    Parameters below as of 09 Oct 2024 13:56  Patient On (Oxygen Delivery Method): nasal cannula  O2 Flow (L/min): 2     (if applicable)    Chest Tube (if applicable)    HEENT: Head is normocephalic and atraumatic. Extraocular muscles are intact. Mucous membranes are moist.     NECK: Supple, no palpable adenopathy.    LUNGS: Fair bilateral air entry   no wheezing, rales, or rhonchi.    HEART: Regular rate and rhythm without murmur.    ABDOMEN: Soft, nontender, and nondistended.  No hepatosplenomegaly is noted.    : No painful voiding, no pelvic pain    EXTREMITIES: Without any cyanosis, clubbing, rash, lesions or edema.    NEUROLOGIC: Awake, alert, oriented, grossly intact    SKIN: Warm, dry, good turgor.      LABS:                        10.4   4.45  )-----------( 200      ( 08 Oct 2024 05:31 )             31.5     10-08    135  |  91[L]  |  7   ----------------------------<  116[H]  3.5   |  38[H]  |  0.25[L]    Ca    8.4      08 Oct 2024 05:31  Phos  3.4     10-08  Mg     1.8     10-08    TPro  6.0  /  Alb  2.6[L]  /  TBili  0.5  /  DBili  x   /  AST  14  /  ALT  13  /  AlkPhos  60  10-08      Urinalysis Basic - ( 08 Oct 2024 05:31 )    Color: x / Appearance: x / SG: x / pH: x  Gluc: 116 mg/dL / Ketone: x  / Bili: x / Urobili: x   Blood: x / Protein: x / Nitrite: x   Leuk Esterase: x / RBC: x / WBC x   Sq Epi: x / Non Sq Epi: x / Bacteria: x                      MICROBIOLOGY: (if applicable)    RADIOLOGY & ADDITIONAL STUDIES:  EKG:   CXR:  ECHO:    IMPRESSION: 79y Female PAST MEDICAL & SURGICAL HISTORY:  HTN (hypertension)      Lumbar neuralgia      Hyponatremia      Arthritis       p/w            Time of Visit:  Patient seen and examined.     MEDICATIONS  (STANDING):  albuterol/ipratropium for Nebulization 3 milliLiter(s) Nebulizer every 6 hours  bisacodyl 5 milliGRAM(s) Oral at bedtime  carvedilol 25 milliGRAM(s) Oral every 12 hours  fluticasone propionate/ salmeterol 250-50 MICROgram(s) Diskus 1 Dose(s) Inhalation two times a day  pantoprazole    Tablet 40 milliGRAM(s) Oral two times a day  polyethylene glycol 3350 17 Gram(s) Oral two times a day  rosuvastatin 5 milliGRAM(s) Oral at bedtime      MEDICATIONS  (PRN):       Medications up to date at time of exam.      PHYSICAL EXAMINATION:  Patient has no new complaints.  GENERAL: The patient  in no apparent distress.     Vital Signs Last 24 Hrs  T(C): 36.3 (09 Oct 2024 13:56), Max: 36.4 (09 Oct 2024 05:15)  T(F): 97.4 (09 Oct 2024 13:56), Max: 97.6 (09 Oct 2024 05:15)  HR: 85 (09 Oct 2024 13:56) (64 - 93)  BP: 138/80 (09 Oct 2024 13:56) (138/68 - 142/80)  BP(mean): --  RR: 16 (09 Oct 2024 13:56) (16 - 19)  SpO2: 96% (09 Oct 2024 13:56) (94% - 96%)    Parameters below as of 09 Oct 2024 13:56  Patient On (Oxygen Delivery Method): nasal cannula  O2 Flow (L/min): 2     (if applicable)    Chest Tube (if applicable)    HEENT: Head is normocephalic and atraumatic. Extraocular muscles are intact. Mucous membranes are moist.     NECK: Supple, no palpable adenopathy.    LUNGS: Fair bilateral air entry   no wheezing, rales, or rhonchi.    HEART: Regular rate and rhythm without murmur.    ABDOMEN: Soft, nontender, and nondistended.  No hepatosplenomegaly is noted.    : No painful voiding, no pelvic pain    EXTREMITIES: Without any cyanosis, clubbing, rash, lesions or edema.    NEUROLOGIC: Awake, alert, oriented, grossly intact    SKIN: Warm, dry, good turgor.      LABS:                        10.4   4.45  )-----------( 200      ( 08 Oct 2024 05:31 )             31.5     10-08    135  |  91[L]  |  7   ----------------------------<  116[H]  3.5   |  38[H]  |  0.25[L]    Ca    8.4      08 Oct 2024 05:31  Phos  3.4     10-08  Mg     1.8     10-08    TPro  6.0  /  Alb  2.6[L]  /  TBili  0.5  /  DBili  x   /  AST  14  /  ALT  13  /  AlkPhos  60  10-08      Urinalysis Basic - ( 08 Oct 2024 05:31 )    Color: x / Appearance: x / SG: x / pH: x  Gluc: 116 mg/dL / Ketone: x  / Bili: x / Urobili: x   Blood: x / Protein: x / Nitrite: x   Leuk Esterase: x / RBC: x / WBC x   Sq Epi: x / Non Sq Epi: x / Bacteria: x        MICROBIOLOGY: (if applicable)    RADIOLOGY & ADDITIONAL STUDIES:  EKG:   CXR:  ECHO:    IMPRESSION: 79y Female PAST MEDICAL & SURGICAL HISTORY:  HTN (hypertension)      Lumbar neuralgia      Hyponatremia      Arthritis       p/w           Impression: This is an 80 Y/O Female from [facility] presented to ED with Rectal Bleed . Had a recent admission here at [facility] due to Hypercapnia Respiratory Failure in 09/ 2024 . Patient blood gas showed profound elevation in PCO2 ( prompted to ICU  and need initiation of Bipap . Patient is known CO2 retainer due to Non compliance to Bipap. For Pulmonary follow up of Acute on chronic hypercapnic respiratory failure . Has stable left basilar atelectasis on CT chest  .     Suggestion:   - O2 saturation 96% with O 2 supplement. Continue Oxygen supplementation 2L NC when off Bipap.    - Continue Bipap 13/5 FIO2 40% at Night . was on standby last night , pt non compliant to Bipap .   - Continue Duoneb via nebulization Q 6 Hours .   - Continue   Advair 250- 50 mcg Twice Daily  .              - Pulmonary oral hygiene care especially every after oral inhaler used.  - Offered incentive spirometer but pt refused .

## 2024-10-09 NOTE — PROGRESS NOTE ADULT - SUBJECTIVE AND OBJECTIVE BOX
NP Note discussed with  primary attending    Patient is a 79y old  Female who presents with a chief complaint of Acute respiratory distress . (07 Oct 2024 11:19)      INTERVAL HPI/OVERNIGHT EVENTS: no new complaints, pt seen at bedside with supplemental oxygen. Saturating well on n/c 2 liters. Pending neurology consult     MEDICATIONS  (STANDING):  albuterol/ipratropium for Nebulization 3 milliLiter(s) Nebulizer every 6 hours  bisacodyl 5 milliGRAM(s) Oral at bedtime  carvedilol 25 milliGRAM(s) Oral every 12 hours  fluticasone propionate/ salmeterol 250-50 MICROgram(s) Diskus 1 Dose(s) Inhalation two times a day  pantoprazole    Tablet 40 milliGRAM(s) Oral two times a day  polyethylene glycol 3350 17 Gram(s) Oral two times a day  rosuvastatin 5 milliGRAM(s) Oral at bedtime    MEDICATIONS  (PRN):      __________________________________________________  REVIEW OF SYSTEMS:    CONSTITUTIONAL: No fever,   EYES: no acute visual disturbances  NECK: No pain or stiffness  RESPIRATORY: No cough; No shortness of breath  CARDIOVASCULAR: No chest pain, no palpitations  GASTROINTESTINAL: No pain. No nausea or vomiting; No diarrhea   NEUROLOGICAL: No headache or numbness, no tremors  MUSCULOSKELETAL: No joint pain, no muscle pain  GENITOURINARY: no dysuria, no frequency, no hesitancy  PSYCHIATRY: no depression , no anxiety  ALL OTHER  ROS negative        Vital Signs Last 24 Hrs  T(C): 36.4 (09 Oct 2024 05:15), Max: 36.6 (08 Oct 2024 13:41)  T(F): 97.6 (09 Oct 2024 05:15), Max: 97.8 (08 Oct 2024 13:41)  HR: 89 (09 Oct 2024 05:15) (64 - 93)  BP: 142/80 (09 Oct 2024 05:15) (138/68 - 182/85)  BP(mean): --  RR: 18 (09 Oct 2024 05:15) (18 - 19)  SpO2: 94% (09 Oct 2024 05:15) (93% - 97%)    Parameters below as of 09 Oct 2024 05:15  Patient On (Oxygen Delivery Method): nasal cannula  O2 Flow (L/min): 2      ________________________________________________  PHYSICAL EXAM:  GENERAL: NAD  HEENT: Normocephalic;  conjunctivae and sclerae clear; moist mucous membranes;   NECK : supple  CHEST/LUNG: Diminished to ausculitation bilaterally with good air entry   HEART: S1 S2  regular; no murmurs, gallops or rubs  ABDOMEN: Soft, Nontender, Nondistended; Bowel sounds present  EXTREMITIES: no cyanosis; no edema; no calf tenderness, generalized weakness   SKIN: warm and dry; no rash  NERVOUS SYSTEM:  Awake and alert; Oriented  to place, person and time ; no new deficits    _________________________________________________  LABS:                        10.4   4.45  )-----------( 200      ( 08 Oct 2024 05:31 )             31.5     10-08    135  |  91[L]  |  7   ----------------------------<  116[H]  3.5   |  38[H]  |  0.25[L]    Ca    8.4      08 Oct 2024 05:31  Phos  3.4     10-08  Mg     1.8     10-08    TPro  6.0  /  Alb  2.6[L]  /  TBili  0.5  /  DBili  x   /  AST  14  /  ALT  13  /  AlkPhos  60  10-08      Urinalysis Basic - ( 08 Oct 2024 05:31 )    Color: x / Appearance: x / SG: x / pH: x  Gluc: 116 mg/dL / Ketone: x  / Bili: x / Urobili: x   Blood: x / Protein: x / Nitrite: x   Leuk Esterase: x / RBC: x / WBC x   Sq Epi: x / Non Sq Epi: x / Bacteria: x      CAPILLARY BLOOD GLUCOSE            RADIOLOGY & ADDITIONAL TESTS:    < from: CT Angio Abdomen and Pelvis w/ IV Cont (10.02.24 @ 21:29) >  IMPRESSION:  Stable left basilar atelectasis. Superimposed pneumonia is not excluded.   Stable biapical consolidative opacities that may represent scarring.   Consider short interval follow-up CT.    No evidence for active GI bleed or active bowel inflammation.    Large stool burden in the rectum.      < end of copied text >      Imaging Personally Reviewed:  YES/NO    Consultant(s) Notes Reviewed:   YES/ No    Care Discussed with Consultants :     Plan of care was discussed with patient and /or primary care giver; all questions and concerns were addressed and care was aligned with patient's wishes.

## 2024-10-10 VITALS
SYSTOLIC BLOOD PRESSURE: 113 MMHG | TEMPERATURE: 98 F | OXYGEN SATURATION: 97 % | HEART RATE: 92 BPM | DIASTOLIC BLOOD PRESSURE: 67 MMHG | RESPIRATION RATE: 18 BRPM

## 2024-10-10 DIAGNOSIS — R53.1 WEAKNESS: ICD-10-CM

## 2024-10-10 LAB
ALBUMIN SERPL ELPH-MCNC: 2.7 G/DL — LOW (ref 3.5–5)
ALP SERPL-CCNC: 60 U/L — SIGNIFICANT CHANGE UP (ref 40–120)
ALT FLD-CCNC: 14 U/L DA — SIGNIFICANT CHANGE UP (ref 10–60)
ANION GAP SERPL CALC-SCNC: 5 MMOL/L — SIGNIFICANT CHANGE UP (ref 5–17)
AST SERPL-CCNC: 13 U/L — SIGNIFICANT CHANGE UP (ref 10–40)
BILIRUB SERPL-MCNC: 0.5 MG/DL — SIGNIFICANT CHANGE UP (ref 0.2–1.2)
BUN SERPL-MCNC: 11 MG/DL — SIGNIFICANT CHANGE UP (ref 7–18)
CALCIUM SERPL-MCNC: 9 MG/DL — SIGNIFICANT CHANGE UP (ref 8.4–10.5)
CHLORIDE SERPL-SCNC: 90 MMOL/L — LOW (ref 96–108)
CO2 SERPL-SCNC: 38 MMOL/L — HIGH (ref 22–31)
CREAT SERPL-MCNC: 0.35 MG/DL — LOW (ref 0.5–1.3)
EGFR: 103 ML/MIN/1.73M2 — SIGNIFICANT CHANGE UP
GLUCOSE SERPL-MCNC: 111 MG/DL — HIGH (ref 70–99)
HCT VFR BLD CALC: 33.4 % — LOW (ref 34.5–45)
HGB BLD-MCNC: 10.7 G/DL — LOW (ref 11.5–15.5)
MCHC RBC-ENTMCNC: 29.1 PG — SIGNIFICANT CHANGE UP (ref 27–34)
MCHC RBC-ENTMCNC: 32 GM/DL — SIGNIFICANT CHANGE UP (ref 32–36)
MCV RBC AUTO: 90.8 FL — SIGNIFICANT CHANGE UP (ref 80–100)
NRBC # BLD: 0 /100 WBCS — SIGNIFICANT CHANGE UP (ref 0–0)
PLATELET # BLD AUTO: 185 K/UL — SIGNIFICANT CHANGE UP (ref 150–400)
POTASSIUM SERPL-MCNC: 3.5 MMOL/L — SIGNIFICANT CHANGE UP (ref 3.5–5.3)
POTASSIUM SERPL-SCNC: 3.5 MMOL/L — SIGNIFICANT CHANGE UP (ref 3.5–5.3)
PROT SERPL-MCNC: 6.1 G/DL — SIGNIFICANT CHANGE UP (ref 6–8.3)
RBC # BLD: 3.68 M/UL — LOW (ref 3.8–5.2)
RBC # FLD: 14.2 % — SIGNIFICANT CHANGE UP (ref 10.3–14.5)
SODIUM SERPL-SCNC: 133 MMOL/L — LOW (ref 135–145)
WBC # BLD: 4.38 K/UL — SIGNIFICANT CHANGE UP (ref 3.8–10.5)
WBC # FLD AUTO: 4.38 K/UL — SIGNIFICANT CHANGE UP (ref 3.8–10.5)

## 2024-10-10 RX ADMIN — PANTOPRAZOLE SODIUM 40 MILLIGRAM(S): 40 TABLET, DELAYED RELEASE ORAL at 05:43

## 2024-10-10 RX ADMIN — IPRATROPIUM BROMIDE AND ALBUTEROL SULFATE 3 MILLILITER(S): .5; 3 SOLUTION RESPIRATORY (INHALATION) at 20:34

## 2024-10-10 RX ADMIN — Medication 25 MILLIGRAM(S): at 05:42

## 2024-10-10 RX ADMIN — Medication 1 DOSE(S): at 11:49

## 2024-10-10 RX ADMIN — BISACODYL 5 MILLIGRAM(S): 5 TABLET, COATED ORAL at 21:12

## 2024-10-10 RX ADMIN — Medication 1 DOSE(S): at 21:13

## 2024-10-10 RX ADMIN — ROSUVASTATIN CALCIUM 5 MILLIGRAM(S): 20 TABLET, COATED ORAL at 21:12

## 2024-10-10 RX ADMIN — Medication 17 GRAM(S): at 05:43

## 2024-10-10 RX ADMIN — Medication 25 MILLIGRAM(S): at 17:52

## 2024-10-10 RX ADMIN — PANTOPRAZOLE SODIUM 40 MILLIGRAM(S): 40 TABLET, DELAYED RELEASE ORAL at 17:52

## 2024-10-10 NOTE — PROGRESS NOTE ADULT - PROBLEM SELECTOR PLAN 3
Presented with rectal bleed, no rectal bleeding in ED  Episode of melena with clots.   CTAP with no evidence of bleed.   Hgb 11.3. LBM 10/06, no blood  - continue protonix 40 mg BID  - trend CBC  - GI consulted -> no plans for colonoscopy at this time given patient's respiratory status and stable H&H.
Presented with rectal bleed, no rectal bleeding in ED  Episode of melena with clots.   CTAP with no evidence of bleed.   Hgb 11.3. LBM 10/06, no blood  - continue protonix 40 mg BID  - trend CBC  - GI consulted -> no plans for colonoscopy at this time given patient's respiratory status and stable H&H.
Episode of melena with clots this AM.   H&H stable.  Repeat CBC  Stool count  GI consulted No plans for colonoscopy at this time given patient's respiratory status and stable H&H.   Protonix IV BID  NPO until seen by speech
Presented with rectal bleed, no rectal bleeding in ED  Episode of melena with clots.   CTAP with no evidence of bleed.   Hgb 11.3. LBM 10/06, no blood  - continue protonix 40 mg BID  - trend CBC  - GI consulted -> no plans for colonoscopy at this time given patient's respiratory status and stable H&H.
Presented with rectal bleed, no rectal bleeding in ED  Episode of melena with clots.   CTAP with no evidence of bleed.   Hgb 11.3. LBM 10/06, no blood  - continue protonix 40 mg BID  - trend CBC  - GI consulted -> no plans for colonoscopy at this time given patient's respiratory status and stable H&H.
hx of HTN on coreg and lasix prn (for leg swelling) at home  /81 on admission  c/w coreg   monitor BP.
Episode of melena with clots this AM.   H&H stable.  Repeat CBC  Stool count  GI consulted No plans for colonoscopy at this time given patient's respiratory status and stable H&H.   Protonix IV BID
hx of HTN on coreg and lasix prn (for leg swelling) at home  /81 on admission  c/w coreg   monitor BP.
Presented with rectal bleed, no rectal bleeding in ED  Episode of melena with clots.   CTAP with no evidence of bleed.   Hgb 11.3. LBM 10/06, no blood  - continue protonix 40 mg BID  - trend CBC  - GI consulted -> no plans for colonoscopy at this time given patient's respiratory status and stable H&H.
CTA of abdomen: No evidence for active GI bleed or active bowel inflammation. Large stool burden in the rectum.  Miralax and senna on hold 2/2 failing swallow eval   Dulcolax supp.   Stool count
Episode of melena with clots this AM.   H&H stable.  Repeat CBC  Stool count  GI consulted No plans for colonoscopy at this time given patient's respiratory status and stable H&H.   Protonix IV BID

## 2024-10-10 NOTE — PROGRESS NOTE ADULT - PROBLEM SELECTOR PLAN 1
-pt has hx of noncompliance with bipap  -on last admission in 9/2024 was exacerbated by PNA  -CT: Stable left basilar atelectasis. Superimposed pneumonia is not excluded.   Stable biapical consolidative opacities that may represent scarring  -as per labs from last admission, pCO2 is usually in 60s  -current bipap settings 20/5-->adjusted to 18/5 to reduce discomfort from high IPAP and aid in compliance   -ABG showing improvement after 1 hour of bipap, (7.23 / 99 / 82 / 42 >>> 7.39/71/90/45)  -mentating at baseline   -continue patient on nocturnal bipap, w/ 2-4L NC off bipap which is her home regimen,  -recheck ABG in a.m.  -low clinical suspicion for active PNA at present, holding abx for now
-pt has hx of noncompliance with bipap  -on last admission in 9/2024 was exacerbated by PNA  -CT: Stable left basilar atelectasis. Superimposed pneumonia is not excluded.   Stable biapical consolidative opacities that may represent scarring  -as per labs from last admission, pCO2 is usually in 60s  -current bipap settings 20/5-->adjusted to 18/5 to reduce discomfort from high IPAP and aid in compliance   -ABG showing improvement after 1 hour of bipap, (7.23 / 99 / 82 / 42 >>> 7.39/71/90/45)  -mentating at baseline   -continue patient on nocturnal bipap, w/ 2-4L NC off bipap which is her home regimen,  -recheck ABG in a.m.  -low clinical suspicion for active PNA at present, holding abx for now
CO2 99 on admission s/p bipap. Patient has hx of non-compliance to BiPAP use.   Critical care team consulted. NO plans for transfer.  CTAP with stable left basilar atelectasis, stable biapical consolidative opacities may represent scarring.   pCO2 66 10/06, downtrending   Lungs CTA, SpO2 96% on 2L  No leukocytosis, afebril, VSS  - pulm Dr Tobar following   - continue bipap 13/5 FiO2 40% at night  - continue duoneb Q6H  - continue advair BID  - supplemental O2 as needed  - low suspicion for PNA, holding abx
-pt has hx of noncompliance with bipap  -on last admission in 9/2024 was exacerbated by PNA  -CT: Stable left basilar atelectasis. Superimposed pneumonia is not excluded.   Stable biapical consolidative opacities that may represent scarring  -as per labs from last admission, pCO2 is usually in 60s  -current bipap settings 20/5-->adjusted to 18/5 to reduce discomfort from high IPAP and aid in compliance   -ABG showing improvement after 1 hour of bipap, (7.23 / 99 / 82 / 42 >>> 7.39/71/90/45)  -mentating at baseline   -continue patient on nocturnal bipap, w/ 2-4L NC off bipap which is her home regimen,  -recheck ABG in a.m.  -low clinical suspicion for active PNA at present, holding abx for now
CO2 99 on admission s/p bipap. Patient has hx of non-compliance to BiPAP use.   Critical care team consulted. NO plans for transfer.  CTAP with stable left basilar atelectasis, stable biapical consolidative opacities may represent scarring.   pCO2 66 10/06, downtrending   Lungs CTA, SpO2 96% on 2L  - pulm Dr Tobar following   - continue bipap 13/5 FiO2 40% at night  - continue duoneb Q6H  - continue advair BID  - supplemental O2 as needed  - low suspicion for PNA, holding abx
CO2 of 99 in ED.   Repeat Arterial: 7.39 /76 /  135 /  46/  100  After BiPAP  Patient has hx of non-compliance to BiPAP use.    Critical care team consulted. NO plans for transfer at this time given improvement in blood gas.   BiPAP Q HS and as needed.   Maintain NPO as patient failed bedside swallow eval  Speech to eval   IVF with gentle hydration on the setting of hx of CHF.  CTA of abdomen: Stable left basilar atelectasis. Superimposed pneumonia is not excluded.   Stable biapical consolidative opacities that may represent scarring.
CO2 99 on admission s/p bipap. Patient has hx of non-compliance to BiPAP use.   Critical care team consulted. NO plans for transfer.  CTAP with stable left basilar atelectasis, stable biapical consolidative opacities may represent scarring.   pCO2 66 10/06, downtrending   Lungs CTA, SpO2 96% on 2L  No leukocytosis, afebril, VSS  - pulm Dr Tobar following   - continue bipap 13/5 FiO2 40% at night  - continue duoneb Q6H  - continue advair BID  - supplemental O2 as needed  - low suspicion for PNA, holding abx
-pt has hx of noncompliance with bipap  -on last admission in 9/2024 was exacerbated by PNA  -CT: Stable left basilar atelectasis. Superimposed pneumonia is not excluded.   Stable biapical consolidative opacities that may represent scarring  -as per labs from last admission, pCO2 is usually in 60s  -current bipap settings 20/5-->adjusted to 18/5 to reduce discomfort from high IPAP and aid in compliance   -ABG showing improvement after 1 hour of bipap, (7.23 / 99 / 82 / 42 >>> 7.39/71/90/45)  -mentating at baseline   -continue patient on nocturnal bipap, w/ 2-4L NC off bipap which is her home regimen,  -recheck ABG in a.m.  -low clinical suspicion for active PNA at present, holding abx for now
CO2 of 99 in ED.   Repeat Arterial: 7.39 /76 /  135 /  46/  100  After BiPAP  Patient has hx of non-compliance to BiPAP use.    Critical care team consulted. NO plans for transfer at this time given improvement in blood gas.   BiPAP Q HS and as needed.   Maintain NPO as patient failed bedside swallow eval  Speech to eval   IVF with gentle hydration on the setting of hx of CHF.  CTA of abdomen: Stable left basilar atelectasis. Superimposed pneumonia is not excluded.   Stable biapical consolidative opacities that may represent scarring.
CO2 99 on admission s/p bipap. Patient has hx of non-compliance to BiPAP use.   Critical care team consulted. NO plans for transfer.  CTAP with stable left basilar atelectasis, stable biapical consolidative opacities may represent scarring.   pCO2 66 10/06, downtrending   Lungs CTA, SpO2 96% on 2L  No leukocytosis, afebril, VSS  - pulm Dr Tobar following   - continue bipap 13/5 FiO2 40% at night  - continue duoneb Q6H  - continue advair BID  - supplemental O2 as needed  - low suspicion for PNA, holding abx
CO2 99 on admission s/p bipap. Patient has hx of non-compliance to BiPAP use.   Critical care team consulted. NO plans for transfer.  CTAP with stable left basilar atelectasis, stable biapical consolidative opacities may represent scarring.   pCO2 66 10/06, downtrending   Lungs CTA, SpO2 96% on 2L  No leukocytosis, afebril, VSS  - pulm Dr Tobar following   - continue bipap 13/5 FiO2 40% at night  - continue duoneb Q6H  - continue advair BID  - supplemental O2 as needed  - low suspicion for PNA, holding abx

## 2024-10-10 NOTE — PROGRESS NOTE ADULT - REASON FOR ADMISSION
Acute respiratory distress
Acute respiratory distress .
Rectal bleed and hypercapnia
Acute on chronic hypercarbic respiratory failure and rectal bleed

## 2024-10-10 NOTE — PROGRESS NOTE ADULT - TIME BILLING
- Review of records, telemetry, vital signs and daily labs.   - General and cardiovascular physical examination.  - Generation of cardiovascular treatment plan and completion of note .  - Coordination of care.      Patient was seen and examined by me on 10/3/24 ,interim events noted,labs and radiology studies reviewed.  Aramis King MD,FACC.  [address] [facility].  [phone]
- Review of records, telemetry, vital signs and daily labs.   - General and cardiovascular physical examination.  - Generation of cardiovascular treatment plan and completion of note .  - Coordination of care.      Patient was seen and examined by me on  10/9/24,interim events noted,labs and radiology studies reviewed.  Aramis King MD,FACC.  [address] [facility].  [phone]
- Review of records, telemetry, vital signs and daily labs.   - General and cardiovascular physical examination.  - Generation of cardiovascular treatment plan and completion of note .  - Coordination of care.      Patient was seen and examined by me on 10/6/24 ,interim events noted,labs and radiology studies reviewed.  Aramis King MD,FACC.  [address] [facility].  [phone]
- Review of records, telemetry, vital signs and daily labs.   - General and cardiovascular physical examination.  - Generation of cardiovascular treatment plan and completion of note .  - Coordination of care.      Patient was seen and examined by me on 10/5/24 ,interim events noted,labs and radiology studies reviewed.  Aramis King MD,FACC.  [address] [facility].  [phone]
- Review of records, telemetry, vital signs and daily labs.   - General and cardiovascular physical examination.  - Generation of cardiovascular treatment plan and completion of note .  - Coordination of care.      Patient was seen and examined by me on 10/4/24 ,interim events noted,labs and radiology studies reviewed.  Aramis King MD,FACC.  [address] [facility].  [phone]
- Review of records, telemetry, vital signs and daily labs.   - General and cardiovascular physical examination.  - Generation of cardiovascular treatment plan and completion of note .  - Coordination of care.      Patient was seen and examined by me on  10/10/24,interim events noted,labs and radiology studies reviewed.  Aramis King MD,FACC.  [address] [facility].  [phone]
- Review of records, telemetry, vital signs and daily labs.   - General and cardiovascular physical examination.  - Generation of cardiovascular treatment plan and completion of note .  - Coordination of care.      Patient was seen and examined by me on  10/7/24,interim events noted,labs and radiology studies reviewed.  Aramis King MD,FACC.  [address] [facility].  [phone]
- Review of records, telemetry, vital signs and daily labs.   - General and cardiovascular physical examination.  - Generation of cardiovascular treatment plan and completion of note .  - Coordination of care.      Patient was seen and examined by me on  10/8/24,interim events noted,labs and radiology studies reviewed.  Aramis King MD,FACC.  [address] [facility].  [phone]

## 2024-10-10 NOTE — PROGRESS NOTE ADULT - PROBLEM SELECTOR PLAN 5
Controlled   C/w home BB  Monitor per unit protocol
GI ppx: PPI  DVT ppx: SCDs
history of HLD, takes crestor 5 mg daily at home  - continue crestor 5 mg daily
Controlled   C/w home BB  Monitor per unit protocol
history of HLD, takes crestor 5 mg daily at home  - continue crestor 5 mg daily
Controlled   C/w home BB  Monitor per unit protocol
history of HLD, takes crestor 5 mg daily at home  - continue crestor 5 mg daily
history of HLD, takes crestor 5 mg daily at home  - continue crestor 5 mg daily

## 2024-10-10 NOTE — PROGRESS NOTE ADULT - PROBLEM SELECTOR PROBLEM 4
Prophylactic measure
Prophylactic measure
Constipation
HTN (hypertension)
Prophylactic measure
Constipation
HTN (hypertension)
Constipation
Prophylactic measure
HTN (hypertension)

## 2024-10-10 NOTE — PROGRESS NOTE ADULT - NS ATTEND AMEND GEN_ALL_CORE FT
Impression; This is an 78 Y/O Female from [facility] presented to ED with Rectal Bleed . Had a recent admission here at [facility] due to Hypercapnia Respiratory Failure in 09/ 2024 . Patient blood gas showed profound elevation in PCO2 ( prompted to ICU  and need initiation of Bipap . Patient is known CO2 retainer due to Non compliance to Bipap. For Pulmonary follow up of Acute on chronic hypercapnic respiratory failure .    Suggestion:   O2 saturation 96% with O2 supplement. Continue Oxygen supplementation when off NIPPV / PAP.   Continue Bipap 13/5 FIO2 40% at Night .   Continue Duoneb via nebulization Q 6 Hours .   Continue   Advair 250- 50 mcg Twice Daily  .              Pulmonary oral hygiene care especially every after oral inhaler used.
Impression: This is an 80 Y/O Female from [facility] presented to ED with Rectal Bleed . Had a recent admission here at [facility] due to Hypercapnia Respiratory Failure in 09/ 2024 . Patient blood gas showed profound elevation in PCO2 ( prompted to ICU  and need initiation of Bipap . Patient is known CO2 retainer due to Non compliance to Bipap. For Pulmonary follow up of Acute on chronic hypercapnic respiratory failure . Has stable left basilar atelectasis on CT chest  .     Suggestion:   - O2 saturation 96% with O 2 supplement. Continue Oxygen supplementation 2L NC when off Bipap.    - Continue Bipap 13/5 FIO2 40% at Night . was on standby last night , pt non compliant to Bipap .   - Continue Duoneb via nebulization Q 6 Hours .   - Continue   Advair 250- 50 mcg Twice Daily  .              - Pulmonary oral hygiene care especially every after oral inhaler used.  - Offered incentive spirometer but pt refused .

## 2024-10-10 NOTE — PROGRESS NOTE ADULT - PROBLEM SELECTOR PROBLEM 1
Acute on chronic respiratory failure with hypercapnia

## 2024-10-10 NOTE — CHART NOTE - NSCHARTNOTEFT_GEN_A_CORE
Neuro Note:  Had extensive discussion with patient's son regarding his concern of long term SOB without known cause.  He is concerned for CIDP, MG and ALS.  There no focal findings on exam.  Respiratory symptoms present since 2021 and there is no acute need for inpatient evaluation for these symptoms.  Will recommend MG markers, Ach-R and MUSK-R antibodies and panel (can be send as inpatient) and outpatient ncs.emg arms and leg, with proximal stim and rep stim for eval of ALS, CIDP and MG.  Dw patient's son who understands and accepts the plan and NP.  time spent 45min

## 2024-10-10 NOTE — PROGRESS NOTE ADULT - PROBLEM SELECTOR PLAN 6
- DVT ppx: b/l SCDs   - GI ppx: continue protonix 40 mg BID
BiPAP Q HS  GI consulted  CC consulted   ABG in AM
GI ppx: PPI  DVT ppx: SCDs
- DVT ppx: b/l SCDs   - GI ppx: continue protonix 40 mg BID

## 2024-10-10 NOTE — PROGRESS NOTE ADULT - PROBLEM SELECTOR PROBLEM 6
Prophylactic measure
Discharge planning issues
Prophylactic measure

## 2024-10-10 NOTE — PROGRESS NOTE ADULT - PROBLEM SELECTOR PLAN 4
History of HTN, takes carvedilol 25 mg BID, and lasix 20 mg daily PRN at home  Normotensive  - continue carvedilol 25 mg BID  - lasix held d/t electrolyte abnormalities
History of HTN, takes carvedilol 25 mg BID, and lasix 20 mg daily PRN at home  Normotensive  - continue carvedilol 25 mg BID  - lasix held d/t electrolyte abnormalities
Controlled   C/w home BB  Monitor per unit protocol
DVT: SCD  GI: PPI
History of HTN, takes carvedilol 25 mg BID, and lasix 20 mg daily PRN at home  Normotensive  - continue carvedilol 25 mg BID  - lasix held d/t electrolyte abnormalities
CTA of abdomen: No evidence for active GI bleed or active bowel inflammation. Large stool burden in the rectum.  Miralax and senna on hold 2/2 failing swallow eval   Dulcolax supp.   Stool count
DVT: SCD  GI: PPI
History of HTN, takes carvedilol 25 mg BID, and lasix 20 mg daily PRN at home  Normotensive  - continue carvedilol 25 mg BID  - lasix held d/t electrolyte abnormalities
DVT: SCD  GI: PPI
History of HTN, takes carvedilol 25 mg BID, and lasix 20 mg daily PRN at home  Normotensive  - continue carvedilol 25 mg BID  - lasix held d/t electrolyte abnormalities
CTA of abdomen: No evidence for active GI bleed or active bowel inflammation. Large stool burden in the rectum.  Miralax and senna on hold 2/2 failing swallow eval   Dulcolax supp.   Stool count
CTA of abdomen: No evidence for active GI bleed or active bowel inflammation. Large stool burden in the rectum.  Miralax and senna on hold 2/2 failing swallow eval   Dulcolax supp.   Stool count
DVT: SCD  GI: PPI
History of HTN, takes carvedilol 25 mg BID, and lasix 20 mg daily PRN at home  Normotensive  - continue carvedilol 25 mg BID  - lasix held d/t electrolyte abnormalities
History of HTN, takes carvedilol 25 mg BID, and lasix 20 mg daily PRN at home  Normotensive  - continue carvedilol 25 mg BID  - lasix held d/t electrolyte abnormalities

## 2024-10-10 NOTE — PROGRESS NOTE ADULT - PROBLEM SELECTOR PLAN 2
K 3.2. Ph 2.3  - replete as needed  - monitor BMP
Likely in the setting of diuresis  K2.8  Lasix 20 mg IV held  Replacement per protocol   Repeat BMP @ 1200   Check serum Mag
Likely in the setting of diuresis  K2.8  Lasix 20 mg IV held  Replacement per protocol   Repeat BMP @ 1200   Check serum Mag
-no evidence of rectal bleed found  -Hb stable at 11.4  -please monitor for active signs of bleeding  - Maintain active T&S, 2 large bore peripheral IVs, transfuse for goal hgb >7 or if symptomatic
-no evidence of rectal bleed found  -Hb stable at 11.4  -please monitor for active signs of bleeding  - Maintain active T&S, 2 large bore peripheral IVs, transfuse for goal hgb >7 or if symptomatic
Episode of melena with clots this AM.   H&H stable.  Repeat CBC  Stool count  GI consulted No plans for colonoscopy at this time given patient's respiratory status and stable H&H.   Protonix IV BID  NPO until seen by speech
K 3.2. Ph 2.3  - replete as needed  - monitor BMP
-no evidence of rectal bleed found  -Hb stable at 11.4  -please monitor for active signs of bleeding  - Maintain active T&S, 2 large bore peripheral IVs, transfuse for goal hgb >7 or if symptomatic
K 3.2. Ph 2.3  - replete as needed  - monitor BMP
-no evidence of rectal bleed found  -Hb stable at 11.4  -please monitor for active signs of bleeding  - Maintain active T&S, 2 large bore peripheral IVs, transfuse for goal hgb >7 or if symptomatic
K 3.2. Ph 2.3  - replete as needed  - monitor BMP
Likely in the setting of diuresis  K2.8  Lasix 20 mg IV held  Replacement per protocol   Repeat BMP @ 1200   Check serum Mag

## 2024-10-10 NOTE — PROGRESS NOTE ADULT - SUBJECTIVE AND OBJECTIVE BOX
PATIENT SEEN AND EXAMINED BY CAITLYN TOTH M.D. ON :- 10/10/24  DATE OF SERVICE:   10/10/24          Interim events noted,Labs ,Radiological studies and Cardiology tests reviewed .    Patient is a 79y old  Female who presents with a chief complaint of Acute respiratory distress (10 Oct 2024 11:30)      HPI:  79F with chronic hypercapnic respiratory failure (no compliant with bipap), diaphragmatic dysfunction, hypertension, arthritis, Neuralgia, osteoporotic compression fractures L5-S1, hyponatremia (HCTZ induced, corrected), goiter, recent admission to [facility] with hypercapnic resp failure 9/2024, presenting from NH with complaint of rectal bleed. As per ED attending no signs of rectal bleeding in the ED. Patient blood gas showed profound elevation in pCO2  prompting ICU consult and initiation of BIPAP. Pt is known to be noncompliant with BIPAP and is known to chronically retain CO2. ABG pH and CO2 improved on BIPAP and determined that patient did not require ICU level of care. Pt was seen at bedside, tolerating BIPAP, appears comfortable, but lethargic. Observed frequently pulling off BIPAP mask and desaturating to low 80s. No acute complaints other than feeling cold.  (03 Oct 2024 01:34)      PAST MEDICAL & SURGICAL HISTORY:  HTN (hypertension)      Lumbar neuralgia      Hyponatremia      Arthritis          PREVIOUS DIAGNOSTIC TESTING:      ECHO  FINDINGS:    STRESS  FINDINGS:    CATHETERIZATION  FINDINGS:    MEDICATIONS  (STANDING):  albuterol/ipratropium for Nebulization 3 milliLiter(s) Nebulizer every 6 hours  bisacodyl 5 milliGRAM(s) Oral at bedtime  carvedilol 25 milliGRAM(s) Oral every 12 hours  fluticasone propionate/ salmeterol 250-50 MICROgram(s) Diskus 1 Dose(s) Inhalation two times a day  pantoprazole    Tablet 40 milliGRAM(s) Oral two times a day  polyethylene glycol 3350 17 Gram(s) Oral two times a day  rosuvastatin 5 milliGRAM(s) Oral at bedtime    MEDICATIONS  (PRN):      FAMILY HISTORY:      SOCIAL HISTORY:    CIGARETTES:    ALCOHOL:    REVIEW OF SYSTEMS:  CONSTITUTIONAL: No fever, weight loss, or fatigue  EYES: No eye pain, visual disturbances, or discharge  ENMT:  No difficulty hearing, tinnitus, vertigo; No sinus or throat pain  NECK: No pain or stiffness  RESPIRATORY: No cough, wheezing, chills or hemoptysis; No shortness of breath  CARDIOVASCULAR: No chest pain, palpitations, dizziness, or leg swelling  GASTROINTESTINAL: No abdominal or epigastric pain. No nausea, vomiting, or hematemesis; No diarrhea or constipation. No melena or hematochezia.  GENITOURINARY: No dysuria, frequency, hematuria, or incontinence  NEUROLOGICAL: No headaches, memory loss, loss of strength, numbness, or tremors  SKIN: No itching, burning, rashes, or lesions   LYMPH NODES: No enlarged glands  ENDOCRINE: No heat or cold intolerance; No hair loss  MUSCULOSKELETAL: No joint pain or swelling; No muscle, back, or extremity pain  PSYCHIATRIC: No depression, anxiety, mood swings, or difficulty sleeping  HEME/LYMPH: No easy bruising, or bleeding gums  ALLERY AND IMMUNOLOGIC: No hives or eczema    Vital Signs Last 24 Hrs  T(C): 36.5 (10 Oct 2024 20:33), Max: 36.8 (10 Oct 2024 17:59)  T(F): 97.7 (10 Oct 2024 20:33), Max: 98.2 (10 Oct 2024 17:59)  HR: 96 (10 Oct 2024 20:33) (82 - 96)  BP: 91/61 (10 Oct 2024 20:33) (91/61 - 127/66)  BP(mean): 68 (10 Oct 2024 13:56) (68 - 68)  RR: 18 (10 Oct 2024 20:33) (15 - 18)  SpO2: 95% (10 Oct 2024 20:33) (94% - 98%)    Parameters below as of 10 Oct 2024 20:33  Patient On (Oxygen Delivery Method): nasal cannula  O2 Flow (L/min): 2        PHYSICAL EXAM:  GENERAL: NAD, well-groomed, well-developed  HEAD:  Atraumatic, Normocephalic  EYES: EOMI, PERRLA, conjunctiva and sclera clear  ENMT: No tonsillar erythema, exudates, or enlargement; Moist mucous membranes, Good dentition, No lesions  NECK: Supple, No JVD, Normal thyroid  NERVOUS SYSTEM:  Alert & Oriented X3, Good concentration; Motor Strength 5/5 B/L upper and lower extremities; DTRs 2+ intact and symmetric  CHEST/LUNG: Clear to percussion bilaterally; No rales, rhonchi, wheezing, or rubs  HEART: Regular rate and rhythm; No murmurs, rubs, or gallops  ABDOMEN: Soft, Nontender, Nondistended; Bowel sounds present  EXTREMITIES:  2+ Peripheral Pulses, No clubbing, cyanosis, or edema  LYMPH: No lymphadenopathy noted  SKIN: No rashes or lesions      INTERPRETATION OF TELEMETRY:    ECG:    PEARLDSVAS:     LABS:                        10.7   4.38  )-----------( 185      ( 10 Oct 2024 08:00 )             33.4     10-10    133[L]  |  90[L]  |  11  ----------------------------<  111[H]  3.5   |  38[H]  |  0.35[L]    Ca    9.0      10 Oct 2024 08:00    TPro  6.1  /  Alb  2.7[L]  /  TBili  0.5  /  DBili  x   /  AST  13  /  ALT  14  /  AlkPhos  60  10-10          Urinalysis Basic - ( 10 Oct 2024 08:00 )    Color: x / Appearance: x / SG: x / pH: x  Gluc: 111 mg/dL / Ketone: x  / Bili: x / Urobili: x   Blood: x / Protein: x / Nitrite: x   Leuk Esterase: x / RBC: x / WBC x   Sq Epi: x / Non Sq Epi: x / Bacteria: x      Lipid Panel:   I&O's Summary      RADIOLOGY & ADDITIONAL STUDIES:    TRANSTHORACIC ECHOCARDIOGRAM REPORT  ________________________________________________________________________________                                      _______       Pt. Name:       CLINTON LIU Study Date:    7/23/2024  MRN:            [id number]             YOB: 1945  Accession #:    [id number]            Age:           79 years  Account#:       [id number]           Gender:        F  Heart Rate:                          Height:        59.84 in (152.00 cm)  Rhythm:                       Weight:        140.00 lb (63.50 kg)  Blood Pressure: 154/84 mmHg          BSA/BMI:       1.60 m² / 27.49 kg/m²  ________________________________________________________________________________________  Referring Physician:    [id number] Sylvie Rosenberg  Interpreting Physician: Mary Hutchison MD  Primary Sonographer:    Dorita Arredondo RDCS    CPT:               ECHO TTE WO CON COMP W DOPP - 93990.m  Indication(s):     Heart failure, unspecified - I50.9  Procedure: Transthoracic echocardiogram with 2-D, M-mode and complete                     spectral and color flow Doppler.  Ordering Location: [facility]  Admission Status:  Inpatient  Study Information: Image quality for this study is adequate.    _______________________________________________________________________________________     CONCLUSIONS:      1. Left ventricular cavity is normal in size. Left ventricular wall thickness is normal. Left ventricular systolic function is normal with an ejection fraction of 58 % by Rod's method of disks. There are no regional wall motion abnormalities seen.   2. There is mild (grade 1) left ventricular diastolic dysfunction.   3. Normal right ventricular cavity size, with normal wall thickness, and normal right ventricular systolic function. Tricuspid annular plane systolic excursion (TAPSE) is 2.3 cm (normal >=1.7 cm).   4. Mild mitral regurgitation.   5. Normal left and right atrial size.   6. Mild tricuspid regurgitation.   7. Estimated pulmonary artery systolic pressure is 37 mmHg, consistent with mild pulmonary hypertension.   8. Mild pulmonic regurgitation.   9. There is calcification of the mitral valve annulus.  10. There is normal LV mass and concentric remodeling.  11. No pericardial effusion seen.  12. No prior echocardiogram is available for comparison.    ________________________________________________________________________________________  FINDINGS:     Left Ventricle:  The left ventricular cavity is normal in size. Left ventricular wall thickness is normal. Left ventricular systolic function is normal with a calculated ejection fraction of 58 % by the Rod's biplane method of disks. There are no regional wall motion abnormalities seen. There is mild (grade 1) left ventriculardiastolic dysfunction. There is normal LV mass and concentric remodeling.     Right Ventricle:  The right ventricular cavity is normal in size, with normal wall thickness and right ventricular systolic function is normal. Tricuspid annular plane systolic excursion (TAPSE) is 2.3 cm (normal >=1.7 cm).     Left Atrium:  The left atrium is normal in size with an indexed volume of 32 ml/m².     Right Atrium:  The right atrium is normal in size.     Interatrial Septum:  The interatrial septum appears intact.     Aortic Valve:  The aortic valve is tricuspid with normal leaflet excursion. There is no aortic valve stenosis. There is no evidence of aortic regurgitation.     Mitral Valve:  Structurally normal mitral valve with normal leaflet excursion.There is calcification of the mitral valve annulus. There is no mitral valve stenosis. There is mild mitral regurgitation.     Tricuspid Valve:  Structurally normal tricuspid valve with normal leaflet excursion. There is mild tricuspid regurgitation.Estimated pulmonary artery systolic pressure is 37 mmHg, consistent with mild pulmonary hypertension.     Pulmonic Valve:  Structurally normal pulmonic valve with normal leaflet excursion. There is mild pulmonic regurgitation.     Aorta:  The aortic root appears normal in size.     Pericardium:  No pericardial effusion seen.     ____________________________________________________________________  QUANTITATIVE DATA:  Left Ventricle Measurements: (Indexed to BSA)     IVSd (2D):   1.1 cm  LVPWd (2D):  1.0 cm  LVIDd (2D):  4.2 cm  LVIDs (2D):  3.0 cm  LV Mass:     144 g  89.9 g/m²  LV Vol d, MOD A2C: 49.9 ml 31.18 ml/m²  LV Vol d, MOD A4C: 68.9 ml 43.06 ml/m²  LV Vol d, MOD BP:  59.2 ml 36.98 ml/m²  LV Vol s, MOD A2C: 18.8 ml 11.71 ml/m²  LV Vols, MOD A4C: 32.5 ml 20.32 ml/m²  LV Vol s, MOD BP:  25.0 ml 15.62 ml/m²  LVOT SV MOD BP:    34.2 ml  LV EF% MOD BP:     58 %     MV E Vmax: 0.75 m/s  MV A Vmax: 0.84 m/s  MV E/A:    0.90  MV DT:     198 msec    Aorta Measurements: (Normal range) (Indexed to BSA)     Ao Root 3.0 cm       Left Atrium Measurements: (Indexed to BSA)  LA Diam 2D:        3.87 cm  LA Vol s, MOD A4C: 47.67 ml.  LA Vol s, MOD A2C: 52.73 ml.  LA Vol BP:         51.0 ml.  32 ml/m².         Right Ventricle Measurements:     TAPSE: 2.3 cm       LVOT / RVOT/ Qp/Qs Data: (Indexed to BSA)  LVOT Diameter:  1.93 cm  LVOT Area:      2.92 cm²  LVOT Vmax:      1.03 m/s  LVOT Vmn:       0.719 m/s  LVOT VTI:       25.55 cm  LVOT peak grad: 4 mmHg  LVOT mean grad: 2.3 mmHg  LVOT SV:       74.6 ml   46.62 ml/m²    Aortic Valve Measurements:  AV Vmax:                1.4 m/s  AV Peak Gradient:       8.0 mmHg  AV Mean Gradient:       5.0 mmHg  AV VTI:                 37.7 cm  AV VTI Ratio:           0.68  AoV EOA, Contin:        1.98 cm²  AoV EOA, Contin i:      1.24 cm²/m²  AoV Dimensionless Index 0.68    Mitral Valve Measurements:     MV E Vmax: 0.7 m/s  MV A Vmax: 0.8 m/s  MV E/A:    0.9       Tricuspid Valve Measurements:     TR Vmax:          2.9 m/s  TR Peak Gradient: 33.8 mmHg  RA Pressure:      3 mmHg  PASP:             37 mmHg    ________________________________________________________________________________________  Electronically signed on 7/24/2024 at 7:25:05 AM by Mary Hutchison MD         *** Final ***

## 2024-10-10 NOTE — PROGRESS NOTE ADULT - ASSESSMENT
80 yo F, from [facility], with pmhx of chronic hypercapnic respiratory failure, diaphragmatic dysfunction, HTN, arthritis, neuralgia, osteoporotic compression fractures L5-S1, hyponatremia, and goiter with recent admission for hypercapnic respiratory failure 09/2024, who presented with rectal bleed, no signs of rectal bleeding in ED. blood gas with profoundly elevated pCO2. Pt placed on bipap, ICU consulted, but did not require ICU admission. Pt admitted for acute on chronic hypercarbic respiratory failure. Pt with bloody stool. GI consulted, not recommending colonoscopy at this time d/t respiratory failure. Pt with no more blood BMs. PT recc CHET, pending acceptance and authorization.

## 2024-10-10 NOTE — PROGRESS NOTE ADULT - PROBLEM SELECTOR PROBLEM 2
Electrolyte imbalance
Electrolyte imbalance
Hypokalemia
Rectal bleeding
Electrolyte imbalance
Hypokalemia
Rectal bleeding
Electrolyte imbalance
Hypokalemia
Electrolyte imbalance

## 2024-10-10 NOTE — PROGRESS NOTE ADULT - PROBLEM SELECTOR PROBLEM 3
Rectal bleeding
HTN (hypertension)
HTN (hypertension)
Rectal bleeding
HTN (hypertension)
Constipation
HTN (hypertension)
Rectal bleeding

## 2024-10-10 NOTE — PROGRESS NOTE ADULT - PROBLEM SELECTOR PROBLEM 5
HTN (hypertension)
HLD (hyperlipidemia)
HTN (hypertension)
HLD (hyperlipidemia)
HTN (hypertension)
Prophylactic measure
HLD (hyperlipidemia)

## 2024-10-10 NOTE — PROGRESS NOTE ADULT - SUBJECTIVE AND OBJECTIVE BOX
Time of Visit:  Patient seen and examined.     MEDICATIONS  (STANDING):  albuterol/ipratropium for Nebulization 3 milliLiter(s) Nebulizer every 6 hours  bisacodyl 5 milliGRAM(s) Oral at bedtime  carvedilol 25 milliGRAM(s) Oral every 12 hours  fluticasone propionate/ salmeterol 250-50 MICROgram(s) Diskus 1 Dose(s) Inhalation two times a day  pantoprazole    Tablet 40 milliGRAM(s) Oral two times a day  polyethylene glycol 3350 17 Gram(s) Oral two times a day  rosuvastatin 5 milliGRAM(s) Oral at bedtime      MEDICATIONS  (PRN):       Medications up to date at time of exam.      PHYSICAL EXAMINATION:    Vital Signs Last 24 Hrs  T(C): 36.8 (10 Oct 2024 17:59), Max: 36.8 (10 Oct 2024 17:59)  T(F): 98.2 (10 Oct 2024 17:59), Max: 98.2 (10 Oct 2024 17:59)  HR: 93 (10 Oct 2024 17:59) (82 - 93)  BP: 127/66 (10 Oct 2024 17:59) (103/56 - 127/66)  BP(mean): 68 (10 Oct 2024 13:56) (68 - 68)  RR: 18 (10 Oct 2024 17:59) (15 - 18)  SpO2: 98% (10 Oct 2024 17:59) (94% - 98%)    Parameters below as of 10 Oct 2024 17:59  Patient On (Oxygen Delivery Method): nasal cannula  O2 Flow (L/min): 2     (if applicable)    General: Alert and oriented. No acute distress.     HEENT: Head is normocephalic and atraumatic. Extraocular muscles are intact. Mucous membranes are moist.     NECK: Supple, no palpable adenopathy.    LUNGS: Fair air entrance . Non labored. No wheezing. No use of accessory muscle.     HEART: S1 S2 Regular rate and rhythm .    ABDOMEN: Soft, nontender, and nondistended. Active bowel sounds.     EXTREMITIES: Without any cyanosis, clubbing, rash, lesions or edema.    NEUROLOGIC: Awake, alert, oriented.     SKIN: Warm, dry, good turgor.      LABS:                        10.7   4.38  )-----------( 185      ( 10 Oct 2024 08:00 )             33.4     10-10    133[L]  |  90[L]  |  11  ----------------------------<  111[H]  3.5   |  38[H]  |  0.35[L]    Ca    9.0      10 Oct 2024 08:00    TPro  6.1  /  Alb  2.7[L]  /  TBili  0.5  /  DBili  x   /  AST  13  /  ALT  14  /  AlkPhos  60  10-10      Urinalysis Basic - ( 10 Oct 2024 08:00 )    Color: x / Appearance: x / SG: x / pH: x  Gluc: 111 mg/dL / Ketone: x  / Bili: x / Urobili: x   Blood: x / Protein: x / Nitrite: x   Leuk Esterase: x / RBC: x / WBC x   Sq Epi: x / Non Sq Epi: x / Bacteria: x      MICROBIOLOGY: (if applicable)    RADIOLOGY & ADDITIONAL STUDIES:  EKG:   CXR: < from: CT Chest w/ IV Cont (10.02.24 @ 21:28) >    ACC: [id number] EXAM:  CT ANGIO ABD PELV (W)AW IC   ORDERED BY: RADHA SAWANT     ACC: [id number] EXAM:  CT CHEST IC   ORDERED BY: RADHA SAWATN     PROCEDURE DATE:  10/02/2024          INTERPRETATION:  CLINICAL INFORMATION: Shortness of breath. GI bleed.    COMPARISON: CTA chest 9/14/2024.    CONTRAST/COMPLICATIONS:  IV Contrast: Omnipaque 350 (accession [id number]), IV contrast documented   in unlinked concurrent exam (accession [id number])  90 cc administered   10   cc discarded  Oral Contrast: NONE  Complications: None reported at time of study completion    PROCEDURE:  CT of the Chest, Abdomen and Pelvis was performed.  Precontrast, Arterial and Delayed phases were acquired though the abdomen.  Sagittal and coronal reformats were performed.    FINDINGS:  CHEST:  LUNGS AND LARGE AIRWAYS: Patent central airways. Stable biapical apparent   midline consolidative opacities. Stable atelectasis of the basilar   segments of the left lower lobe.. Mild right basilar dependent   atelectasis.  PLEURA:Trace bilateral pleural effusions. Elevated left diaphragm.  VESSELS: Aortic calcifications. Coronary artery calcifications.  HEART: Cardiomegaly. No pericardial effusion.  MEDIASTINUM AND LESLIE: No lymphadenopathy.  CHEST WALL AND LOWER NECK: Stable diffuse thyromegaly..    ABDOMEN AND PELVIS:  LIVER: Within normal limits.  BILE DUCTS: Normal caliber.  GALLBLADDER: Within normal limits.  SPLEEN: Within normal limits.  PANCREAS: Within normal limits.  ADRENALS: Within normal limits.  KIDNEYS/URETERS: Subcentimeter indeterminate hypodense left renal lesion   is too small to characterize. Symmetric renal enhancement without   hydronephrosis. BLADDER: Distended to above the level of the umbilicus..  REPRODUCTIVE ORGANS: Fibroid uterus.    BOWEL: Rectal distention with stool. No evidence for active GI bleed. No   bowel obstruction or evidence of active bowel inflammation. Appendix is   not visualized. No evidence of inflammation in the pericecal region.  PERITONEUM/RETROPERITONEUM: Mild presacral edema..  VESSELS: Within normal limits.  LYMPH NODES: No lymphadenopathy.  ABDOMINAL WALL: Within normal limits.  BONES: DJD of the glenohumeral joints, left greater than right.   Degenerative changes of the spine. L2 kyphoplasty with cement material   noted in the canal at this level, unchanged. Additional multilevel   compression deformities are stable involving mid thoracic to distal   lumbar vertebral bodies..    IMPRESSION:  Stable left basilar atelectasis. Superimposed pneumonia is not excluded.   Stable biapical consolidative opacities that may represent scarring.   Consider short interval follow-up CT.    No evidence for active GI bleed or active bowel inflammation.    Large stool burden in the rectum.        --- End of Report ---            DA ALVARES MD; Attending Radiologist  This document has been electronically signed. Oct  2 2024 10:10PM    < end of copied text >    ECHO:    IMPRESSION: 79y Female PAST MEDICAL & SURGICAL HISTORY:  HTN (hypertension)      Lumbar neuralgia      Hyponatremia      Arthritis       Impression: This is an 78 Y/O Female from [facility] presented to ED with Rectal Bleed . Had a recent admission here at [facility] due to Hypercapnia Respiratory Failure in 09/ 2024 . Patient blood gas showed profound elevation in PCO2 ( prompted to ICU  and need initiation of Bipap . Patient is known CO2 retainer due to Non compliance to Bipap. For Pulmonary follow up of Acute on chronic hypercapnic respiratory failure . Has stable left basilar atelectasis on CT chest  .     Suggestion:   - O2 saturation 96% with O 2 supplement. Continue Oxygen supplementation 2L NC when off Bipap.    - Continue Bipap 13/5 FIO2 40% at Night . was on standby last night , pt non compliant to Bipap .   - Continue Duoneb via nebulization Q 6 Hours .   - Continue   Advair 250- 50 mcg Twice Daily  .              - Pulmonary oral hygiene care especially every after oral inhaler used.  - Offered incentive spirometer but pt refused .

## 2024-10-10 NOTE — PROGRESS NOTE ADULT - PROVIDER SPECIALTY LIST ADULT
Internal Medicine
Pulmonology
Cardiology
Internal Medicine
Internal Medicine
Pulmonology
Cardiology
Cardiology
Internal Medicine
Cardiology
Internal Medicine
Cardiology
Internal Medicine
Cardiology
Internal Medicine
Internal Medicine
Cardiology

## 2024-10-10 NOTE — PROGRESS NOTE ADULT - PROBLEM SELECTOR PLAN 7
BiPAP Q HS and PRN  GI consulted>>no intervention planned at this time  CC consulted >> no plans for transfer to ICU  ABG  Potassium replacement   Monitor for rectal bleed
- from [facility]  - PT recc CHET  - pending acceptance and auth
BiPAP Q HS and PRN  GI consulted>>no intervention planned at this time  CC consulted >> no plans for transfer to ICU  ABG  Potassium replacement   Monitor for rectal bleed
- from [facility] NH  - PT recc CHET  - pending acceptance and auth
- from [facility]  - PT rec CHET  - Son by the bedside is not accepting discharge unless neurology sees the patient.
- from [facility]  - PT recc CHET  - pending acceptance and auth
- from [facility]  - PT recc CHET  - pending acceptance and auth
d/c planning
BiPAP Q HS and PRN  GI consulted>>no intervention planned at this time  CC consulted >> no plans for transfer to ICU  ABG  Potassium replacement   Monitor for rectal bleed
- from [facility] NH  - PT recc CHET  - pending acceptance and auth

## 2024-10-15 PROCEDURE — 97530 THERAPEUTIC ACTIVITIES: CPT

## 2024-10-15 PROCEDURE — 94150 VITAL CAPACITY TEST: CPT

## 2024-10-15 PROCEDURE — 82570 ASSAY OF URINE CREATININE: CPT

## 2024-10-15 PROCEDURE — 82746 ASSAY OF FOLIC ACID SERUM: CPT

## 2024-10-15 PROCEDURE — 85027 COMPLETE CBC AUTOMATED: CPT

## 2024-10-15 PROCEDURE — 87899 AGENT NOS ASSAY W/OPTIC: CPT

## 2024-10-15 PROCEDURE — 84439 ASSAY OF FREE THYROXINE: CPT

## 2024-10-15 PROCEDURE — 93005 ELECTROCARDIOGRAM TRACING: CPT

## 2024-10-15 PROCEDURE — 80048 BASIC METABOLIC PNL TOTAL CA: CPT

## 2024-10-15 PROCEDURE — 83735 ASSAY OF MAGNESIUM: CPT

## 2024-10-15 PROCEDURE — 85652 RBC SED RATE AUTOMATED: CPT

## 2024-10-15 PROCEDURE — 94660 CPAP INITIATION&MGMT: CPT

## 2024-10-15 PROCEDURE — 70450 CT HEAD/BRAIN W/O DYE: CPT | Mod: MC

## 2024-10-15 PROCEDURE — 84295 ASSAY OF SERUM SODIUM: CPT

## 2024-10-15 PROCEDURE — 81001 URINALYSIS AUTO W/SCOPE: CPT

## 2024-10-15 PROCEDURE — 86140 C-REACTIVE PROTEIN: CPT

## 2024-10-15 PROCEDURE — 93970 EXTREMITY STUDY: CPT

## 2024-10-15 PROCEDURE — 87635 SARS-COV-2 COVID-19 AMP PRB: CPT

## 2024-10-15 PROCEDURE — 84630 ASSAY OF ZINC: CPT

## 2024-10-15 PROCEDURE — 99285 EMERGENCY DEPT VISIT HI MDM: CPT

## 2024-10-15 PROCEDURE — 87086 URINE CULTURE/COLONY COUNT: CPT

## 2024-10-15 PROCEDURE — 71275 CT ANGIOGRAPHY CHEST: CPT | Mod: MC

## 2024-10-15 PROCEDURE — 84443 ASSAY THYROID STIM HORMONE: CPT

## 2024-10-15 PROCEDURE — 83090 ASSAY OF HOMOCYSTEINE: CPT

## 2024-10-15 PROCEDURE — 92526 ORAL FUNCTION THERAPY: CPT

## 2024-10-15 PROCEDURE — 84300 ASSAY OF URINE SODIUM: CPT

## 2024-10-15 PROCEDURE — 86780 TREPONEMA PALLIDUM: CPT

## 2024-10-15 PROCEDURE — 85730 THROMBOPLASTIN TIME PARTIAL: CPT

## 2024-10-15 PROCEDURE — 86431 RHEUMATOID FACTOR QUANT: CPT

## 2024-10-15 PROCEDURE — 84550 ASSAY OF BLOOD/URIC ACID: CPT

## 2024-10-15 PROCEDURE — 97116 GAIT TRAINING THERAPY: CPT

## 2024-10-15 PROCEDURE — 87449 NOS EACH ORGANISM AG IA: CPT

## 2024-10-15 PROCEDURE — 84484 ASSAY OF TROPONIN QUANT: CPT

## 2024-10-15 PROCEDURE — 83880 ASSAY OF NATRIURETIC PEPTIDE: CPT

## 2024-10-15 PROCEDURE — 73030 X-RAY EXAM OF SHOULDER: CPT

## 2024-10-15 PROCEDURE — 94640 AIRWAY INHALATION TREATMENT: CPT

## 2024-10-15 PROCEDURE — 97162 PT EVAL MOD COMPLEX 30 MIN: CPT

## 2024-10-15 PROCEDURE — 86738 MYCOPLASMA ANTIBODY: CPT

## 2024-10-15 PROCEDURE — 82962 GLUCOSE BLOOD TEST: CPT

## 2024-10-15 PROCEDURE — 92610 EVALUATE SWALLOWING FUNCTION: CPT

## 2024-10-15 PROCEDURE — 85025 COMPLETE CBC W/AUTO DIFF WBC: CPT

## 2024-10-15 PROCEDURE — 80053 COMPREHEN METABOLIC PANEL: CPT

## 2024-10-15 PROCEDURE — 87040 BLOOD CULTURE FOR BACTERIA: CPT

## 2024-10-15 PROCEDURE — 83605 ASSAY OF LACTIC ACID: CPT

## 2024-10-15 PROCEDURE — 87640 STAPH A DNA AMP PROBE: CPT

## 2024-10-15 PROCEDURE — 84100 ASSAY OF PHOSPHORUS: CPT

## 2024-10-15 PROCEDURE — 82525 ASSAY OF COPPER: CPT

## 2024-10-15 PROCEDURE — 87389 HIV-1 AG W/HIV-1&-2 AB AG IA: CPT

## 2024-10-15 PROCEDURE — 71045 X-RAY EXAM CHEST 1 VIEW: CPT

## 2024-10-15 PROCEDURE — 87641 MR-STAPH DNA AMP PROBE: CPT

## 2024-10-15 PROCEDURE — 83921 ORGANIC ACID SINGLE QUANT: CPT

## 2024-10-15 PROCEDURE — 83930 ASSAY OF BLOOD OSMOLALITY: CPT

## 2024-10-15 PROCEDURE — 82607 VITAMIN B-12: CPT

## 2024-10-15 PROCEDURE — 83935 ASSAY OF URINE OSMOLALITY: CPT

## 2024-10-15 PROCEDURE — 36415 COLL VENOUS BLD VENIPUNCTURE: CPT

## 2024-10-15 PROCEDURE — 82803 BLOOD GASES ANY COMBINATION: CPT

## 2024-10-15 PROCEDURE — 82330 ASSAY OF CALCIUM: CPT

## 2024-10-15 PROCEDURE — 84132 ASSAY OF SERUM POTASSIUM: CPT

## 2024-10-15 PROCEDURE — 85610 PROTHROMBIN TIME: CPT

## 2024-10-15 PROCEDURE — 80061 LIPID PANEL: CPT

## 2024-10-15 PROCEDURE — 86200 CCP ANTIBODY: CPT

## 2024-11-26 PROCEDURE — 36415 COLL VENOUS BLD VENIPUNCTURE: CPT

## 2024-11-26 PROCEDURE — 85025 COMPLETE CBC W/AUTO DIFF WBC: CPT

## 2024-11-26 PROCEDURE — 94640 AIRWAY INHALATION TREATMENT: CPT

## 2024-11-26 PROCEDURE — 85730 THROMBOPLASTIN TIME PARTIAL: CPT

## 2024-11-26 PROCEDURE — 86901 BLOOD TYPING SEROLOGIC RH(D): CPT

## 2024-11-26 PROCEDURE — 83735 ASSAY OF MAGNESIUM: CPT

## 2024-11-26 PROCEDURE — 83690 ASSAY OF LIPASE: CPT

## 2024-11-26 PROCEDURE — 71260 CT THORAX DX C+: CPT | Mod: MC

## 2024-11-26 PROCEDURE — 80048 BASIC METABOLIC PNL TOTAL CA: CPT

## 2024-11-26 PROCEDURE — 87640 STAPH A DNA AMP PROBE: CPT

## 2024-11-26 PROCEDURE — 92526 ORAL FUNCTION THERAPY: CPT

## 2024-11-26 PROCEDURE — 97116 GAIT TRAINING THERAPY: CPT

## 2024-11-26 PROCEDURE — 99285 EMERGENCY DEPT VISIT HI MDM: CPT

## 2024-11-26 PROCEDURE — 84132 ASSAY OF SERUM POTASSIUM: CPT

## 2024-11-26 PROCEDURE — 82962 GLUCOSE BLOOD TEST: CPT

## 2024-11-26 PROCEDURE — 87641 MR-STAPH DNA AMP PROBE: CPT

## 2024-11-26 PROCEDURE — 94660 CPAP INITIATION&MGMT: CPT

## 2024-11-26 PROCEDURE — 86850 RBC ANTIBODY SCREEN: CPT

## 2024-11-26 PROCEDURE — 97110 THERAPEUTIC EXERCISES: CPT

## 2024-11-26 PROCEDURE — 85027 COMPLETE CBC AUTOMATED: CPT

## 2024-11-26 PROCEDURE — 82803 BLOOD GASES ANY COMBINATION: CPT

## 2024-11-26 PROCEDURE — 71045 X-RAY EXAM CHEST 1 VIEW: CPT

## 2024-11-26 PROCEDURE — 84295 ASSAY OF SERUM SODIUM: CPT

## 2024-11-26 PROCEDURE — 84100 ASSAY OF PHOSPHORUS: CPT

## 2024-11-26 PROCEDURE — 83605 ASSAY OF LACTIC ACID: CPT

## 2024-11-26 PROCEDURE — 85610 PROTHROMBIN TIME: CPT

## 2024-11-26 PROCEDURE — 80053 COMPREHEN METABOLIC PANEL: CPT

## 2024-11-26 PROCEDURE — 97530 THERAPEUTIC ACTIVITIES: CPT

## 2024-11-26 PROCEDURE — 86900 BLOOD TYPING SEROLOGIC ABO: CPT

## 2024-11-26 PROCEDURE — 92610 EVALUATE SWALLOWING FUNCTION: CPT

## 2024-11-26 PROCEDURE — 74174 CTA ABD&PLVS W/CONTRAST: CPT | Mod: MC

## 2024-12-25 PROBLEM — F10.90 ALCOHOL USE: Status: INACTIVE | Noted: 2021-12-09

## 2025-01-07 ENCOUNTER — APPOINTMENT (OUTPATIENT)
Dept: NEUROLOGY | Facility: CLINIC | Age: 80
End: 2025-01-07

## 2025-01-07 DIAGNOSIS — J96.92 RESPIRATORY FAILURE, UNSPECIFIED WITH HYPERCAPNIA: ICD-10-CM

## 2025-01-07 DIAGNOSIS — M79.2 NEURALGIA AND NEURITIS, UNSPECIFIED: ICD-10-CM

## 2025-01-07 DIAGNOSIS — J98.6: ICD-10-CM

## 2025-01-07 DIAGNOSIS — Z86.79 PERSONAL HISTORY OF OTHER DISEASES OF THE CIRCULATORY SYSTEM: ICD-10-CM

## 2025-01-07 DIAGNOSIS — M80.88XA OTHER OSTEOPOROSIS WITH CURRENT PATHOLOGICAL FRACTURE, VERTEBRA(E), INITIAL ENCOUNTER FOR FRACTURE: ICD-10-CM

## 2025-01-07 DIAGNOSIS — J95.859: ICD-10-CM

## 2025-04-30 NOTE — PHYSICAL THERAPY INITIAL EVALUATION ADULT - DIAGNOSIS, PT EVAL
Patient presents with generalized weakness, impaired balance and was unsteady with transfers and ambulation
HTN (hypertension)

## 2025-05-12 NOTE — H&P ADULT - NSHPSOURCEINFORD_GEN_ALL_CORE
Patient/Child

## 2025-06-06 NOTE — ED ADULT NURSE NOTE - CAS EDN DISCHARGE INTERVENTIONS
Documents were faxed to [place] per patient's wife.   
Non establish patient has questions regarding what documents are needed prior to appointment with Dr. Rapp 7/22/25. Please reach out to patient at [phone].    Thank you  
Patient's wife sent the documents through her chart. Writer asked that she send them through the patient's [employer] [facility].   
IV intact

## 2025-06-26 NOTE — PHYSICAL THERAPY INITIAL EVALUATION ADULT - MUSCLE TONE ASSESSMENT, REHAB EVAL
Addended by: NICHOLAS GREY on: 6/26/2025 07:35 AM     Modules accepted: Orders     bilateral upper extremities/bilateral lower extremities/normal